# Patient Record
Sex: MALE | Race: WHITE | NOT HISPANIC OR LATINO | Employment: OTHER | URBAN - METROPOLITAN AREA
[De-identification: names, ages, dates, MRNs, and addresses within clinical notes are randomized per-mention and may not be internally consistent; named-entity substitution may affect disease eponyms.]

---

## 2019-05-23 ENCOUNTER — APPOINTMENT (OUTPATIENT)
Dept: CT IMAGING | Facility: CLINIC | Age: 69
End: 2019-05-23
Attending: EMERGENCY MEDICINE
Payer: COMMERCIAL

## 2019-05-23 ENCOUNTER — HOSPITAL ENCOUNTER (EMERGENCY)
Facility: CLINIC | Age: 69
Discharge: HOME | End: 2019-05-23
Attending: EMERGENCY MEDICINE | Admitting: EMERGENCY MEDICINE
Payer: COMMERCIAL

## 2019-05-23 VITALS
HEART RATE: 77 BPM | HEIGHT: 74 IN | BODY MASS INDEX: 36.57 KG/M2 | WEIGHT: 285 LBS | TEMPERATURE: 98.1 F | RESPIRATION RATE: 18 BRPM | DIASTOLIC BLOOD PRESSURE: 88 MMHG | SYSTOLIC BLOOD PRESSURE: 138 MMHG | OXYGEN SATURATION: 98 %

## 2019-05-23 DIAGNOSIS — R55 VASOVAGAL SYNCOPE: ICD-10-CM

## 2019-05-23 LAB
ANION GAP SERPL CALCULATED.3IONS-SCNC: 7 MMOL/L (ref 3–14)
BASOPHILS # BLD AUTO: 0 10E9/L (ref 0–0.2)
BASOPHILS NFR BLD AUTO: 0.4 %
BUN SERPL-MCNC: 11 MG/DL (ref 7–30)
CALCIUM SERPL-MCNC: 8.5 MG/DL (ref 8.5–10.1)
CHLORIDE SERPL-SCNC: 105 MMOL/L (ref 94–109)
CO2 SERPL-SCNC: 28 MMOL/L (ref 20–32)
CREAT SERPL-MCNC: 0.97 MG/DL (ref 0.66–1.25)
DIFFERENTIAL METHOD BLD: ABNORMAL
EOSINOPHIL # BLD AUTO: 0.1 10E9/L (ref 0–0.7)
EOSINOPHIL NFR BLD AUTO: 1.1 %
ERYTHROCYTE [DISTWIDTH] IN BLOOD BY AUTOMATED COUNT: 12.9 % (ref 10–15)
GFR SERPL CREATININE-BSD FRML MDRD: 79 ML/MIN/{1.73_M2}
GLUCOSE SERPL-MCNC: 104 MG/DL (ref 70–99)
HCT VFR BLD AUTO: 44.1 % (ref 40–53)
HGB BLD-MCNC: 15.7 G/DL (ref 13.3–17.7)
IMM GRANULOCYTES # BLD: 0 10E9/L (ref 0–0.4)
IMM GRANULOCYTES NFR BLD: 0.2 %
INR PPP: 1.9 (ref 0.86–1.14)
INTERPRETATION ECG - MUSE: NORMAL
LYMPHOCYTES # BLD AUTO: 1.3 10E9/L (ref 0.8–5.3)
LYMPHOCYTES NFR BLD AUTO: 23.4 %
MCH RBC QN AUTO: 31.8 PG (ref 26.5–33)
MCHC RBC AUTO-ENTMCNC: 35.6 G/DL (ref 31.5–36.5)
MCV RBC AUTO: 90 FL (ref 78–100)
MONOCYTES # BLD AUTO: 0.5 10E9/L (ref 0–1.3)
MONOCYTES NFR BLD AUTO: 9.9 %
NEUTROPHILS # BLD AUTO: 3.5 10E9/L (ref 1.6–8.3)
NEUTROPHILS NFR BLD AUTO: 65 %
NRBC # BLD AUTO: 0 10*3/UL
NRBC BLD AUTO-RTO: 0 /100
PLATELET # BLD AUTO: 139 10E9/L (ref 150–450)
POTASSIUM SERPL-SCNC: 4.1 MMOL/L (ref 3.4–5.3)
RBC # BLD AUTO: 4.93 10E12/L (ref 4.4–5.9)
SODIUM SERPL-SCNC: 140 MMOL/L (ref 133–144)
WBC # BLD AUTO: 5.3 10E9/L (ref 4–11)

## 2019-05-23 PROCEDURE — 93005 ELECTROCARDIOGRAM TRACING: CPT

## 2019-05-23 PROCEDURE — 80048 BASIC METABOLIC PNL TOTAL CA: CPT | Performed by: EMERGENCY MEDICINE

## 2019-05-23 PROCEDURE — 70450 CT HEAD/BRAIN W/O DYE: CPT

## 2019-05-23 PROCEDURE — 85610 PROTHROMBIN TIME: CPT | Performed by: EMERGENCY MEDICINE

## 2019-05-23 PROCEDURE — 99285 EMERGENCY DEPT VISIT HI MDM: CPT | Mod: 25

## 2019-05-23 PROCEDURE — 85025 COMPLETE CBC W/AUTO DIFF WBC: CPT | Performed by: EMERGENCY MEDICINE

## 2019-05-23 RX ORDER — WARFARIN SODIUM 1 MG/1
1 TABLET ORAL DAILY
COMMUNITY
End: 2020-09-13

## 2019-05-23 SDOH — HEALTH STABILITY: MENTAL HEALTH: HOW OFTEN DO YOU HAVE A DRINK CONTAINING ALCOHOL?: NEVER

## 2019-05-23 ASSESSMENT — ENCOUNTER SYMPTOMS: NAUSEA: 1

## 2019-05-23 ASSESSMENT — MIFFLIN-ST. JEOR: SCORE: 2122.5

## 2019-05-23 NOTE — ED AVS SNAPSHOT
Emergency Department  [address] [place] [address]  Phone:  [phone]  Fax:  [phone]                                    Cameron Barnard   MRN: [id number]    Department:   Emergency Department   Date of Visit:  5/23/2019           After Visit Summary Signature Page    I have received my discharge instructions, and my questions have been answered. I have discussed any challenges I see with this plan with the nurse or doctor.    ..........................................................................................................................................  Patient/Patient Representative Signature      ..........................................................................................................................................  Patient Representative Print Name and Relationship to Patient    ..................................................               ................................................  Date                                   Time    ..........................................................................................................................................  Reviewed by Signature/Title    ...................................................              ..............................................  Date                                               Time          22EPIC Rev 08/18

## 2019-05-23 NOTE — ED PROVIDER NOTES
"  History     Chief Complaint:  Loss of consciousness     HPI   Cameron Barnard is a 69 year old male who is on Coumadin and presents with a loss of consciousness. The patient was at [employer] and had just finished putting landscape rock onto his cart when he felt nauseas and experienced a loss of consciousness. he did not hit his head. He did go to the bathroom before leaving with the ambulance. He did have 3 cups of caffeinated coffee prior to visiting [employer] which he does not usually have as he drinks decaffeinated. He also hadn't taken his antihistamine for allergies today. Blood pressure was noticed to be 160 systolic per medics. Due to concern, the patient has been taken to the ED for evaluation and treatment via ambulance. Upon arrival, the patient's wife states he looked pale and sweaty this morning. EMS noticed color improvement here in the ED. The patient denies any chest pain. 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     Allergies:  Wheat     Medications:    Coumadin   Zyrtec     Past Medical History:    Primary Osteoarthritis  Deep vein thrombosis  Pulmonary embolism  Gout  Ascending aorta dilation    Past Surgical History:    The patient does not have any pertinent past surgical history.    Family History:    No past pertinent family history.    Social History:  Marital Status:  Single   Smoker:   No   Smokeless:   No   Alcohol:   Yes, occasional  Drugs:   No   Arrives with:   Wife    Review of Systems   Cardiovascular: Negative for chest pain.   Gastrointestinal: Positive for nausea.   Neurological: Positive for syncope.   All other systems reviewed and are negative.    Physical Exam     Patient Vitals for the past 24 hrs:   BP Temp Pulse Resp SpO2 Height Weight   05/23/19 1200 138/88 -- -- -- 98 % -- --   05/23/19 1100 157/88 -- -- -- 97 % -- --   05/23/19 1038 (!) 167/102 98.1  F (36.7  C) 77 18 98 % 1.88 m (6' 2\") 129.3 kg (285 lb)     Physical " Exam  GENERAL: well developed, pleasant  HEAD: atraumatic  EYES: pupils reactive, extraocular muscles intact, conjunctivae normal  ENT:  mucus membranes moist  NECK:  trachea midline, normal range of motion  RESPIRATORY: no tachypnea, breath sounds clear to auscultation   CVS: normal S1/S2, no murmurs, intact distal pulses  ABDOMEN: soft, nontender, nondistention  MUSCULOSKELETAL: no deformities  SKIN: warm and dry, no acute rashes or ulceration  NEURO: GCS 15, cranial nerves intact, alert and oriented x3  PSYCH:  Mood/affect normal    Emergency Department Course   ECG:  Indication: loss of consciousness  Time: 1103  Vent. Rate 70 bpm. NY interval 218. QRS duration 104. QT/QTc 404/436. P-R-T axis 42 -49 5. Sinus rhythm with 1st degree AV block. Incomplete right bundle branch block. Left anterior fascicular block. Minimal voltage criteria for LVH, may be normal variant. Cannot rule out anterior infarct, age undetermined. Abnormal ECG. Read time: 1119    Imaging:  Radiographic findings were communicated with the patient and family who voiced understanding of the findings.    CT Head without contrast:   No evidence of acute intracranial hemorrhage, mass, or  herniation. as per radiology.    Laboratory:  INR: 1.90  BMP: Glucose 104, o/w WNL (Creatinine: 0.97)  CBC: WBC: 5.3, HGB: 15.7, PLT: 139    Emergency Department Course:  1035 I performed an exam of the patient as documented above.   1158 I rechecked the patient and discussed the results of their workup thus far.     Findings and plan explained. Patient discharged home with instructions regarding supportive care, medications, and reasons to return. The importance of close follow-up was reviewed. I personally reviewed the laboratory results with them and answered all related questions prior to discharge.    Impression & Plan    Medical Decision Making:  The patient presents with syncope.  He notes that he was feeling nauseated and he frequently does feel this way  and had 3 cups of coffee today.  He was lifting six 50 pound bags of rocks and started feeling symptoms of lightheadedness.  The wooziness as he describes it intensified as did the lightheadedness and he felt it was due to the fact that he had not had his Zyrtec today and then had syncope.  Is not clear if he had head trauma.    Orthostatics were unrevealing. The patient's CT, labs, and EKG are unrevealing.  Certainly sounds vasovagal in nature as he has had prior episode of syncope in the past due to a heat exposure.  Possibly related to his nausea and coffee intake compounded with exertion of heavy lifting.  Discussed following up with his primary care doctor and avoiding heavy lifting today.  Should return if worsening symptoms.    Diagnosis:    ICD-10-CM    1. Vasovagal syncope R55      Disposition:  discharged to home    Scribe Disclosure:  Neville DIAMOND, am serving as a scribe on 5/23/2019 at 10:35 AM to personally document services performed by Dirk Benavides MD based on my observations and the provider's statements to me.     Neville Moss  5/23/2019    EMERGENCY DEPARTMENT       Dirk Benavides MD  05/28/19 0879

## 2019-05-23 NOTE — ED NOTES
Bed: ED27  Expected date:   Expected time:   Means of arrival:   Comments:  [facility] [place] - 428 - 69M syncope

## 2020-09-13 ENCOUNTER — APPOINTMENT (OUTPATIENT)
Dept: CT IMAGING | Facility: CLINIC | Age: 70
DRG: 390 | End: 2020-09-13
Attending: NURSE PRACTITIONER
Payer: COMMERCIAL

## 2020-09-13 ENCOUNTER — HOSPITAL ENCOUNTER (INPATIENT)
Facility: CLINIC | Age: 70
LOS: 7 days | Discharge: HOME | DRG: 390 | End: 2020-09-20
Attending: NURSE PRACTITIONER | Admitting: INTERNAL MEDICINE
Payer: COMMERCIAL

## 2020-09-13 DIAGNOSIS — R79.1 SUPRATHERAPEUTIC INR: ICD-10-CM

## 2020-09-13 DIAGNOSIS — K57.10: ICD-10-CM

## 2020-09-13 DIAGNOSIS — R31.9 HEMATURIA: ICD-10-CM

## 2020-09-13 DIAGNOSIS — R17 ELEVATED BILIRUBIN: ICD-10-CM

## 2020-09-13 DIAGNOSIS — K56.609 SMALL BOWEL OBSTRUCTION (H): ICD-10-CM

## 2020-09-13 PROBLEM — I82.412: Status: ACTIVE | Noted: 2017-05-01

## 2020-09-13 PROBLEM — I77.810 ASCENDING AORTA DILATION (H): Status: ACTIVE | Noted: 2017-05-01

## 2020-09-13 PROBLEM — M17.0 PRIMARY OSTEOARTHRITIS OF BOTH KNEES: Status: ACTIVE | Noted: 2019-03-12

## 2020-09-13 PROBLEM — K57.92 DIVERTICULITIS: Status: ACTIVE | Noted: 2020-09-13

## 2020-09-13 PROBLEM — Z79.01 ANTICOAGULATION MONITORING, INR RANGE 2-3: Status: ACTIVE | Noted: 2017-05-25

## 2020-09-13 LAB
ALBUMIN SERPL-MCNC: 3.8 G/DL (ref 3.4–5)
ALBUMIN UR-MCNC: 30 MG/DL
ALP SERPL-CCNC: 70 U/L (ref 40–150)
ALT SERPL W P-5'-P-CCNC: 32 U/L (ref 0–70)
ANION GAP SERPL CALCULATED.3IONS-SCNC: 6 MMOL/L (ref 3–14)
APPEARANCE UR: CLEAR
AST SERPL W P-5'-P-CCNC: 25 U/L (ref 0–45)
BASOPHILS # BLD AUTO: 0 10E9/L (ref 0–0.2)
BASOPHILS NFR BLD AUTO: 0 %
BILIRUB SERPL-MCNC: 2.1 MG/DL (ref 0.2–1.3)
BILIRUB UR QL STRIP: NEGATIVE
BUN SERPL-MCNC: 18 MG/DL (ref 7–30)
CALCIUM SERPL-MCNC: 9 MG/DL (ref 8.5–10.1)
CHLORIDE SERPL-SCNC: 103 MMOL/L (ref 94–109)
CO2 SERPL-SCNC: 27 MMOL/L (ref 20–32)
COLOR UR AUTO: YELLOW
CREAT SERPL-MCNC: 0.92 MG/DL (ref 0.66–1.25)
DIFFERENTIAL METHOD BLD: ABNORMAL
EOSINOPHIL # BLD AUTO: 0 10E9/L (ref 0–0.7)
EOSINOPHIL NFR BLD AUTO: 0 %
ERYTHROCYTE [DISTWIDTH] IN BLOOD BY AUTOMATED COUNT: 13.1 % (ref 10–15)
GFR SERPL CREATININE-BSD FRML MDRD: 83 ML/MIN/{1.73_M2}
GLUCOSE SERPL-MCNC: 131 MG/DL (ref 70–99)
GLUCOSE UR STRIP-MCNC: NEGATIVE MG/DL
HCT VFR BLD AUTO: 48.5 % (ref 40–53)
HGB BLD-MCNC: 17.2 G/DL (ref 13.3–17.7)
HGB UR QL STRIP: ABNORMAL
IMM GRANULOCYTES # BLD: 0.1 10E9/L (ref 0–0.4)
IMM GRANULOCYTES NFR BLD: 0.3 %
INR PPP: 3.97 (ref 0.86–1.14)
KETONES UR STRIP-MCNC: 10 MG/DL
LACTATE BLD-SCNC: 2.2 MMOL/L (ref 0.7–2)
LEUKOCYTE ESTERASE UR QL STRIP: NEGATIVE
LIPASE SERPL-CCNC: 86 U/L (ref 73–393)
LYMPHOCYTES # BLD AUTO: 1.6 10E9/L (ref 0.8–5.3)
LYMPHOCYTES NFR BLD AUTO: 7.4 %
MCH RBC QN AUTO: 32.1 PG (ref 26.5–33)
MCHC RBC AUTO-ENTMCNC: 35.5 G/DL (ref 31.5–36.5)
MCV RBC AUTO: 91 FL (ref 78–100)
MONOCYTES # BLD AUTO: 1.7 10E9/L (ref 0–1.3)
MONOCYTES NFR BLD AUTO: 7.8 %
MUCOUS THREADS #/AREA URNS LPF: PRESENT /LPF
NEUTROPHILS # BLD AUTO: 17.8 10E9/L (ref 1.6–8.3)
NEUTROPHILS NFR BLD AUTO: 84.5 %
NITRATE UR QL: NEGATIVE
NRBC # BLD AUTO: 0 10*3/UL
NRBC BLD AUTO-RTO: 0 /100
PH UR STRIP: 8.5 PH (ref 5–7)
PLATELET # BLD AUTO: 236 10E9/L (ref 150–450)
POTASSIUM SERPL-SCNC: 3.7 MMOL/L (ref 3.4–5.3)
PROCALCITONIN SERPL-MCNC: 0.85 NG/ML
PROT SERPL-MCNC: 7.8 G/DL (ref 6.8–8.8)
RBC # BLD AUTO: 5.35 10E12/L (ref 4.4–5.9)
RBC #/AREA URNS AUTO: 103 /HPF (ref 0–2)
SODIUM SERPL-SCNC: 136 MMOL/L (ref 133–144)
SOURCE: ABNORMAL
SP GR UR STRIP: 1.03 (ref 1–1.03)
SQUAMOUS #/AREA URNS AUTO: <1 /HPF (ref 0–1)
UROBILINOGEN UR STRIP-MCNC: 2 MG/DL (ref 0–2)
WBC # BLD AUTO: 21.1 10E9/L (ref 4–11)
WBC #/AREA URNS AUTO: 1 /HPF (ref 0–5)

## 2020-09-13 PROCEDURE — 99223 1ST HOSP IP/OBS HIGH 75: CPT | Mod: AI | Performed by: INTERNAL MEDICINE

## 2020-09-13 PROCEDURE — 74177 CT ABD & PELVIS W/CONTRAST: CPT

## 2020-09-13 PROCEDURE — 96375 TX/PRO/DX INJ NEW DRUG ADDON: CPT

## 2020-09-13 PROCEDURE — 25000128 H RX IP 250 OP 636: Performed by: NURSE PRACTITIONER

## 2020-09-13 PROCEDURE — 36415 COLL VENOUS BLD VENIPUNCTURE: CPT | Performed by: INTERNAL MEDICINE

## 2020-09-13 PROCEDURE — 25800030 ZZH RX IP 258 OP 636: Performed by: NURSE PRACTITIONER

## 2020-09-13 PROCEDURE — C9113 INJ PANTOPRAZOLE SODIUM, VIA: HCPCS | Performed by: INTERNAL MEDICINE

## 2020-09-13 PROCEDURE — 25000125 ZZHC RX 250: Performed by: NURSE PRACTITIONER

## 2020-09-13 PROCEDURE — 96365 THER/PROPH/DIAG IV INF INIT: CPT | Mod: 59

## 2020-09-13 PROCEDURE — 85025 COMPLETE CBC W/AUTO DIFF WBC: CPT | Performed by: NURSE PRACTITIONER

## 2020-09-13 PROCEDURE — 84145 PROCALCITONIN (PCT): CPT | Performed by: NURSE PRACTITIONER

## 2020-09-13 PROCEDURE — 96361 HYDRATE IV INFUSION ADD-ON: CPT

## 2020-09-13 PROCEDURE — 99285 EMERGENCY DEPT VISIT HI MDM: CPT | Mod: 25

## 2020-09-13 PROCEDURE — 81001 URINALYSIS AUTO W/SCOPE: CPT | Performed by: NURSE PRACTITIONER

## 2020-09-13 PROCEDURE — 80053 COMPREHEN METABOLIC PANEL: CPT | Performed by: NURSE PRACTITIONER

## 2020-09-13 PROCEDURE — 12000000 ZZH R&B MED SURG/OB

## 2020-09-13 PROCEDURE — 87040 BLOOD CULTURE FOR BACTERIA: CPT | Performed by: NURSE PRACTITIONER

## 2020-09-13 PROCEDURE — 83605 ASSAY OF LACTIC ACID: CPT | Performed by: INTERNAL MEDICINE

## 2020-09-13 PROCEDURE — 83690 ASSAY OF LIPASE: CPT | Performed by: NURSE PRACTITIONER

## 2020-09-13 PROCEDURE — 25000128 H RX IP 250 OP 636: Performed by: INTERNAL MEDICINE

## 2020-09-13 PROCEDURE — 85610 PROTHROMBIN TIME: CPT | Performed by: NURSE PRACTITIONER

## 2020-09-13 PROCEDURE — U0003 INFECTIOUS AGENT DETECTION BY NUCLEIC ACID (DNA OR RNA); SEVERE ACUTE RESPIRATORY SYNDROME CORONAVIRUS 2 (SARS-COV-2) (CORONAVIRUS DISEASE [COVID-19]), AMPLIFIED PROBE TECHNIQUE, MAKING USE OF HIGH THROUGHPUT TECHNOLOGIES AS DESCRIBED BY CMS-2020-01-R: HCPCS | Performed by: NURSE PRACTITIONER

## 2020-09-13 PROCEDURE — 25800030 ZZH RX IP 258 OP 636: Performed by: INTERNAL MEDICINE

## 2020-09-13 PROCEDURE — C9803 HOPD COVID-19 SPEC COLLECT: HCPCS

## 2020-09-13 RX ORDER — POTASSIUM CHLORIDE 29.8 MG/ML
20 INJECTION INTRAVENOUS
Status: DISCONTINUED | OUTPATIENT
Start: 2020-09-13 | End: 2020-09-20 | Stop reason: HOSPADM

## 2020-09-13 RX ORDER — POTASSIUM CL/LIDO/0.9 % NACL 10MEQ/0.1L
10 INTRAVENOUS SOLUTION, PIGGYBACK (ML) INTRAVENOUS
Status: DISCONTINUED | OUTPATIENT
Start: 2020-09-13 | End: 2020-09-20 | Stop reason: HOSPADM

## 2020-09-13 RX ORDER — PIPERACILLIN SODIUM, TAZOBACTAM SODIUM 3; .375 G/15ML; G/15ML
3.38 INJECTION, POWDER, LYOPHILIZED, FOR SOLUTION INTRAVENOUS ONCE
Status: COMPLETED | OUTPATIENT
Start: 2020-09-13 | End: 2020-09-13

## 2020-09-13 RX ORDER — ACETAMINOPHEN 500 MG
1000 TABLET ORAL DAILY PRN
Status: ON HOLD | COMMUNITY
End: 2021-02-11

## 2020-09-13 RX ORDER — LIDOCAINE 40 MG/G
CREAM TOPICAL
Status: DISCONTINUED | OUTPATIENT
Start: 2020-09-13 | End: 2020-09-20 | Stop reason: HOSPADM

## 2020-09-13 RX ORDER — IOPAMIDOL 755 MG/ML
135 INJECTION, SOLUTION INTRAVASCULAR ONCE
Status: COMPLETED | OUTPATIENT
Start: 2020-09-13 | End: 2020-09-13

## 2020-09-13 RX ORDER — ONDANSETRON 2 MG/ML
4 INJECTION INTRAMUSCULAR; INTRAVENOUS ONCE
Status: COMPLETED | OUTPATIENT
Start: 2020-09-13 | End: 2020-09-13

## 2020-09-13 RX ORDER — POTASSIUM CHLORIDE 7.45 MG/ML
10 INJECTION INTRAVENOUS
Status: DISCONTINUED | OUTPATIENT
Start: 2020-09-13 | End: 2020-09-20 | Stop reason: HOSPADM

## 2020-09-13 RX ORDER — POTASSIUM CHLORIDE 1500 MG/1
20-40 TABLET, EXTENDED RELEASE ORAL
Status: DISCONTINUED | OUTPATIENT
Start: 2020-09-13 | End: 2020-09-20 | Stop reason: HOSPADM

## 2020-09-13 RX ORDER — BISACODYL 10 MG
10 SUPPOSITORY, RECTAL RECTAL DAILY PRN
Status: DISCONTINUED | OUTPATIENT
Start: 2020-09-13 | End: 2020-09-20 | Stop reason: HOSPADM

## 2020-09-13 RX ORDER — WARFARIN SODIUM 5 MG/1
10 TABLET ORAL SEE ADMIN INSTRUCTIONS
Status: ON HOLD | COMMUNITY
End: 2020-10-02

## 2020-09-13 RX ORDER — PROCHLORPERAZINE MALEATE 5 MG
5 TABLET ORAL EVERY 6 HOURS PRN
Status: DISCONTINUED | OUTPATIENT
Start: 2020-09-13 | End: 2020-09-20 | Stop reason: HOSPADM

## 2020-09-13 RX ORDER — AMOXICILLIN 250 MG
1 CAPSULE ORAL 2 TIMES DAILY PRN
Status: DISCONTINUED | OUTPATIENT
Start: 2020-09-13 | End: 2020-09-20 | Stop reason: HOSPADM

## 2020-09-13 RX ORDER — ONDANSETRON 4 MG/1
4 TABLET, ORALLY DISINTEGRATING ORAL EVERY 6 HOURS PRN
Status: DISCONTINUED | OUTPATIENT
Start: 2020-09-13 | End: 2020-09-20 | Stop reason: HOSPADM

## 2020-09-13 RX ORDER — POTASSIUM CHLORIDE 1.5 G/1.58G
20-40 POWDER, FOR SOLUTION ORAL
Status: DISCONTINUED | OUTPATIENT
Start: 2020-09-13 | End: 2020-09-20 | Stop reason: HOSPADM

## 2020-09-13 RX ORDER — SODIUM CHLORIDE 9 MG/ML
INJECTION, SOLUTION INTRAVENOUS CONTINUOUS
Status: DISCONTINUED | OUTPATIENT
Start: 2020-09-13 | End: 2020-09-19

## 2020-09-13 RX ORDER — WARFARIN SODIUM 5 MG/1
7.5 TABLET ORAL SEE ADMIN INSTRUCTIONS
Status: ON HOLD | COMMUNITY
Start: 2020-09-25 | End: 2020-10-02

## 2020-09-13 RX ORDER — PIPERACILLIN SODIUM, TAZOBACTAM SODIUM 3; .375 G/15ML; G/15ML
3.38 INJECTION, POWDER, LYOPHILIZED, FOR SOLUTION INTRAVENOUS EVERY 6 HOURS
Status: DISCONTINUED | OUTPATIENT
Start: 2020-09-13 | End: 2020-09-19

## 2020-09-13 RX ORDER — AMOXICILLIN 250 MG
2 CAPSULE ORAL 2 TIMES DAILY PRN
Status: DISCONTINUED | OUTPATIENT
Start: 2020-09-13 | End: 2020-09-20 | Stop reason: HOSPADM

## 2020-09-13 RX ORDER — NALOXONE HYDROCHLORIDE 0.4 MG/ML
.1-.4 INJECTION, SOLUTION INTRAMUSCULAR; INTRAVENOUS; SUBCUTANEOUS
Status: DISCONTINUED | OUTPATIENT
Start: 2020-09-13 | End: 2020-09-20 | Stop reason: HOSPADM

## 2020-09-13 RX ORDER — ONDANSETRON 2 MG/ML
4 INJECTION INTRAMUSCULAR; INTRAVENOUS EVERY 6 HOURS PRN
Status: DISCONTINUED | OUTPATIENT
Start: 2020-09-13 | End: 2020-09-20 | Stop reason: HOSPADM

## 2020-09-13 RX ORDER — PROCHLORPERAZINE 25 MG
12.5 SUPPOSITORY, RECTAL RECTAL EVERY 12 HOURS PRN
Status: DISCONTINUED | OUTPATIENT
Start: 2020-09-13 | End: 2020-09-20 | Stop reason: HOSPADM

## 2020-09-13 RX ORDER — HYDROMORPHONE HYDROCHLORIDE 1 MG/ML
.3-.5 INJECTION, SOLUTION INTRAMUSCULAR; INTRAVENOUS; SUBCUTANEOUS
Status: DISCONTINUED | OUTPATIENT
Start: 2020-09-13 | End: 2020-09-20 | Stop reason: HOSPADM

## 2020-09-13 RX ADMIN — SODIUM CHLORIDE 79 ML: 9 INJECTION, SOLUTION INTRAVENOUS at 15:16

## 2020-09-13 RX ADMIN — IOPAMIDOL 135 ML: 755 INJECTION, SOLUTION INTRAVENOUS at 15:16

## 2020-09-13 RX ADMIN — HYDROMORPHONE HYDROCHLORIDE 0.3 MG: 1 INJECTION, SOLUTION INTRAMUSCULAR; INTRAVENOUS; SUBCUTANEOUS at 20:51

## 2020-09-13 RX ADMIN — SODIUM CHLORIDE 1000 ML: 9 INJECTION, SOLUTION INTRAVENOUS at 14:22

## 2020-09-13 RX ADMIN — PIPERACILLIN SODIUM AND TAZOBACTAM SODIUM 3.38 G: 3; .375 INJECTION, POWDER, LYOPHILIZED, FOR SOLUTION INTRAVENOUS at 22:00

## 2020-09-13 RX ADMIN — SODIUM CHLORIDE: 9 INJECTION, SOLUTION INTRAVENOUS at 19:33

## 2020-09-13 RX ADMIN — PANTOPRAZOLE SODIUM 40 MG: 40 INJECTION, POWDER, FOR SOLUTION INTRAVENOUS at 19:41

## 2020-09-13 RX ADMIN — ONDANSETRON 4 MG: 2 INJECTION INTRAMUSCULAR; INTRAVENOUS at 14:24

## 2020-09-13 RX ADMIN — PIPERACILLIN SODIUM AND TAZOBACTAM SODIUM 3.38 G: 3; .375 INJECTION, POWDER, LYOPHILIZED, FOR SOLUTION INTRAVENOUS at 16:34

## 2020-09-13 ASSESSMENT — ENCOUNTER SYMPTOMS
CONSTIPATION: 1
VOMITING: 1
FEVER: 0
ABDOMINAL PAIN: 1
DIARRHEA: 0
NAUSEA: 1

## 2020-09-13 ASSESSMENT — ACTIVITIES OF DAILY LIVING (ADL): ADLS_ACUITY_SCORE: 15

## 2020-09-13 ASSESSMENT — MIFFLIN-ST. JEOR
SCORE: 2131.11
SCORE: 2094.82

## 2020-09-13 NOTE — H&P
[facility]    History and Physical  Hospitalist       Date of Admission:  9/13/2020  Date of Service (when I saw the patient): 09/13/20    Assessment & Plan   Cameron Barnard is a 70 year old male who presents with abdominal pain    Asymptomatic COVID-19 sent, low suspicion    Diverticulitis  Small bowel obstruction  Presented with LLQ pain. Started evening prior to presentation, episodes of n/v since. No diarrhea or fever. Largely resolved in ED after emesis.  Afebrile. Procal 0.85. WBC 21.1. CT with low grade SBO with transition in LLQ. Also possible duodenal diverticulitis, malignancy may have same appearance  - blood cultures pending  - zosyn  - NPO, IV fluids  - protonix IV daily  - hold NG for now  - prn hydromorphone for pain  - GI consult, valdo given abnormalities seen on CT    Elevated bilirubin  Cholelithiasis  Hepatic steatosis and suggestion of cirrhosis/ chronic liver disease  Appears as if he used to drink somewhat heavily but none more receent. Noted 2.1 on admission, CT with steatosis and possible cirrhosis.  Bili was normal 11/2019 1.1.   - GI consult as above    Hematuria  No stones seen on CT admission. Will need urology referral at discharge    H/o PE/ DVT  On chronic warfarin for this. INR 3.97 on presentation  - while NPO, convert to lovenox subcutaneous once INR<3.0    HLD  Resume statin when PO    Dilated ascending aorta  Follows with cardiology (Dr. Watson) through [facility]. 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 increased risk of all cause morbidity and mortality  - encourage weight loss and healthy lifestyle    DVT Prophylaxis: Warfarin  Code Status: DNR / DNI    Disposition: Expected discharge in 3-4 days pending improvement in SBO and diverticulitis as well as any plans by GI    Maverick Montemayor MD  [phone] (P)  Text Page     Primary Care Physician   [facility]    Chief Complaint   Abdominal  pain    History is obtained from the patient and medical records    History of Present Illness   Cameron Barnard is a 70 year old male who presents with abdominal pain.  Is a delightful male who states that approximately 1 month ago he had an episode of abdominal pain with nausea and vomiting.  He actually made himself n.p.o. for 4 days and then gradually advance his diet.  His pain resolved.  Then, the evening prior to presentation he developed the same pain and felt like crap.  He had nausea and vomiting.  States the pain was generally in his left lower quadrant port across spread across his lower abdomen as well as occasionally go up into his upper abdomen on the left.  He had persistent nausea and vomiting overnight and states he would wake up every 45 minutes with paroxysms of pain.  Did not radiate to the back.  He had a minor bowel movement this a.m. otherwise none.  States the pain persisted prompting his presentation to the emergency department.  On arrival in the emergency department he had an episode of nausea with emesis.  He states after this the pain largely resolved.  He otherwise denies chest pain or shortness of breath.  Denies fevers or chills.  He has chronic left lower extremity edema secondary to DVT.    Past Medical History    I have reviewed this patient's medical history and updated it with pertinent information if needed.   Past Medical History:   Diagnosis Date     Allergic state      DVT (deep vein thrombosis) in pregnancy (H)        Past Surgical History   I have reviewed this patient's surgical history and updated it with pertinent information if needed.  No past surgical history on file.    Prior to Admission Medications   Prior to Admission Medications   Prescriptions Last Dose Informant Patient Reported? Taking?   warfarin (COUMADIN) 1 MG tablet   Yes No   Sig: Take 1 mg by mouth daily      Facility-Administered Medications: None     Allergies   No Known Allergies    Social History   I  have reviewed this patient's social history and updated it with pertinent information if needed. Cameron Barnard  reports that he has never smoked. He does not have any smokeless tobacco history on file. He reports that he does not drink alcohol or use drugs.    Family History   I have reviewed this patient's family history and updated it with pertinent information if needed.   Sister with breast cancer, gall bladder disease    Review of Systems   The 10 point Review of Systems is negative other than noted in the HPI or here.     Physical Exam   Temp: 98.2  F (36.8  C) Temp src: Oral BP: 130/75 Pulse: 74   Resp: 26 SpO2: 93 % O2 Device: None (Room air)    Vital Signs with Ranges  280 lbs 0 oz    Constitutional: alert, oriented and in no acute distress  Eyes: EOMI, PERRL  HEENT: OP clear  Respiratory: CTA B without w/c  Cardiovascular: RRR without m/r/g. L>R LE edema  GI: soft, nontender, obese, no RUQ tenderness  Lymph/Hematologic: no cervical LAD  Genitourinary: deferred  Skin: no rashes or lesions grossly  Musculoskeletal: no deformities or arthritis  Neurologic: CN II-XII, GREY, sensation grossly intact  Psychiatric: mood and affect wnl    Data   Data reviewed today:  I personally reviewed the abdominal CT image(s) showing diverticulitis, SBO.  Recent Labs   Lab 09/13/20  1419   WBC 21.1*   HGB 17.2   MCV 91      INR 3.97*      POTASSIUM 3.7   CHLORIDE 103   CO2 27   BUN 18   CR 0.92   ANIONGAP 6   PETER 9.0   *   ALBUMIN 3.8   PROTTOTAL 7.8   BILITOTAL 2.1*   ALKPHOS 70   ALT 32   AST 25   LIPASE 86       Recent Results (from the past 24 hour(s))   CT Abdomen Pelvis w Contrast    Narrative    CT ABDOMEN PELVIS W CONTRAST 9/13/2020 3:28 PM    CLINICAL HISTORY: Abd pain, diverticulitis suspected    TECHNIQUE: CT scan of the abdomen and pelvis was performed following  injection of IV contrast. Multiplanar reformats were obtained. Dose  reduction techniques were used.  CONTRAST: 135 mL  Isovue-370    COMPARISON: None.    FINDINGS:   LOWER CHEST: Normal.    HEPATOBILIARY: Suggestion of micronodular contour to the surface of  the liver. Mild hepatic steatosis. Cholelithiasis without biliary  ductal dilatation.    PANCREAS: Mild fatty atrophy.    SPLEEN: Normal.    ADRENAL GLANDS: Normal.    KIDNEYS/BLADDER: Symmetric enhancement of both kidneys. No  hydronephrosis. Subcentimeter hypodensity in the right kidney that is  too small to characterize, though statistically likely benign. Urinary  bladder is unremarkable.    BOWEL: Multiple fluid-filled dilated loops of small bowel in the left  lower quadrant with gradual transition to normal caliber in the  anterior left lower quadrant series 4/image 164. No pneumoperitoneum  or portal venous gas. No pneumatosis intestinalis. There is short  segmental mural thickening and enhancement in a distal left lower  quadrant small bowel loop with fluid filled dilated small bowel loops  proximal and distal series 4/image 197, series 6/image 82.    There is a moderate fluid and gas-filled descending duodenal  diverticulum with adjacent irregular enhancement and associated  stranding. Distal colonic diverticulosis without acute diverticulitis.    PELVIC ORGANS: Unremarkable. Trace dependent pelvic free fluid, likely  reactive.    ADDITIONAL FINDINGS: Abdominal aorta normal in caliber with minimal  calcified atheromatous plaque. Major portal veins are patent.    MUSCULOSKELETAL: Multilevel lower lumbar spondylosis.      Impression    IMPRESSION:   1.  Low-grade small bowel obstruction with gradual transition point in  the anterior left lower quadrant.  2.  Short segment mural thickening of distal small bowel loops, remote  and proximal transition point, may be reactive.  3.  Prominent descending duodenal diverticulum with adjacent  thickening and enhancement. The thickening and enhancement may be due  to adjacent compressed pancreatic parenchyma, though  duodenal  diverticulitis or malignancy can have a similar appearance. Close  clinical follow-up and consideration for endoscopy for further  evaluation.  4.  Distal colonic diverticulosis without acute diverticulitis.  5.  Cholelithiasis.  6.  Hepatic steatosis and suggestion of chronic liver disease  (cirrhosis).    MAX DURAND MD

## 2020-09-13 NOTE — PROGRESS NOTES
RECEIVING UNIT ED HANDOFF REVIEW    ED Nurse Handoff Report was reviewed by: Carmella Agee RN on September 13, 2020 at 6:00 PM

## 2020-09-13 NOTE — ED PROVIDER NOTES
"  History     Chief Complaint:  Lower abdominal pain    HPI   Cameron Barnard is a 70 year old male with history of LLE DVT, PE, ascending aorta dilatation, who is anticoagulated on coumadin who presents with left lower quadrant abdominal pain. The patient states the pain began last night and has had 6 or 7 episodes of emesis since then. He has been dry heaving. The pain first began as upper epigastric pain which then radiated to the lower abdominal area. The patient denies diarrhea, fever, or urinary symptoms. He does endorse constipation. He notes similar symptoms last week but did not eat for 4 days and had resolution of symptoms.     Allergies:  No Known Drug Allergies    Medications:    Coumadin     Past Medical History:    Left femoral vein DVT  Osteoarthritis of both knees  Pulmonary embolism  Ascending aorta dilation  Gout  Dyslipidemia  Hiatal hernia    Past Surgical History:    The patient does not have any pertinent past surgical history.    Family History:    Mother - Blood disease  Sister - Breast cancer    Social History:  The patient was accompanied to the ED by wife.  Smoking Status: Never Smoker  Smokeless tobacco: Never Used  Alcohol Use: Never  Drug Use: Never  PCP:  No Ref-Primary, Physician   Marital Status:   [2]     Review of Systems   Constitutional: Negative for fever.   Gastrointestinal: Positive for abdominal pain, constipation, nausea and vomiting. Negative for diarrhea.   Genitourinary: Negative.    All other systems reviewed and are negative.    Physical Exam     Patient Vitals for the past 24 hrs:   BP Temp Temp src Pulse Resp SpO2 Height Weight   09/13/20 1500 131/80 -- -- 82 -- 90 % -- --   09/13/20 1445 128/81 -- -- 81 -- 94 % -- --   09/13/20 1430 128/82 -- -- 87 -- 95 % -- --   09/13/20 1352 (!) 152/81 98.2  F (36.8  C) Oral 98 26 97 % 1.88 m (6' 2\") 127 kg (280 lb)       Physical Exam  Nursing notes reviewed. Vitals reviewed.  General: Alert. Well kept.  Eyes:  Conjunctiva " non-injected, non-icteric.  Neck/Throat: Moist mucous membranes.  Normal voice.  Cardiac: Regular rhythm. Normal heart sounds with no murmur/rubs/click.   Pulmonary: Clear and equal breath sounds bilaterally. No crackles/rales. No wheezing  Abdomen: Soft. Non-distended. Left lower quadrant and suprapubic tenderness to palpation. No guarding or rebound.  Musculoskeletal: Normal gross range of motion of all 4 extremities.    Neurological: Alert and oriented x4.   Skin: Warm and dry without rashes or petechiae. Normal appearance of visualized exposed skin.  Psych: Affect normal. Good eye contact.    Emergency Department Course     Imaging:  Radiology findings were communicated with the patient who voiced understanding of the findings.    CT Abdomen Pelvis w contrast   1.  Low-grade small bowel obstruction with gradual transition point in  the anterior left lower quadrant.  2.  Short segment mural thickening of distal small bowel loops, remote  and proximal transition point, may be reactive.  3.  Prominent descending duodenal diverticulum with adjacent  thickening and enhancement. The thickening and enhancement may be due  to adjacent compressed pancreatic parenchyma, though duodenal  diverticulitis or malignancy can have a similar appearance. Close  clinical follow-up and consideration for endoscopy for further  evaluation.  4.  Distal colonic diverticulosis without acute diverticulitis.  5.  Cholelithiasis.  6.  Hepatic steatosis and suggestion of chronic liver disease  (cirrhosis).  Reading per radiology    Laboratory:  Laboratory findings were communicated with the patient who voiced understanding of the findings.    CBC: WBC 21.1 (H), o/w WNL (HGB 17.2, )  CMP: Glucose 131 (H), Bilirubin 2,1 (H), o/w WNL (Creatinine 0.92)  Lipase: 86  INR: 3.97 (H)  Procalcitonin: 0.85  Blood Culture x2: Pending    UA with micro: Ketones 10 (A), Blood Small (A), pH 8.5 (H), Albumin 30 (A),  (H), Mucous Present (A),  o/w negative    COVID-19 Virus (Coronavirus) by PCR: Pending.     Interventions:  1422 0.9% NaCl bolus 1000 mL IV  1424 zofran 4 mg IV  1634 zosyn 3.375 g IV    Emergency Department Course:  Past medical records, nursing notes, and vitals reviewed.    (1434)   I performed an exam of the patient as documented above. History obtained from patient.      The patient was sent for a CT Abdomen Pelvis w contrast while in the emergency department, results above.     IV was inserted and blood was drawn for laboratory testing, results above.   The patient provided a urine sample here in the emergency department. This was sent for laboratory testing, findings above.    A nasal swab was obtained for laboratory testing, findings above.      (1630)   I rechecked the patient and discussed results and plan of care.     (1655)   I spoke with Dr. Montemayor of the Hospitalist service regarding patient's presentation, findings, and plan of care.     Findings and plan explained to the Patient who consents to admission. Discussed the patient with Dr. Montemayor, who will admit the patient to a med/surg bed for further monitoring, evaluation, and treatment.     I personally reviewed the laboratory and imaging results with the Patient and answered all related questions prior to admission.     Impression & Plan     Medical Decision Making:  Cameron Barnard is a 70 year old male with a history of ascending aortic dilatation, left femoral DVT, and pulmonary embolism, currently anticoagulated on warfarin who presents for evaluation of abdominal pain with nausea and vomiting. He is afebrile but WBC returns today at 21.1 with a neutrophil at 17.8. He does have a mild hyperbilirubinemia with bilirubin at 2.1, the rest of his hepatic panel and lipase are normal. His urine has 103 RBCs and no signs of infection and his INR is supra therapeutic at 3.97. CT of abdomen and pelvis shows a low grade small bowel obstruction with the transition point in the  left lower quadrant which is consistent with the patients pain. There is also a prominent descending duodenal diverticulum with thickening and enhancement concerning for duodenal diverticulitis or malignancy. The patient's procalcitonin is 0.85 but with the leukocytosis and the CT findings I will treat him for duodenal diverticulitis. Blood cultures were obtained. No indication for NG tube today he feels improved with Zofran. I spoke with  hospitalist who accepts him for admission.     Diagnosis:    ICD-10-CM    1. Elevated bilirubin  R17 Procalcitonin     Procalcitonin     Blood culture     Blood culture     Asymptomatic COVID-19 Virus (Coronavirus) by PCR     CANCELED: Procalcitonin   2. Supratherapeutic INR  R79.1    3. Small bowel obstruction (H)  K56.609    4. Diverticulum of duodenum with complication  K57.10    5. Hematuria  R31.9      Disposition:  Admitted to med/surg bed.    Scribe Disclosure:  I, Milena Capps, am serving as a scribe at 2:16 PM on 9/13/2020 to document services personally performed by Tricia Glasgow DNP based on my observations and the provider's statements to me.   9/13/2020    EMERGENCY DEPARTMENT       Tricia Glasgow CNP  09/13/20 [id number]

## 2020-09-13 NOTE — PHARMACY-ADMISSION MEDICATION HISTORY
"Pharmacy Medication History  Admission medication history interview status for the 9/13/2020  admission is complete. See EPIC admission navigator for prior to admission medications     Medication history sources: Patient  Medication history source reliability: Good  Adherence assessment: Good    Significant changes made to the medication list:  Medications added:  - Acetaminophen  - \"OTC sleeping pill\" under \"Unknown to patient\"  - \"Eye drop for allergies\" under \"Unknown to patient\"      Additional medication history information:   None    Medication reconciliation completed by provider prior to medication history? No    Time spent in this activity: 15 minutes      Prior to Admission medications    Medication Sig Last Dose Taking? Auth Provider   acetaminophen (TYLENOL) 500 MG tablet Take 1,000 mg by mouth daily as needed  at PRN Yes Unknown, Entered By History   UNKNOWN TO PATIENT Take 0.5 tablets by mouth nightly as needed \"OTC Sleeping pill\" per patient  at PRN Yes Unknown, Entered By History   UNKNOWN TO PATIENT Place 3 drops into both eyes daily as needed \"Eye drop for allergies\" per patient  at PRN Yes Unknown, Entered By History   warfarin ANTICOAGULANT (COUMADIN) 5 MG tablet Take 10 mg by mouth daily Except on Wednesdays 9/12/2020 at Unknown time Yes Unknown, Entered By History   warfarin ANTICOAGULANT (COUMADIN) 5 MG tablet Take 7.5 mg by mouth On Wednesdays 9/9/2020 Yes Unknown, Entered By History       "

## 2020-09-13 NOTE — ED TRIAGE NOTES
Pt has LLQ abdominal pain;  Has been having episodes of emesis since last night.  States that he is maybe a little constipated; no bladder issues.

## 2020-09-13 NOTE — ED NOTES
[facility]  ED Nurse Handoff Report    ED Chief complaint: No chief complaint on file.      ED Diagnosis:   Final diagnoses:   Elevated bilirubin   Supratherapeutic INR   Small bowel obstruction (H)       Code Status: Full Code    Allergies: No Known Allergies    Patient Story: Pt has had abd pain since last night.  Focused Assessment:  Alert and oriented.  VSS.  Up independently.  Has had roughly 6-7 episodes of emesis since last night.  Pt states that he has been slightly constipated.    Treatments and/or interventions provided: abx, 1L bolus  Patient's response to treatments and/or interventions:     To be done/followed up on inpatient unit:  See epic    Does this patient have any cognitive concerns?:     Activity level - Baseline/Home:  Independent  Activity Level - Current:   Independent    Patient's Preferred language: English   Needed?: No    Isolation: None  Infection: Not Applicable  Bariatric?: No    Vital Signs:   Vitals:    09/13/20 1430 09/13/20 1445 09/13/20 1500 09/13/20 1600   BP: 128/82 128/81 131/80 130/75   Pulse: 87 81 82 74   Resp:       Temp:       TempSrc:       SpO2: 95% 94% 90% 93%   Weight:       Height:           Cardiac Rhythm:     Was the PSS-3 completed:   Yes  What interventions are required if any?               Family Comments: wife @ bedside  OBS brochure/video discussed/provided to patient/family: Yes              Name of person given brochure if not patient:               Relationship to patient:     For the majority of the shift this patient's behavior was Green.   Behavioral interventions performed were .    ED NURSE PHONE NUMBER: [id number]

## 2020-09-14 LAB
ALBUMIN SERPL-MCNC: 2.8 G/DL (ref 3.4–5)
ALP SERPL-CCNC: 53 U/L (ref 40–150)
ALT SERPL W P-5'-P-CCNC: 22 U/L (ref 0–70)
ANION GAP SERPL CALCULATED.3IONS-SCNC: 3 MMOL/L (ref 3–14)
AST SERPL W P-5'-P-CCNC: 17 U/L (ref 0–45)
BILIRUB SERPL-MCNC: 2.3 MG/DL (ref 0.2–1.3)
BUN SERPL-MCNC: 19 MG/DL (ref 7–30)
CALCIUM SERPL-MCNC: 7.4 MG/DL (ref 8.5–10.1)
CHLORIDE SERPL-SCNC: 108 MMOL/L (ref 94–109)
CO2 SERPL-SCNC: 27 MMOL/L (ref 20–32)
CREAT SERPL-MCNC: 1.05 MG/DL (ref 0.66–1.25)
ERYTHROCYTE [DISTWIDTH] IN BLOOD BY AUTOMATED COUNT: 13.3 % (ref 10–15)
GFR SERPL CREATININE-BSD FRML MDRD: 71 ML/MIN/{1.73_M2}
GLUCOSE SERPL-MCNC: 116 MG/DL (ref 70–99)
HCT VFR BLD AUTO: 41.6 % (ref 40–53)
HGB BLD-MCNC: 13.9 G/DL (ref 13.3–17.7)
INR PPP: 3.55 (ref 0.86–1.14)
LACTATE BLD-SCNC: 1.1 MMOL/L (ref 0.7–2)
MCH RBC QN AUTO: 31.2 PG (ref 26.5–33)
MCHC RBC AUTO-ENTMCNC: 33.4 G/DL (ref 31.5–36.5)
MCV RBC AUTO: 93 FL (ref 78–100)
PLATELET # BLD AUTO: 153 10E9/L (ref 150–450)
POTASSIUM SERPL-SCNC: 3.7 MMOL/L (ref 3.4–5.3)
PROT SERPL-MCNC: 6.1 G/DL (ref 6.8–8.8)
RBC # BLD AUTO: 4.46 10E12/L (ref 4.4–5.9)
SARS-COV-2 RNA SPEC QL NAA+PROBE: NOT DETECTED
SODIUM SERPL-SCNC: 138 MMOL/L (ref 133–144)
SPECIMEN SOURCE: NORMAL
WBC # BLD AUTO: 11.8 10E9/L (ref 4–11)

## 2020-09-14 PROCEDURE — 83605 ASSAY OF LACTIC ACID: CPT | Performed by: INTERNAL MEDICINE

## 2020-09-14 PROCEDURE — 25800030 ZZH RX IP 258 OP 636: Performed by: INTERNAL MEDICINE

## 2020-09-14 PROCEDURE — 85610 PROTHROMBIN TIME: CPT | Performed by: INTERNAL MEDICINE

## 2020-09-14 PROCEDURE — C9113 INJ PANTOPRAZOLE SODIUM, VIA: HCPCS | Performed by: INTERNAL MEDICINE

## 2020-09-14 PROCEDURE — 25000128 H RX IP 250 OP 636: Performed by: INTERNAL MEDICINE

## 2020-09-14 PROCEDURE — 12000000 ZZH R&B MED SURG/OB

## 2020-09-14 PROCEDURE — 99232 SBSQ HOSP IP/OBS MODERATE 35: CPT | Performed by: INTERNAL MEDICINE

## 2020-09-14 PROCEDURE — 85027 COMPLETE CBC AUTOMATED: CPT | Performed by: INTERNAL MEDICINE

## 2020-09-14 PROCEDURE — 36415 COLL VENOUS BLD VENIPUNCTURE: CPT | Performed by: INTERNAL MEDICINE

## 2020-09-14 PROCEDURE — 80053 COMPREHEN METABOLIC PANEL: CPT | Performed by: INTERNAL MEDICINE

## 2020-09-14 RX ADMIN — PIPERACILLIN SODIUM AND TAZOBACTAM SODIUM 3.38 G: 3; .375 INJECTION, POWDER, LYOPHILIZED, FOR SOLUTION INTRAVENOUS at 09:39

## 2020-09-14 RX ADMIN — PIPERACILLIN SODIUM AND TAZOBACTAM SODIUM 3.38 G: 3; .375 INJECTION, POWDER, LYOPHILIZED, FOR SOLUTION INTRAVENOUS at 16:02

## 2020-09-14 RX ADMIN — HYDROMORPHONE HYDROCHLORIDE 0.5 MG: 1 INJECTION, SOLUTION INTRAMUSCULAR; INTRAVENOUS; SUBCUTANEOUS at 03:46

## 2020-09-14 RX ADMIN — PIPERACILLIN SODIUM AND TAZOBACTAM SODIUM 3.38 G: 3; .375 INJECTION, POWDER, LYOPHILIZED, FOR SOLUTION INTRAVENOUS at 22:20

## 2020-09-14 RX ADMIN — HYDROMORPHONE HYDROCHLORIDE 0.5 MG: 1 INJECTION, SOLUTION INTRAMUSCULAR; INTRAVENOUS; SUBCUTANEOUS at 09:39

## 2020-09-14 RX ADMIN — PANTOPRAZOLE SODIUM 40 MG: 40 INJECTION, POWDER, FOR SOLUTION INTRAVENOUS at 09:39

## 2020-09-14 RX ADMIN — SODIUM CHLORIDE: 9 INJECTION, SOLUTION INTRAVENOUS at 06:02

## 2020-09-14 RX ADMIN — PIPERACILLIN SODIUM AND TAZOBACTAM SODIUM 3.38 G: 3; .375 INJECTION, POWDER, LYOPHILIZED, FOR SOLUTION INTRAVENOUS at 03:47

## 2020-09-14 RX ADMIN — HYDROMORPHONE HYDROCHLORIDE 0.5 MG: 1 INJECTION, SOLUTION INTRAMUSCULAR; INTRAVENOUS; SUBCUTANEOUS at 06:10

## 2020-09-14 RX ADMIN — ONDANSETRON 4 MG: 2 INJECTION INTRAMUSCULAR; INTRAVENOUS at 22:27

## 2020-09-14 RX ADMIN — HYDROMORPHONE HYDROCHLORIDE 0.5 MG: 1 INJECTION, SOLUTION INTRAMUSCULAR; INTRAVENOUS; SUBCUTANEOUS at 22:27

## 2020-09-14 RX ADMIN — HYDROMORPHONE HYDROCHLORIDE 0.5 MG: 1 INJECTION, SOLUTION INTRAMUSCULAR; INTRAVENOUS; SUBCUTANEOUS at 18:29

## 2020-09-14 ASSESSMENT — ACTIVITIES OF DAILY LIVING (ADL)
ADLS_ACUITY_SCORE: 15

## 2020-09-14 NOTE — PLAN OF CARE
A&Ox4.  VSS.  Up SBA to bathroom.  IVF and IV antibiotics.  Increase in abdominal pain after a few ice chips, NPO.  IV dilaudid x1.  Lactic 2.2, repeat at midnight.  GI consult pending.

## 2020-09-14 NOTE — PROGRESS NOTES
Sepsis Evaluation Progress Note    I was called to see Cameron Barnard due to abnormal vital signs triggering the Sepsis SIRS screening alert. He is known to have an infection.     Physical Exam   Vital Signs:  Temp: 99  F (37.2  C) Temp src: Oral BP: (!) 152/75 Pulse: 82   Resp: 20 SpO2: 97 % O2 Device: None (Room air)    @[facility]([id number],1,,1)@  General: in no acute distress  Mental Status: baseline mental status.     Remainder of physical exam is significant for RRR, lungs clear, abdomen benign    Data   Lactate for Sepsis Protocol   Date Value Ref Range Status   09/13/2020 2.2 (H) 0.7 - 2.0 mmol/L Final     Comment:     Significant value called to and read back by  MADELAINE RUIZ ON 88 AT 1908 AV         Assessment & Plan   NO EVIDENCE OF SEPSIS at this time.  Vital sign, physical exam, and lab findings are likely due to diverticulitis.    Disposition: The patient will remain on the current unit. We will continue to monitor this patient closely..  Maverick Montemayor MD    Sepsis Criteria   Sepsis: 2+ SIRS criteria due to infection  Severe Sepsis: Sepsis AND 1+ new sign of acute organ dysfunction (Note: lactate >2 or acute encephalopathy each qualify as organ dysfunction)  Septic Shock: Sepsis AND hypotension despite volume resuscitation with 30 ml/kg crystalloid or lactate >=4  Note: HYPOTENSION is defined as 2 BP readings measured 3 hrs apart that have a SBP <90, MAP <65, or decrease >40 mmHg, occurring 6 hrs before or after t-zero

## 2020-09-14 NOTE — PROVIDER NOTIFICATION
MD Notification    Notified Person: MD    Notified Person Name: Montemayor    Notification Date/Time:9/13/20 1912    Notification Interaction:Paged MD    Purpose of Notification:Lactic BPA fired when arrived from ED.  Critical Lactic 2.2    Orders Received:MD returned call, will repeat lactic at midnight.  Continue to monitor.    Comments:

## 2020-09-14 NOTE — CONSULTS
GASTROENTEROLOGY CONSULTATION     Cameron Barnard   [address] [place] [address]   70 year old male   Admission Date/Time: 9/13/2020   Encounter Date: 9/14/2020  Primary Care Provider: Xiomy Brady     Referring / Attending Physician: Maverick Montemayor   We were asked to see the patient in consultation by Dr. Montemayor for evaluation of SBO.     HPI: Cameron Barnard is a 70 year old male with a past medical history significant for DVT, PE, ascending aortic dilation currently anticoagulated with Coumadin who presented to the emergency department for evaluation of left lower quadrant pain.    The patient states that his pain began 2 nights ago and was accompanied by several episodes of emesis and dry heaving.  The pain began in his epigastrium and radiated to his left lower quadrant.  He had a similar episode about 1 month ago which he treated with dietary restriction and his symptoms resolved on their own.  In the emergency department the patient was found to have normal vital signs.  Laboratory evaluation revealed a white count of 21.1, hemoglobin 17.2, INR 3.97.  A CT of the abdomen pelvis suggested a low-grade small bowel obstruction with gradual transition point in the anterior left lower quadrant.  There is a short segment of mural thickening of distal small bowel loops.  There is also a prominent descending duodenal diverticulum with adjacent thickening and enhancement, colonic diverticulosis, cholelithiasis, hepatic steatosis and suggestive of chronic liver disease.  He was admitted to the floor and given antiemetics with improvement in his nausea and vomiting so an NG tube was not placed.  This morning the patient states that he is feeling slightly better. He denies any further nausea or vomiting. He continues to have lower abdominal pain. He has not passed any flatus or bowel movements since admission. He states he is unable to afford a lengthy hospitalization and would like to be discharged  "as soon as possible.     Past Medical History  Past Medical History:   Diagnosis Date     Allergic state      DVT (deep vein thrombosis) in pregnancy (H)        Past Surgical History  No abdominal surgeries    Family History  Negative for IBD or GI malignancy    Social History  Social History     Socioeconomic History     Marital status:      Spouse name: Not on file     Number of children: Not on file     Years of education: Not on file     Highest education level: Not on file   Occupational History     Not on file   Social Needs     Financial resource strain: Not on file     Food insecurity     Worry: Not on file     Inability: Not on file     Transportation needs     Medical: Not on file     Non-medical: Not on file   Tobacco Use     Smoking status: Never Smoker   Substance and Sexual Activity     Alcohol use: Never     Frequency: Never     Drug use: Never     Sexual activity: Not on file   Lifestyle     Physical activity     Days per week: Not on file     Minutes per session: Not on file     Stress: Not on file   Relationships     Social connections     Talks on phone: Not on file     Gets together: Not on file     Attends [employer] service: Not on file     Active member of club or organization: Not on file     Attends meetings of clubs or organizations: Not on file     Relationship status: Not on file     Intimate partner violence     Fear of current or ex partner: Not on file     Emotionally abused: Not on file     Physically abused: Not on file     Forced sexual activity: Not on file   Other Topics Concern     Not on file   Social History Narrative     Not on file       Medications  Prior to Admission medications    Medication Sig Start Date End Date Taking? Authorizing Provider   acetaminophen (TYLENOL) 500 MG tablet Take 1,000 mg by mouth daily as needed   Yes Unknown, Entered By History   UNKNOWN TO PATIENT Take 0.5 tablets by mouth nightly as needed \"OTC Sleeping pill\" per patient   Yes Unknown, " "Entered By History   UNKNOWN TO PATIENT Place 3 drops into both eyes daily as needed \"Eye drop for allergies\" per patient   Yes Unknown, Entered By History   warfarin ANTICOAGULANT (COUMADIN) 5 MG tablet Take 10 mg by mouth six times a week Daily except on Wednesdays   Yes Unknown, Entered By History   warfarin ANTICOAGULANT (COUMADIN) 5 MG tablet Take 7.5 mg by mouth On Wednesdays   Yes Unknown, Entered By History       Allergies:  Patient has no known allergies.    ROS: A ten point review of systems was obtained and negative other than the symptoms noted above in the HPI.     Physical Exam:   /71 (BP Location: Left arm)   Pulse 69   Temp 98.5  F (36.9  C) (Oral)   Resp 16   Ht 1.88 m (6' 2\")   Wt 130.6 kg (288 lb)   SpO2 95%   BMI 36.98 kg/m     Constitutional: age appropriate, alert, no acute distress  Cardiovascular: regular rate and rhythm, no murmurs,rubs or gallops  Respiratory: clear to auscultation bilaterally  Psychiatric: normal pleasant affect  Head: atraumatic, normocephalic, spider angiomata on face  Neck: supple, no thyromegaly  ENT: mucous membranes are moist, no oral lesions are noted  Abdomen: soft, non-tender, non-distended, hypoactive bowel sound. No masses or hepatosplenomegaly is appreciated. No rebound tenderness or guarding  Neuro: Neurologically intact grossly  Skin: warm, dry, no rashes are noted    ADDITIONAL COMMENTS:   I reviewed the patient's new clinical lab test results.   Recent Labs   Lab Test 09/14/20  0651 09/13/20  1419 05/23/19  1055   WBC 11.8* 21.1* 5.3   HGB 13.9 17.2 15.7   MCV 93 91 90    236 139*   INR 3.55* 3.97* 1.90*      Recent Labs   Lab Test 09/14/20  0651 09/13/20  1419 05/23/19  1055    136 140   POTASSIUM 3.7 3.7 4.1   CHLORIDE 108 103 105   CO2 27 27 28   BUN 19 18 11   CR 1.05 0.92 0.97   ANIONGAP 3 6 7   PETER 7.4* 9.0 8.5      Recent Labs   Lab Test 09/14/20  0651 09/13/20  1419   ALBUMIN 2.8* 3.8   BILITOTAL 2.3* 2.1*   ALT 22 32   AST " 17 25   ALKPHOS 53 70   PROTEIN  --  30*   LIPASE  --  86      9/13/20 CT abdomen pelvis  IMPRESSION:   1.  Low-grade small bowel obstruction with gradual transition point in  the anterior left lower quadrant.  2.  Short segment mural thickening of distal small bowel loops, remote  and proximal transition point, may be reactive.  3.  Prominent descending duodenal diverticulum with adjacent  thickening and enhancement. The thickening and enhancement may be due  to adjacent compressed pancreatic parenchyma, though duodenal  diverticulitis or malignancy can have a similar appearance. Close  clinical follow-up and consideration for endoscopy for further  evaluation.  4.  Distal colonic diverticulosis without acute diverticulitis.  5.  Cholelithiasis.  6.  Hepatic steatosis and suggestion of chronic liver disease  (cirrhosis).    Assessment: 70-year-old male presenting with abdominal pain.  He had similar pain about 1 month ago that resolved spontaneously with dietary restriction. His suggests a SBO in the LLQ and short segment mural thickening in the distal small bowel. There is also a duodenal diverticulum with thickening and enhancement which could represent diverticulitis. Given his white count on admission diverticulitis is within the differential. The source for his SBO is unclear. Given the small bowel thickening inflammatory bowel disease is within the differential as is malignancy    Plan:   -Clear liquid diet. Advance as tolerated  -If vomiting returns would place NG tube for decompression. Would consider surgical consultation at that time as well.   -Eventually would benefit from EGD/Colonoscopy however would hold off for now given current obstruction  -Encourage ambulation  -EtOH cessation  -Outpatient evaluation in our Hepatology Clinic  -[facility] following    I discussed the patient's findings and plan with Dr. Spenser Ty who will also independently see and examine the patient.     Syed Braun PA-C  [facility]  Cell:  [phone] Monday through Friday 2632-6659  Office: [phone]

## 2020-09-14 NOTE — PROGRESS NOTES
[facility]    Medicine Progress Note - Hospitalist Service        Date of Admission:  9/13/2020  1:56 PM    Assessment & Plan:   Cameron Barnard is a 70 year old male who presents with abdominal pain     Duodenal Diverticulitis  Partial small bowel obstruction  Presented with LLQ pain. Started evening prior to presentation, episodes of n/v since. No diarrhea or fever. WBC 21.1. CT with low grade SBO with transition in LLQ. Also possible duodenal diverticulitis  -Afebrile, abdominal pain much better overnight.  -Continue IV zosyn  -Evaluated by GI, clear liquids diet started  -protonix IV daily  -If symptoms recur, will need NG decompression and general surgery consult  -prn hydromorphone for pain  -Eventually will need EGD, per GI     Elevated bilirubin  Cholelithiasis  Hepatic steatosis and suggestion of cirrhosis/ chronic liver disease  Appears as if he used to drink somewhat heavily but none more receent. Noted 2.1 on admission, CT with steatosis and possible cirrhosis.  Bili was normal 11/2019 1.1.   -Recheck LFTs in the morning    Hematuria  -No stones seen on CT admission.   -Will need urology referral at discharge     H/o PE/ DVT  -On chronic warfarin for this. INR 3.97 on presentation  -DVT/PE was 3 years ago  -Hold Coumadin just in case surgical intervention needed, if clinically improving will restart Coumadin tomorrow     HLD  -Resume statin when PO     Dilated ascending aorta  -Follows with cardiology (Dr. Watson) through [facility]. Follow up next 1/2021    Diet: NPO for Medical/Clinical Reasons Except for: Meds, Ice Chips     DVT Prophylaxis: Warfarin   Altman Catheter: not present  Code Status: No CPR, DO NOT INTUBATE  Disposition Plan    Expected discharge: 1-2 days to prior living situation pending improvement of pain and resolution of bowel obstruction.  Entered: Phillip Garcia MD 09/14/2020, 11:03 AM        The patient's care was discussed with the Bedside Nurse and Patient.    Phillip  "MD Jose  Hospitalist Service  [facility]    ______________________________________________________________________    Interval History   Abdominal pain much better this morning.  Denies nausea or vomiting.  Afebrile.    Data reviewed today: I reviewed all medications, new labs and imaging results over the last 24 hours. I personally reviewed no images or EKG's today.    Physical Exam   Vital signs:  Temp: 98.5  F (36.9  C) Temp src: Oral BP: 113/71 Pulse: 69   Resp: 16 SpO2: 95 % O2 Device: None (Room air)   Height: 188 cm (6' 2\") Weight: 130.6 kg (288 lb)  Estimated body mass index is 36.98 kg/m  as calculated from the following:    Height as of this encounter: 1.88 m (6' 2\").    Weight as of this encounter: 130.6 kg (288 lb).      Wt Readings from Last 2 Encounters:   09/13/20 130.6 kg (288 lb)   05/23/19 129.3 kg (285 lb)       Gen: AAOX3, NAD, comfortable  HEENT: Supple neck, moist oral mucosa, no pallor  Resp: CTA B/L, normal WOB, no crackles, no wheezes  CVS: RRR, no murmur  Abd/GI: Soft, mild nonspecific tenderness around the central abdomen, bowel sounds are hypoactive, no guarding or rebound or rigidity  Skin: Warm, dry no rashes  MSK: No joint deformities, no pedal edema  Neuro- CN- intact. No focal deficits.       Data   Recent Labs   Lab 09/14/20  0651 09/13/20  1419   WBC 11.8* 21.1*   HGB 13.9 17.2   MCV 93 91    236   INR 3.55* 3.97*    136   POTASSIUM 3.7 3.7   CHLORIDE 108 103   CO2 27 27   BUN 19 18   CR 1.05 0.92   ANIONGAP 3 6   PETER 7.4* 9.0   * 131*   ALBUMIN 2.8* 3.8   PROTTOTAL 6.1* 7.8   BILITOTAL 2.3* 2.1*   ALKPHOS 53 70   ALT 22 32   AST 17 25   LIPASE  --  86       Recent Results (from the past 24 hour(s))   CT Abdomen Pelvis w Contrast    Narrative    CT ABDOMEN PELVIS W CONTRAST 9/13/2020 3:28 PM    CLINICAL HISTORY: Abd pain, diverticulitis suspected    TECHNIQUE: CT scan of the abdomen and pelvis was performed following  injection of IV " contrast. Multiplanar reformats were obtained. Dose  reduction techniques were used.  CONTRAST: 135 mL Isovue-370    COMPARISON: None.    FINDINGS:   LOWER CHEST: Normal.    HEPATOBILIARY: Suggestion of micronodular contour to the surface of  the liver. Mild hepatic steatosis. Cholelithiasis without biliary  ductal dilatation.    PANCREAS: Mild fatty atrophy.    SPLEEN: Normal.    ADRENAL GLANDS: Normal.    KIDNEYS/BLADDER: Symmetric enhancement of both kidneys. No  hydronephrosis. Subcentimeter hypodensity in the right kidney that is  too small to characterize, though statistically likely benign. Urinary  bladder is unremarkable.    BOWEL: Multiple fluid-filled dilated loops of small bowel in the left  lower quadrant with gradual transition to normal caliber in the  anterior left lower quadrant series 4/image 164. No pneumoperitoneum  or portal venous gas. No pneumatosis intestinalis. There is short  segmental mural thickening and enhancement in a distal left lower  quadrant small bowel loop with fluid filled dilated small bowel loops  proximal and distal series 4/image 197, series 6/image 82.    There is a moderate fluid and gas-filled descending duodenal  diverticulum with adjacent irregular enhancement and associated  stranding. Distal colonic diverticulosis without acute diverticulitis.    PELVIC ORGANS: Unremarkable. Trace dependent pelvic free fluid, likely  reactive.    ADDITIONAL FINDINGS: Abdominal aorta normal in caliber with minimal  calcified atheromatous plaque. Major portal veins are patent.    MUSCULOSKELETAL: Multilevel lower lumbar spondylosis.      Impression    IMPRESSION:   1.  Low-grade small bowel obstruction with gradual transition point in  the anterior left lower quadrant.  2.  Short segment mural thickening of distal small bowel loops, remote  and proximal transition point, may be reactive.  3.  Prominent descending duodenal diverticulum with adjacent  thickening and enhancement. The  thickening and enhancement may be due  to adjacent compressed pancreatic parenchyma, though duodenal  diverticulitis or malignancy can have a similar appearance. Close  clinical follow-up and consideration for endoscopy for further  evaluation.  4.  Distal colonic diverticulosis without acute diverticulitis.  5.  Cholelithiasis.  6.  Hepatic steatosis and suggestion of chronic liver disease  (cirrhosis).    MAX DURAND MD     Medications     - MEDICATION INSTRUCTIONS -       sodium chloride 125 mL/hr at 09/14/20 0602       pantoprazole (PROTONIX) IV  40 mg Intravenous Daily with breakfast     piperacillin-tazobactam  3.375 g Intravenous Q6H     sodium chloride (PF)  3 mL Intracatheter Q8H

## 2020-09-14 NOTE — PLAN OF CARE
AVSS; abdominal pain controlled with IV dilaudid; IV NS at 125 ml/hr; up with SBA; voiding; pt denies flatus; no c/o nausea; NPO except ice chips/meds; GI consult ordered.

## 2020-09-15 LAB
ALBUMIN SERPL-MCNC: 2.6 G/DL (ref 3.4–5)
ALP SERPL-CCNC: 50 U/L (ref 40–150)
ALT SERPL W P-5'-P-CCNC: 18 U/L (ref 0–70)
AST SERPL W P-5'-P-CCNC: 16 U/L (ref 0–45)
BILIRUB DIRECT SERPL-MCNC: 0.3 MG/DL (ref 0–0.2)
BILIRUB SERPL-MCNC: 1.5 MG/DL (ref 0.2–1.3)
INR PPP: 2.49 (ref 0.86–1.14)
PROT SERPL-MCNC: 6 G/DL (ref 6.8–8.8)

## 2020-09-15 PROCEDURE — C9113 INJ PANTOPRAZOLE SODIUM, VIA: HCPCS | Performed by: INTERNAL MEDICINE

## 2020-09-15 PROCEDURE — 99232 SBSQ HOSP IP/OBS MODERATE 35: CPT | Performed by: INTERNAL MEDICINE

## 2020-09-15 PROCEDURE — 25000132 ZZH RX MED GY IP 250 OP 250 PS 637: Performed by: PHYSICIAN ASSISTANT

## 2020-09-15 PROCEDURE — 25800030 ZZH RX IP 258 OP 636: Performed by: INTERNAL MEDICINE

## 2020-09-15 PROCEDURE — 80076 HEPATIC FUNCTION PANEL: CPT | Performed by: INTERNAL MEDICINE

## 2020-09-15 PROCEDURE — 85610 PROTHROMBIN TIME: CPT | Performed by: INTERNAL MEDICINE

## 2020-09-15 PROCEDURE — 36415 COLL VENOUS BLD VENIPUNCTURE: CPT | Performed by: INTERNAL MEDICINE

## 2020-09-15 PROCEDURE — 25000128 H RX IP 250 OP 636: Performed by: INTERNAL MEDICINE

## 2020-09-15 PROCEDURE — 12000000 ZZH R&B MED SURG/OB

## 2020-09-15 RX ORDER — POLYETHYLENE GLYCOL 3350 17 G/17G
238 POWDER, FOR SOLUTION ORAL ONCE
Status: COMPLETED | OUTPATIENT
Start: 2020-09-15 | End: 2020-09-15

## 2020-09-15 RX ORDER — MAGNESIUM CARB/ALUMINUM HYDROX 105-160MG
296 TABLET,CHEWABLE ORAL ONCE
Status: COMPLETED | OUTPATIENT
Start: 2020-09-16 | End: 2020-09-16

## 2020-09-15 RX ORDER — WARFARIN SODIUM 5 MG/1
5 TABLET ORAL
Status: DISCONTINUED | OUTPATIENT
Start: 2020-09-15 | End: 2020-09-15 | Stop reason: DRUGHIGH

## 2020-09-15 RX ADMIN — PANTOPRAZOLE SODIUM 40 MG: 40 INJECTION, POWDER, FOR SOLUTION INTRAVENOUS at 10:49

## 2020-09-15 RX ADMIN — PIPERACILLIN SODIUM AND TAZOBACTAM SODIUM 3.38 G: 3; .375 INJECTION, POWDER, LYOPHILIZED, FOR SOLUTION INTRAVENOUS at 10:50

## 2020-09-15 RX ADMIN — POLYETHYLENE GLYCOL 3350 238 G: 17 POWDER, FOR SOLUTION ORAL at 14:01

## 2020-09-15 RX ADMIN — HYDROMORPHONE HYDROCHLORIDE 0.5 MG: 1 INJECTION, SOLUTION INTRAMUSCULAR; INTRAVENOUS; SUBCUTANEOUS at 23:00

## 2020-09-15 RX ADMIN — PIPERACILLIN SODIUM AND TAZOBACTAM SODIUM 3.38 G: 3; .375 INJECTION, POWDER, LYOPHILIZED, FOR SOLUTION INTRAVENOUS at 03:55

## 2020-09-15 RX ADMIN — PIPERACILLIN SODIUM AND TAZOBACTAM SODIUM 3.38 G: 3; .375 INJECTION, POWDER, LYOPHILIZED, FOR SOLUTION INTRAVENOUS at 16:48

## 2020-09-15 RX ADMIN — SODIUM CHLORIDE: 9 INJECTION, SOLUTION INTRAVENOUS at 20:26

## 2020-09-15 RX ADMIN — SODIUM CHLORIDE: 9 INJECTION, SOLUTION INTRAVENOUS at 07:17

## 2020-09-15 RX ADMIN — PIPERACILLIN SODIUM AND TAZOBACTAM SODIUM 3.38 G: 3; .375 INJECTION, POWDER, LYOPHILIZED, FOR SOLUTION INTRAVENOUS at 21:43

## 2020-09-15 RX ADMIN — PROCHLORPERAZINE EDISYLATE 5 MG: 5 INJECTION INTRAMUSCULAR; INTRAVENOUS at 23:07

## 2020-09-15 RX ADMIN — ONDANSETRON 4 MG: 2 INJECTION INTRAMUSCULAR; INTRAVENOUS at 20:23

## 2020-09-15 ASSESSMENT — ACTIVITIES OF DAILY LIVING (ADL)
RETIRED_COMMUNICATION: 0-->UNDERSTANDS/COMMUNICATES WITHOUT DIFFICULTY
ADLS_ACUITY_SCORE: 15
BATHING: 0-->INDEPENDENT
ADLS_ACUITY_SCORE: 15
SWALLOWING: 0-->SWALLOWS FOODS/LIQUIDS WITHOUT DIFFICULTY
ADLS_ACUITY_SCORE: 15
COGNITION: 0 - NO COGNITION ISSUES REPORTED
DRESS: 0-->INDEPENDENT
ADLS_ACUITY_SCORE: 15
ADLS_ACUITY_SCORE: 10
ADLS_ACUITY_SCORE: 15
TOILETING: 0-->INDEPENDENT
RETIRED_EATING: 0-->INDEPENDENT

## 2020-09-15 NOTE — PROGRESS NOTES
"GASTROENTEROLOGY PROGRESS NOTE     SUBJECTIVE: Feeling better today. Denies nausea or vomiting. Tolerating liquids. Feels like he may need to have a bowel movement soon.     OBJECTIVE:   /66 (BP Location: Left arm)   Pulse 66   Temp 96.4  F (35.8  C) (Oral)   Resp 16   Ht 1.88 m (6' 2\")   Wt 130.6 kg (288 lb)   SpO2 96%   BMI 36.98 kg/m     Temp (24hrs), Av.7  F (36.5  C), Min:96.4  F (35.8  C), Max:98.7  F (37.1  C)     Patient Vitals for the past 72 hrs:   Weight   20 1837 130.6 kg (288 lb)   20 1352 127 kg (280 lb)        Intake/Output Summary (Last 24 hours) at 9/15/2020 1047  Last data filed at 2020 2223  Gross per 24 hour   Intake 2108 ml   Output --   Net 2108 ml      PHYSICAL EXAM   Constitutional: Age appropriate, up in bed, no acute distress  Abdomen: Soft, non-tender, non-distended, normally active bowel sounds. No masses or hepatosplenomegaly appreciated. No guarding or rebound tenderness.    Additional Comments:   ROS, FH, SH: See initial GI consult for details.     I have reviewed the patient's new clinical lab results:   Recent Labs   Lab Test 09/15/20  0742 09/14/20  0651 09/13/20  1419 05/23/19  1055   WBC  --  11.8* 21.1* 5.3   HGB  --  13.9 17.2 15.7   MCV  --  93 91 90   PLT  --  153 236 139*   INR 2.49* 3.55* 3.97* 1.90*      Recent Labs   Lab Test 20  0651 20  1419 19  1055    136 140   POTASSIUM 3.7 3.7 4.1   CHLORIDE 108 103 105   CO2 27 27 28   BUN 19 18 11   CR 1.05 0.92 0.97   ANIONGAP 3 6 7   PETER 7.4* 9.0 8.5      Recent Labs   Lab Test 09/15/20  0742 20  0651 20  1419   ALBUMIN 2.6* 2.8* 3.8   BILITOTAL 1.5* 2.3* 2.1*   ALT 18 22 32   AST 16 17 25   ALKPHOS 50 53 70   PROTEIN  --   --  30*   LIPASE  --   --  86      20 CT abdomen pelvis  IMPRESSION:   1.  Low-grade small bowel obstruction with gradual transition point in  the anterior left lower quadrant.  2.  Short segment mural thickening of distal small bowel " loops, remote  and proximal transition point, may be reactive.  3.  Prominent descending duodenal diverticulum with adjacent  thickening and enhancement. The thickening and enhancement may be due  to adjacent compressed pancreatic parenchyma, though duodenal  diverticulitis or malignancy can have a similar appearance. Close  clinical follow-up and consideration for endoscopy for further  evaluation.  4.  Distal colonic diverticulosis without acute diverticulitis.  5.  Cholelithiasis.  6.  Hepatic steatosis and suggestion of chronic liver disease  (cirrhosis).     Assessment: 70-year-old male presenting with abdominal pain.  He had similar pain about 1 month ago that resolved spontaneously with dietary restriction. His CT suggests a SBO in the LLQ and short segment mural thickening in the distal small bowel. There is also a duodenal diverticulum with thickening and enhancement which could represent diverticulitis. Given his white count on admission diverticulitis is within the differential. The source for his SBO is unclear. Given the small bowel thickening inflammatory bowel disease is within the differential as is malignancy    Plan:   -Clear liquid diet. NPO at midnight  -Pt appears to be opening up and would like further evaluation sooner rather than later. Therefore will try starting a colonoscopy prep this afternoon if he starts having bowel movements  -EGD/Colonoscopy tentatively planned for tomorrow 9/16  -EtOH cessation  -Outpatient evaluation in our Hepatology Clinic  -[facility] following    [facility]  Cell:  [phone] Monday through Friday [id number]  Office: [phone]

## 2020-09-15 NOTE — PLAN OF CARE
A&Ox4. VSS. No C/O pain. Denies nausea. R PIV infusing NS @ 125 mL/hr w/ intermittent abx. IND. NPO, ice + meds. BS normoactive, denies flatus, no BM. +2 edema LLE. Discharge pending bowel improvement.

## 2020-09-15 NOTE — PROGRESS NOTES
[facility]    Medicine Progress Note - Hospitalist Service        Date of Admission:  9/13/2020  1:56 PM    Assessment & Plan:   Cameron Barnard is a 70 year old male who presents with abdominal pain     Duodenal Diverticulitis  Partial small bowel obstruction  Presented with LLQ pain. Started evening prior to presentation, episodes of n/v since. No diarrhea or fever. WBC 21.1. CT with low grade SBO with transition in LLQ. Also possible duodenal diverticulitis  -Pain overall much better  -History of partial return of bowel function, passing flatus, no bowel movements yet  -protonix IV daily  -GI following, plan for EGD and colonoscopy tomorrow  -Clear liquid diet for now  -N.p.o. after midnight  -Symptomatic treatment of pain    Elevated bilirubin  Cholelithiasis  Hepatic steatosis and suggestion of cirrhosis/ chronic liver disease  Appears as if he used to drink somewhat heavily but none more receent. Noted 2.1 on admission, CT with steatosis and possible cirrhosis.    -Bilirubin improving, LFTs stable.    Hematuria  -No stones seen on CT admission.   -Will need urology referral at discharge     H/o PE/ DVT  -On chronic warfarin for this. INR 3.97 on presentation  -DVT/PE was 3 years ago  -INR 2.49 today, continue to hold coumadin today as EGD/colonoscopy planned for tomorrow AM     HLD  -Resume statin when taking PO     Dilated ascending aorta  -Follows with cardiology (Dr. Watson) through [facility]. Follow up next 1/2021    Diet: Clear Liquid Diet     DVT Prophylaxis: Warfarin   Altman Catheter: not present  Code Status: No CPR, DO NOT INTUBATE  Disposition Plan    Expected discharge: 1-2 days to prior living situation pending improvement of pain and resolution of bowel obstruction.  Entered: Phillip Garcia MD 09/15/2020, 11:53 AM        The patient's care was discussed with the Bedside Nurse and Patient.    Phillip Garcia MD  Hospitalist Service  [facility]  "Hospital    ______________________________________________________________________    Interval History   Abdominal pain much better. Passing flatus. No n/v. Afebrile. EGD/colonoscopy planned for AM    Data reviewed today: I reviewed all medications, new labs and imaging results over the last 24 hours. I personally reviewed no images or EKG's today.    Physical Exam   Vital signs:  Temp: 96.4  F (35.8  C) Temp src: Oral BP: 126/66 Pulse: 66   Resp: 16 SpO2: 96 % O2 Device: None (Room air)   Height: 188 cm (6' 2\") Weight: 130.6 kg (288 lb)  Estimated body mass index is 36.98 kg/m  as calculated from the following:    Height as of this encounter: 1.88 m (6' 2\").    Weight as of this encounter: 130.6 kg (288 lb).      Wt Readings from Last 2 Encounters:   09/13/20 130.6 kg (288 lb)   05/23/19 129.3 kg (285 lb)       Gen: AAOX3, NAD  Resp: CTA B/L, normal WOB  CVS: RRR, no murmur  Abd/GI: Soft, mild nonspecific tenderness around the central abdomen, bowel sounds are normoactive, no guarding or rebound or rigidity  Skin: Warm, dry no rashes  MSK: No joint deformities, no pedal edema  Neuro- CN- intact. No focal deficits.       Data   Recent Labs   Lab 09/15/20  0742 09/14/20  0651 09/13/20  1419   WBC  --  11.8* 21.1*   HGB  --  13.9 17.2   MCV  --  93 91   PLT  --  153 236   INR 2.49* 3.55* 3.97*   NA  --  138 136   POTASSIUM  --  3.7 3.7   CHLORIDE  --  108 103   CO2  --  27 27   BUN  --  19 18   CR  --  1.05 0.92   ANIONGAP  --  3 6   PETER  --  7.4* 9.0   GLC  --  116* 131*   ALBUMIN 2.6* 2.8* 3.8   PROTTOTAL 6.0* 6.1* 7.8   BILITOTAL 1.5* 2.3* 2.1*   ALKPHOS 50 53 70   ALT 18 22 32   AST 16 17 25   LIPASE  --   --  86       No results found for this or any previous visit (from the past 24 hour(s)).  Medications     - MEDICATION INSTRUCTIONS -       sodium chloride 125 mL/hr at 09/15/20 0717     Warfarin Therapy Reminder         pantoprazole (PROTONIX) IV  40 mg Intravenous Daily with breakfast     " piperacillin-tazobactam  3.375 g Intravenous Q6H     sodium chloride (PF)  3 mL Intracatheter Q8H     warfarin ANTICOAGULANT  5 mg Oral ONCE at 18:00

## 2020-09-15 NOTE — PHARMACY-ANTICOAGULATION SERVICE
Clinical Pharmacy - Warfarin Dosing Consult     Pharmacy has been consulted to manage this patient s warfarin therapy.  Indication: DVT/ PE Treatment  Therapy Goal: INR 2-3  Warfarin Prior to Admission: Yes  Warfarin PTA Regimen: 10mg 6 days/wk and 7.5mg Wednesdays  Recent documented change in oral intake/nutrition: Yes, and INR was supratherapeutic on admission  Dose Comments: Pt is currently NPO except for meds and ice chips    INR   Date Value Ref Range Status   09/15/2020 2.49 (H) 0.86 - 1.14 Final   09/14/2020 3.55 (H) 0.86 - 1.14 Final       Recommend warfarin 5 mg today.  Pharmacy will monitor Cameron Barnard daily and order warfarin doses to achieve specified goal.      Please contact pharmacy as soon as possible if the warfarin needs to be held for a procedure or if the warfarin goals change.

## 2020-09-15 NOTE — PLAN OF CARE
Oriented x4.  VSS.  IV fluids infusing and intermittent antibiotics.  Ambulating independently in halls and to bathroom.  + bowel sounds but no BM yet.  CO waves of lower abdominal pain, IV dilaudid given with relief.  Nausea x1 relieved with zofran.  +2 edema in left leg.  Nursing will continue to monitor.

## 2020-09-16 ENCOUNTER — APPOINTMENT (OUTPATIENT)
Dept: GENERAL RADIOLOGY | Facility: CLINIC | Age: 70
DRG: 390 | End: 2020-09-16
Attending: PHYSICIAN ASSISTANT
Payer: COMMERCIAL

## 2020-09-16 LAB
ANION GAP SERPL CALCULATED.3IONS-SCNC: 5 MMOL/L (ref 3–14)
BUN SERPL-MCNC: 10 MG/DL (ref 7–30)
CALCIUM SERPL-MCNC: 8.2 MG/DL (ref 8.5–10.1)
CHLORIDE SERPL-SCNC: 107 MMOL/L (ref 94–109)
CO2 SERPL-SCNC: 27 MMOL/L (ref 20–32)
CREAT SERPL-MCNC: 0.83 MG/DL (ref 0.66–1.25)
ERYTHROCYTE [DISTWIDTH] IN BLOOD BY AUTOMATED COUNT: 13.3 % (ref 10–15)
GFR SERPL CREATININE-BSD FRML MDRD: 88 ML/MIN/{1.73_M2}
GLUCOSE SERPL-MCNC: 132 MG/DL (ref 70–99)
HCT VFR BLD AUTO: 45 % (ref 40–53)
HGB BLD-MCNC: 14.9 G/DL (ref 13.3–17.7)
INR PPP: 2.02 (ref 0.86–1.14)
MCH RBC QN AUTO: 30.1 PG (ref 26.5–33)
MCHC RBC AUTO-ENTMCNC: 33.1 G/DL (ref 31.5–36.5)
MCV RBC AUTO: 91 FL (ref 78–100)
PLATELET # BLD AUTO: 199 10E9/L (ref 150–450)
POTASSIUM SERPL-SCNC: 3.1 MMOL/L (ref 3.4–5.3)
RBC # BLD AUTO: 4.95 10E12/L (ref 4.4–5.9)
SODIUM SERPL-SCNC: 139 MMOL/L (ref 133–144)
WBC # BLD AUTO: 8.3 10E9/L (ref 4–11)

## 2020-09-16 PROCEDURE — 25000132 ZZH RX MED GY IP 250 OP 250 PS 637: Performed by: PHYSICIAN ASSISTANT

## 2020-09-16 PROCEDURE — 25800030 ZZH RX IP 258 OP 636: Performed by: INTERNAL MEDICINE

## 2020-09-16 PROCEDURE — 99222 1ST HOSP IP/OBS MODERATE 55: CPT | Performed by: SURGERY

## 2020-09-16 PROCEDURE — 85027 COMPLETE CBC AUTOMATED: CPT | Performed by: INTERNAL MEDICINE

## 2020-09-16 PROCEDURE — 12000000 ZZH R&B MED SURG/OB

## 2020-09-16 PROCEDURE — C9113 INJ PANTOPRAZOLE SODIUM, VIA: HCPCS | Performed by: INTERNAL MEDICINE

## 2020-09-16 PROCEDURE — 74019 RADEX ABDOMEN 2 VIEWS: CPT

## 2020-09-16 PROCEDURE — 25000128 H RX IP 250 OP 636: Performed by: INTERNAL MEDICINE

## 2020-09-16 PROCEDURE — 80048 BASIC METABOLIC PNL TOTAL CA: CPT | Performed by: INTERNAL MEDICINE

## 2020-09-16 PROCEDURE — 25000132 ZZH RX MED GY IP 250 OP 250 PS 637: Performed by: HOSPITALIST

## 2020-09-16 PROCEDURE — 99233 SBSQ HOSP IP/OBS HIGH 50: CPT | Performed by: INTERNAL MEDICINE

## 2020-09-16 PROCEDURE — 36415 COLL VENOUS BLD VENIPUNCTURE: CPT | Performed by: INTERNAL MEDICINE

## 2020-09-16 PROCEDURE — 85610 PROTHROMBIN TIME: CPT | Performed by: INTERNAL MEDICINE

## 2020-09-16 RX ORDER — ACETAMINOPHEN 325 MG/1
975 TABLET ORAL EVERY 4 HOURS PRN
Status: DISCONTINUED | OUTPATIENT
Start: 2020-09-16 | End: 2020-09-20 | Stop reason: HOSPADM

## 2020-09-16 RX ADMIN — PIPERACILLIN SODIUM AND TAZOBACTAM SODIUM 3.38 G: 3; .375 INJECTION, POWDER, LYOPHILIZED, FOR SOLUTION INTRAVENOUS at 10:41

## 2020-09-16 RX ADMIN — PANTOPRAZOLE SODIUM 40 MG: 40 INJECTION, POWDER, FOR SOLUTION INTRAVENOUS at 08:17

## 2020-09-16 RX ADMIN — PROCHLORPERAZINE EDISYLATE 5 MG: 5 INJECTION INTRAMUSCULAR; INTRAVENOUS at 06:43

## 2020-09-16 RX ADMIN — HYDROMORPHONE HYDROCHLORIDE 0.5 MG: 1 INJECTION, SOLUTION INTRAMUSCULAR; INTRAVENOUS; SUBCUTANEOUS at 06:25

## 2020-09-16 RX ADMIN — ONDANSETRON 4 MG: 2 INJECTION INTRAMUSCULAR; INTRAVENOUS at 05:48

## 2020-09-16 RX ADMIN — PIPERACILLIN SODIUM AND TAZOBACTAM SODIUM 3.38 G: 3; .375 INJECTION, POWDER, LYOPHILIZED, FOR SOLUTION INTRAVENOUS at 21:41

## 2020-09-16 RX ADMIN — PIPERACILLIN SODIUM AND TAZOBACTAM SODIUM 3.38 G: 3; .375 INJECTION, POWDER, LYOPHILIZED, FOR SOLUTION INTRAVENOUS at 04:04

## 2020-09-16 RX ADMIN — SODIUM CHLORIDE: 9 INJECTION, SOLUTION INTRAVENOUS at 13:58

## 2020-09-16 RX ADMIN — PIPERACILLIN SODIUM AND TAZOBACTAM SODIUM 3.38 G: 3; .375 INJECTION, POWDER, LYOPHILIZED, FOR SOLUTION INTRAVENOUS at 15:53

## 2020-09-16 RX ADMIN — ACETAMINOPHEN 975 MG: 325 TABLET, FILM COATED ORAL at 23:04

## 2020-09-16 RX ADMIN — Medication 1 ML: at 18:39

## 2020-09-16 RX ADMIN — MAGNESIUM CITRATE 296 ML: 1.75 LIQUID ORAL at 05:48

## 2020-09-16 ASSESSMENT — ACTIVITIES OF DAILY LIVING (ADL)
ADLS_ACUITY_SCORE: 10
ADLS_ACUITY_SCORE: 12
ADLS_ACUITY_SCORE: 10
ADLS_ACUITY_SCORE: 12

## 2020-09-16 NOTE — PLAN OF CARE
A&Ox4.  VSS, RA; except HTN.  Patient finished bowel prep prior to midnight; no BM, very small amount of flatus per patient report.  BS active.  Zofran and compazine given for nausea related to bowel prep; emesis x 1 after drinking magnesium citrate this AM.  Patient complained of intermittent low abdominal cramping; controlled with PRN Dilaudid x 2, heat packs and repositioning. Independent.  PIV infusing NS @ 50 mL/hr; intermittent antibiotics.  NPO since midnight, patient verbalized understanding.  Discharge pending progress.

## 2020-09-16 NOTE — PLAN OF CARE
Pt had a BM this morning, with lots of flatus, lower abdominal pain resolved. Pt will start colonoscopy prep at 1400  Pt drank 3.5L of  Miralax prep without any BM's, now pt states he is full and if he continues to drink he will end up having emesis. Pt decided to hold off until he gets response from what he has ingested so far. Will continue to monitor.

## 2020-09-16 NOTE — PROGRESS NOTES
[facility]    Medicine Progress Note - Hospitalist Service        Date of Admission:  9/13/2020  1:56 PM    Assessment & Plan:   Cameron Barnard is a 70 year old male who presents with abdominal pain     Duodenal Diverticulitis  Small bowel obstruction  Presented with LLQ pain. Started evening prior to presentation, episodes of n/v since. No diarrhea or fever. WBC 21.1. CT with low grade SBO with transition in LLQ. Also possible duodenal diverticulitis  -Initially improved with bowel rest and IV fluids, GI consulted, plan was to undergo EGD and colonoscopy today.  -Patient received a colonoscopy prep last night, no bowel movements  -Intermittent abdominal pain with worsening distention  -Clinically appears to have worsening of bowel obstruction.  -Insert NG tube  -Stat abdominal x-ray  -N.p.o.  -Discussed with GI, will likely cancel plans for EGD and colonoscopy  -General surgery consult  -IV fluids, symptomatic management of pain and nausea/vomiting  -Continue IV Zosyn for now for suspected duodenal diverticulitis    Elevated bilirubin  Cholelithiasis  Hepatic steatosis and suggestion of cirrhosis/ chronic liver disease  Appears as if he used to drink somewhat heavily but none more receent. Noted 2.1 on admission, CT with steatosis and possible cirrhosis.    -Bilirubin improving, LFTs stable.    Hematuria  -No stones seen on CT admission.   -Will need urology referral at discharge     H/o PE/ DVT  -On chronic warfarin for this. INR 3.97 on presentation  -DVT/PE was 3 years ago  -INR 2.02 today, hold warfarin as there might be a need for surgical intervention  -re-consult pharmacy when ready to resume coumadin again     HLD  -Resume statin when taking PO     Dilated ascending aorta  -Follows with cardiology (Dr. Watson) through [facility]. Follow up next 1/2021    Diet: NPO for Medical/Clinical Reasons Except for: Meds     DVT Prophylaxis: Warfarin   Altman Catheter: not present  Code Status: Full  "code  Disposition Plan    Expected discharge: 2 to 3 days, pending resolution of small bowel obstruction  Entered: Phillip aGrcia MD 09/16/2020, 8:21 AM        The patient's care was discussed with the Bedside Nurse and Patient.    Phillip Garcia MD  Hospitalist Service  [facility]    ______________________________________________________________________    Interval History   Intermittent abdominal pain.  Patient did not have any bowel movement with the colonoscopy prep in fact his abdominal distention has gotten worse.  Had one episode of emesis.  Afebrile.    Data reviewed today: I reviewed all medications, new labs and imaging results over the last 24 hours. I personally reviewed no images or EKG's today.    Physical Exam   Vital signs:  Temp: 98.3  F (36.8  C) Temp src: Oral BP: 130/77 Pulse: 78   Resp: 18 SpO2: 96 % O2 Device: None (Room air)   Height: 188 cm (6' 2\") Weight: 130.6 kg (288 lb)  Estimated body mass index is 36.98 kg/m  as calculated from the following:    Height as of this encounter: 1.88 m (6' 2\").    Weight as of this encounter: 130.6 kg (288 lb).      Wt Readings from Last 2 Encounters:   09/13/20 130.6 kg (288 lb)   05/23/19 129.3 kg (285 lb)       Gen: AAOX3, NAD  Resp: CTA B/L, normal WOB  CVS: RRR, no murmur  Abd/GI: Slightly more distended today, nonspecific generalized tenderness, bowel sounds hypoactive, no guarding or rebound or rigidity  Skin: Warm, dry no rashes  MSK: Bilateral lower extremity edema, L>R(chronic)  Neuro- CN- intact. No focal deficits.       Data   Recent Labs   Lab 09/16/20  0706 09/15/20  0742 09/14/20  0651 09/13/20  1419   WBC 8.3  --  11.8* 21.1*   HGB 14.9  --  13.9 17.2   MCV 91  --  93 91     --  153 236   INR 2.02* 2.49* 3.55* 3.97*     --  138 136   POTASSIUM 3.1*  --  3.7 3.7   CHLORIDE 107  --  108 103   CO2 27  --  27 27   BUN 10  --  19 18   CR 0.83  --  1.05 0.92   ANIONGAP 5  --  3 6   PETER 8.2*  --  7.4* 9.0   GLC " 132*  --  116* 131*   ALBUMIN  --  2.6* 2.8* 3.8   PROTTOTAL  --  6.0* 6.1* 7.8   BILITOTAL  --  1.5* 2.3* 2.1*   ALKPHOS  --  50 53 70   ALT  --  18 22 32   AST  --  16 17 25   LIPASE  --   --   --  86       No results found for this or any previous visit (from the past 24 hour(s)).  Medications     - MEDICATION INSTRUCTIONS -       sodium chloride 50 mL/hr at 09/15/20 2026     Warfarin Therapy Reminder         pantoprazole (PROTONIX) IV  40 mg Intravenous Daily with breakfast     piperacillin-tazobactam  3.375 g Intravenous Q6H     sodium chloride (PF)  3 mL Intracatheter Q8H

## 2020-09-16 NOTE — PROGRESS NOTES
"GASTROENTEROLOGY PROGRESS NOTE     SUBJECTIVE: Drank entire prep last night. No bowel movements. One episode of emesis this morning. NG placed with immediate return of 2.8 L. Passing flatus but feels poor.      OBJECTIVE:   /86 (BP Location: Left arm)   Pulse 78   Temp 98.3  F (36.8  C) (Oral)   Resp 18   Ht 1.88 m (6' 2\")   Wt 130.6 kg (288 lb)   SpO2 96%   BMI 36.98 kg/m     Temp (24hrs), Av.2  F (36.8  C), Min:97.5  F (36.4  C), Max:98.7  F (37.1  C)     Patient Vitals for the past 72 hrs:   Weight   20 1837 130.6 kg (288 lb)   20 1352 127 kg (280 lb)        Intake/Output Summary (Last 24 hours) at 2020 1017  Last data filed at 2020 0922  Gross per 24 hour   Intake 5462.5 ml   Output 2800 ml   Net 2662.5 ml      PHYSICAL EXAM   Constitutional: Obese, in bed, no acute distress  Cardiovascular: Regular rate and rhythm. No murmurs rubs or gallops  Respiratory: Clear to auscultation bilaterally  Abdomen: Soft, non-tender, mildly distended, hypoactive bowel sounds. No masses or hepatosplenomegaly appreciated. No guarding or rebound tenderness.    Additional Comments:   ROS, FH, SH: See initial GI consult for details.     I have reviewed the patient's new clinical lab results:   Recent Labs   Lab Test 20  0706 09/15/20  0742 20  0651 20  1419   WBC 8.3  --  11.8* 21.1*   HGB 14.9  --  13.9 17.2   MCV 91  --  93 91     --  153 236   INR 2.02* 2.49* 3.55* 3.97*      Recent Labs   Lab Test 20  0706 20  0651 20  1419    138 136   POTASSIUM 3.1* 3.7 3.7   CHLORIDE 107 108 103   CO2 27 27 27   BUN 10 19 18   CR 0.83 1.05 0.92   ANIONGAP 5 3 6   PETER 8.2* 7.4* 9.0      Recent Labs   Lab Test 09/15/20  0742 20  0651 20  1419   ALBUMIN 2.6* 2.8* 3.8   BILITOTAL 1.5* 2.3* 2.1*   ALT 18 22 32   AST 16 17 25   ALKPHOS 50 53 70   PROTEIN  --   --  30*   LIPASE  --   --  86      20 CT abdomen pelvis  IMPRESSION:   1.  Low-grade " small bowel obstruction with gradual transition point in  the anterior left lower quadrant.  2.  Short segment mural thickening of distal small bowel loops, remote  and proximal transition point, may be reactive.  3.  Prominent descending duodenal diverticulum with adjacent  thickening and enhancement. The thickening and enhancement may be due  to adjacent compressed pancreatic parenchyma, though duodenal  diverticulitis or malignancy can have a similar appearance. Close  clinical follow-up and consideration for endoscopy for further  evaluation.  4.  Distal colonic diverticulosis without acute diverticulitis.  5.  Cholelithiasis.  6.  Hepatic steatosis and suggestion of chronic liver disease  (cirrhosis).     Assessment: 70-year-old male presenting with abdominal pain.  He had similar pain about 1 month ago that resolved spontaneously with dietary restriction. His CT suggests a SBO in the LLQ and short segment mural thickening in the distal small bowel. There is also a duodenal diverticulum with thickening and enhancement which could represent diverticulitis. Given his white count on admission diverticulitis is within the differential. The source for his SBO is unclear. Given the small bowel thickening inflammatory bowel disease is within the differential as is malignancy. Attempted to prep yesterday however he had no stool output. NG placed this morning for decompression.     Plan:   -Continue NG decompression  -Surgical consultation   -EGD/Colonoscopy cancelled   -Continue IV fluids  -EtOH cessation  -Outpatient evaluation in our Hepatology Clinic  -MNLUIS FELIPE following    [facility]  Cell:  [phone] Monday through Friday [id number]  Office: [phone]

## 2020-09-16 NOTE — CONSULTS
"General Surgery Consultation    Cameron Barnard MRN#: [id number]   Age: 70 year old YOB: 1950     The surgical service was consulted by Dr. Garcia to evaluate and/or treat this patient for SBO.    Date of Admission:          9/13/2020       Chief Complaint:   abd pain         History of Present Illness:   This patient is a 70 year old male who presented to the [facility] ER with LLQ abdominal pain and associated n/v x 1 day.  He denies fever, chills, change in BM or urination.  He reports a similar episode about 1 month ago which resolved with bowel rest at home  He denies having any abdominal surgeries.  Of note, patient is on Warfarin for hx of DVT/PE, INR 2.02.  Today a NGT was placed with 3L out.  He is feeling much better.  The history is obtained from the patient and chart. The patient is NPO.    COVID result: negative 9/13         Past Medical History:     Past Medical History:   Diagnosis Date     Allergic state      DVT (deep vein thrombosis) / PE    ascending aorta dilatation         Past Surgical History:   No past surgical history on file.         Medications:     Prior to Admission medications    Medication Sig Start Date End Date Taking? Authorizing Provider   acetaminophen (TYLENOL) 500 MG tablet Take 1,000 mg by mouth daily as needed   Yes Unknown, Entered By History   UNKNOWN TO PATIENT Take 0.5 tablets by mouth nightly as needed \"OTC Sleeping pill\" per patient   Yes Unknown, Entered By History   UNKNOWN TO PATIENT Place 3 drops into both eyes daily as needed \"Eye drop for allergies\" per patient   Yes Unknown, Entered By History   warfarin ANTICOAGULANT (COUMADIN) 5 MG tablet Take 10 mg by mouth six times a week Daily except on Wednesdays   Yes Unknown, Entered By History   warfarin ANTICOAGULANT (COUMADIN) 5 MG tablet Take 7.5 mg by mouth On Wednesdays   Yes Unknown, Entered By History          Current Medications:           pantoprazole (PROTONIX) IV  40 mg " "Intravenous Daily with breakfast     piperacillin-tazobactam  3.375 g Intravenous Q6H     sodium chloride (PF)  3 mL Intracatheter Q8H     bisacodyl, HYDROmorphone, lidocaine 4%, lidocaine (buffered or not buffered), magnesium hydroxide, melatonin, naloxone, ondansetron **OR** ondansetron, - MEDICATION INSTRUCTIONS -, potassium chloride, potassium chloride with lidocaine, potassium chloride, potassium chloride, potassium chloride, prochlorperazine **OR** prochlorperazine **OR** prochlorperazine, senna-docusate **OR** senna-docusate, sodium chloride (PF), Warfarin Therapy Reminder         Allergies:   No Known Allergies          Social History:     Social History     Tobacco Use     Smoking status: Never Smoker   Substance Use Topics     Alcohol use: Never     Frequency: Never          Family History:   No family history on file.          Review of Systems:   The 12 point Review of Systems is negative other than noted in the HPI.         Physical Exam:   Blood pressure 127/82, pulse 72, temperature 98.3  F (36.8  C), temperature source Oral, resp. rate 16, height 1.88 m (6' 2\"), weight 130.6 kg (288 lb), SpO2 95 %.  I/O last 3 completed shifts:  In: 5462.5 [P.O.:4200; I.V.:1262.5]  Out: -   General - This is a well developed, well nourished male in no apparent distress.  HEENT - Normocephalic. Atraumatic. Moist mucous membranes. Pupils equal.  No scleral icterus.  Neck - Supple without masses or lymphadenopathy.  Lungs - Clear to ascultation bilaterally without crackles or wheezing.    Heart - Regular rate & rhythm without murmur.  Abdomen - Soft, nt, non-distended, +bowel sounds, no masses or organomegaly.  Extremities - Moves all extremities. B/L LE edema and stasis changes.  Neurologic - Nonfocal.  Skin - Warm and dry.          Data:   Labs:  Recent Labs   Lab Test 09/16/20  0706 09/14/20  0651 09/13/20  1419   WBC 8.3 11.8* 21.1*   HGB 14.9 13.9 17.2   HCT 45.0 41.6 48.5    153 236     Recent Labs   Lab " Test 09/16/20  0706 09/14/20  0651 09/13/20  1419   POTASSIUM 3.1* 3.7 3.7   CHLORIDE 107 108 103   CO2 27 27 27   BUN 10 19 18   CR 0.83 1.05 0.92     Recent Labs   Lab Test 09/15/20  0742 09/14/20  0651 09/13/20  1419   BILITOTAL 1.5* 2.3* 2.1*   DBIL 0.3*  --   --    ALT 18 22 32   AST 16 17 25   ALKPHOS 50 53 70   LIPASE  --   --  86     Recent Labs   Lab Test 09/16/20  0706 09/15/20  0742 09/14/20  0651   INR 2.02* 2.49* 3.55*     CT scan of the abdomen: 1.  Low-grade small bowel obstruction with gradual transition point in the anterior left lower quadrant.  2.  Short segment mural thickening of distal small bowel loops, remote and proximal transition point, may be reactive.  3.  Prominent descending duodenal diverticulum with adjacent  thickening and enhancement. The thickening and enhancement may be due  to adjacent compressed pancreatic parenchyma, though duodenal  diverticulitis or malignancy can have a similar appearance. Close  clinical follow-up and consideration for endoscopy for further  evaluation.  4.  Distal colonic diverticulosis without acute diverticulitis.  5.  Cholelithiasis.  6.  Hepatic steatosis and suggestion of chronic liver disease  (cirrhosis).         Assessment:   SBO  Duodenal diverticulum  Cholelithiasis         Plan:   - NPO, NGT, IVF, IV pain control, Zosyn  - Bowel rest  - Possible gastrografin challenge  - Protonix  - Monitor INR  - GI following  - Chloraseptic spray  - Medical management per hospitalist, appreciate your help    Thank you very much for this consult.     I have discussed the history, physical, and plan with Dr. Ceballos, who has independently interviewed and examined the patient and agrees with the plan as stated.     Kylah Gonzalez PA-C  [facility]  [phone]

## 2020-09-17 LAB
INR PPP: 1.75 (ref 0.86–1.14)
POTASSIUM SERPL-SCNC: 3.2 MMOL/L (ref 3.4–5.3)

## 2020-09-17 PROCEDURE — 25800030 ZZH RX IP 258 OP 636: Performed by: INTERNAL MEDICINE

## 2020-09-17 PROCEDURE — 85610 PROTHROMBIN TIME: CPT | Performed by: INTERNAL MEDICINE

## 2020-09-17 PROCEDURE — 36415 COLL VENOUS BLD VENIPUNCTURE: CPT | Performed by: HOSPITALIST

## 2020-09-17 PROCEDURE — 36415 COLL VENOUS BLD VENIPUNCTURE: CPT | Performed by: INTERNAL MEDICINE

## 2020-09-17 PROCEDURE — C9113 INJ PANTOPRAZOLE SODIUM, VIA: HCPCS | Performed by: INTERNAL MEDICINE

## 2020-09-17 PROCEDURE — 12000000 ZZH R&B MED SURG/OB

## 2020-09-17 PROCEDURE — 99232 SBSQ HOSP IP/OBS MODERATE 35: CPT | Performed by: PHYSICIAN ASSISTANT

## 2020-09-17 PROCEDURE — 25000128 H RX IP 250 OP 636: Performed by: INTERNAL MEDICINE

## 2020-09-17 PROCEDURE — 99232 SBSQ HOSP IP/OBS MODERATE 35: CPT | Performed by: HOSPITALIST

## 2020-09-17 PROCEDURE — 84132 ASSAY OF SERUM POTASSIUM: CPT | Performed by: HOSPITALIST

## 2020-09-17 RX ADMIN — SODIUM CHLORIDE: 9 INJECTION, SOLUTION INTRAVENOUS at 20:28

## 2020-09-17 RX ADMIN — DIATRIZOATE MEGLUMINE AND DIATRIZOATE SODIUM 90 ML: 660; 100 SOLUTION ORAL; RECTAL at 22:06

## 2020-09-17 RX ADMIN — PIPERACILLIN SODIUM AND TAZOBACTAM SODIUM 3.38 G: 3; .375 INJECTION, POWDER, LYOPHILIZED, FOR SOLUTION INTRAVENOUS at 16:11

## 2020-09-17 RX ADMIN — PIPERACILLIN SODIUM AND TAZOBACTAM SODIUM 3.38 G: 3; .375 INJECTION, POWDER, LYOPHILIZED, FOR SOLUTION INTRAVENOUS at 22:06

## 2020-09-17 RX ADMIN — PIPERACILLIN SODIUM AND TAZOBACTAM SODIUM 3.38 G: 3; .375 INJECTION, POWDER, LYOPHILIZED, FOR SOLUTION INTRAVENOUS at 03:42

## 2020-09-17 RX ADMIN — ONDANSETRON 4 MG: 2 INJECTION INTRAMUSCULAR; INTRAVENOUS at 22:56

## 2020-09-17 RX ADMIN — PIPERACILLIN SODIUM AND TAZOBACTAM SODIUM 3.38 G: 3; .375 INJECTION, POWDER, LYOPHILIZED, FOR SOLUTION INTRAVENOUS at 09:53

## 2020-09-17 RX ADMIN — PANTOPRAZOLE SODIUM 40 MG: 40 INJECTION, POWDER, FOR SOLUTION INTRAVENOUS at 08:54

## 2020-09-17 ASSESSMENT — ACTIVITIES OF DAILY LIVING (ADL)
ADLS_ACUITY_SCORE: 12

## 2020-09-17 NOTE — PROGRESS NOTES
"[facility]    General Surgery  Daily Post-Op Note       Assessment and Plan:   Cameron Barnard is a 70 year old male with low grade small bowel obstruction, prominent descending duodenal diverticulum concerning for duodenal diverticulitis or less likely malignancy. Abdominal film this morning revealing stable mildly dilated loop of small bowel in the mid abdomen.     Patient has no prior abdominal surgeries. He has been recently changing his diet for weight loss.     PLAN:  - Abdomen minimally distended without peritoneal signs. 5L of NG output yesterday, but passed flatus and very small BM this morning.  - Continue conservative management. NPO, NG to LIS, protonix.  - GGC overnight with abdominal film in the morning. Orders in place.  - Appreciate GI consult and eventual EGD        Interval History:   Cameron Barnard is seen this morning on surgical rounds. He states he feels much better than he felt yesterday. Very minimal LLQ pain and his abdomen feels softer. He denies nausea since NG placed. He had 5L out yesterday. He did pass a bit of flatus this morning and small firm stool that looked like \"pellets\".          Physical Exam:   Temp: 97.3  F (36.3  C) Temp src: Oral BP: 132/78 Pulse: 71   Resp: 16 SpO2: 95 % O2 Device: None (Room air)      I/O last 3 completed shifts:  In: 4878 [I.V.:4878]  Out: 5450 [Emesis/NG output:5450]    N,450 ml/24 hours bilious    GENERAL: VS reviewed, alert, oriented, no acute distress  LUNGS: Normal respiratory effort, no wheezing  ABDOMEN:  obese, minimally distended but soft, minimally tender in LLQ, no rebound or guarding   EXTREMITIES: Moving all extremities, no gross deformities, well perfused, no lower extremity edema or tenderness  NEUROLOGICAL: Grossly non-focal, mood & affect appropriate    Data   Recent Labs   Lab 20  0752 20  0706 09/15/20  0742 20  0651 20  1419   WBC  --  8.3  --  11.8* 21.1*   HGB  --  14.9  --  13.9 17.2   MCV  --  " 91  --  93 91   PLT  --  199  --  153 236   INR 1.75* 2.02* 2.49* 3.55* 3.97*   NA  --  139  --  138 136   POTASSIUM  --  3.1*  --  3.7 3.7   CHLORIDE  --  107  --  108 103   CO2  --  27  --  27 27   BUN  --  10  --  19 18   CR  --  0.83  --  1.05 0.92   ANIONGAP  --  5  --  3 6   PETER  --  8.2*  --  7.4* 9.0   GLC  --  132*  --  116* 131*   ALBUMIN  --   --  2.6* 2.8* 3.8   PROTTOTAL  --   --  6.0* 6.1* 7.8   BILITOTAL  --   --  1.5* 2.3* 2.1*   ALKPHOS  --   --  50 53 70   ALT  --   --  18 22 32   AST  --   --  16 17 25     IMAGING:    XR ABDOMEN 9/17  IMPRESSION: Stable mildly dilated loop of small bowel in the mid  abdomen, compatible with the known low-grade small bowel obstruction  seen on the prior CT, without significant interval change. No free air  under the diaphragm. Splenic artery atherosclerotic calcifications.  Degenerative changes spine.    Ania Larson PA-C

## 2020-09-17 NOTE — PLAN OF CARE
No bowel movement despite bowel prep last night. Abdomen distended. Patient c/o fullness and abdominal discomfort. BS not audible. EGD/colonoscopy canceled. Abdominal XR ordered, see results. NG tube inserted with immediate return of over 2800mL. Abdominal distention greatly improved. BP remained stable despite large fluid loss. All VSS on room air. Denies pain other than mild throat discomfort from NG tube. PRN chloraseptic spray given x1. Intermittent nausea but much improved after NG tube placement. Surgery consulted. Plan to continue NPO status with NG tube decompression. Possible gastrografin challenge tomorrow.

## 2020-09-17 NOTE — PLAN OF CARE
A&Ox4. VSS ex temp tmax 100.0 overnight. Tylenol given for a headache. Abdominal distention improving. BS hypoactive. No BM but is passing flatus. NGT on LIS with green/brown output. PIV infusing NS at 75ml/hour. Ambulated to bathroom. Abx Q6H. Plan for possible gastrografin challenge today.

## 2020-09-17 NOTE — PROVIDER NOTIFICATION
MD Notification    Notified Person: MD    Notified Person Name: Dr. Gutierrez    Notification Date/Time: 09/16/2020 22:42    Notification Interaction: paged, awaiting call back    Purpose of Notification:    Orders Received: order placed for tylenol    Comments:  Patient reporting headache, requesting Tylenol.

## 2020-09-17 NOTE — PROGRESS NOTES
[facility]    Medicine Progress Note - Hospitalist Service        Date of Admission:  9/13/2020  1:56 PM    Assessment & Plan:   Cameron Barnard is a 70 year old male who presents with abdominal pain     Duodenal Diverticulitis  Small bowel obstruction  Presented with LLQ pain. Started evening prior to presentation, episodes of n/v since. No diarrhea or fever. WBC 21.1. CT with low grade SBO with transition in LLQ. Also possible duodenal diverticulitis  -Clinically appears to have worsening of bowel obstruction.  -Continue NG tube  -N.p.o.  -Appreciate GI and Surgery consults  -IV fluids, symptomatic management of pain and nausea/vomiting  -Continue IV Zosyn for now for suspected duodenal diverticulitis    Elevated bilirubin  Cholelithiasis  Hepatic steatosis and suggestion of cirrhosis/ chronic liver disease  Appears as if he used to drink somewhat heavily but none more receent. Noted 2.1 on admission, CT with steatosis and possible cirrhosis.    -Bilirubin improving, LFTs stable.    Hematuria  -No stones seen on CT admission.   -Will need urology referral at discharge     H/o PE/ DVT  -On chronic warfarin for this. INR 3.97 on presentation  -DVT/PE was 3 years ago  -INR 2.02 today, hold warfarin as there might be a need for surgical intervention  -re-consult pharmacy when ready to resume coumadin again     HLD  -Resume statin when taking PO     Dilated ascending aorta  -Follows with cardiology (Dr. Watson) through [facility]. Follow up next 1/2021    Diet: NPO for Medical/Clinical Reasons Except for: Meds     DVT Prophylaxis: Warfarin   Altman Catheter: not present  Code Status: Full code  Disposition Plan    Expected discharge: 2 to 3 days, pending resolution of small bowel obstruction  Entered: Alba Riojas MD 09/17/2020, 4:27 PM        The patient's care was discussed with the Bedside Nurse and Patient.    Alba Riojas MD  Hospitalist Service  [facility]  "Hospital    ______________________________________________________________________    Interval History   Feeling better today since placement of NG tube. +BM (small). Passing gas.    Data reviewed today: I reviewed all medications, new labs and imaging results over the last 24 hours. I personally reviewed no images or EKG's today.    Physical Exam   Vital signs:  Temp: 98.5  F (36.9  C) Temp src: Oral BP: 138/72 Pulse: 81   Resp: 16 SpO2: 93 % O2 Device: None (Room air)   Height: 188 cm (6' 2\") Weight: 130.6 kg (288 lb)  Estimated body mass index is 36.98 kg/m  as calculated from the following:    Height as of this encounter: 1.88 m (6' 2\").    Weight as of this encounter: 130.6 kg (288 lb).      Wt Readings from Last 2 Encounters:   09/13/20 130.6 kg (288 lb)   05/23/19 129.3 kg (285 lb)       Gen: AAOX3, NAD  Resp: CTA B/L, normal WOB  CVS: RRR, no murmur  Abd/GI: Slightly more distended today, nonspecific generalized tenderness, bowel sounds hypoactive, no guarding or rebound or rigidity  Skin: Warm, dry no rashes  MSK: Bilateral lower extremity edema, L>R(chronic)  Neuro- CN- intact. No focal deficits.       Data   Recent Labs   Lab 09/17/20  0752 09/16/20  0706 09/15/20  0742 09/14/20  0651 09/13/20  1419   WBC  --  8.3  --  11.8* 21.1*   HGB  --  14.9  --  13.9 17.2   MCV  --  91  --  93 91   PLT  --  199  --  153 236   INR 1.75* 2.02* 2.49* 3.55* 3.97*   NA  --  139  --  138 136   POTASSIUM  --  3.1*  --  3.7 3.7   CHLORIDE  --  107  --  108 103   CO2  --  27  --  27 27   BUN  --  10  --  19 18   CR  --  0.83  --  1.05 0.92   ANIONGAP  --  5  --  3 6   PETER  --  8.2*  --  7.4* 9.0   GLC  --  132*  --  116* 131*   ALBUMIN  --   --  2.6* 2.8* 3.8   PROTTOTAL  --   --  6.0* 6.1* 7.8   BILITOTAL  --   --  1.5* 2.3* 2.1*   ALKPHOS  --   --  50 53 70   ALT  --   --  18 22 32   AST  --   --  16 17 25   LIPASE  --   --   --   --  86       No results found for this or any previous visit (from the past 24 " hour(s)).  Medications     - MEDICATION INSTRUCTIONS -       sodium chloride 75 mL/hr at 09/16/20 1358       diatrizoate meglumine-sodium  90 mL Oral Once     pantoprazole (PROTONIX) IV  40 mg Intravenous Daily with breakfast     piperacillin-tazobactam  3.375 g Intravenous Q6H     sodium chloride (PF)  3 mL Intracatheter Q8H

## 2020-09-18 ENCOUNTER — APPOINTMENT (OUTPATIENT)
Dept: GENERAL RADIOLOGY | Facility: CLINIC | Age: 70
DRG: 390 | End: 2020-09-18
Attending: PHYSICIAN ASSISTANT
Payer: COMMERCIAL

## 2020-09-18 LAB
ANION GAP SERPL CALCULATED.3IONS-SCNC: 7 MMOL/L (ref 3–14)
BUN SERPL-MCNC: 16 MG/DL (ref 7–30)
CALCIUM SERPL-MCNC: 8 MG/DL (ref 8.5–10.1)
CHLORIDE SERPL-SCNC: 111 MMOL/L (ref 94–109)
CO2 SERPL-SCNC: 24 MMOL/L (ref 20–32)
CREAT SERPL-MCNC: 1.03 MG/DL (ref 0.66–1.25)
GFR SERPL CREATININE-BSD FRML MDRD: 73 ML/MIN/{1.73_M2}
GLUCOSE SERPL-MCNC: 84 MG/DL (ref 70–99)
INR PPP: 1.5 (ref 0.86–1.14)
MAGNESIUM SERPL-MCNC: 2.4 MG/DL (ref 1.6–2.3)
POTASSIUM SERPL-SCNC: 3.5 MMOL/L (ref 3.4–5.3)
SODIUM SERPL-SCNC: 142 MMOL/L (ref 133–144)

## 2020-09-18 PROCEDURE — 99232 SBSQ HOSP IP/OBS MODERATE 35: CPT | Performed by: PHYSICIAN ASSISTANT

## 2020-09-18 PROCEDURE — C9113 INJ PANTOPRAZOLE SODIUM, VIA: HCPCS | Performed by: INTERNAL MEDICINE

## 2020-09-18 PROCEDURE — 12000000 ZZH R&B MED SURG/OB

## 2020-09-18 PROCEDURE — 83735 ASSAY OF MAGNESIUM: CPT | Performed by: INTERNAL MEDICINE

## 2020-09-18 PROCEDURE — 25000132 ZZH RX MED GY IP 250 OP 250 PS 637: Performed by: PHYSICIAN ASSISTANT

## 2020-09-18 PROCEDURE — 80048 BASIC METABOLIC PNL TOTAL CA: CPT | Performed by: INTERNAL MEDICINE

## 2020-09-18 PROCEDURE — 74019 RADEX ABDOMEN 2 VIEWS: CPT

## 2020-09-18 PROCEDURE — 85610 PROTHROMBIN TIME: CPT | Performed by: INTERNAL MEDICINE

## 2020-09-18 PROCEDURE — 25000128 H RX IP 250 OP 636: Performed by: INTERNAL MEDICINE

## 2020-09-18 PROCEDURE — 99232 SBSQ HOSP IP/OBS MODERATE 35: CPT | Performed by: HOSPITALIST

## 2020-09-18 PROCEDURE — 25800030 ZZH RX IP 258 OP 636: Performed by: INTERNAL MEDICINE

## 2020-09-18 PROCEDURE — 36415 COLL VENOUS BLD VENIPUNCTURE: CPT | Performed by: INTERNAL MEDICINE

## 2020-09-18 PROCEDURE — 40000141 ZZH STATISTIC PERIPHERAL IV START W/O US GUIDANCE

## 2020-09-18 RX ADMIN — PIPERACILLIN SODIUM AND TAZOBACTAM SODIUM 3.38 G: 3; .375 INJECTION, POWDER, LYOPHILIZED, FOR SOLUTION INTRAVENOUS at 16:20

## 2020-09-18 RX ADMIN — POTASSIUM CHLORIDE 10 MEQ: 7.46 INJECTION, SOLUTION INTRAVENOUS at 00:10

## 2020-09-18 RX ADMIN — SODIUM CHLORIDE: 9 INJECTION, SOLUTION INTRAVENOUS at 16:20

## 2020-09-18 RX ADMIN — PIPERACILLIN SODIUM AND TAZOBACTAM SODIUM 3.38 G: 3; .375 INJECTION, POWDER, LYOPHILIZED, FOR SOLUTION INTRAVENOUS at 03:34

## 2020-09-18 RX ADMIN — Medication 10 MEQ: at 02:39

## 2020-09-18 RX ADMIN — Medication 10 MEQ: at 04:14

## 2020-09-18 RX ADMIN — Medication 10 MEQ: at 05:17

## 2020-09-18 RX ADMIN — PIPERACILLIN SODIUM AND TAZOBACTAM SODIUM 3.38 G: 3; .375 INJECTION, POWDER, LYOPHILIZED, FOR SOLUTION INTRAVENOUS at 21:57

## 2020-09-18 RX ADMIN — PANTOPRAZOLE SODIUM 40 MG: 40 INJECTION, POWDER, FOR SOLUTION INTRAVENOUS at 09:31

## 2020-09-18 RX ADMIN — POLYETHYLENE GLYCOL 3350, SODIUM SULFATE ANHYDROUS, SODIUM BICARBONATE, SODIUM CHLORIDE, POTASSIUM CHLORIDE 2000 ML: 236; 22.74; 6.74; 5.86; 2.97 POWDER, FOR SOLUTION ORAL at 17:05

## 2020-09-18 RX ADMIN — PIPERACILLIN SODIUM AND TAZOBACTAM SODIUM 3.38 G: 3; .375 INJECTION, POWDER, LYOPHILIZED, FOR SOLUTION INTRAVENOUS at 09:31

## 2020-09-18 ASSESSMENT — ACTIVITIES OF DAILY LIVING (ADL)
ADLS_ACUITY_SCORE: 12

## 2020-09-18 NOTE — PLAN OF CARE
NG tube to LIS. 650mL green/yellow output this shift. Formed bowel movements x2. BS hypoactive. Passing gas. No nausea. Patient actually feels hungry. Occasional cramping but otherwise denies pain. Plan for gastrografin challenge at 2200 tonight and XR tomorrow morning. Plan pending results. GI and Surgery following.

## 2020-09-18 NOTE — PROGRESS NOTES
[facility]    Medicine Progress Note - Hospitalist Service        Date of Admission:  9/13/2020  1:56 PM    Assessment & Plan:   Cameron Barnard is a 70 year old male who presents with abdominal pain     Duodenal Diverticulitis  Small bowel obstruction  Presented with LLQ pain. Started evening prior to presentation, episodes of n/v since. No diarrhea or fever. WBC 21.1. CT with low grade SBO with transition in LLQ. Also possible duodenal diverticulitis. Now with multiple BMs and tolerating po.  -Continue NG tube for bowel prep. Okay to leave clamped.  -Clear liquids today  -Appreciate GI and Surgery consults  -IV fluids, symptomatic management of pain and nausea/vomiting  -Continue IV Zosyn for now    Elevated bilirubin  Cholelithiasis  Hepatic steatosis and suggestion of cirrhosis/ chronic liver disease  Appears as if he used to drink somewhat heavily but none more receent. Noted 2.1 on admission, CT with steatosis and possible cirrhosis.    -Bilirubin improving, LFTs stable.    Hematuria  -No stones seen on CT admission.   -Will need urology referral at discharge     H/o PE/ DVT  -On chronic warfarin for this. INR 3.97 on presentation  -DVT/PE was 3 years ago  -Restart coumadin after EGD/colonoscopy tomorrow     HLD  -Resume statin on discharge     Dilated ascending aorta  -Follows with cardiology (Dr. Watson) through [facility]. Follow up next 1/2021    Diet: Clear Liquid Diet  NPO for Medical/Clinical Reasons Except for: No Exceptions     DVT Prophylaxis: Warfarin   Altman Catheter: not present  Code Status: Full code  Disposition Plan    Expected discharge: 1-2 days  Entered: Alba Riojas MD 09/18/2020, 3:03 PM        The patient's care was discussed with the Bedside Nurse and Patient.    Alba Riojas MD  Hospitalist Service  [facility]    ______________________________________________________________________    Interval History   Feeling better today but tired due to up all  "night with loose stools. Tolerating clears and clamping of NG tube. Leaving NG tube in for bowel prep. EGD and colonoscopy planned for tomorrow.    Data reviewed today: I reviewed all medications, new labs and imaging results over the last 24 hours. I personally reviewed no images or EKG's today.    Physical Exam   Vital signs:  Temp: 96.3  F (35.7  C) Temp src: Oral BP: 135/88 Pulse: 84   Resp: 17 SpO2: 95 % O2 Device: None (Room air)   Height: 188 cm (6' 2\") Weight: 130.6 kg (288 lb)  Estimated body mass index is 36.98 kg/m  as calculated from the following:    Height as of this encounter: 1.88 m (6' 2\").    Weight as of this encounter: 130.6 kg (288 lb).      Wt Readings from Last 2 Encounters:   09/13/20 130.6 kg (288 lb)   05/23/19 129.3 kg (285 lb)       Gen: AAOX3, NAD  Resp: CTA B/L, normal WOB  CVS: RRR, no murmur  Abd/GI: Slightly more distended today, nonspecific generalized tenderness, bowel sounds hypoactive, no guarding or rebound or rigidity  Skin: Warm, dry no rashes  MSK: Bilateral lower extremity edema, L>R(chronic)  Neuro- CN- intact. No focal deficits.       Data   Recent Labs   Lab 09/18/20  0729 09/17/20  2127 09/17/20  0752 09/16/20  0706 09/15/20  0742 09/14/20  0651 09/13/20  1419   WBC  --   --   --  8.3  --  11.8* 21.1*   HGB  --   --   --  14.9  --  13.9 17.2   MCV  --   --   --  91  --  93 91   PLT  --   --   --  199  --  153 236   INR 1.50*  --  1.75* 2.02* 2.49* 3.55* 3.97*     --   --  139  --  138 136   POTASSIUM 3.5 3.2*  --  3.1*  --  3.7 3.7   CHLORIDE 111*  --   --  107  --  108 103   CO2 24  --   --  27  --  27 27   BUN 16  --   --  10  --  19 18   CR 1.03  --   --  0.83  --  1.05 0.92   ANIONGAP 7  --   --  5  --  3 6   PETER 8.0*  --   --  8.2*  --  7.4* 9.0   GLC 84  --   --  132*  --  116* 131*   ALBUMIN  --   --   --   --  2.6* 2.8* 3.8   PROTTOTAL  --   --   --   --  6.0* 6.1* 7.8   BILITOTAL  --   --   --   --  1.5* 2.3* 2.1*   ALKPHOS  --   --   --   --  50 53 70 "   ALT  --   --   --   --  18 22 32   AST  --   --   --   --  16 17 25   LIPASE  --   --   --   --   --   --  86       Recent Results (from the past 24 hour(s))   XR Abdomen 2 Views    Narrative    ABDOMEN TWO VIEWS  9/18/2020 7:11 AM     HISTORY: Gastrografin administration, small bowel obstruction follow  up.    COMPARISON: None.      Impression    IMPRESSION: Nasogastric tube appears appropriately positioned. There  is contrast throughout nondilated colon. Prominent small bowel loop in  the left abdomen measures up to 2.8 cm. No gross free air. Degree of  small bowel distention improved from prior.    AAMIR SCOTT MD     Medications     - MEDICATION INSTRUCTIONS -       sodium chloride 75 mL/hr at 09/17/20 2028       pantoprazole (PROTONIX) IV  40 mg Intravenous Daily with breakfast     piperacillin-tazobactam  3.375 g Intravenous Q6H     polyethylene glycol  2,000 mL Per NG tube Once     sodium chloride (PF)  3 mL Intracatheter Q8H

## 2020-09-18 NOTE — PROGRESS NOTES
"GASTROENTEROLOGY PROGRESS NOTE     SUBJECTIVE: Began having diarrhea overnight. NG clamped without return of nausea or pain. Tolerating clears     OBJECTIVE:   /88 (BP Location: Left arm)   Pulse 84   Temp 96.3  F (35.7  C) (Oral)   Resp 17   Ht 1.88 m (6' 2\")   Wt 130.6 kg (288 lb)   SpO2 95%   BMI 36.98 kg/m     Temp (24hrs), Av  F (36.7  C), Min:96.3  F (35.7  C), Max:99.1  F (37.3  C)     No data found.     Intake/Output Summary (Last 24 hours) at 2020 1137  Last data filed at 2020 0600  Gross per 24 hour   Intake 881 ml   Output 850 ml   Net 31 ml      PHYSICAL EXAM   Constitutional: Age appropriate, in bed, no acute distress  Cardiovascular: Regular rate and rhythm. No murmurs rubs or gallops  Respiratory: Clear to auscultation bilaterally  Abdomen: Soft, non-tender, non-distended, hypoactive bowel sounds. No masses or hepatosplenomegaly appreciated. No guarding or rebound tenderness.    Additional Comments:   ROS, FH, SH: See initial GI consult for details.     I have reviewed the patient's new clinical lab results:   Recent Labs   Lab Test 20  0720  07520  0720  0651 20  1419   WBC  --   --  8.3  --  11.8* 21.1*   HGB  --   --  14.9  --  13.9 17.2   MCV  --   --  91  --  93 91   PLT  --   --  199  --  153 236   INR 1.50* 1.75* 2.02*   < > 3.55* 3.97*    < > = values in this interval not displayed.      Recent Labs   Lab Test 20  0729 20  2127 20  0706 20  0651     --  139 138   POTASSIUM 3.5 3.2* 3.1* 3.7   CHLORIDE 111*  --  107 108   CO2 24  --  27 27   BUN 16  --  10 19   CR 1.03  --  0.83 1.05   ANIONGAP 7  --  5 3   PETER 8.0*  --  8.2* 7.4*      Recent Labs   Lab Test 09/15/20  0742 20  0651 20  1419   ALBUMIN 2.6* 2.8* 3.8   BILITOTAL 1.5* 2.3* 2.1*   ALT 18 22 32   AST 16 17 25   ALKPHOS 50 53 70   PROTEIN  --   --  30*   LIPASE  --   --  86      20 CT abdomen pelvis  IMPRESSION:   1. "  Low-grade small bowel obstruction with gradual transition point in  the anterior left lower quadrant.  2.  Short segment mural thickening of distal small bowel loops, remote  and proximal transition point, may be reactive.  3.  Prominent descending duodenal diverticulum with adjacent  thickening and enhancement. The thickening and enhancement may be due  to adjacent compressed pancreatic parenchyma, though duodenal  diverticulitis or malignancy can have a similar appearance. Close  clinical follow-up and consideration for endoscopy for further  evaluation.  4.  Distal colonic diverticulosis without acute diverticulitis.  5.  Cholelithiasis.  6.  Hepatic steatosis and suggestion of chronic liver disease  (cirrhosis).    9/18/20 AXR  IMPRESSION: Nasogastric tube appears appropriately positioned. There  is contrast throughout nondilated colon. Prominent small bowel loop in  the left abdomen measures up to 2.8 cm. No gross free air. Degree of  small bowel distention improved from prior.     Assessment: 70-year-old male presenting with abdominal pain.  He had similar pain about 1 month ago that resolved spontaneously with dietary restriction. His CT suggests a SBO in the LLQ and short segment mural thickening in the distal small bowel. There is also a duodenal diverticulum with thickening and enhancement which could represent diverticulitis. Given his white count on admission diverticulitis is within the differential. The source for his SBO is unclear. Given the small bowel thickening inflammatory bowel disease is within the differential as is malignancy. Attempted bowel prep with no stool output. NG placed with over 5 liters removed. Surgery consulted. Pt tolerated contrast last night and x-ray this morning shows the contrast in a non dilated colon. Tolerating NG clamping overnight without return of symptoms    Plan:   -Continue clear liquids, NPO at midnight  -Surgical  consultation appreciated  -EGD/Colonoscopy planned for tomorrow 9/19. Will give a bit more prep this evening  -Continue IV fluids and supportive care  -EtOH cessation  -Outpatient evaluation in our Hepatology Clinic  -SILVINO following    [facility]  Cell:  [phone] Monday through Friday 4997-4224  Office: [phone]

## 2020-09-18 NOTE — PROGRESS NOTES
"[facility]    General Surgery  Daily Post-Op Note       Assessment and Plan:   Cameron Barnard is a 70 year old male with low grade small bowel obstruction, prominent descending duodenal diverticulum concerning for duodenal diverticulitis or less likely malignancy. GGC overnight with abdominal film revealing improved small bowel distension but still with prominent small bowel loop in the left abdomen.    Patient has no prior abdominal surgeries. He has been recently changing his diet for weight loss.     PLAN:  - Film reviewed by Dr. Ceballos. Will clamp NG and allow clear liquid diet with NG in place.  - Patient is still having bowel movements this morning. If bowel function slows down later this morning over a couple hour period, will keep things moving with half a bottle of mag citrate via NG. [facility] nursing order placed for this.  - Agree with plan for EGD by GI team, would be nice to know plans if will pursue this during this hospital stay or outpatient         Interval History:   Cameron Barnard is seen this morning on surgical rounds. Cameron denies nausea and abdominal pain. He continues to have loose bowel movements this morning with most recent one 5 minutes ago. Patient is frustrated with long hospital stay and is hoping to have EGD while in the hospital while he's \"empty.\"         Physical Exam:   Temp: 96.3  F (35.7  C) Temp src: Oral BP: 135/88 Pulse: 84   Resp: 17 SpO2: 95 % O2 Device: None (Room air)      I/O last 3 completed shifts:  In: 881 [I.V.:881]  Out: 850 [Emesis/NG output:850]    N/24 hours bilious    GENERAL: VS reviewed, alert, oriented, no acute distress  LUNGS: Normal respiratory effort, no wheezing  ABDOMEN:  obese, minimally distended but soft, nontender throughout, no rebound or guarding   EXTREMITIES: Moving all extremities, no gross deformities, well perfused, no lower extremity edema or tenderness  NEUROLOGICAL: Grossly non-focal, mood & affect appropriate    Data   Recent " Labs   Lab 09/18/20  0729 09/17/20  2127 09/17/20  0752 09/16/20  0706 09/15/20  0742 09/14/20  0651 09/13/20  1419   WBC  --   --   --  8.3  --  11.8* 21.1*   HGB  --   --   --  14.9  --  13.9 17.2   MCV  --   --   --  91  --  93 91   PLT  --   --   --  199  --  153 236   INR 1.50*  --  1.75* 2.02* 2.49* 3.55* 3.97*     --   --  139  --  138 136   POTASSIUM 3.5 3.2*  --  3.1*  --  3.7 3.7   CHLORIDE 111*  --   --  107  --  108 103   CO2 24  --   --  27  --  27 27   BUN 16  --   --  10  --  19 18   CR 1.03  --   --  0.83  --  1.05 0.92   ANIONGAP 7  --   --  5  --  3 6   PETER 8.0*  --   --  8.2*  --  7.4* 9.0   GLC 84  --   --  132*  --  116* 131*   ALBUMIN  --   --   --   --  2.6* 2.8* 3.8   PROTTOTAL  --   --   --   --  6.0* 6.1* 7.8   BILITOTAL  --   --   --   --  1.5* 2.3* 2.1*   ALKPHOS  --   --   --   --  50 53 70   ALT  --   --   --   --  18 22 32   AST  --   --   --   --  16 17 25     IMAGING:    XR ABDOMEN 9/18  IMPRESSION: Nasogastric tube appears appropriately positioned. There  is contrast throughout nondilated colon. Prominent small bowel loop in  the left abdomen measures up to 2.8 cm. No gross free air. Degree of  small bowel distention improved from prior.    Ania Larson PA-C

## 2020-09-18 NOTE — PLAN OF CARE
A&Ox4.  VSS, ex slight HTN, RA.  Denies pain.  NPO with ice chips.  NG tube to LIS, 200 mL out this shift.  Gastrographin given through tube last PM as ordered; NG clamped x 2 hrs, patient requested preventive Zofran, abdominal x-ray this AM.  Per patient report, BM x 5 overnight.  Potassium replaced; recheck pending.  PIV infusing NS @ 75 mL/hr; intermittent antibiotics.  Discharge pending progress.

## 2020-09-19 ENCOUNTER — ANESTHESIA (OUTPATIENT)
Dept: SURGERY | Facility: CLINIC | Age: 70
DRG: 390 | End: 2020-09-19
Payer: COMMERCIAL

## 2020-09-19 ENCOUNTER — ANESTHESIA EVENT (OUTPATIENT)
Dept: SURGERY | Facility: CLINIC | Age: 70
DRG: 390 | End: 2020-09-19
Payer: COMMERCIAL

## 2020-09-19 LAB
ALBUMIN SERPL-MCNC: 2.4 G/DL (ref 3.4–5)
ALP SERPL-CCNC: 55 U/L (ref 40–150)
ALT SERPL W P-5'-P-CCNC: 38 U/L (ref 0–70)
ANION GAP SERPL CALCULATED.3IONS-SCNC: 3 MMOL/L (ref 3–14)
AST SERPL W P-5'-P-CCNC: 37 U/L (ref 0–45)
BACTERIA SPEC CULT: NO GROWTH
BACTERIA SPEC CULT: NO GROWTH
BILIRUB SERPL-MCNC: 0.9 MG/DL (ref 0.2–1.3)
BUN SERPL-MCNC: 12 MG/DL (ref 7–30)
CALCIUM SERPL-MCNC: 7.7 MG/DL (ref 8.5–10.1)
CHLORIDE SERPL-SCNC: 111 MMOL/L (ref 94–109)
CO2 SERPL-SCNC: 30 MMOL/L (ref 20–32)
COLONOSCOPY: NORMAL
CREAT SERPL-MCNC: 0.87 MG/DL (ref 0.66–1.25)
GFR SERPL CREATININE-BSD FRML MDRD: 87 ML/MIN/{1.73_M2}
GLUCOSE SERPL-MCNC: 84 MG/DL (ref 70–99)
INR PPP: 1.4 (ref 0.86–1.14)
POTASSIUM SERPL-SCNC: 3.4 MMOL/L (ref 3.4–5.3)
PROT SERPL-MCNC: 5.7 G/DL (ref 6.8–8.8)
SODIUM SERPL-SCNC: 144 MMOL/L (ref 133–144)
SPECIMEN SOURCE: NORMAL
SPECIMEN SOURCE: NORMAL
UPPER GI ENDOSCOPY: NORMAL

## 2020-09-19 PROCEDURE — 36000052 ZZH SURGERY LEVEL 2 EA 15 ADDTL MIN: Performed by: INTERNAL MEDICINE

## 2020-09-19 PROCEDURE — 12000000 ZZH R&B MED SURG/OB

## 2020-09-19 PROCEDURE — 25000128 H RX IP 250 OP 636: Performed by: INTERNAL MEDICINE

## 2020-09-19 PROCEDURE — 99232 SBSQ HOSP IP/OBS MODERATE 35: CPT | Performed by: HOSPITALIST

## 2020-09-19 PROCEDURE — 0DBP8ZX EXCISION OF RECTUM, VIA NATURAL OR ARTIFICIAL OPENING ENDOSCOPIC, DIAGNOSTIC: ICD-10-PCS | Performed by: INTERNAL MEDICINE

## 2020-09-19 PROCEDURE — 99232 SBSQ HOSP IP/OBS MODERATE 35: CPT | Performed by: PHYSICIAN ASSISTANT

## 2020-09-19 PROCEDURE — 25000566 ZZH SEVOFLURANE, EA 15 MIN: Performed by: INTERNAL MEDICINE

## 2020-09-19 PROCEDURE — 25000125 ZZHC RX 250: Performed by: NURSE ANESTHETIST, CERTIFIED REGISTERED

## 2020-09-19 PROCEDURE — C9113 INJ PANTOPRAZOLE SODIUM, VIA: HCPCS | Performed by: INTERNAL MEDICINE

## 2020-09-19 PROCEDURE — 37000009 ZZH ANESTHESIA TECHNICAL FEE, EACH ADDTL 15 MIN: Performed by: INTERNAL MEDICINE

## 2020-09-19 PROCEDURE — 36000050 ZZH SURGERY LEVEL 2 1ST 30 MIN: Performed by: INTERNAL MEDICINE

## 2020-09-19 PROCEDURE — 85610 PROTHROMBIN TIME: CPT | Performed by: HOSPITALIST

## 2020-09-19 PROCEDURE — 25000128 H RX IP 250 OP 636: Performed by: NURSE ANESTHETIST, CERTIFIED REGISTERED

## 2020-09-19 PROCEDURE — 71000012 ZZH RECOVERY PHASE 1 LEVEL 1 FIRST HR: Performed by: INTERNAL MEDICINE

## 2020-09-19 PROCEDURE — 25000132 ZZH RX MED GY IP 250 OP 250 PS 637: Performed by: INTERNAL MEDICINE

## 2020-09-19 PROCEDURE — 25800030 ZZH RX IP 258 OP 636: Performed by: ANESTHESIOLOGY

## 2020-09-19 PROCEDURE — 0DBE8ZX EXCISION OF LARGE INTESTINE, VIA NATURAL OR ARTIFICIAL OPENING ENDOSCOPIC, DIAGNOSTIC: ICD-10-PCS | Performed by: INTERNAL MEDICINE

## 2020-09-19 PROCEDURE — 88305 TISSUE EXAM BY PATHOLOGIST: CPT | Performed by: INTERNAL MEDICINE

## 2020-09-19 PROCEDURE — 40000169 ZZH STATISTIC PRE-PROCEDURE ASSESSMENT I: Performed by: INTERNAL MEDICINE

## 2020-09-19 PROCEDURE — 88305 TISSUE EXAM BY PATHOLOGIST: CPT | Mod: 26 | Performed by: INTERNAL MEDICINE

## 2020-09-19 PROCEDURE — 0DJ08ZZ INSPECTION OF UPPER INTESTINAL TRACT, VIA NATURAL OR ARTIFICIAL OPENING ENDOSCOPIC: ICD-10-PCS | Performed by: INTERNAL MEDICINE

## 2020-09-19 PROCEDURE — 80053 COMPREHEN METABOLIC PANEL: CPT | Performed by: HOSPITALIST

## 2020-09-19 PROCEDURE — 37000008 ZZH ANESTHESIA TECHNICAL FEE, 1ST 30 MIN: Performed by: INTERNAL MEDICINE

## 2020-09-19 PROCEDURE — 36415 COLL VENOUS BLD VENIPUNCTURE: CPT | Performed by: HOSPITALIST

## 2020-09-19 RX ORDER — ONDANSETRON 2 MG/ML
4 INJECTION INTRAMUSCULAR; INTRAVENOUS EVERY 30 MIN PRN
Status: DISCONTINUED | OUTPATIENT
Start: 2020-09-19 | End: 2020-09-19 | Stop reason: HOSPADM

## 2020-09-19 RX ORDER — FLUMAZENIL 0.1 MG/ML
0.2 INJECTION, SOLUTION INTRAVENOUS
Status: ACTIVE | OUTPATIENT
Start: 2020-09-19 | End: 2020-09-19

## 2020-09-19 RX ORDER — NALOXONE HYDROCHLORIDE 0.4 MG/ML
.1-.4 INJECTION, SOLUTION INTRAMUSCULAR; INTRAVENOUS; SUBCUTANEOUS
Status: DISCONTINUED | OUTPATIENT
Start: 2020-09-19 | End: 2020-09-20 | Stop reason: HOSPADM

## 2020-09-19 RX ORDER — LIDOCAINE HYDROCHLORIDE 20 MG/ML
INJECTION, SOLUTION INFILTRATION; PERINEURAL PRN
Status: DISCONTINUED | OUTPATIENT
Start: 2020-09-19 | End: 2020-09-19

## 2020-09-19 RX ORDER — PANTOPRAZOLE SODIUM 40 MG/1
40 TABLET, DELAYED RELEASE ORAL
Status: DISCONTINUED | OUTPATIENT
Start: 2020-09-20 | End: 2020-09-20 | Stop reason: HOSPADM

## 2020-09-19 RX ORDER — ONDANSETRON 2 MG/ML
INJECTION INTRAMUSCULAR; INTRAVENOUS PRN
Status: DISCONTINUED | OUTPATIENT
Start: 2020-09-19 | End: 2020-09-19

## 2020-09-19 RX ORDER — ONDANSETRON 4 MG/1
4 TABLET, ORALLY DISINTEGRATING ORAL EVERY 30 MIN PRN
Status: DISCONTINUED | OUTPATIENT
Start: 2020-09-19 | End: 2020-09-19 | Stop reason: HOSPADM

## 2020-09-19 RX ORDER — WARFARIN SODIUM 5 MG/1
10 TABLET ORAL
Status: COMPLETED | OUTPATIENT
Start: 2020-09-19 | End: 2020-09-19

## 2020-09-19 RX ORDER — SODIUM CHLORIDE, SODIUM LACTATE, POTASSIUM CHLORIDE, CALCIUM CHLORIDE 600; 310; 30; 20 MG/100ML; MG/100ML; MG/100ML; MG/100ML
INJECTION, SOLUTION INTRAVENOUS CONTINUOUS
Status: DISCONTINUED | OUTPATIENT
Start: 2020-09-19 | End: 2020-09-19 | Stop reason: HOSPADM

## 2020-09-19 RX ORDER — FENTANYL CITRATE 50 UG/ML
25-50 INJECTION, SOLUTION INTRAMUSCULAR; INTRAVENOUS
Status: DISCONTINUED | OUTPATIENT
Start: 2020-09-19 | End: 2020-09-19 | Stop reason: HOSPADM

## 2020-09-19 RX ORDER — NEOSTIGMINE METHYLSULFATE 1 MG/ML
VIAL (ML) INJECTION PRN
Status: DISCONTINUED | OUTPATIENT
Start: 2020-09-19 | End: 2020-09-19

## 2020-09-19 RX ORDER — HYDROMORPHONE HYDROCHLORIDE 1 MG/ML
.3-.5 INJECTION, SOLUTION INTRAMUSCULAR; INTRAVENOUS; SUBCUTANEOUS EVERY 5 MIN PRN
Status: DISCONTINUED | OUTPATIENT
Start: 2020-09-19 | End: 2020-09-19 | Stop reason: HOSPADM

## 2020-09-19 RX ORDER — GLYCOPYRROLATE 0.2 MG/ML
INJECTION, SOLUTION INTRAMUSCULAR; INTRAVENOUS PRN
Status: DISCONTINUED | OUTPATIENT
Start: 2020-09-19 | End: 2020-09-19

## 2020-09-19 RX ORDER — DEXAMETHASONE SODIUM PHOSPHATE 4 MG/ML
INJECTION, SOLUTION INTRA-ARTICULAR; INTRALESIONAL; INTRAMUSCULAR; INTRAVENOUS; SOFT TISSUE PRN
Status: DISCONTINUED | OUTPATIENT
Start: 2020-09-19 | End: 2020-09-19

## 2020-09-19 RX ORDER — PROPOFOL 10 MG/ML
INJECTION, EMULSION INTRAVENOUS PRN
Status: DISCONTINUED | OUTPATIENT
Start: 2020-09-19 | End: 2020-09-19

## 2020-09-19 RX ORDER — FENTANYL CITRATE 50 UG/ML
INJECTION, SOLUTION INTRAMUSCULAR; INTRAVENOUS PRN
Status: DISCONTINUED | OUTPATIENT
Start: 2020-09-19 | End: 2020-09-19

## 2020-09-19 RX ADMIN — GLYCOPYRROLATE 0.8 MCG: 0.2 INJECTION, SOLUTION INTRAMUSCULAR; INTRAVENOUS at 10:47

## 2020-09-19 RX ADMIN — PIPERACILLIN SODIUM AND TAZOBACTAM SODIUM 3.38 G: 3; .375 INJECTION, POWDER, LYOPHILIZED, FOR SOLUTION INTRAVENOUS at 04:05

## 2020-09-19 RX ADMIN — PANTOPRAZOLE SODIUM 40 MG: 40 INJECTION, POWDER, FOR SOLUTION INTRAVENOUS at 08:01

## 2020-09-19 RX ADMIN — WARFARIN SODIUM 10 MG: 5 TABLET ORAL at 18:05

## 2020-09-19 RX ADMIN — NEOSTIGMINE METHYLSULFATE 5 MG: 1 INJECTION, SOLUTION INTRAVENOUS at 10:47

## 2020-09-19 RX ADMIN — ONDANSETRON 4 MG: 2 INJECTION INTRAMUSCULAR; INTRAVENOUS at 10:36

## 2020-09-19 RX ADMIN — ROCURONIUM BROMIDE 30 MG: 10 INJECTION INTRAVENOUS at 10:19

## 2020-09-19 RX ADMIN — PIPERACILLIN SODIUM AND TAZOBACTAM SODIUM 3.38 G: 3; .375 INJECTION, POWDER, LYOPHILIZED, FOR SOLUTION INTRAVENOUS at 11:24

## 2020-09-19 RX ADMIN — LIDOCAINE HYDROCHLORIDE 100 MG: 20 INJECTION, SOLUTION INFILTRATION; PERINEURAL at 10:16

## 2020-09-19 RX ADMIN — SUCCINYLCHOLINE CHLORIDE 100 MG: 20 INJECTION, SOLUTION INTRAMUSCULAR; INTRAVENOUS; PARENTERAL at 10:16

## 2020-09-19 RX ADMIN — PROPOFOL 200 MG: 10 INJECTION, EMULSION INTRAVENOUS at 10:16

## 2020-09-19 RX ADMIN — FENTANYL CITRATE 50 MCG: 50 INJECTION, SOLUTION INTRAMUSCULAR; INTRAVENOUS at 10:21

## 2020-09-19 RX ADMIN — DEXAMETHASONE SODIUM PHOSPHATE 4 MG: 4 INJECTION, SOLUTION INTRA-ARTICULAR; INTRALESIONAL; INTRAMUSCULAR; INTRAVENOUS; SOFT TISSUE at 10:18

## 2020-09-19 RX ADMIN — SODIUM CHLORIDE, POTASSIUM CHLORIDE, SODIUM LACTATE AND CALCIUM CHLORIDE: 600; 310; 30; 20 INJECTION, SOLUTION INTRAVENOUS at 10:05

## 2020-09-19 SDOH — HEALTH STABILITY: MENTAL HEALTH: CURRENT SMOKER: 0

## 2020-09-19 ASSESSMENT — ACTIVITIES OF DAILY LIVING (ADL)
ADLS_ACUITY_SCORE: 12

## 2020-09-19 ASSESSMENT — COPD QUESTIONNAIRES: COPD: 0

## 2020-09-19 ASSESSMENT — ENCOUNTER SYMPTOMS: DYSRHYTHMIAS: 0

## 2020-09-19 ASSESSMENT — LIFESTYLE VARIABLES: TOBACCO_USE: 0

## 2020-09-19 NOTE — PLAN OF CARE
Problem: Pain (Intestinal Obstruction)  Goal: Acceptable Pain Control  9/18/2020 1901 by Sarah Prasad, RN  Outcome: No Change  SS, A/O, ambulating independently.  Denies pain.  New PIV placed this afternoon after infiltration on L.  Clear liquid diet, currently undergoing bowel prep with GoLytely down NG tube, tolerating, no nausea and frequent, loose stools continue.  NG clamped all of today.  Plan for colonoscopy/endoscopy tomorrow.

## 2020-09-19 NOTE — PLAN OF CARE
Problem: Nausea and Vomiting (Intestinal Obstruction)  Goal: Nausea and Vomiting Relief  9/19/2020 1754 by Sarah Prasad, RN  Outcome: Improving   Some HTN, all other VS WDL.  A/O, ambulating independently.  EGD and colonoscopy today, polyps removed, NG tube removed.  Advanced to full liquid diet, tolerating well no nausea or abdominal pain.  Diarrhea resolved.  Possible discharge tomorrow pending tolerating PO.

## 2020-09-19 NOTE — PROGRESS NOTES
[facility]    Medicine Progress Note - Hospitalist Service        Date of Admission:  9/13/2020  1:56 PM    Assessment & Plan:   Cameron Barnard is a 70 year old male who presents with abdominal pain     Duodenal Diverticulitis  Small bowel obstruction  Presented with LLQ pain. Started evening prior to presentation, episodes of n/v since. No diarrhea or fever. WBC 21.1. CT with low grade SBO with transition in LLQ. Also possible duodenal diverticulitis. Now with multiple BMs and tolerating po.  -EGD/colonoscopy complete - findings unremarkable  -Advancing diet  -Appreciate GI and Surgery consults  -Discontinue zosyn - no evidence of infection    Elevated bilirubin  Cholelithiasis  Hepatic steatosis and suggestion of cirrhosis/ chronic liver disease  Appears as if he used to drink somewhat heavily but none more receent. Noted 2.1 on admission, CT with steatosis and possible cirrhosis.    -Bilirubin improving, LFTs stable.    Hematuria  -No stones seen on CT admission.   -Will need urology referral at discharge     H/o PE/ DVT  -On chronic warfarin for this. INR 3.97 on presentation  -DVT/PE was 3 years ago  -Restart coumadin. No need for bridging.     HLD  -Resume statin on discharge     Dilated ascending aorta  -Follows with cardiology (Dr. Watson) through [facility]. Follow up next 1/2021    Diet: Clear Liquid Diet  Full Liquid Diet     DVT Prophylaxis: Warfarin   Altman Catheter: not present  Code Status: Full code  Disposition Plan    Expected discharge: tomorrow morning  Entered: Alba Riojas MD 09/19/2020, 2:52 PM        The patient's care was discussed with the Bedside Nurse and Patient.    Alba Riojas MD  Hospitalist Service  [facility]    ______________________________________________________________________    Interval History   Feeling well. Exhausted. Would like to leave early tomorrow.    Data reviewed today: I reviewed all medications, new labs and imaging results  "over the last 24 hours. I personally reviewed no images or EKG's today.    Physical Exam   Vital signs:  Temp: 97.8  F (36.6  C) Temp src: Oral BP: (!) 144/83 Pulse: 65   Resp: 15 SpO2: 97 % O2 Device: None (Room air) Oxygen Delivery: 8 LPM Height: 188 cm (6' 2\") Weight: 130.6 kg (288 lb)  Estimated body mass index is 36.98 kg/m  as calculated from the following:    Height as of this encounter: 1.88 m (6' 2\").    Weight as of this encounter: 130.6 kg (288 lb).      Wt Readings from Last 2 Encounters:   09/13/20 130.6 kg (288 lb)   05/23/19 129.3 kg (285 lb)       Gen: AAOX3, NAD  Resp: CTA B/L, normal WOB  CVS: RRR, no murmur  Abd/GI: Slightly more distended today, nonspecific generalized tenderness, bowel sounds hypoactive, no guarding or rebound or rigidity  Skin: Warm, dry no rashes  MSK: Bilateral lower extremity edema, L>R(chronic)  Neuro- CN- intact. No focal deficits.       Data   Recent Labs   Lab 09/19/20  0635 09/18/20  0729 09/17/20  2127 09/17/20  0752 09/16/20  0706 09/15/20  0742 09/14/20  0651 09/13/20  1419   WBC  --   --   --   --  8.3  --  11.8* 21.1*   HGB  --   --   --   --  14.9  --  13.9 17.2   MCV  --   --   --   --  91  --  93 91   PLT  --   --   --   --  199  --  153 236   INR 1.40* 1.50*  --  1.75* 2.02* 2.49* 3.55* 3.97*    142  --   --  139  --  138 136   POTASSIUM 3.4 3.5 3.2*  --  3.1*  --  3.7 3.7   CHLORIDE 111* 111*  --   --  107  --  108 103   CO2 30 24  --   --  27  --  27 27   BUN 12 16  --   --  10  --  19 18   CR 0.87 1.03  --   --  0.83  --  1.05 0.92   ANIONGAP 3 7  --   --  5  --  3 6   PETER 7.7* 8.0*  --   --  8.2*  --  7.4* 9.0   GLC 84 84  --   --  132*  --  116* 131*   ALBUMIN 2.4*  --   --   --   --  2.6* 2.8* 3.8   PROTTOTAL 5.7*  --   --   --   --  6.0* 6.1* 7.8   BILITOTAL 0.9  --   --   --   --  1.5* 2.3* 2.1*   ALKPHOS 55  --   --   --   --  50 53 70   ALT 38  --   --   --   --  18 22 32   AST 37  --   --   --   --  16 17 25   LIPASE  --   --   --   --   --   " --   --  86       No results found for this or any previous visit (from the past 24 hour(s)).  Medications     - MEDICATION INSTRUCTIONS -       - MEDICATION INSTRUCTIONS -       Warfarin Therapy Reminder         [START ON 9/20/2020] pantoprazole  40 mg Oral QAM AC     sodium chloride (PF)  3 mL Intracatheter Q8H     warfarin ANTICOAGULANT  10 mg Oral ONCE at 18:00

## 2020-09-19 NOTE — PROGRESS NOTES
GI Brief Note    EGD and colonoscopy completed, see full reports and photos under procedures tab.    Findings:    EGD: normal. No duodenitis, no mass, no diverticulum    Colonoscopy:  - normal terminal ileum  - diverticulosis  - multiple small polyps removed today    A/P:  Colonoscopy should be repeated in a few months for screening in case any small polyps missed today due to prep  No reason for SBO seen on today's exams, no evidence of malignancy, IBD, etc  No luminal  GI reason seen for SBO    -start clear liquids, advance as tolerated if ok with surgery    Lakshmi Santana MD  [facility]  Cell/Pager: [phone]  After 5pm please call [phone]

## 2020-09-19 NOTE — ANESTHESIA CARE TRANSFER NOTE
Patient: Cameron Barnard    Procedure(s):  ESOPHAGOGASTRODUODENOSCOPY (EGD)  COLONOSCOPY    Diagnosis: Small bowel obstruction (H) [K56.609]  Diagnosis Additional Information: No value filed.    Anesthesia Type:   General     Note:  Airway :Face Mask  Patient transferred to:PACU  Comments: Pt awake and able to verbalize needs. ALL monitors on and audible. Vital signs stable. Spontaneous respiration without difficulty. o2 sats 99% on 10Lo2 per face mask. Pt denies pain. Report given to PACU RN.Handoff Report: Identifed the Patient, Identified the Reponsible Provider, Reviewed the pertinent medical history, Discussed the surgical course, Reviewed Intra-OP anesthesia mangement and issues during anesthesia, Set expectations for post-procedure period and Allowed opportunity for questions and acknowledgement of understanding      Vitals: (Last set prior to Anesthesia Care Transfer)    CRNA VITALS  9/19/2020 1023 - 9/19/2020 1058      9/19/2020             NIBP:  166/84    Pulse:  65    SpO2:  98 %    Resp Rate (observed):  10                Electronically Signed By: RON Philippe CRNA  September 19, 2020  10:58 AM

## 2020-09-19 NOTE — PROGRESS NOTES
PRE-PROCEDURE NOTE    CHIEF COMPLAINT / REASON FOR PROCEDURE:  SBO, concern for duodenitis on CT    History and Physical Reviewed:  yes    PRE-SEDATION ASSESSMENT:    Lung Exam:  normal  Heart Exam:  normal  Mallampati Airway Classification:  3   Previous reaction to anesthesia/sedation:   no  Sedation plan based on assessment:  Per anesthesia  Comment(s):  Increased risk of perforation explained  ASA Classification:  3-4    IMPRESSION:  Rule out duodenitis, mass, small bowel inflammation    PLAN:  egd/colon    Lakshmi Santana MD, MD

## 2020-09-19 NOTE — PROGRESS NOTES
[facility]    General Surgery  Daily Post-Op Note       Assessment and Plan:   Cameron Barnard is a 70 year old male with low grade small bowel obstruction, prominent descending duodenal diverticulum concerning for duodenal diverticulitis or less likely malignancy. GGC yesterday with abdominal film revealing improved small bowel distension but still with prominent small bowel loop in the left abdomen.    Patient has no prior abdominal surgeries. He has been recently changing his diet for weight loss.     PLAN:  - EGD by GI team today, will await these results  - From a General Surgery standpoint NG tube can be removed and likely due to this down in endoscopy.  - No changes from surgical standpoint. As small bowel obstruction resolving, diet advancement following GI procedure per their recommendations.    **ADDENDUM**  GI procedure results as below. Keep NG out and advance diet to full liquids later today if tolerates clear liquids again after procedures.    EGD   - Normal upper endoscopy. No sign of mass or                             inflammation or diverticulum.   COLONOSCOPY  - Multiple small polyps removed from the colon                             - Diverticulosis                             - Normal terminal ileum                             - No evidence of colitis        Interval History:   Cameron Barnard is seen this morning on surgical rounds. He states he feels the best he has felt since being the hospital. He tolerated clears yesterday with NG clamped. He denies abdominal pain. He is passing flatus and continues to have bowel movements following prep for EGD today.         Physical Exam:   Temp: 98.8  F (37.1  C) Temp src: Oral BP: 137/78 Pulse: 71   Resp: 16 SpO2: 97 % O2 Device: None (Room air)      No intake/output data recorded.    GENERAL: VS reviewed, alert, oriented, no acute distress  LUNGS: Normal respiratory effort, no wheezing  ABDOMEN:  obese, nondistended, nontender throughout, no  rebound or guarding   EXTREMITIES: Moving all extremities, no gross deformities, well perfused, no lower extremity edema or tenderness  NEUROLOGICAL: Grossly non-focal, mood & affect appropriate    Data   Recent Labs   Lab 09/19/20  0635 09/18/20  0729 09/17/20  2127 09/17/20  0752 09/16/20  0706 09/15/20  0742 09/14/20  0651 09/13/20  1419   WBC  --   --   --   --  8.3  --  11.8* 21.1*   HGB  --   --   --   --  14.9  --  13.9 17.2   MCV  --   --   --   --  91  --  93 91   PLT  --   --   --   --  199  --  153 236   INR 1.40* 1.50*  --  1.75* 2.02* 2.49* 3.55* 3.97*    142  --   --  139  --  138 136   POTASSIUM 3.4 3.5 3.2*  --  3.1*  --  3.7 3.7   CHLORIDE 111* 111*  --   --  107  --  108 103   CO2 30 24  --   --  27  --  27 27   BUN 12 16  --   --  10  --  19 18   CR 0.87 1.03  --   --  0.83  --  1.05 0.92   ANIONGAP 3 7  --   --  5  --  3 6   PETER 7.7* 8.0*  --   --  8.2*  --  7.4* 9.0   GLC 84 84  --   --  132*  --  116* 131*   ALBUMIN 2.4*  --   --   --   --  2.6* 2.8* 3.8   PROTTOTAL 5.7*  --   --   --   --  6.0* 6.1* 7.8   BILITOTAL 0.9  --   --   --   --  1.5* 2.3* 2.1*   ALKPHOS 55  --   --   --   --  50 53 70   ALT 38  --   --   --   --  18 22 32   AST 37  --   --   --   --  16 17 25       Ania Larson PA-C

## 2020-09-19 NOTE — ANESTHESIA PREPROCEDURE EVALUATION
Anesthesia Pre-Procedure Evaluation    Patient: Cameron Barnard   MRN: [id number] : 1950          Preoperative Diagnosis: Small bowel obstruction (H) [K56.609]    Procedure(s):  ESOPHAGOGASTRODUODENOSCOPY (EGD)  COLONOSCOPY    Past Medical History:   Diagnosis Date     Allergic state      DVT (deep vein thrombosis) in pregnancy      History reviewed. No pertinent surgical history.    Anesthesia Evaluation     .             ROS/MED HX    ENT/Pulmonary: Comment: H/O PE     (-) tobacco use, asthma, COPD and sleep apnea   Neurologic:  - neg neurologic ROS     Cardiovascular:     (+) ----. Taking blood thinners : . . . :. .      (-) CAD and arrhythmias   METS/Exercise Tolerance:     Hematologic:         Musculoskeletal:         GI/Hepatic: Comment: SBO  NG in situ. 3 L removed when placed          (-) GERD   Renal/Genitourinary:  - ROS Renal section negative       Endo:     (+) Obesity, .   (-) Type I DM and Type II DM   Psychiatric:  - neg psychiatric ROS       Infectious Disease:         Malignancy:         Other:                          Physical Exam      Airway   Mallampati: III  TM distance: >3 FB  Neck ROM: full    Dental   (+) missing    Cardiovascular   Rhythm and rate: regular      Pulmonary    breath sounds clear to auscultation            Lab Results   Component Value Date    WBC 8.3 2020    HGB 14.9 2020    HCT 45.0 2020     2020     2020    POTASSIUM 3.4 2020    CHLORIDE 111 (H) 2020    CO2 30 2020    BUN 12 2020    CR 0.87 2020    GLC 84 2020    PETER 7.7 (L) 2020    MAG 2.4 (H) 2020    ALBUMIN 2.4 (L) 2020    PROTTOTAL 5.7 (L) 2020    ALT 38 2020    AST 37 2020    ALKPHOS 55 2020    BILITOTAL 0.9 2020    LIPASE 86 2020    INR 1.40 (H) 2020       Preop Vitals  BP Readings from Last 3 Encounters:   20 (!) 175/95   19 138/88    Pulse Readings from Last 3  "Encounters:   09/19/20 71   05/23/19 77      Resp Readings from Last 3 Encounters:   09/19/20 18   05/23/19 18    SpO2 Readings from Last 3 Encounters:   09/19/20 96%   05/23/19 98%      Temp Readings from Last 1 Encounters:   09/19/20 36.4  C (97.6  F) (Temporal)    Ht Readings from Last 1 Encounters:   09/13/20 1.88 m (6' 2\")      Wt Readings from Last 1 Encounters:   09/13/20 130.6 kg (288 lb)    Estimated body mass index is 36.98 kg/m  as calculated from the following:    Height as of this encounter: 1.88 m (6' 2\").    Weight as of this encounter: 130.6 kg (288 lb).       Anesthesia Plan      History & Physical Review  History and physical reviewed and following examination; no interval change.    ASA Status:  3 .    NPO Status:  > 8 hours (NG in place)    Plan for General with Intravenous and Propofol (RSI) induction. Maintenance will be Balanced.    PONV prophylaxis:  Ondansetron (or other 5HT-3) and Dexamethasone or Solumedrol  RSI/ETT  Given that NG still in place we will do GA.    The patient is not a current smoker      Postoperative Care  Postoperative pain management:  Multi-modal analgesia.      Consents  Anesthetic plan, risks, benefits and alternatives discussed with:  Patient.  Use of blood products discussed: No .   .                 Parish Oakley MD  "

## 2020-09-19 NOTE — PLAN OF CARE
Pt A/O. VSS on RA. Denied pain. Denied nausea. NG remained clamped. Bowel prep completed. Loose stools continue, cleared overnight. Clear liquids, NPO since midnight.  PIV w/ NS at 75 ml/hr. Up independently in room. Plan for EGD/colonoscopy at 0945.

## 2020-09-19 NOTE — ANESTHESIA POSTPROCEDURE EVALUATION
Patient: Cameron Barnard    Procedure(s):  ESOPHAGOGASTRODUODENOSCOPY (EGD)  COLONOSCOPY    Diagnosis:Small bowel obstruction (H) [K56.609]  Diagnosis Additional Information: No value filed.    Anesthesia Type:  General    Note:  Anesthesia Post Evaluation    Patient location during evaluation: PACU  Patient participation: Able to fully participate in evaluation  Level of consciousness: awake and alert  Pain management: adequate  Airway patency: patent  Cardiovascular status: acceptable and stable  Respiratory status: acceptable, spontaneous ventilation, unassisted and nonlabored ventilation  Hydration status: acceptable  PONV: none             Last vitals:  Vitals:    09/19/20 1115 09/19/20 1145 09/19/20 1211   BP: (!) 156/91 (!) 173/92 (!) 178/83   Pulse: 59 61 61   Resp: 16 12 16   Temp: 37.2  C (99  F)  36.2  C (97.1  F)   SpO2: 96% 96% 96%         Electronically Signed By: Parish Oakley MD  September 19, 2020  12:27 PM

## 2020-09-20 VITALS
HEIGHT: 74 IN | OXYGEN SATURATION: 96 % | BODY MASS INDEX: 36.96 KG/M2 | HEART RATE: 64 BPM | TEMPERATURE: 96 F | DIASTOLIC BLOOD PRESSURE: 80 MMHG | SYSTOLIC BLOOD PRESSURE: 161 MMHG | RESPIRATION RATE: 16 BRPM | WEIGHT: 288 LBS

## 2020-09-20 LAB
ALBUMIN SERPL-MCNC: 2.7 G/DL (ref 3.4–5)
ALP SERPL-CCNC: 64 U/L (ref 40–150)
ALT SERPL W P-5'-P-CCNC: 57 U/L (ref 0–70)
ANION GAP SERPL CALCULATED.3IONS-SCNC: 5 MMOL/L (ref 3–14)
AST SERPL W P-5'-P-CCNC: 49 U/L (ref 0–45)
BILIRUB SERPL-MCNC: 0.8 MG/DL (ref 0.2–1.3)
BUN SERPL-MCNC: 7 MG/DL (ref 7–30)
CALCIUM SERPL-MCNC: 8.2 MG/DL (ref 8.5–10.1)
CHLORIDE SERPL-SCNC: 104 MMOL/L (ref 94–109)
CO2 SERPL-SCNC: 28 MMOL/L (ref 20–32)
CREAT SERPL-MCNC: 0.86 MG/DL (ref 0.66–1.25)
ERYTHROCYTE [DISTWIDTH] IN BLOOD BY AUTOMATED COUNT: 12.6 % (ref 10–15)
GFR SERPL CREATININE-BSD FRML MDRD: 87 ML/MIN/{1.73_M2}
GLUCOSE SERPL-MCNC: 93 MG/DL (ref 70–99)
HCT VFR BLD AUTO: 41.1 % (ref 40–53)
HGB BLD-MCNC: 14.1 G/DL (ref 13.3–17.7)
INR PPP: 1.27 (ref 0.86–1.14)
MCH RBC QN AUTO: 31.3 PG (ref 26.5–33)
MCHC RBC AUTO-ENTMCNC: 34.3 G/DL (ref 31.5–36.5)
MCV RBC AUTO: 91 FL (ref 78–100)
PLATELET # BLD AUTO: 202 10E9/L (ref 150–450)
POTASSIUM SERPL-SCNC: 3.4 MMOL/L (ref 3.4–5.3)
PROT SERPL-MCNC: 6.8 G/DL (ref 6.8–8.8)
RBC # BLD AUTO: 4.51 10E12/L (ref 4.4–5.9)
SODIUM SERPL-SCNC: 137 MMOL/L (ref 133–144)
WBC # BLD AUTO: 8 10E9/L (ref 4–11)

## 2020-09-20 PROCEDURE — 25000132 ZZH RX MED GY IP 250 OP 250 PS 637: Performed by: HOSPITALIST

## 2020-09-20 PROCEDURE — 80053 COMPREHEN METABOLIC PANEL: CPT | Performed by: HOSPITALIST

## 2020-09-20 PROCEDURE — 85027 COMPLETE CBC AUTOMATED: CPT | Performed by: HOSPITALIST

## 2020-09-20 PROCEDURE — 99239 HOSP IP/OBS DSCHRG MGMT >30: CPT | Performed by: HOSPITALIST

## 2020-09-20 PROCEDURE — 36415 COLL VENOUS BLD VENIPUNCTURE: CPT | Performed by: HOSPITALIST

## 2020-09-20 PROCEDURE — 85610 PROTHROMBIN TIME: CPT | Performed by: HOSPITALIST

## 2020-09-20 RX ORDER — WARFARIN SODIUM 5 MG/1
15 TABLET ORAL
Status: DISCONTINUED | OUTPATIENT
Start: 2020-09-20 | End: 2020-09-20 | Stop reason: HOSPADM

## 2020-09-20 RX ADMIN — PANTOPRAZOLE SODIUM 40 MG: 40 TABLET, DELAYED RELEASE ORAL at 06:55

## 2020-09-20 ASSESSMENT — ACTIVITIES OF DAILY LIVING (ADL)
ADLS_ACUITY_SCORE: 12

## 2020-09-20 NOTE — PLAN OF CARE
Pt A/O. VSS on RA. Denied pain. Denied nausea. Tolerating full liquid diet. PIV SL. Up independently. Plan for discharge home today.

## 2020-09-20 NOTE — PROGRESS NOTES
[id number]: pt stable this AM. No new complaints. VSS ex elevated BP. Denies pain. Up ind. Advanced to low fiber diet. Spoke with MD in AM, okay for patient to discharge prior to being seen. GI signed off. IV removed. Patient walked off unit with wife. AVS given and all questions answered.

## 2020-09-20 NOTE — DISCHARGE SUMMARY
[facility]  Hospitalist Discharge Summary      Date of Admission:  9/13/2020  Date of Discharge:  9/20/2020 10:56 AM  Discharging Provider: Alba Riojas MD      Discharge Diagnoses   Duodenal diverticulitis  Small bowel obstruction, resolved    Follow-ups Needed After Discharge   Follow-up Appointments     Follow-up and recommended labs and tests       Follow up with primary care provider, [facility], within 7   days for hospital follow- up.  No follow up labs or test are needed.                 Discharge Disposition   Discharged to home  Condition at discharge: Stable    Hospital Course   Followed by GI and surgery services.    Consultations This Hospital Stay   GASTROENTEROLOGY IP CONSULT  PHARMACY TO DOSE WARFARIN  SURGERY GENERAL IP CONSULT  PHARMACY TO DOSE WARFARIN    Code Status   Full Code    Time Spent on this Encounter   I, Alba Riojas MD, personally saw the patient today and spent greater than 30 minutes discharging this patient.       Alba Riojas MD  [facility]  ______________________________________________________________________    Physical Exam   Vital Signs: Temp: 96  F (35.6  C) Temp src: Oral BP: (!) 146/83 Pulse: 66   Resp: 16 SpO2: 96 % O2 Device: None (Room air) Oxygen Delivery: 8 LPM  Weight: 288 lbs 0 oz    Primary Care Physician   [facility]    Discharge Orders      Reason for your hospital stay    Small bowel obstruction     Follow-up and recommended labs and tests     Follow up with primary care provider, [facility], within 7 days for hospital follow- up.  No follow up labs or test are needed.     Activity    Your activity upon discharge: activity as tolerated     Full Code     Diet    Follow this diet upon discharge: Regular       Significant Results and Procedures   Most Recent 3 BMP's:  Recent Labs   Lab Test 09/30/20  0814 09/29/20  0739 09/28/20  0710    138 138   POTASSIUM 3.6 3.5 3.7    CHLORIDE 104 106 107   CO2 27 28 26   BUN 7 10 10   CR 0.73 0.82 0.90   ANIONGAP 6 4 5   PETER 7.8* 7.9* 8.0*   GLC 75 90 101*   ,   Results for orders placed or performed during the hospital encounter of 09/13/20   CT Abdomen Pelvis w Contrast    Narrative    CT ABDOMEN PELVIS W CONTRAST 9/13/2020 3:28 PM    CLINICAL HISTORY: Abd pain, diverticulitis suspected    TECHNIQUE: CT scan of the abdomen and pelvis was performed following  injection of IV contrast. Multiplanar reformats were obtained. Dose  reduction techniques were used.  CONTRAST: 135 mL Isovue-370    COMPARISON: None.    FINDINGS:   LOWER CHEST: Normal.    HEPATOBILIARY: Suggestion of micronodular contour to the surface of  the liver. Mild hepatic steatosis. Cholelithiasis without biliary  ductal dilatation.    PANCREAS: Mild fatty atrophy.    SPLEEN: Normal.    ADRENAL GLANDS: Normal.    KIDNEYS/BLADDER: Symmetric enhancement of both kidneys. No  hydronephrosis. Subcentimeter hypodensity in the right kidney that is  too small to characterize, though statistically likely benign. Urinary  bladder is unremarkable.    BOWEL: Multiple fluid-filled dilated loops of small bowel in the left  lower quadrant with gradual transition to normal caliber in the  anterior left lower quadrant series 4/image 164. No pneumoperitoneum  or portal venous gas. No pneumatosis intestinalis. There is short  segmental mural thickening and enhancement in a distal left lower  quadrant small bowel loop with fluid filled dilated small bowel loops  proximal and distal series 4/image 197, series 6/image 82.    There is a moderate fluid and gas-filled descending duodenal  diverticulum with adjacent irregular enhancement and associated  stranding. Distal colonic diverticulosis without acute diverticulitis.    PELVIC ORGANS: Unremarkable. Trace dependent pelvic free fluid, likely  reactive.    ADDITIONAL FINDINGS: Abdominal aorta normal in caliber with minimal  calcified atheromatous  plaque. Major portal veins are patent.    MUSCULOSKELETAL: Multilevel lower lumbar spondylosis.      Impression    IMPRESSION:   1.  Low-grade small bowel obstruction with gradual transition point in  the anterior left lower quadrant.  2.  Short segment mural thickening of distal small bowel loops, remote  and proximal transition point, may be reactive.  3.  Prominent descending duodenal diverticulum with adjacent  thickening and enhancement. The thickening and enhancement may be due  to adjacent compressed pancreatic parenchyma, though duodenal  diverticulitis or malignancy can have a similar appearance. Close  clinical follow-up and consideration for endoscopy for further  evaluation.  4.  Distal colonic diverticulosis without acute diverticulitis.  5.  Cholelithiasis.  6.  Hepatic steatosis and suggestion of chronic liver disease  (cirrhosis).    MAX DURAND MD   XR Abdomen 2 Views    Narrative    XR ABDOMEN 2 VW   9/16/2020 8:51 AM     HISTORY: SBO, reassess progress    COMPARISON: 9/13/2020      Impression    IMPRESSION: Stable mildly dilated loop of small bowel in the mid  abdomen, compatible with the known low-grade small bowel obstruction  seen on the prior CT, without significant interval change. No free air  under the diaphragm. Splenic artery atherosclerotic calcifications.  Degenerative changes spine.    GIOVANNY GILBERT MD   XR Abdomen 2 Views    Narrative    ABDOMEN TWO VIEWS  9/18/2020 7:11 AM     HISTORY: Gastrografin administration, small bowel obstruction follow  up.    COMPARISON: None.      Impression    IMPRESSION: Nasogastric tube appears appropriately positioned. There  is contrast throughout nondilated colon. Prominent small bowel loop in  the left abdomen measures up to 2.8 cm. No gross free air. Degree of  small bowel distention improved from prior.    AAMIR SCOTT MD       Discharge Medications   Current Discharge Medication List      CONTINUE these medications which have NOT CHANGED  "   Details   acetaminophen (TYLENOL) 500 MG tablet Take 1,000 mg by mouth daily as needed      !! UNKNOWN TO PATIENT Take 0.5 tablets by mouth nightly as needed \"OTC Sleeping pill\" per patient      !! UNKNOWN TO PATIENT Place 3 drops into both eyes daily as needed \"Eye drop for allergies\" per patient      !! warfarin ANTICOAGULANT (COUMADIN) 5 MG tablet Take 10 mg by mouth six times a week Daily except on Wednesdays      !! warfarin ANTICOAGULANT (COUMADIN) 5 MG tablet Take 7.5 mg by mouth On Wednesdays       !! - Potential duplicate medications found. Please discuss with provider.        Allergies   No Known Allergies  "

## 2020-09-20 NOTE — PROGRESS NOTES
GI chart check    EGD/Colonoscopy unremarkable for cause of SBO.  Patient tolerating diet and planning for discharge today.    Given the number of  Colon polyps removed and the prep quality, he should have another colonoscopy in a few months to make sure no polyps were missed, we can contact him as an outpatient to arrange that.    GI will sign off, please call with any questions and we would be happy to see the  Patient again.    Lakshmi Santana MD  [facility]  Cell/Pager: [phone]  After 5pm please call [phone]

## 2020-09-22 LAB — COPATH REPORT: NORMAL

## 2020-09-26 ENCOUNTER — APPOINTMENT (OUTPATIENT)
Dept: CT IMAGING | Facility: CLINIC | Age: 70
DRG: 330 | End: 2020-09-26
Attending: NURSE PRACTITIONER
Payer: COMMERCIAL

## 2020-09-26 ENCOUNTER — HOSPITAL ENCOUNTER (INPATIENT)
Facility: CLINIC | Age: 70
LOS: 6 days | Discharge: HOME | DRG: 330 | End: 2020-10-02
Attending: NURSE PRACTITIONER | Admitting: HOSPITALIST
Payer: COMMERCIAL

## 2020-09-26 DIAGNOSIS — R10.30 LOWER ABDOMINAL PAIN: ICD-10-CM

## 2020-09-26 DIAGNOSIS — Z79.01 ANTICOAGULATION MONITORING, INR RANGE 2-3: Primary | ICD-10-CM

## 2020-09-26 DIAGNOSIS — K56.609 SBO (SMALL BOWEL OBSTRUCTION) (H): ICD-10-CM

## 2020-09-26 LAB
ALBUMIN SERPL-MCNC: 3 G/DL (ref 3.4–5)
ALBUMIN UR-MCNC: 10 MG/DL
ALP SERPL-CCNC: 70 U/L (ref 40–150)
ALT SERPL W P-5'-P-CCNC: 41 U/L (ref 0–70)
ANION GAP SERPL CALCULATED.3IONS-SCNC: 5 MMOL/L (ref 3–14)
APPEARANCE UR: CLEAR
AST SERPL W P-5'-P-CCNC: 28 U/L (ref 0–45)
BASOPHILS # BLD AUTO: 0 10E9/L (ref 0–0.2)
BASOPHILS NFR BLD AUTO: 0.1 %
BILIRUB SERPL-MCNC: 1.3 MG/DL (ref 0.2–1.3)
BILIRUB UR QL STRIP: NEGATIVE
BUN SERPL-MCNC: 9 MG/DL (ref 7–30)
CALCIUM SERPL-MCNC: 8.5 MG/DL (ref 8.5–10.1)
CHLORIDE SERPL-SCNC: 104 MMOL/L (ref 94–109)
CO2 SERPL-SCNC: 26 MMOL/L (ref 20–32)
COLOR UR AUTO: YELLOW
CREAT SERPL-MCNC: 0.8 MG/DL (ref 0.66–1.25)
DIFFERENTIAL METHOD BLD: ABNORMAL
EOSINOPHIL # BLD AUTO: 0.2 10E9/L (ref 0–0.7)
EOSINOPHIL NFR BLD AUTO: 1.2 %
ERYTHROCYTE [DISTWIDTH] IN BLOOD BY AUTOMATED COUNT: 12.8 % (ref 10–15)
GFR SERPL CREATININE-BSD FRML MDRD: 90 ML/MIN/{1.73_M2}
GLUCOSE BLDC GLUCOMTR-MCNC: 114 MG/DL (ref 70–99)
GLUCOSE SERPL-MCNC: 112 MG/DL (ref 70–99)
GLUCOSE UR STRIP-MCNC: NEGATIVE MG/DL
HCT VFR BLD AUTO: 43.7 % (ref 40–53)
HGB BLD-MCNC: 15.1 G/DL (ref 13.3–17.7)
HGB UR QL STRIP: NEGATIVE
IMM GRANULOCYTES # BLD: 0 10E9/L (ref 0–0.4)
IMM GRANULOCYTES NFR BLD: 0.3 %
INR PPP: 2.36 (ref 0.86–1.14)
INR PPP: 2.45 (ref 0.86–1.14)
INR PPP: 2.69 (ref 0.86–1.14)
KETONES UR STRIP-MCNC: 5 MG/DL
LABORATORY COMMENT REPORT: NORMAL
LACTATE BLD-SCNC: 0.6 MMOL/L (ref 0.7–2)
LEUKOCYTE ESTERASE UR QL STRIP: NEGATIVE
LIPASE SERPL-CCNC: 70 U/L (ref 73–393)
LYMPHOCYTES # BLD AUTO: 1.5 10E9/L (ref 0.8–5.3)
LYMPHOCYTES NFR BLD AUTO: 11.7 %
MCH RBC QN AUTO: 31 PG (ref 26.5–33)
MCHC RBC AUTO-ENTMCNC: 34.6 G/DL (ref 31.5–36.5)
MCV RBC AUTO: 90 FL (ref 78–100)
MONOCYTES # BLD AUTO: 1.4 10E9/L (ref 0–1.3)
MONOCYTES NFR BLD AUTO: 10.6 %
MUCOUS THREADS #/AREA URNS LPF: PRESENT /LPF
NEUTROPHILS # BLD AUTO: 9.7 10E9/L (ref 1.6–8.3)
NEUTROPHILS NFR BLD AUTO: 76.1 %
NITRATE UR QL: NEGATIVE
NRBC # BLD AUTO: 0 10*3/UL
NRBC BLD AUTO-RTO: 0 /100
PH UR STRIP: 5.5 PH (ref 5–7)
PLATELET # BLD AUTO: 303 10E9/L (ref 150–450)
POTASSIUM SERPL-SCNC: 3.9 MMOL/L (ref 3.4–5.3)
PROT SERPL-MCNC: 7.2 G/DL (ref 6.8–8.8)
RBC # BLD AUTO: 4.87 10E12/L (ref 4.4–5.9)
RBC #/AREA URNS AUTO: 2 /HPF (ref 0–2)
SARS-COV-2 RNA SPEC QL NAA+PROBE: NEGATIVE
SARS-COV-2 RNA SPEC QL NAA+PROBE: NORMAL
SODIUM SERPL-SCNC: 135 MMOL/L (ref 133–144)
SOURCE: ABNORMAL
SP GR UR STRIP: 1.01 (ref 1–1.03)
SPECIMEN SOURCE: NORMAL
SPECIMEN SOURCE: NORMAL
SQUAMOUS #/AREA URNS AUTO: <1 /HPF (ref 0–1)
UROBILINOGEN UR STRIP-MCNC: NORMAL MG/DL (ref 0–2)
WBC # BLD AUTO: 12.8 10E9/L (ref 4–11)
WBC #/AREA URNS AUTO: <1 /HPF (ref 0–5)

## 2020-09-26 PROCEDURE — 74177 CT ABD & PELVIS W/CONTRAST: CPT

## 2020-09-26 PROCEDURE — 96376 TX/PRO/DX INJ SAME DRUG ADON: CPT

## 2020-09-26 PROCEDURE — 85025 COMPLETE CBC W/AUTO DIFF WBC: CPT | Performed by: NURSE PRACTITIONER

## 2020-09-26 PROCEDURE — 96374 THER/PROPH/DIAG INJ IV PUSH: CPT | Mod: 59

## 2020-09-26 PROCEDURE — 25000128 H RX IP 250 OP 636: Performed by: NURSE PRACTITIONER

## 2020-09-26 PROCEDURE — C9803 HOPD COVID-19 SPEC COLLECT: HCPCS

## 2020-09-26 PROCEDURE — 25800030 ZZH RX IP 258 OP 636: Performed by: HOSPITALIST

## 2020-09-26 PROCEDURE — 99222 1ST HOSP IP/OBS MODERATE 55: CPT | Mod: 57 | Performed by: SURGERY

## 2020-09-26 PROCEDURE — 85610 PROTHROMBIN TIME: CPT | Performed by: NURSE PRACTITIONER

## 2020-09-26 PROCEDURE — 25000125 ZZHC RX 250: Performed by: NURSE PRACTITIONER

## 2020-09-26 PROCEDURE — 99223 1ST HOSP IP/OBS HIGH 75: CPT | Mod: AI | Performed by: HOSPITALIST

## 2020-09-26 PROCEDURE — 25800030 ZZH RX IP 258 OP 636

## 2020-09-26 PROCEDURE — 83605 ASSAY OF LACTIC ACID: CPT | Performed by: NURSE PRACTITIONER

## 2020-09-26 PROCEDURE — 12000000 ZZH R&B MED SURG/OB

## 2020-09-26 PROCEDURE — 96361 HYDRATE IV INFUSION ADD-ON: CPT

## 2020-09-26 PROCEDURE — 25000128 H RX IP 250 OP 636: Performed by: HOSPITALIST

## 2020-09-26 PROCEDURE — 36415 COLL VENOUS BLD VENIPUNCTURE: CPT | Performed by: HOSPITALIST

## 2020-09-26 PROCEDURE — 83690 ASSAY OF LIPASE: CPT | Performed by: NURSE PRACTITIONER

## 2020-09-26 PROCEDURE — 96375 TX/PRO/DX INJ NEW DRUG ADDON: CPT

## 2020-09-26 PROCEDURE — 00000146 ZZHCL STATISTIC GLUCOSE BY METER IP

## 2020-09-26 PROCEDURE — 85610 PROTHROMBIN TIME: CPT | Performed by: HOSPITALIST

## 2020-09-26 PROCEDURE — 99285 EMERGENCY DEPT VISIT HI MDM: CPT | Mod: 25

## 2020-09-26 PROCEDURE — 25000128 H RX IP 250 OP 636

## 2020-09-26 PROCEDURE — 80053 COMPREHEN METABOLIC PANEL: CPT | Performed by: NURSE PRACTITIONER

## 2020-09-26 PROCEDURE — 93005 ELECTROCARDIOGRAM TRACING: CPT

## 2020-09-26 PROCEDURE — 25800030 ZZH RX IP 258 OP 636: Performed by: NURSE PRACTITIONER

## 2020-09-26 PROCEDURE — 81001 URINALYSIS AUTO W/SCOPE: CPT | Performed by: NURSE PRACTITIONER

## 2020-09-26 PROCEDURE — U0003 INFECTIOUS AGENT DETECTION BY NUCLEIC ACID (DNA OR RNA); SEVERE ACUTE RESPIRATORY SYNDROME CORONAVIRUS 2 (SARS-COV-2) (CORONAVIRUS DISEASE [COVID-19]), AMPLIFIED PROBE TECHNIQUE, MAKING USE OF HIGH THROUGHPUT TECHNOLOGIES AS DESCRIBED BY CMS-2020-01-R: HCPCS | Performed by: NURSE PRACTITIONER

## 2020-09-26 RX ORDER — SODIUM CHLORIDE 9 MG/ML
INJECTION, SOLUTION INTRAVENOUS CONTINUOUS
Status: DISCONTINUED | OUTPATIENT
Start: 2020-09-26 | End: 2020-10-01

## 2020-09-26 RX ORDER — PIPERACILLIN SODIUM, TAZOBACTAM SODIUM 4; .5 G/20ML; G/20ML
4.5 INJECTION, POWDER, LYOPHILIZED, FOR SOLUTION INTRAVENOUS EVERY 6 HOURS
Status: DISCONTINUED | OUTPATIENT
Start: 2020-09-26 | End: 2020-10-01

## 2020-09-26 RX ORDER — NALOXONE HYDROCHLORIDE 0.4 MG/ML
.1-.4 INJECTION, SOLUTION INTRAMUSCULAR; INTRAVENOUS; SUBCUTANEOUS
Status: DISCONTINUED | OUTPATIENT
Start: 2020-09-26 | End: 2020-10-02 | Stop reason: HOSPADM

## 2020-09-26 RX ORDER — PROCHLORPERAZINE 25 MG
12.5 SUPPOSITORY, RECTAL RECTAL EVERY 12 HOURS PRN
Status: DISCONTINUED | OUTPATIENT
Start: 2020-09-26 | End: 2020-10-02 | Stop reason: HOSPADM

## 2020-09-26 RX ORDER — IOPAMIDOL 755 MG/ML
135 INJECTION, SOLUTION INTRAVASCULAR ONCE
Status: COMPLETED | OUTPATIENT
Start: 2020-09-26 | End: 2020-09-26

## 2020-09-26 RX ORDER — PROCHLORPERAZINE MALEATE 5 MG
5 TABLET ORAL EVERY 6 HOURS PRN
Status: DISCONTINUED | OUTPATIENT
Start: 2020-09-26 | End: 2020-10-02 | Stop reason: HOSPADM

## 2020-09-26 RX ORDER — LIDOCAINE 40 MG/G
CREAM TOPICAL
Status: DISCONTINUED | OUTPATIENT
Start: 2020-09-26 | End: 2020-10-02 | Stop reason: HOSPADM

## 2020-09-26 RX ORDER — ONDANSETRON 2 MG/ML
4 INJECTION INTRAMUSCULAR; INTRAVENOUS EVERY 6 HOURS PRN
Status: DISCONTINUED | OUTPATIENT
Start: 2020-09-26 | End: 2020-10-02 | Stop reason: HOSPADM

## 2020-09-26 RX ORDER — SODIUM CHLORIDE, SODIUM LACTATE, POTASSIUM CHLORIDE, CALCIUM CHLORIDE 600; 310; 30; 20 MG/100ML; MG/100ML; MG/100ML; MG/100ML
INJECTION, SOLUTION INTRAVENOUS CONTINUOUS
Status: CANCELLED | OUTPATIENT
Start: 2020-09-26

## 2020-09-26 RX ORDER — HYDROMORPHONE HYDROCHLORIDE 1 MG/ML
1 INJECTION, SOLUTION INTRAMUSCULAR; INTRAVENOUS; SUBCUTANEOUS ONCE
Status: COMPLETED | OUTPATIENT
Start: 2020-09-26 | End: 2020-09-26

## 2020-09-26 RX ORDER — HYDROMORPHONE HYDROCHLORIDE 1 MG/ML
.5-1 INJECTION, SOLUTION INTRAMUSCULAR; INTRAVENOUS; SUBCUTANEOUS
Status: DISCONTINUED | OUTPATIENT
Start: 2020-09-26 | End: 2020-10-02 | Stop reason: HOSPADM

## 2020-09-26 RX ORDER — ONDANSETRON 2 MG/ML
4 INJECTION INTRAMUSCULAR; INTRAVENOUS ONCE
Status: COMPLETED | OUTPATIENT
Start: 2020-09-26 | End: 2020-09-26

## 2020-09-26 RX ORDER — PIPERACILLIN SODIUM, TAZOBACTAM SODIUM 4; .5 G/20ML; G/20ML
4.5 INJECTION, POWDER, LYOPHILIZED, FOR SOLUTION INTRAVENOUS ONCE
Status: COMPLETED | OUTPATIENT
Start: 2020-09-26 | End: 2020-09-26

## 2020-09-26 RX ORDER — ONDANSETRON 4 MG/1
4 TABLET, ORALLY DISINTEGRATING ORAL EVERY 6 HOURS PRN
Status: DISCONTINUED | OUTPATIENT
Start: 2020-09-26 | End: 2020-10-02 | Stop reason: HOSPADM

## 2020-09-26 RX ADMIN — SODIUM CHLORIDE: 9 INJECTION, SOLUTION INTRAVENOUS at 18:20

## 2020-09-26 RX ADMIN — SODIUM CHLORIDE 1000 ML: 9 INJECTION, SOLUTION INTRAVENOUS at 12:28

## 2020-09-26 RX ADMIN — IOPAMIDOL 135 ML: 755 INJECTION, SOLUTION INTRAVENOUS at 12:58

## 2020-09-26 RX ADMIN — HYDROMORPHONE HYDROCHLORIDE 1 MG: 1 INJECTION, SOLUTION INTRAMUSCULAR; INTRAVENOUS; SUBCUTANEOUS at 12:25

## 2020-09-26 RX ADMIN — HYDROMORPHONE HYDROCHLORIDE 1 MG: 1 INJECTION, SOLUTION INTRAMUSCULAR; INTRAVENOUS; SUBCUTANEOUS at 15:05

## 2020-09-26 RX ADMIN — ONDANSETRON 4 MG: 2 INJECTION INTRAMUSCULAR; INTRAVENOUS at 12:27

## 2020-09-26 RX ADMIN — PIPERACILLIN AND TAZOBACTAM 4.5 G: 4; .5 INJECTION, POWDER, FOR SOLUTION INTRAVENOUS at 20:56

## 2020-09-26 RX ADMIN — PIPERACILLIN SODIUM AND TAZOBACTAM SODIUM 4.5 G: 4; .5 INJECTION, POWDER, LYOPHILIZED, FOR SOLUTION INTRAVENOUS at 15:10

## 2020-09-26 RX ADMIN — HYDROMORPHONE HYDROCHLORIDE 0.5 MG: 1 INJECTION, SOLUTION INTRAMUSCULAR; INTRAVENOUS; SUBCUTANEOUS at 23:52

## 2020-09-26 RX ADMIN — SODIUM CHLORIDE 79 ML: 9 INJECTION, SOLUTION INTRAVENOUS at 12:58

## 2020-09-26 RX ADMIN — PHYTONADIONE 2 MG: 2 INJECTION, EMULSION INTRAMUSCULAR; INTRAVENOUS; SUBCUTANEOUS at 18:51

## 2020-09-26 ASSESSMENT — MIFFLIN-ST. JEOR: SCORE: 2076.68

## 2020-09-26 ASSESSMENT — ACTIVITIES OF DAILY LIVING (ADL): ADLS_ACUITY_SCORE: 12

## 2020-09-26 ASSESSMENT — ENCOUNTER SYMPTOMS
VOMITING: 0
NAUSEA: 1
FEVER: 0
CONSTIPATION: 1
ABDOMINAL PAIN: 1
DIFFICULTY URINATING: 0
DYSURIA: 0
SHORTNESS OF BREATH: 0

## 2020-09-26 NOTE — PHARMACY-ADMISSION MEDICATION HISTORY
"Pharmacy Medication History  Admission medication history interview status for the 9/26/2020  admission is complete. See EPIC admission navigator for prior to admission medications     Medication history sources: Patient and was discharged from the hospital on 9/20  Medication history source reliability: Good  Adherence assessment: unable to assess    Significant changes made to the medication list:  Modified warfarin dosing. Added doxylamine, [employer] naturals      Additional medication history information:   none    Medication reconciliation completed by provider prior to medication history? No    Time spent in this activity: 10 minutes      Prior to Admission medications    Medication Sig Last Dose Taking? Auth Provider   acetaminophen (TYLENOL) 500 MG tablet Take 1,000 mg by mouth daily as needed prn Yes Unknown, Entered By History   doxylamine (UNISOM) 25 MG TABS tablet Take 12.5 mg by mouth nightly as needed prn Yes Unknown, Entered By History   Fish Oil-Cholecalciferol (OMEGA-3 FISH OIL/VITAMIN D3 PO) Take 2 capsules by mouth daily [employer] product with Omega3/Vit D 2 weeks ago Yes Unknown, Entered By History   UNKNOWN TO PATIENT Place 3 drops into both eyes daily as needed \"Blink tears Eye drop for allergies\" per patient  Yes Unknown, Entered By History   warfarin ANTICOAGULANT (COUMADIN) 5 MG tablet Take 10 mg by mouth See Admin Instructions Had dose on 9/24/20 9/24/2020 at Unknown time Yes Unknown, Entered By History   warfarin ANTICOAGULANT (COUMADIN) 5 MG tablet Take 7.5 mg by mouth See Admin Instructions Scheduled for 9/25/20 and 9/26/20. then INR recheck 9/25/2020 at pm Yes Unknown, Entered By History       "

## 2020-09-26 NOTE — PROGRESS NOTES
RECEIVING UNIT ED HANDOFF REVIEW    ED Nurse Handoff Report was reviewed by: Remington Rodriges RN on September 26, 2020 at 4:58 PM

## 2020-09-26 NOTE — CONSULTS
[facility] Surgery Consultation    Cameron Barnard MRN# [id number]   Age: 70 year old YOB: 1950     Date of Admission:  9/26/2020    Reason for consult: Abdominal pain ; de sang SBO        Requesting physician: Farida Palacio CNP        Level of consult: Consult, follow and place orders           Assessment and Plan:   Assessment:   Mr Barnard is a 69 yo male with a history of DVT/PE on warfarin presenting with abdominal pain likely from a de sang SBO. His CT scan is concerning for inflammation in the ileum likely causing this SBO although the exact etiology is unclear. Lower concern for appendicitis.       Plan:   -tentatively plan for diagnostic laparoscopy tomorrow, possible ex-lap, possible appendectomy tomorrow to evaluate the cause of his pain and CT findings   -agree with IV Abx   -NPO   -consider NGT if patient develops nausea or vomiting   -trend CBC and BMP and INR tomorrow morning   -mIVF   -reverse warfarin with vitamin K              Chief Complaint:   Abdominal pain     History is obtained from the patient         History of Present Illness:   This patient is a 70 year old male with a history of DVT/PE on warfarin who presents to the ER today with lower abdominal pain. He was recently admitted to this hospital last week week similar symptoms. The concern at that time was a de sang SBO. This resolved with conservative management. There was a duodenal diverticulum noted on CT and due to the de sang SBO there was a concern for possible malignancy causing this obstruction. GI performed EGD and colonoscopy which revealed several polyps but no obvious cause for his SBO. After discharge about a week ago he was doing fairly well until about Wednesday. The pain started coming back again in the same location he had it previously. Mainly in the lower abdomen. He says he has eaten very conservative foods. He denies any nausea or vomiting. He is still passing gas but has not had a bowel movement since  "Wednesday. The pain would not improve and he eventually decided to return to the ER today because his symptoms were unrelenting and he just feels very sore. He feels better now with the pain medications.              Past Medical History:     Past Medical History:   Diagnosis Date     Allergic state      DVT (deep venous thrombosis) (H)              Past Surgical History:     Past Surgical History:   Procedure Laterality Date     COLONOSCOPY N/A 9/19/2020    Procedure: COLONOSCOPY;  Surgeon: Lakshmi Santana MD;  Location:  OR     ESOPHAGOSCOPY, GASTROSCOPY, DUODENOSCOPY (EGD), COMBINED N/A 9/19/2020    Procedure: ESOPHAGOGASTRODUODENOSCOPY (EGD);  Surgeon: Lakshmi Santana MD;  Location:  OR             Social History:     Social History     Tobacco Use     Smoking status: Never Smoker     Smokeless tobacco: Never Used   Substance Use Topics     Alcohol use: Yes     Frequency: Never     Comment: 1 glass of red wine daily             Family History:   History reviewed. No pertinent family history.  Family history reviewed          Immunizations:   Immunization status is unknown          Allergies:   No Known Allergies          Medications:     Current Facility-Administered Medications   Medication     piperacillin-tazobactam (ZOSYN) 4.5 g vial to attach to  mL bag     Current Outpatient Medications   Medication Sig     acetaminophen (TYLENOL) 500 MG tablet Take 1,000 mg by mouth daily as needed     doxylamine (UNISOM) 25 MG TABS tablet Take 12.5 mg by mouth nightly as needed     Fish Oil-Cholecalciferol (OMEGA-3 FISH OIL/VITAMIN D3 PO) Take 2 capsules by mouth daily [employer] product with Omega3/Vit D     UNKNOWN TO PATIENT Place 3 drops into both eyes daily as needed \"Blink tears Eye drop for allergies\" per patient     warfarin ANTICOAGULANT (COUMADIN) 5 MG tablet Take 10 mg by mouth See Admin Instructions Had dose on 9/24/20     warfarin ANTICOAGULANT (COUMADIN) 5 MG tablet Take 7.5 " "mg by mouth See Admin Instructions Scheduled for 9/25/20 and 9/26/20. then INR recheck             Review of Systems:   The Review of Systems is negative other than noted in the HPI    /87   Pulse 85   Temp 96.1  F (35.6  C)   Resp 18   Ht 1.88 m (6' 2\")   Wt 125.2 kg (276 lb)   SpO2 98%   BMI 35.44 kg/m    General: appears well and not in distress, conversational   CV: regular rate and rhythm   Resp: normal respiratory effort   Abd: soft, obese abdomen, non-tender to deep palpation; no surgical scars   Extremities: warm and well perfused             Data:   All laboratory and imaging data in the past 24 hours reviewed     Delfino Loredo MD  General Surgery Resident, PGY-4   P: [phone]        "

## 2020-09-26 NOTE — ED TRIAGE NOTES
Generalized abdominal pain, recent hospitalization for bowel obstructions. He states he is passing gas occasionally.

## 2020-09-26 NOTE — H&P
[facility]    History and Physical  Hospitalist       Date of Admission:  9/26/2020    Assessment & Plan   Cameron Barnard is a 70 year old male with a history of recent small bowel obstruction, remote PE/DVT, hyperlipidemia, and dilated ascending aorta who presented to the ER with abdominal pain and nausea. After evaluation in the ER, he was found to have a recurrent small bowel obstruction. General surgery was consulted and the hospitalist service was contacted to bring him into the hospital for further evaluation and management.    Small bowel obstruction  Possible bowel microperforation  Possible early acute appendicitis  -Recently hospitalized from 9/13 through 9/20 with similar symptoms.  -Symptoms resolved with conservative management during that hospitalization.  -EGD and colonoscopy were obtained during that admission and did not reveal any obvious source for his bowel obstruction.  -He felt better after discharge but developed recurrent symptoms on Wednesday 9/23.  -CT scan abdomen/pelvis on 9/26 is concerning for small bowel obstruction with transition point in the distal ileum.  There is significant inflammatory change in the region as well as possible small microperforation and possible early acute appendicitis.  -Admit to inpatient.  -N.p.o.  -IV fluids.  -As needed pain and nausea medications.  -General surgery consulted, planning likely exploratory laparoscopy in a.m.  -Started on IV Zosyn in the ER, will continue the same for now.  -Hold off on NG tube for now, if he develops nausea/vomiting would consider placing NG tube.  He is very hopeful that he can avoid having an NG tube placed.  -Recheck labs in a.m.    History of PE/DVT  -Diagnosed about 3 years ago, has been on warfarin since then.  -Hold warfarin for now.  -INR 2.45 in the ER.  Consult pharmacy to reverse warfarin in anticipation of abdominal surgery tomorrow morning.    Hyperlipidemia  -Hold PTA omega-3 product for  now.    Recent hematuria  -Follow-up with outpatient urology as previously recommended.  -Currently denies hematuria and there was no blood seen on his UA in the ER today.    Dilated a sending aorta  -Follows with cardiology through [facility] as an outpatient, continue outpatient follow-up as previously recommended.    Asymptomatic COVID-19 testing  -Pre-op asymptomatic COVID-19 testing was obtained in the ER and is pending at this time.    DVT Prophylaxis: Pneumatic Compression Devices  Code Status: Full Code  Expected discharge: admit to inpatient, I expect that he will be in the hospital at least two midnights.    Juan Pinon MD    Primary Care Physician   [facility]    Chief Complaint   Abdominal pain and nausea    History is obtained from the patient    History of Present Illness   Cameron Barnard is a 70 year old male with a history of recent small bowel obstruction, remote PE/DVT, hyperlipidemia, and dilated ascending aorta who presented to the ER with abdominal pain and nausea.  He was recently admitted to [facility] from September 13 through the 20th with a small bowel obstruction.  Etiology of the bowel obstruction was unclear.  He responded well to conservative management.  He underwent an EGD and colonoscopy on September 19, 2020 which revealed some polyps in the colon, but was otherwise fairly unremarkable and did not reveal an etiology for his bowel obstruction.  He felt well after he was discharged home on the 20th.  He started to develop recurrent abdominal pain and nausea on the 23rd.  The symptoms are not improving and he decided to come into the ER today for evaluation.    In the ER, he was evaluated by Farida Palacio CNP.  Vitals were okay.  Labs are fairly unremarkable although white blood cell count was slightly elevated at 12.8.  Lipase and lactic acid were not elevated.  CT the abdomen and pelvis was concerning for a distal small bowel obstruction with significant  "inflammation in the region, possible small microperforation, and possible early acute appendicitis.  He is given pain medications and IV fluids.  He was started on an IV antibiotic.  General surgery was consulted and the hospitalist service was contacted to admit the patient for further evaluation management.    He currently feels better after getting some IV pain and nausea medication.  Denies fevers, chest pain, shortness of breath.  Has been nauseous but has not thrown up today.  Has still been passing gas on occasion but has not had a bowel movement since Wednesday.  Denies urinary symptoms.  No recent sick contacts or known exposures COVID-19.    Past Medical History    I have reviewed this patient's medical history and updated it with pertinent information if needed.   Past Medical History:   Diagnosis Date     Allergic state      DVT (deep venous thrombosis) (H)      Past Surgical History   I have reviewed this patient's surgical history and updated it with pertinent information if needed.  Past Surgical History:   Procedure Laterality Date     COLONOSCOPY N/A 9/19/2020    Procedure: COLONOSCOPY;  Surgeon: Lakshmi Santana MD;  Location:  OR     ESOPHAGOSCOPY, GASTROSCOPY, DUODENOSCOPY (EGD), COMBINED N/A 9/19/2020    Procedure: ESOPHAGOGASTRODUODENOSCOPY (EGD);  Surgeon: Lakshmi Santana MD;  Location:  OR     Prior to Admission Medications   Prior to Admission Medications   Prescriptions Last Dose Informant Patient Reported? Taking?   Fish Oil-Cholecalciferol (OMEGA-3 FISH OIL/VITAMIN D3 PO) 2 weeks ago  Yes Yes   Sig: Take 2 capsules by mouth daily Boiling [employer] product with Omega3/Vit D   UNKNOWN TO PATIENT  Self Yes Yes   Sig: Place 3 drops into both eyes daily as needed \"Blink tears Eye drop for allergies\" per patient   acetaminophen (TYLENOL) 500 MG tablet prn Self Yes Yes   Sig: Take 1,000 mg by mouth daily as needed   doxylamine (UNISOM) 25 MG TABS tablet prn  Yes Yes   Sig: Take " 12.5 mg by mouth nightly as needed   warfarin ANTICOAGULANT (COUMADIN) 5 MG tablet 9/24/2020 at Unknown time Self Yes Yes   Sig: Take 10 mg by mouth See Admin Instructions Had dose on 9/24/20   warfarin ANTICOAGULANT (COUMADIN) 5 MG tablet 9/25/2020 at pm Self Yes Yes   Sig: Take 7.5 mg by mouth See Admin Instructions Scheduled for 9/25/20 and 9/26/20. then INR recheck      Facility-Administered Medications: None     Allergies   No Known Allergies    Social History   I have reviewed this patient's social history and updated it with pertinent information if needed. Cameron Barnard  reports that he has never smoked. He has never used smokeless tobacco. He reports current alcohol use. He reports that he does not use drugs.    Family History   I have reviewed this patient's family history and updated it with pertinent information if needed.   History reviewed. No pertinent family history.    Review of Systems   The 10 point Review of Systems is negative other than noted in the HPI or here.    Physical Exam   Temp: 96.1  F (35.6  C)   BP: 114/77 Pulse: 85   Resp: 18 SpO2: 92 %      Vital Signs with Ranges  Temp:  [96.1  F (35.6  C)] 96.1  F (35.6  C)  Pulse:  [85] 85  Resp:  [18] 18  BP: (114-135)/(77-87) 114/77  SpO2:  [92 %-98 %] 92 %  276 lbs 0 oz    Constitutional: awake, alert, cooperative, no apparent distress  Eyes: pupils equal, round and reactive to light, conjunctiva normal  ENT: oral pharynx with moist mucous membranes  Respiratory: clear to auscultation bilaterally, no crackles or wheezing  Cardiovascular: regular rate and rhythm, normal S1 and S2, no murmur noted  GI: normal bowel sounds, soft, obese, mild generalized tenderness, mild distention  Skin: warm, dry  Musculoskeletal: no lower extremity pitting edema present  Neurologic: awake, alert, oriented to name, place and time. motor is 5 out of 5 bilaterally. sensory is intact.    Data   Data reviewed today:  I personally reviewed no images or EKG's  today.    Recent Labs   Lab 09/26/20  1204 09/20/20  0726   WBC 12.8* 8.0   HGB 15.1 14.1   MCV 90 91    202   INR 2.45* 1.27*    137   POTASSIUM 3.9 3.4   CHLORIDE 104 104   CO2 26 28   BUN 9 7   CR 0.80 0.86   ANIONGAP 5 5   PETER 8.5 8.2*   * 93   ALBUMIN 3.0* 2.7*   PROTTOTAL 7.2 6.8   BILITOTAL 1.3 0.8   ALKPHOS 70 64   ALT 41 57   AST 28 49*   LIPASE 70*  --      Recent Results (from the past 24 hour(s))   CT Abdomen Pelvis w Contrast    Narrative    CT ABDOMEN AND PELVIS WITH CONTRAST 9/26/2020 1:07 PM    CLINICAL HISTORY: Abdominal pain, 9/13 CT with small bowel  obstruction.    TECHNIQUE: CT scan of the abdomen and pelvis was performed following  injection of IV contrast. Multiplanar reformats were obtained. Dose  reduction techniques were used.    CONTRAST: 135 mL [id number]    COMPARISON: CT abdomen and pelvis dated 9/13/2020, abdominal x-rays  dated 9/18/2020.    FINDINGS:   LOWER CHEST: Normal.    HEPATOBILIARY: Multiple dependently layering gallstones are again seen  in the gallbladder. Gallbladder is slightly more distended than on the  prior study. The liver enhances normally.    PANCREAS: Probable duodenal diverticulum is again seen in the  head/uncinate process of the pancreas, similar to the prior study. The  pancreas otherwise enhances normally and is of normal morphology.    SPLEEN: Normal.    ADRENAL GLANDS: Normal.    KIDNEYS/BLADDER: Normal.    BOWEL: There are multiple diverticula in the colon. The colon is  decompressed. Appendix extends posteriorly and medially from the cecum  and is prominent at the tip measuring up to 1.0 cm in diameter. This  is slightly more prominent than on the prior study. There are also  some mild strandy densities adjacent to the appendix which could  represent inflammatory changes from early acute appendicitis. There is  abnormal fluid-filled and air-filled distention of the small bowel.  There is significant inflammatory change around a loop of  the distal  ileum in the deep pelvis adjacent to the urinary bladder and adjacent  to the sigmoid colon. This appears be centered around the small bowel  and not the colon, and is a new finding since the prior study. There  is fecalization in the distal small bowel in this region. Just after  this area of inflammation, there appears to be decompression of the  small bowel and proximal to this area, there is abnormal distention of  the small bowel. A few bubbles of gas are seen adjacent to the small  bowel wall in this region which could represent microperforation and  does not appear to be associated with a diverticulum. Trace free fluid  is seen adjacent to this region. The more proximal small bowel is  distended by fluid and air, but is otherwise unremarkable. Small bowel  measures up to maximum of approximately 4.8 cm in diameter. The  stomach contains a moderate amount of air and fluid, but is otherwise  unremarkable. The proximalmost small bowel/jejunum and duodenum are  grossly of normal caliber.    PELVIC ORGANS: Prostate gland is unremarkable.    ADDITIONAL FINDINGS: No other free air is identified. There is  nonaneurysmal atherosclerosis. No adenopathy is seen.    MUSCULOSKELETAL: No acute fractures or aggressive osseous lesions are  identified. There are degenerative changes in the spine.      Impression    IMPRESSION:   1.  The findings are concerning for small bowel obstruction with  transition point in the distal ileum adjacent to the urinary bladder.  There is significant inflammatory change in this region. These  inflammatory changes are new and the transition point in the small  bowel in this location is new since the prior CT dated 9/13/2020.  There may be a microperforation of the small bowel in this region  given the few bubbles of extraluminal air adjacent to small bowel in  this region.  2.  Appendix is mildly enlarged and are some periappendiceal  inflammatory changes which could represent  early acute appendicitis,  although this does not appear to be the cause of the inflammatory  change around the distal ileum where there is a transition point.  3.  Colonic diverticulosis.  4.  Cholelithiasis.    I discussed the findings of probable small bowel obstruction, possible  early acute appendicitis, as well as significant inflammatory changes  around the distal small bowel (distal ileum) with possible  microperforation of the distal ileum with Dr. Palacio on 9/26/2020 at  approximately 1:40 PM, when she became available.

## 2020-09-26 NOTE — ED PROVIDER NOTES
History     Chief Complaint:  Abdominal Pain    HPI   Cameron Barnard is a 70 year old male taking coumadin with a history of small bowel obstruction, DVT, and diverticulitis who presents to the emergency department for evaluation of abdominal pain. The patient was recently seen and admitted to the hospital on 09/13/2020 for lower abdominal pain. He had imaging and laboratory work drawn that revealed a small bowel obstruction. He underwent an endoscopy and colonoscopy on 09/19/2020 prior to being discharged on 09/20/2020. At that time, he reports that he felt well and was passing gas.However, he had an episode of diarrhea 2 days later and has not been able to pass stool since then. He shortly after began having abdominal pain from his penis to his lower abdomen, which is the same location as his pain on 09/13. He states that the pain is pulsating and rates an 8/10. The pain makes it difficult to walk and sit upright. The patient also notes having some nausea. He otherwise denies any chest pain, shortness of breath, vomiting, fever, dysuria or difficulty urinating.    CT Abdomen Pelvis w Contrast   [facility] 09/13/2020  1.  Low-grade small bowel obstruction with gradual transition point in  the anterior left lower quadrant.  2.  Short segment mural thickening of distal small bowel loops, remote  and proximal transition point, may be reactive.  3.  Prominent descending duodenal diverticulum with adjacent  thickening and enhancement. The thickening and enhancement may be due  to adjacent compressed pancreatic parenchyma, though duodenal  diverticulitis or malignancy can have a similar appearance. Close  clinical follow-up and consideration for endoscopy for further  evaluation.  4.  Distal colonic diverticulosis without acute diverticulitis.  5.  Cholelithiasis.  6.  Hepatic steatosis and suggestion of chronic liver disease  (cirrhosis).  Reading per radiology      Allergies:  Wheat    Medications:   "  Coumadin    Past Medical History:    DVT  Diverticulitis  Osteoarthritis  Pulmonary embolism  Allergic state  Gout  Hernia  Small bowel obstruction    Past Surgical History:    Colonoscopy  Esophagoscopy, gastroscopy, duodenoscopy, combined     Family History:    Mother: blood poisoning  Sister: breast cancer    Social History:  The patient was accompanied to the ED by his wife.  Smoking Status: Never   Smokeless Tobacco: Never  Alcohol Use: Yes  Drug Use: Never  Marital Status:        Review of Systems   Constitutional: Negative for fever.   Respiratory: Negative for shortness of breath.    Cardiovascular: Negative for chest pain.   Gastrointestinal: Positive for abdominal pain, constipation and nausea. Negative for vomiting.   Genitourinary: Negative for difficulty urinating and dysuria.   All other systems reviewed and are negative.        Physical Exam   Vitals:  Patient Vitals for the past 24 hrs:   BP Temp Pulse Resp SpO2 Height Weight   09/26/20 1133 135/87 96.1  F (35.6  C) 85 18 98 % 1.88 m (6' 2\") 125.2 kg (276 lb)       Physical Exam  Physical Exam   Constitutional: Appears uncomfortable. Mild diaphoresis.Non-toxic appearing.    Head: Head moves freely with normal range of motion.   ENT: Oropharynx is clear and moist.   Eyes: Conjunctivae pink. EOMs intact. No scleral icterus.   Neck: Normal range of motion.    Cardiovascular: Regular rate and rhythm. Normal heart sounds. No concerning murmur.  Pulmonary/Chest: No respiratory distress. No decreased breath sounds. No wheezes. No rhonchi. No rales.   Abdominal: Distended, firm. Diffuse lower abdominal jeremy and guarding. No CVA tenderness.   Musculoskeletal: Distal capillary refill and sensation intact.  Neurological: Oriented to person, place, and time. No focal deficits.   Skin: Skin is warm and normal in color. No rash noted.        Emergency Department Course     ECG:  Completed at 1611.  Read at 1622 by Dr. Barrios.   Rate 66 bpm. NC interval " 202. QRS duration 104. QT/QTc 434/454. P-R-T axes 37 -39 -11.  Normal sinus rhythm  LAHB      Imaging:  Radiographic findings were communicated with the patient who voiced understanding of the findings.    CT Abdomen Pelvis W Contrast  1.  The findings are concerning for small bowel obstruction with  transition point in the distal ileum adjacent to the urinary bladder.  There is significant inflammatory change in this region. These  inflammatory changes are new and the transition point in the small  bowel in this location is new since the prior CT dated 9/13/2020.  There may be a microperforation of the small bowel in this region  given the few bubbles of extraluminal air adjacent to small bowel in  this region.  2.  Appendix is mildly enlarged and are some periappendiceal  inflammatory changes which could represent early acute appendicitis,  although this does not appear to be the cause of the inflammatory  change around the distal ileum where there is a transition point.  3.  Colonic diverticulosis.  4.  Cholelithiasis.     I discussed the findings of probable small bowel obstruction, possible  early acute appendicitis, as well as significant inflammatory changes  around the distal small bowel (distal ileum) with possible  microperforation of the distal ileum with Dr. Palacio on 9/26/2020 at  approximately 1:40 PM, when she became available.  Reading per radiology.    Laboratory:  CBC: WBC 12.8 (H) o/w WNL. (HGB 15.1, )   CMP: Glucose 112 (H), Albumin 3.0 (L) o/w AWNL (Creatinine 0.80)    INR: 2.45 (H)  Lipase: 70 (L)     Lactic Acid (Collected at 1345): 0.6 (L)     UA with micro: Ketones 5, Protein Albumin 10, Mucous present o/w negative     Asymptomatic COVID-19 Virus by PCR: Pending    Interventions:  1225 Dilaudid 1 mg IV  1227 Zofran 4 mg IV  1228 NS, 1 L, IV bolus   Zosyn 3.375 g IV    Emergency Department Course:  Past medical records, nursing notes, and vitals reviewed.    1204 I performed an exam of  the patient as documented above.     IV was inserted and blood was drawn for laboratory testing, results above.  The patient was sent for a abdomen/pelvis CT while in the emergency department, results above.     1336 I spoke with Dr. Franks, radiology, regarding the patient.     1342 I rechecked the patient and discussed the results of his workup thus far.     The patient provided a urine sample here in the emergency department. This was sent for laboratory testing, findings above.     1404 I spoke with Dr. Parekh, general surgery, regarding the patient.     1424 I spoke with Dr. Parekh, general surgery, regarding the patient. He anticipates that the patient will go to surgery tomorrow.    1436 I rechecked and updated the patient.     1443 I spoke with Dr. Pinon, hospitalist, who agreed to admit the patient.     EKG obtained in the ED, see results above.      1718 I rechecked and updated the patient.    Findings and plan explained to the Patient who consents to admission. Discussed the patient with Dr. Pinon, who will admit the patient to a med/surg bed for further monitoring, evaluation, and treatment.    I personally reviewed the laboratory results with the Patient and answered all related questions prior to admission.      Impression & Plan      Covid-19  Cameron Barnard was evaluated during a global COVID-19 pandemic, which necessitated consideration that the patient might be at risk for infection with the SARS-CoV-2 virus that causes COVID-19.   Applicable protocols for evaluation were followed during the patient's care.   COVID-19 was considered as part of the patient's evaluation. The plan for testing is:  a test was obtained during this visit.     Medical Decision Making:  Cameron Barnard is a 70 year old male with recent admission for SBO, no cause found. Upper endoscopy and colonoscopy during admission on 9/19 with no cause found, 5 polyps removed. Here today with increasing pain. No fevers. WBC 12.8. Pain  controlled with Dilaudid. CT abnormal with inflammatory changes (new since CT on 9/13), SBO more distal than prior, possible microperforation, and an enlarged appendix periappendiceal stranding. I considered bowel ischemia and for that reason lactate performed and is negative. Zosyn given in ED. Discussed with Dr Parekh, general surgery. Patient is anticoagulated on Warfarin and INR is 2.45 today. Dr Greer will admit, I discussed with him plan for surgery tomorrow and he will reverse Coumadin accordingly. Patient and his wife are amenable to plan.     COVID test ordered for asymptomatic with plans for surgery tomorrow.     Diagnosis:    ICD-10-CM    1. Lower abdominal pain  R10.30    2. SBO (small bowel obstruction) (H)  K56.609        Disposition:  Admitted to the hospital under the care of Shaka Rose, am serving as a scribe on 9/26/2020 at 12:00 PM to personally document services performed by Farida Palacio* based on my observations and the provider's statements to me.    Shaka Smith  9/26/2020    EMERGENCY DEPARTMENT       Farida Palacio, RON LOVE  09/26/20 1807

## 2020-09-26 NOTE — ED NOTES
"[facility]  ED Nurse Handoff Report    ED Chief complaint: Abdominal Pain      ED Diagnosis:   Final diagnoses:   Lower abdominal pain   SBO (small bowel obstruction) (H)       Code Status: Full Code    Allergies: No Known Allergies    Patient Story: Recently discharged from [facility]. Continues to have pain in abdomen.  Focused Assessment:  Abdominal pain that was 8/10. Patient drank some tea this am but has not had solid food in about 4 days. Patient is on Eliquis. Tentative plan for surgery tomorrow 9/27/20.     Treatments and/or interventions provided: IV fluid. Dilaudid. Zofran.  Patient's response to treatments and/or interventions: Patient states that he does feel better after the pain meds.    To be done/followed up on inpatient unit:  ?    Does this patient have any cognitive concerns?: No concerns.    Activity level - Baseline/Home:  Independent  Activity Level - Current:   Independent    Patient's Preferred language: English   Needed?: No    Isolation: None  Infection: Not Applicable  Bariatric?: Yes    Vital Signs:   Vitals:    09/26/20 1133   BP: 135/87   Pulse: 85   Resp: 18   Temp: 96.1  F (35.6  C)   SpO2: 98%   Weight: 125.2 kg (276 lb)   Height: 1.88 m (6' 2\")       Cardiac Rhythm:     Was the PSS-3 completed:   Yes  What interventions are required if any?               Family Comments: Wife is bedside and helpful. Patient is alert and very pleasant.   OBS brochure/video discussed/provided to patient/family: N/A              Name of person given brochure if not patient: N/A              Relationship to patient: N/A    For the majority of the shift this patient's behavior was Green.   Behavioral interventions performed were None.    ED NURSE PHONE NUMBER: [phone]         "

## 2020-09-26 NOTE — ED NOTES
Pt had a quick dizzy spell while lying in bed. Pt was diaphoretic, awake, alert. Pt states he feels much better. EKG done and BS check.Pt provider notified  And will continue to monitor.

## 2020-09-27 ENCOUNTER — APPOINTMENT (OUTPATIENT)
Dept: GENERAL RADIOLOGY | Facility: CLINIC | Age: 70
DRG: 330 | End: 2020-09-27
Attending: SURGERY
Payer: COMMERCIAL

## 2020-09-27 ENCOUNTER — SURGERY (OUTPATIENT)
Age: 70
End: 2020-09-27
Payer: COMMERCIAL

## 2020-09-27 ENCOUNTER — ANESTHESIA EVENT (OUTPATIENT)
Dept: SURGERY | Facility: CLINIC | Age: 70
DRG: 330 | End: 2020-09-27
Payer: COMMERCIAL

## 2020-09-27 ENCOUNTER — ANESTHESIA (OUTPATIENT)
Dept: SURGERY | Facility: CLINIC | Age: 70
DRG: 330 | End: 2020-09-27
Payer: COMMERCIAL

## 2020-09-27 LAB
ANION GAP SERPL CALCULATED.3IONS-SCNC: 4 MMOL/L (ref 3–14)
BUN SERPL-MCNC: 11 MG/DL (ref 7–30)
CALCIUM SERPL-MCNC: 8 MG/DL (ref 8.5–10.1)
CHLORIDE SERPL-SCNC: 107 MMOL/L (ref 94–109)
CO2 SERPL-SCNC: 26 MMOL/L (ref 20–32)
CREAT SERPL-MCNC: 0.95 MG/DL (ref 0.66–1.25)
ERYTHROCYTE [DISTWIDTH] IN BLOOD BY AUTOMATED COUNT: 13.1 % (ref 10–15)
GFR SERPL CREATININE-BSD FRML MDRD: 80 ML/MIN/{1.73_M2}
GLUCOSE SERPL-MCNC: 103 MG/DL (ref 70–99)
HCT VFR BLD AUTO: 39.2 % (ref 40–53)
HGB BLD-MCNC: 13.3 G/DL (ref 13.3–17.7)
INR PPP: 1.59 (ref 0.86–1.14)
INR PPP: 1.73 (ref 0.86–1.14)
INTERPRETATION ECG - MUSE: NORMAL
MCH RBC QN AUTO: 31.4 PG (ref 26.5–33)
MCHC RBC AUTO-ENTMCNC: 33.9 G/DL (ref 31.5–36.5)
MCV RBC AUTO: 93 FL (ref 78–100)
PLATELET # BLD AUTO: 247 10E9/L (ref 150–450)
POTASSIUM SERPL-SCNC: 3.6 MMOL/L (ref 3.4–5.3)
RBC # BLD AUTO: 4.24 10E12/L (ref 4.4–5.9)
SODIUM SERPL-SCNC: 137 MMOL/L (ref 133–144)
WBC # BLD AUTO: 8.7 10E9/L (ref 4–11)

## 2020-09-27 PROCEDURE — 25000128 H RX IP 250 OP 636: Performed by: HOSPITALIST

## 2020-09-27 PROCEDURE — 85610 PROTHROMBIN TIME: CPT | Performed by: HOSPITALIST

## 2020-09-27 PROCEDURE — 25800030 ZZH RX IP 258 OP 636

## 2020-09-27 PROCEDURE — 25000125 ZZHC RX 250: Performed by: NURSE ANESTHETIST, CERTIFIED REGISTERED

## 2020-09-27 PROCEDURE — 40000986 XR ABDOMEN PORT 1 VW

## 2020-09-27 PROCEDURE — 88307 TISSUE EXAM BY PATHOLOGIST: CPT | Performed by: SURGERY

## 2020-09-27 PROCEDURE — 36000056 ZZH SURGERY LEVEL 3 1ST 30 MIN: Performed by: SURGERY

## 2020-09-27 PROCEDURE — 25800030 ZZH RX IP 258 OP 636: Performed by: NURSE ANESTHETIST, CERTIFIED REGISTERED

## 2020-09-27 PROCEDURE — 85610 PROTHROMBIN TIME: CPT | Performed by: SURGERY

## 2020-09-27 PROCEDURE — 93010 ELECTROCARDIOGRAM REPORT: CPT | Performed by: INTERNAL MEDICINE

## 2020-09-27 PROCEDURE — 25000125 ZZHC RX 250: Performed by: SURGERY

## 2020-09-27 PROCEDURE — 71000012 ZZH RECOVERY PHASE 1 LEVEL 1 FIRST HR: Performed by: SURGERY

## 2020-09-27 PROCEDURE — 99232 SBSQ HOSP IP/OBS MODERATE 35: CPT | Performed by: HOSPITALIST

## 2020-09-27 PROCEDURE — 88307 TISSUE EXAM BY PATHOLOGIST: CPT | Mod: 26 | Performed by: SURGERY

## 2020-09-27 PROCEDURE — 93005 ELECTROCARDIOGRAM TRACING: CPT

## 2020-09-27 PROCEDURE — 88304 TISSUE EXAM BY PATHOLOGIST: CPT | Mod: 26 | Performed by: SURGERY

## 2020-09-27 PROCEDURE — 25000128 H RX IP 250 OP 636: Performed by: NURSE ANESTHETIST, CERTIFIED REGISTERED

## 2020-09-27 PROCEDURE — 0DTJ4ZZ RESECTION OF APPENDIX, PERCUTANEOUS ENDOSCOPIC APPROACH: ICD-10-PCS | Performed by: SURGERY

## 2020-09-27 PROCEDURE — 0DBB0ZZ EXCISION OF ILEUM, OPEN APPROACH: ICD-10-PCS | Performed by: SURGERY

## 2020-09-27 PROCEDURE — 25000128 H RX IP 250 OP 636

## 2020-09-27 PROCEDURE — 88304 TISSUE EXAM BY PATHOLOGIST: CPT | Performed by: SURGERY

## 2020-09-27 PROCEDURE — 37000008 ZZH ANESTHESIA TECHNICAL FEE, 1ST 30 MIN: Performed by: SURGERY

## 2020-09-27 PROCEDURE — 25800030 ZZH RX IP 258 OP 636: Performed by: STUDENT IN AN ORGANIZED HEALTH CARE EDUCATION/TRAINING PROGRAM

## 2020-09-27 PROCEDURE — 36000058 ZZH SURGERY LEVEL 3 EA 15 ADDTL MIN: Performed by: SURGERY

## 2020-09-27 PROCEDURE — 25800025 ZZH RX 258: Performed by: SURGERY

## 2020-09-27 PROCEDURE — 85027 COMPLETE CBC AUTOMATED: CPT | Performed by: HOSPITALIST

## 2020-09-27 PROCEDURE — 37000009 ZZH ANESTHESIA TECHNICAL FEE, EACH ADDTL 15 MIN: Performed by: SURGERY

## 2020-09-27 PROCEDURE — 40000170 ZZH STATISTIC PRE-PROCEDURE ASSESSMENT II: Performed by: SURGERY

## 2020-09-27 PROCEDURE — 12000000 ZZH R&B MED SURG/OB

## 2020-09-27 PROCEDURE — 36415 COLL VENOUS BLD VENIPUNCTURE: CPT | Performed by: HOSPITALIST

## 2020-09-27 PROCEDURE — 25000128 H RX IP 250 OP 636: Performed by: ANESTHESIOLOGY

## 2020-09-27 PROCEDURE — 36415 COLL VENOUS BLD VENIPUNCTURE: CPT | Performed by: SURGERY

## 2020-09-27 PROCEDURE — 80048 BASIC METABOLIC PNL TOTAL CA: CPT | Performed by: HOSPITALIST

## 2020-09-27 PROCEDURE — 71000013 ZZH RECOVERY PHASE 1 LEVEL 1 EA ADDTL HR: Performed by: SURGERY

## 2020-09-27 PROCEDURE — 25800030 ZZH RX IP 258 OP 636: Performed by: HOSPITALIST

## 2020-09-27 PROCEDURE — 44180 LAP ENTEROLYSIS: CPT | Performed by: SURGERY

## 2020-09-27 PROCEDURE — 27210794 ZZH OR GENERAL SUPPLY STERILE: Performed by: SURGERY

## 2020-09-27 PROCEDURE — 25000128 H RX IP 250 OP 636: Performed by: STUDENT IN AN ORGANIZED HEALTH CARE EDUCATION/TRAINING PROGRAM

## 2020-09-27 PROCEDURE — 25000566 ZZH SEVOFLURANE, EA 15 MIN: Performed by: SURGERY

## 2020-09-27 RX ORDER — ONDANSETRON 2 MG/ML
INJECTION INTRAMUSCULAR; INTRAVENOUS PRN
Status: DISCONTINUED | OUTPATIENT
Start: 2020-09-27 | End: 2020-09-27

## 2020-09-27 RX ORDER — FENTANYL CITRATE 50 UG/ML
INJECTION, SOLUTION INTRAMUSCULAR; INTRAVENOUS PRN
Status: DISCONTINUED | OUTPATIENT
Start: 2020-09-27 | End: 2020-09-27

## 2020-09-27 RX ORDER — LIDOCAINE HYDROCHLORIDE 20 MG/ML
INJECTION, SOLUTION INFILTRATION; PERINEURAL PRN
Status: DISCONTINUED | OUTPATIENT
Start: 2020-09-27 | End: 2020-09-27

## 2020-09-27 RX ORDER — FENTANYL CITRATE 50 UG/ML
25-50 INJECTION, SOLUTION INTRAMUSCULAR; INTRAVENOUS
Status: DISCONTINUED | OUTPATIENT
Start: 2020-09-27 | End: 2020-09-27

## 2020-09-27 RX ORDER — DEXAMETHASONE SODIUM PHOSPHATE 4 MG/ML
INJECTION, SOLUTION INTRA-ARTICULAR; INTRALESIONAL; INTRAMUSCULAR; INTRAVENOUS; SOFT TISSUE PRN
Status: DISCONTINUED | OUTPATIENT
Start: 2020-09-27 | End: 2020-09-27

## 2020-09-27 RX ORDER — GLYCOPYRROLATE 0.2 MG/ML
INJECTION, SOLUTION INTRAMUSCULAR; INTRAVENOUS PRN
Status: DISCONTINUED | OUTPATIENT
Start: 2020-09-27 | End: 2020-09-27

## 2020-09-27 RX ORDER — SODIUM CHLORIDE, SODIUM LACTATE, POTASSIUM CHLORIDE, CALCIUM CHLORIDE 600; 310; 30; 20 MG/100ML; MG/100ML; MG/100ML; MG/100ML
INJECTION, SOLUTION INTRAVENOUS CONTINUOUS PRN
Status: DISCONTINUED | OUTPATIENT
Start: 2020-09-27 | End: 2020-09-27

## 2020-09-27 RX ORDER — NALOXONE HYDROCHLORIDE 0.4 MG/ML
.1-.4 INJECTION, SOLUTION INTRAMUSCULAR; INTRAVENOUS; SUBCUTANEOUS
Status: ACTIVE | OUTPATIENT
Start: 2020-09-27 | End: 2020-09-28

## 2020-09-27 RX ORDER — NEOSTIGMINE METHYLSULFATE 1 MG/ML
VIAL (ML) INJECTION PRN
Status: DISCONTINUED | OUTPATIENT
Start: 2020-09-27 | End: 2020-09-27

## 2020-09-27 RX ORDER — LORAZEPAM 2 MG/ML
0.5 INJECTION INTRAMUSCULAR EVERY 6 HOURS PRN
Status: DISCONTINUED | OUTPATIENT
Start: 2020-09-27 | End: 2020-10-02 | Stop reason: HOSPADM

## 2020-09-27 RX ORDER — PROPOFOL 10 MG/ML
INJECTION, EMULSION INTRAVENOUS PRN
Status: DISCONTINUED | OUTPATIENT
Start: 2020-09-27 | End: 2020-09-27

## 2020-09-27 RX ORDER — BUPIVACAINE HYDROCHLORIDE AND EPINEPHRINE 2.5; 5 MG/ML; UG/ML
INJECTION, SOLUTION INFILTRATION; PERINEURAL PRN
Status: DISCONTINUED | OUTPATIENT
Start: 2020-09-27 | End: 2020-09-27 | Stop reason: HOSPADM

## 2020-09-27 RX ORDER — ONDANSETRON 2 MG/ML
4 INJECTION INTRAMUSCULAR; INTRAVENOUS EVERY 30 MIN PRN
Status: DISCONTINUED | OUTPATIENT
Start: 2020-09-27 | End: 2020-09-27

## 2020-09-27 RX ORDER — HYDROMORPHONE HYDROCHLORIDE 1 MG/ML
.3-.5 INJECTION, SOLUTION INTRAMUSCULAR; INTRAVENOUS; SUBCUTANEOUS EVERY 5 MIN PRN
Status: DISCONTINUED | OUTPATIENT
Start: 2020-09-27 | End: 2020-09-27

## 2020-09-27 RX ORDER — MAGNESIUM HYDROXIDE 1200 MG/15ML
LIQUID ORAL PRN
Status: DISCONTINUED | OUTPATIENT
Start: 2020-09-27 | End: 2020-09-27 | Stop reason: HOSPADM

## 2020-09-27 RX ORDER — SODIUM CHLORIDE, SODIUM LACTATE, POTASSIUM CHLORIDE, CALCIUM CHLORIDE 600; 310; 30; 20 MG/100ML; MG/100ML; MG/100ML; MG/100ML
INJECTION, SOLUTION INTRAVENOUS CONTINUOUS
Status: DISCONTINUED | OUTPATIENT
Start: 2020-09-27 | End: 2020-10-01

## 2020-09-27 RX ORDER — ONDANSETRON 4 MG/1
4 TABLET, ORALLY DISINTEGRATING ORAL EVERY 30 MIN PRN
Status: DISCONTINUED | OUTPATIENT
Start: 2020-09-27 | End: 2020-09-27

## 2020-09-27 RX ADMIN — LORAZEPAM 0.5 MG: 2 INJECTION INTRAMUSCULAR; INTRAVENOUS at 18:16

## 2020-09-27 RX ADMIN — PHENYLEPHRINE HYDROCHLORIDE 100 MCG: 10 INJECTION INTRAVENOUS at 11:35

## 2020-09-27 RX ADMIN — HYDROMORPHONE HYDROCHLORIDE 0.5 MG: 1 INJECTION, SOLUTION INTRAMUSCULAR; INTRAVENOUS; SUBCUTANEOUS at 13:44

## 2020-09-27 RX ADMIN — NEOSTIGMINE METHYLSULFATE 5 MG: 1 INJECTION, SOLUTION INTRAVENOUS at 12:57

## 2020-09-27 RX ADMIN — PHENYLEPHRINE HYDROCHLORIDE 100 MCG: 10 INJECTION INTRAVENOUS at 11:44

## 2020-09-27 RX ADMIN — SODIUM CHLORIDE 1000 ML: 900 IRRIGANT IRRIGATION at 11:41

## 2020-09-27 RX ADMIN — HYDROMORPHONE HYDROCHLORIDE 0.5 MG: 1 INJECTION, SOLUTION INTRAMUSCULAR; INTRAVENOUS; SUBCUTANEOUS at 13:28

## 2020-09-27 RX ADMIN — PHENYLEPHRINE HYDROCHLORIDE 100 MCG: 10 INJECTION INTRAVENOUS at 12:13

## 2020-09-27 RX ADMIN — DEXMEDETOMIDINE HYDROCHLORIDE 8 MCG: 100 INJECTION, SOLUTION INTRAVENOUS at 11:47

## 2020-09-27 RX ADMIN — PIPERACILLIN AND TAZOBACTAM 4.5 G: 4; .5 INJECTION, POWDER, FOR SOLUTION INTRAVENOUS at 02:47

## 2020-09-27 RX ADMIN — ROCURONIUM BROMIDE 10 MG: 10 INJECTION INTRAVENOUS at 11:38

## 2020-09-27 RX ADMIN — HYDROMORPHONE HYDROCHLORIDE 0.5 MG: 1 INJECTION, SOLUTION INTRAMUSCULAR; INTRAVENOUS; SUBCUTANEOUS at 17:13

## 2020-09-27 RX ADMIN — PHENYLEPHRINE HYDROCHLORIDE 100 MCG: 10 INJECTION INTRAVENOUS at 11:30

## 2020-09-27 RX ADMIN — ONDANSETRON 4 MG: 2 INJECTION INTRAMUSCULAR; INTRAVENOUS at 12:39

## 2020-09-27 RX ADMIN — SUCCINYLCHOLINE CHLORIDE 100 MG: 20 INJECTION, SOLUTION INTRAMUSCULAR; INTRAVENOUS; PARENTERAL at 11:20

## 2020-09-27 RX ADMIN — ROCURONIUM BROMIDE 40 MG: 10 INJECTION INTRAVENOUS at 11:30

## 2020-09-27 RX ADMIN — SODIUM CHLORIDE, POTASSIUM CHLORIDE, SODIUM LACTATE AND CALCIUM CHLORIDE: 600; 310; 30; 20 INJECTION, SOLUTION INTRAVENOUS at 12:37

## 2020-09-27 RX ADMIN — PIPERACILLIN AND TAZOBACTAM 4.5 G: 4; .5 INJECTION, POWDER, FOR SOLUTION INTRAVENOUS at 20:29

## 2020-09-27 RX ADMIN — FENTANYL CITRATE 100 MCG: 50 INJECTION, SOLUTION INTRAMUSCULAR; INTRAVENOUS at 11:19

## 2020-09-27 RX ADMIN — HYDROMORPHONE HYDROCHLORIDE 0.5 MG: 1 INJECTION, SOLUTION INTRAMUSCULAR; INTRAVENOUS; SUBCUTANEOUS at 12:35

## 2020-09-27 RX ADMIN — PHENYLEPHRINE HYDROCHLORIDE 100 MCG: 10 INJECTION INTRAVENOUS at 12:45

## 2020-09-27 RX ADMIN — PROPOFOL 200 MG: 10 INJECTION, EMULSION INTRAVENOUS at 11:20

## 2020-09-27 RX ADMIN — PHYTONADIONE 2 MG: 2 INJECTION, EMULSION INTRAMUSCULAR; INTRAVENOUS; SUBCUTANEOUS at 08:40

## 2020-09-27 RX ADMIN — ROCURONIUM BROMIDE 10 MG: 10 INJECTION INTRAVENOUS at 11:41

## 2020-09-27 RX ADMIN — SODIUM CHLORIDE, POTASSIUM CHLORIDE, SODIUM LACTATE AND CALCIUM CHLORIDE: 600; 310; 30; 20 INJECTION, SOLUTION INTRAVENOUS at 17:15

## 2020-09-27 RX ADMIN — MIDAZOLAM 2 MG: 1 INJECTION INTRAMUSCULAR; INTRAVENOUS at 11:14

## 2020-09-27 RX ADMIN — SODIUM CHLORIDE: 9 INJECTION, SOLUTION INTRAVENOUS at 06:43

## 2020-09-27 RX ADMIN — SODIUM CHLORIDE, POTASSIUM CHLORIDE, SODIUM LACTATE AND CALCIUM CHLORIDE: 600; 310; 30; 20 INJECTION, SOLUTION INTRAVENOUS at 11:14

## 2020-09-27 RX ADMIN — DEXAMETHASONE SODIUM PHOSPHATE 4 MG: 4 INJECTION, SOLUTION INTRA-ARTICULAR; INTRALESIONAL; INTRAMUSCULAR; INTRAVENOUS; SOFT TISSUE at 11:44

## 2020-09-27 RX ADMIN — HYDROMORPHONE HYDROCHLORIDE 0.5 MG: 1 INJECTION, SOLUTION INTRAMUSCULAR; INTRAVENOUS; SUBCUTANEOUS at 14:20

## 2020-09-27 RX ADMIN — PHYTONADIONE 2 MG: 2 INJECTION, EMULSION INTRAMUSCULAR; INTRAVENOUS; SUBCUTANEOUS at 00:15

## 2020-09-27 RX ADMIN — HYDROMORPHONE HYDROCHLORIDE 0.5 MG: 1 INJECTION, SOLUTION INTRAMUSCULAR; INTRAVENOUS; SUBCUTANEOUS at 23:38

## 2020-09-27 RX ADMIN — PHENYLEPHRINE HYDROCHLORIDE 100 MCG: 10 INJECTION INTRAVENOUS at 12:43

## 2020-09-27 RX ADMIN — HYDROMORPHONE HYDROCHLORIDE 0.5 MG: 1 INJECTION, SOLUTION INTRAMUSCULAR; INTRAVENOUS; SUBCUTANEOUS at 20:29

## 2020-09-27 RX ADMIN — GLYCOPYRROLATE 0.7 MG: 0.2 INJECTION, SOLUTION INTRAMUSCULAR; INTRAVENOUS at 12:57

## 2020-09-27 RX ADMIN — BUPIVACAINE HYDROCHLORIDE AND EPINEPHRINE BITARTRATE 30 ML: 2.5; .005 INJECTION, SOLUTION EPIDURAL; INFILTRATION; INTRACAUDAL; PERINEURAL at 13:03

## 2020-09-27 RX ADMIN — LIDOCAINE HYDROCHLORIDE 100 MG: 20 INJECTION, SOLUTION INFILTRATION; PERINEURAL at 11:19

## 2020-09-27 RX ADMIN — DEXMEDETOMIDINE HYDROCHLORIDE 12 MCG: 100 INJECTION, SOLUTION INTRAVENOUS at 12:19

## 2020-09-27 RX ADMIN — PHENYLEPHRINE HYDROCHLORIDE 100 MCG: 10 INJECTION INTRAVENOUS at 12:11

## 2020-09-27 RX ADMIN — PHENYLEPHRINE HYDROCHLORIDE 100 MCG: 10 INJECTION INTRAVENOUS at 11:33

## 2020-09-27 RX ADMIN — PIPERACILLIN AND TAZOBACTAM 4.5 G: 4; .5 INJECTION, POWDER, FOR SOLUTION INTRAVENOUS at 10:28

## 2020-09-27 RX ADMIN — ROCURONIUM BROMIDE 10 MG: 10 INJECTION INTRAVENOUS at 12:16

## 2020-09-27 ASSESSMENT — ACTIVITIES OF DAILY LIVING (ADL)
AMBULATION: 0-->INDEPENDENT
RETIRED_COMMUNICATION: 0-->UNDERSTANDS/COMMUNICATES WITHOUT DIFFICULTY
ADLS_ACUITY_SCORE: 12
SWALLOWING: 0-->SWALLOWS FOODS/LIQUIDS WITHOUT DIFFICULTY
NUMBER_OF_TIMES_PATIENT_HAS_FALLEN_WITHIN_LAST_SIX_MONTHS: 1
WHICH_OF_THE_ABOVE_FUNCTIONAL_RISKS_HAD_A_RECENT_ONSET_OR_CHANGE?: FALL HISTORY
ADLS_ACUITY_SCORE: 12
TOILETING: 0-->INDEPENDENT
ADLS_ACUITY_SCORE: 12
ADLS_ACUITY_SCORE: 12
RETIRED_EATING: 0-->INDEPENDENT
BATHING: 0-->INDEPENDENT
ADLS_ACUITY_SCORE: 12
COGNITION: 0 - NO COGNITION ISSUES REPORTED
TRANSFERRING: 0-->INDEPENDENT
FALL_HISTORY_WITHIN_LAST_SIX_MONTHS: YES
DRESS: 0-->INDEPENDENT

## 2020-09-27 ASSESSMENT — MIFFLIN-ST. JEOR: SCORE: 2088.75

## 2020-09-27 ASSESSMENT — LIFESTYLE VARIABLES: TOBACCO_USE: 0

## 2020-09-27 NOTE — BRIEF OP NOTE
[facility]    Brief Operative Note    Pre-operative diagnosis: Peritonitis (H) [K65.9]  Post-operative diagnosis Same as pre-operative diagnosis    Procedure: Procedure(s):  LAPAROSCOPIC  SMALL BOWEL RESCECTION  Laparoscopic appendectomy  Surgeon: Surgeon(s) and Role:     * Christ Parekh MD - Primary  Anesthesia: General   Estimated blood loss: 20ml  Drains: None  Specimens:   ID Type Source Tests Collected by Time Destination   A : APPENDIX Organ Appendix SURGICAL PATHOLOGY EXAM Christ Parekh MD 9/27/2020 12:09 PM    B : TERMINAL ILEUM, SUTURE ON PROXIMAL END Tissue Small Intestine, Ileum SURGICAL PATHOLOGY EXAM Christ Parekh MD 9/27/2020 12:32 PM      Findings:   focally inflamed mesentery and small bowel with local abscess cavities and creeping mesenteric fat in mid ileum. - resected, appendectomy also done   Complications: None.  Implants: * No implants in log *     Recs:   -NG placed in OR - keep to LIS   -mIVF   -continue Abx   -continue to hold warfarin

## 2020-09-27 NOTE — ANESTHESIA POSTPROCEDURE EVALUATION
Patient: Cameron Barnard    Procedure(s):  LAPAROSCOPIC  SMALL BOWEL RESCECTION  Laparoscopic appendectomy    Diagnosis:Peritonitis (H) [K65.9]  Diagnosis Additional Information: No value filed.    Anesthesia Type:  General    Note:  Anesthesia Post Evaluation    Patient location during evaluation: PACU  Patient participation: Able to fully participate in evaluation  Level of consciousness: awake  Pain management: adequate  Airway patency: patent  Cardiovascular status: acceptable  Respiratory status: acceptable  Hydration status: acceptable  PONV: none     Anesthetic complications: None          Last vitals:  Vitals:    09/27/20 1420 09/27/20 1430 09/27/20 1440   BP: (!) 141/83 (!) 140/77 138/83   Pulse: 62 64 58   Resp: 19 20 20   Temp:      SpO2: 97% 95% 94%         Electronically Signed By: Orly Viveros  September 27, 2020  2:53 PM

## 2020-09-27 NOTE — ANESTHESIA PREPROCEDURE EVALUATION
Anesthesia Pre-Procedure Evaluation    Patient: Cameron Barnard   MRN: [id number] : 1950          Preoperative Diagnosis: Peritonitis (H) [K65.9]    Procedure(s):  LAPAROSCOPY, DIAGNOSTIC, BY GENERAL SURGERY    Past Medical History:   Diagnosis Date     Allergic state      DVT (deep venous thrombosis) (H)      Past Surgical History:   Procedure Laterality Date     COLONOSCOPY N/A 2020    Procedure: COLONOSCOPY;  Surgeon: Lakshmi Santana MD;  Location:  OR     ESOPHAGOSCOPY, GASTROSCOPY, DUODENOSCOPY (EGD), COMBINED N/A 2020    Procedure: ESOPHAGOGASTRODUODENOSCOPY (EGD);  Surgeon: Lakshmi Santana MD;  Location:  OR       Anesthesia Evaluation     . Pt has had prior anesthetic.     No history of anesthetic complications          ROS/MED HX    ENT/Pulmonary:      (-) tobacco use, asthma and sleep apnea   Neurologic:       Cardiovascular:        (-) hypertension   METS/Exercise Tolerance:     Hematologic:     (+) History of blood clots -      Musculoskeletal:         GI/Hepatic:        (-) GERD   Renal/Genitourinary:         Endo:      (-) Type II DM and thyroid disease   Psychiatric:         Infectious Disease:         Malignancy:         Other:                          Physical Exam  Normal systems: dental    Airway   Mallampati: III  TM distance: >3 FB  Neck ROM: full    Dental     Cardiovascular   Rhythm and rate: regular and normal      Pulmonary    breath sounds clear to auscultation            Lab Results   Component Value Date    WBC 8.7 2020    HGB 13.3 2020    HCT 39.2 (L) 2020     2020     2020    POTASSIUM 3.6 2020    CHLORIDE 107 2020    CO2 26 2020    BUN 11 2020    CR 0.95 2020     (H) 2020    PETER 8.0 (L) 2020    MAG 2.4 (H) 2020    ALBUMIN 3.0 (L) 2020    PROTTOTAL 7.2 2020    ALT 41 2020    AST 28 2020    ALKPHOS 70 2020    BILITOTAL 1.3  "09/26/2020    LIPASE 70 (L) 09/26/2020    INR 1.73 (H) 09/27/2020       Preop Vitals  BP Readings from Last 3 Encounters:   09/27/20 121/76   09/20/20 (!) 161/80   05/23/19 138/88    Pulse Readings from Last 3 Encounters:   09/27/20 70   09/20/20 64   05/23/19 77      Resp Readings from Last 3 Encounters:   09/27/20 16   09/20/20 16   05/23/19 18    SpO2 Readings from Last 3 Encounters:   09/27/20 98%   09/20/20 96%   05/23/19 98%      Temp Readings from Last 1 Encounters:   09/27/20 36.8  C (98.3  F) (Oral)    Ht Readings from Last 1 Encounters:   09/26/20 1.88 m (6' 2\")      Wt Readings from Last 1 Encounters:   09/27/20 126.4 kg (278 lb 10.6 oz)    Estimated body mass index is 35.78 kg/m  as calculated from the following:    Height as of this encounter: 1.88 m (6' 2\").    Weight as of this encounter: 126.4 kg (278 lb 10.6 oz).       Anesthesia Plan      History & Physical Review  History and physical reviewed and following examination; no interval change.    ASA Status:  3 .        Plan for General with Intravenous induction. Maintenance will be Balanced.    PONV prophylaxis:  Ondansetron (or other 5HT-3) and Dexamethasone or Solumedrol  Additional equipment: Videolaryngoscope        Postoperative Care  Postoperative pain management:  IV analgesics and Oral pain medications.      Consents  Anesthetic plan, risks, benefits and alternatives discussed with:  Patient..                 Orly Viveros  "

## 2020-09-27 NOTE — ANESTHESIA CARE TRANSFER NOTE
Patient: Cameron Barnard    Procedure(s):  LAPAROSCOPIC  SMALL BOWEL RESCECTION  Laparoscopic appendectomy    Diagnosis: Peritonitis (H) [K65.9]  Diagnosis Additional Information: No value filed.    Anesthesia Type:   General     Note:  Airway :Face Mask  Patient transferred to:PACU  Comments: Neuromuscular blockade reversed after TOF 4/4, spontaneous respirations, adequate tidal volumes, followed commands to voice, oropharynx suctioned with soft flexible catheter, extubated atraumatically, airway patent after extubation.  Oxygen via facemask at 8 liters per minute to PACU. Oxygen tubing connected to wall O2 in PACU, SpO2, NiBP, and EKG monitors and alarms on and functioning, report on patient's clinical status given to PACU RN, RN questions answered.   Handoff Report: Identifed the Patient, Identified the Reponsible Provider, Reviewed the pertinent medical history, Discussed the surgical course, Reviewed Intra-OP anesthesia mangement and issues during anesthesia, Set expectations for post-procedure period and Allowed opportunity for questions and acknowledgement of understanding      Vitals: (Last set prior to Anesthesia Care Transfer)    CRNA VITALS  9/27/2020 1242 - 9/27/2020 1316      9/27/2020             NIBP:  140/80    Ht Rate:  57    SpO2:  97 %    Resp Rate (observed):  18                Electronically Signed By: RON Hunter CRNA  September 27, 2020  1:16 PM

## 2020-09-27 NOTE — PROGRESS NOTES
[facility]    Medicine Progress Note - Hospitalist Service       Date of Admission:  9/26/2020  Assessment & Plan       Cameron Barnard is a 70 year old male with a history of recent small bowel obstruction, remote PE/DVT, hyperlipidemia, and dilated ascending aorta who presented to the ER with abdominal pain and nausea. After evaluation in the ER, he was found to have a recurrent small bowel obstruction. General surgery was consulted and the hospitalist service was contacted to bring him into the hospital for further evaluation and management.    Small bowel obstruction  Possible bowel microperforation  Possible early acute appendicitis  -Recently hospitalized from 9/13 through 9/20 with similar symptoms. Symptoms resolved with conservative management during that hospitalization.  -EGD and colonoscopy were obtained during that admission and did not reveal any obvious source for his bowel obstruction.  -He felt better after discharge but developed recurrent symptoms starting on Wednesday 9/23.  -CT scan abdomen/pelvis on 9/26 is concerning for small bowel obstruction with transition point in the distal ileum.  There is significant inflammatory change in the region as well as possible small microperforation and possible early acute appendicitis.  -N.p.o.  -IV fluids.  -As needed pain and nausea medications.  -Continue IV Zosyn due to possible microperforation.  -Hold off on NG tube for now, if he develops nausea/vomiting would consider placing NG tube.  He is very hopeful that he can avoid having an NG tube placed.  -Leukocytosis resolved. Recheck labs in a.m.  -Symptoms improved but not resolved this morning.  -General surgery consulted, planning exploratory laparoscopy later today.    History of PE/DVT  -Diagnosed about 3 years ago, has been on warfarin since then.  -Hold warfarin for now.  -INR 2.45 in the ER.  Pharmacy consulted to reverse warfarin. Received 2 mg of IV vitamin K on two occasions  overnight. INR 1.73 this morning. Pharmacy ordered another 2 mg of IV vitamin K this morning. Goal INR is < 1.5 prior to going to the OR.    Hyperlipidemia  -Hold PTA omega-3 product for now.    Recent hematuria  -Follow-up with outpatient urology as previously recommended.  -Currently denies hematuria and there was no blood seen on his UA in the ER.    Dilated a sending aorta  -Follows with cardiology through [facility] as an outpatient, continue outpatient follow-up as previously recommended.    Asymptomatic COVID-19 testing  -Pre-op asymptomatic COVID-19 testing was NEGATIVE on 9/26/20.     Diet: NPO for Medical/Clinical Reasons Except for: Meds    DVT Prophylaxis: Pneumatic Compression Devices  Altman Catheter: not present  Code Status: Full Code           Disposition Plan   Expected discharge: going to OR today for exploratory laparoscopy. Discharge plan depends on surgical findings and post-op recovery.  Entered: Juan Pinon MD 09/27/2020, 8:44 AM       The patient's care was discussed with the Bedside Nurse and Patient.    Juan Pinon MD  Hospitalist Service  [facility]    ______________________________________________________________________    Interval History   Cameron Barnard feels better this morning. Abdominal pain improved, now about a 3-4/10. Passing flatus. No BM since admission. Denies fevers, chest pain, shortness of breath, nausea. Plan for OR at noon today per patient report.    Data reviewed today: I reviewed all medications, new labs and imaging results over the last 24 hours. I personally reviewed no images or EKG's today.    Physical Exam   Vital Signs: Temp: 98.3  F (36.8  C) Temp src: Oral BP: 121/76 Pulse: 70   Resp: 16 SpO2: 98 % O2 Device: None (Room air)    Weight: 278 lbs 10.58 oz  Constitutional: awake, alert, cooperative, no apparent distress  Respiratory: clear to auscultation bilaterally, no crackles or wheezing  Cardiovascular: regular rate and rhythm, normal  S1 and S2, no murmur noted  GI: normal bowel sounds, soft, mild distention, minimal tenderness  Skin: warm, dry  Musculoskeletal: trace lower extremity pitting edema present  Neurologic: awake, alert, oriented to name, place and time    Data   Recent Labs   Lab 09/27/20  0735 09/26/20  2324 09/26/20  1837 09/26/20  1204   WBC 8.7  --   --  12.8*   HGB 13.3  --   --  15.1   MCV 93  --   --  90     --   --  303   INR 1.73* 2.36* 2.69* 2.45*     --   --  135   POTASSIUM 3.6  --   --  3.9   CHLORIDE 107  --   --  104   CO2 26  --   --  26   BUN 11  --   --  9   CR 0.95  --   --  0.80   ANIONGAP 4  --   --  5   PETER 8.0*  --   --  8.5   *  --   --  112*   ALBUMIN  --   --   --  3.0*   PROTTOTAL  --   --   --  7.2   BILITOTAL  --   --   --  1.3   ALKPHOS  --   --   --  70   ALT  --   --   --  41   AST  --   --   --  28   LIPASE  --   --   --  70*     Medications     sodium chloride 100 mL/hr at 09/27/20 0643       phytonadione  2 mg Intravenous Once     piperacillin-tazobactam  4.5 g Intravenous Q6H     sodium chloride (PF)  3 mL Intracatheter Q8H

## 2020-09-27 NOTE — PROGRESS NOTES
"General Surgery Progress Note:     S:   Doing about the same today, his abdominal pain is better and he is passing gas this morning but no bowel movement. He denies nausea or vomiting. No fevers or chills.     O:   /76 (BP Location: Right arm)   Pulse 70   Temp 98.3  F (36.8  C) (Oral)   Resp 16   Ht 1.88 m (6' 2\")   Wt 126.4 kg (278 lb 10.6 oz)   SpO2 98%   BMI 35.78 kg/m    General: appears well and not in distress, conversational   CV: regular rate and rhythm   Resp: normal respiratory effort   Abd: soft, obese abdomen, non-tender to deep palpation; no surgical scars   Extremities: warm and well perfused     A/P:   Mr Barnard is a 69 yo male with a history of DVT/PE on warfarin presenting with abdominal pain likely from a de sang SBO. His CT scan is concerning for inflammation in the ileum likely causing this SBO although the exact etiology is unclear.     -diagnostic laparoscopy today   -INR down to 1.7 today, will have FFP in OR ready   -continue IV Abx   -NPO   -mIVF   -morning labs     Delfino Loredo MD  General Surgery Resident, PGY-4   P: [phone]     "

## 2020-09-27 NOTE — OP NOTE
General Surgery Operative Note    PREOPERATIVE DIAGNOSIS: Recurrent small bowel obstruction, possible intra-abdominal microperforation    POSTOPERATIVE DIAGNOSIS: Same    PROCEDURE: Exploratory laparoscopy, laparoscopic-assisted small bowel resection, laparoscopic appendectomy    ANESTHESIA:  General.    PREOPERATIVE MEDICATIONS: Zosyn    SURGEON:  Christ Parekh MD    ASSISTANT: Delfino Loredo MD.  Assistant was required owing to challenging exposure and need for retraction.    INDICATIONS: Patient presented approximately 3 weeks ago with small bowel obstruction.  No history of previous abdominal surgery.  Patient recovered with medical management but returned yesterday with recurrent symptoms of nausea and vomiting.  CT scan of the abdomen showed inflammation of a small bowel loop in the pelvis and concern for possible microperforation.  Patient now presents for exploratory laparoscopy.    PROCEDURE:  The patient was taken to the operating suite and uneventfully endotracheally intubated.  The abdomen was prepped and draped in a sterile fashion.  Surgeon initiated timeout was acknowledged.  We entered the abdomen in the left upper quadrant using a Visiport technique.  2 other 5 mm trochars were placed in the left abdomen.  We began exploring the abdomen and encountered some hyperemic bowel near the terminal ileum.  We followed this distally and encountered an area of very firm tense small bowel which appear to be causing a relative bowel obstruction proximally.  We continued following this and saw an area of probable abscess formation near the mesentery of this area.  We made the determination that this was likely the cause of his issues and that this would not clinically improve without resection.  Given his concerns for right lower quadrant pain and some concern for appendicitis on CT scan, I did like to remove his appendix.  Appendix appeared grossly normal.  We grasped the appendix and elevated toward the  abdominal wall.  A window was made in the mesoappendix and we divided the appendix from the cecum with a 45 mm Endo NIDIA white load.  I then used 2 firings of the gray load stapler to divide the mesentery.  We grasped the area of small bowel which appeared to be perforated and elected to make a small incision in the abdominal wall to deliver it.  I used a 15 blade and made a 6 cm midline incision above the umbilicus.  This was taken down through skin and subcutaneous tissue.  Fascia was divided along the midline.  I placed an Rajinder wound retractor and was able to encounter the bowel which was involved.  I began delivering the Paris to the abdominal wound and came across the mesentery with a LigaSure device.  We delivered the affected area which appeared to normalize at approximately 8 to 10 cm proximal to the terminal ileum.  We found an area surrounding the microperforation which measured approximately 20 cm.  We brought the small bowel together in a side-to-side functional end and configuration.  Enterotomies were made and we fired a 75 mm blue load stapler to create our anastomosis.  We then used another staple firing to close our common enterotomy.  Mesenteric defect was closed with several interrupted 3-0 Vicryl sutures.  The anastomosis was widely patent and was returned into the abdominal cavity.  Fascia was then closed along the midline with several interrupted 0 Vicryl sutures.  We reinsufflated the abdomen at this point and our anastomosis appeared to be in good position.  There is no evidence of ongoing bleeding and we elected to terminate the procedure at this point.  Trochars were removed and the abdomen was desufflated.  The skin edges were reapproximated with 4-0 Vicryl and Steri-Strips.  The patient was uneventfully extubated, awakened and taken to the PACU in stable condition.  At the conclusion of the case, all lap and needle counts were correct.      ESTIMATED BLOOD LOSS: 20 mL    INTRAOPERATIVE  FINDINGS: Probable perforation of terminal ileum with localized inflammation and small bowel obstruction.  Exact cause not identified.  Incidental appendectomy performed.    Christ Parekh MD, MD

## 2020-09-27 NOTE — PLAN OF CARE
A/o x4. AVSS on RA. Pain managed w/ IV dilaudid. C/o anxiety this evening, managed w/ IV ativan. Midline dressing, scant drainage. BS hypoactive, -flatus, -BM. Voiding small adequate amounts. NGT intact, small un-measurable output. CMS intact. Will CTM

## 2020-09-27 NOTE — PLAN OF CARE
A/Ox4. VSS. LS clear. BS hypo, no flatus, no bm. Voiding adequately. Managing pain with prn dilaudid x1. NPO. Up independently. INR at midnight 2.36, vitamin k given. Plan for surgery today.

## 2020-09-28 LAB
ANION GAP SERPL CALCULATED.3IONS-SCNC: 5 MMOL/L (ref 3–14)
BUN SERPL-MCNC: 10 MG/DL (ref 7–30)
CALCIUM SERPL-MCNC: 8 MG/DL (ref 8.5–10.1)
CHLORIDE SERPL-SCNC: 107 MMOL/L (ref 94–109)
CO2 SERPL-SCNC: 26 MMOL/L (ref 20–32)
CREAT SERPL-MCNC: 0.9 MG/DL (ref 0.66–1.25)
ERYTHROCYTE [DISTWIDTH] IN BLOOD BY AUTOMATED COUNT: 12.9 % (ref 10–15)
GFR SERPL CREATININE-BSD FRML MDRD: 85 ML/MIN/{1.73_M2}
GLUCOSE SERPL-MCNC: 101 MG/DL (ref 70–99)
HCT VFR BLD AUTO: 38.2 % (ref 40–53)
HGB BLD-MCNC: 12.8 G/DL (ref 13.3–17.7)
MCH RBC QN AUTO: 31 PG (ref 26.5–33)
MCHC RBC AUTO-ENTMCNC: 33.5 G/DL (ref 31.5–36.5)
MCV RBC AUTO: 93 FL (ref 78–100)
PLATELET # BLD AUTO: 284 10E9/L (ref 150–450)
POTASSIUM SERPL-SCNC: 3.7 MMOL/L (ref 3.4–5.3)
RBC # BLD AUTO: 4.13 10E12/L (ref 4.4–5.9)
SODIUM SERPL-SCNC: 138 MMOL/L (ref 133–144)
WBC # BLD AUTO: 10.1 10E9/L (ref 4–11)

## 2020-09-28 PROCEDURE — 99232 SBSQ HOSP IP/OBS MODERATE 35: CPT | Performed by: HOSPITALIST

## 2020-09-28 PROCEDURE — 36415 COLL VENOUS BLD VENIPUNCTURE: CPT | Performed by: STUDENT IN AN ORGANIZED HEALTH CARE EDUCATION/TRAINING PROGRAM

## 2020-09-28 PROCEDURE — 25000132 ZZH RX MED GY IP 250 OP 250 PS 637: Performed by: STUDENT IN AN ORGANIZED HEALTH CARE EDUCATION/TRAINING PROGRAM

## 2020-09-28 PROCEDURE — 25800030 ZZH RX IP 258 OP 636: Performed by: STUDENT IN AN ORGANIZED HEALTH CARE EDUCATION/TRAINING PROGRAM

## 2020-09-28 PROCEDURE — 80048 BASIC METABOLIC PNL TOTAL CA: CPT | Performed by: STUDENT IN AN ORGANIZED HEALTH CARE EDUCATION/TRAINING PROGRAM

## 2020-09-28 PROCEDURE — 25000128 H RX IP 250 OP 636: Performed by: STUDENT IN AN ORGANIZED HEALTH CARE EDUCATION/TRAINING PROGRAM

## 2020-09-28 PROCEDURE — 12000000 ZZH R&B MED SURG/OB

## 2020-09-28 PROCEDURE — 85027 COMPLETE CBC AUTOMATED: CPT | Performed by: STUDENT IN AN ORGANIZED HEALTH CARE EDUCATION/TRAINING PROGRAM

## 2020-09-28 RX ADMIN — Medication 1 MG: at 22:29

## 2020-09-28 RX ADMIN — HYDROMORPHONE HYDROCHLORIDE 0.5 MG: 1 INJECTION, SOLUTION INTRAMUSCULAR; INTRAVENOUS; SUBCUTANEOUS at 17:23

## 2020-09-28 RX ADMIN — HYDROMORPHONE HYDROCHLORIDE 0.5 MG: 1 INJECTION, SOLUTION INTRAMUSCULAR; INTRAVENOUS; SUBCUTANEOUS at 06:25

## 2020-09-28 RX ADMIN — SODIUM CHLORIDE, POTASSIUM CHLORIDE, SODIUM LACTATE AND CALCIUM CHLORIDE: 600; 310; 30; 20 INJECTION, SOLUTION INTRAVENOUS at 04:46

## 2020-09-28 RX ADMIN — PIPERACILLIN AND TAZOBACTAM 4.5 G: 4; .5 INJECTION, POWDER, FOR SOLUTION INTRAVENOUS at 10:07

## 2020-09-28 RX ADMIN — PIPERACILLIN AND TAZOBACTAM 4.5 G: 4; .5 INJECTION, POWDER, FOR SOLUTION INTRAVENOUS at 02:30

## 2020-09-28 RX ADMIN — HYDROMORPHONE HYDROCHLORIDE 0.5 MG: 1 INJECTION, SOLUTION INTRAMUSCULAR; INTRAVENOUS; SUBCUTANEOUS at 11:16

## 2020-09-28 RX ADMIN — PIPERACILLIN AND TAZOBACTAM 4.5 G: 4; .5 INJECTION, POWDER, FOR SOLUTION INTRAVENOUS at 15:16

## 2020-09-28 RX ADMIN — PIPERACILLIN AND TAZOBACTAM 4.5 G: 4; .5 INJECTION, POWDER, FOR SOLUTION INTRAVENOUS at 22:30

## 2020-09-28 RX ADMIN — SODIUM CHLORIDE, POTASSIUM CHLORIDE, SODIUM LACTATE AND CALCIUM CHLORIDE: 600; 310; 30; 20 INJECTION, SOLUTION INTRAVENOUS at 16:33

## 2020-09-28 RX ADMIN — HYDROMORPHONE HYDROCHLORIDE 0.5 MG: 1 INJECTION, SOLUTION INTRAMUSCULAR; INTRAVENOUS; SUBCUTANEOUS at 02:40

## 2020-09-28 ASSESSMENT — ACTIVITIES OF DAILY LIVING (ADL)
ADLS_ACUITY_SCORE: 12
ADLS_ACUITY_SCORE: 12
ADLS_ACUITY_SCORE: 14
ADLS_ACUITY_SCORE: 12

## 2020-09-28 ASSESSMENT — MIFFLIN-ST. JEOR: SCORE: 2097.75

## 2020-09-28 NOTE — PLAN OF CARE
Pt A&Ox4. VSS on RA. Pain managed w/ IV dilaudid and ice. Scant drainage to midline dressing. BS hypocative, -gas, -bm. NG tube to LIS w/ dark green output. NPO. CMS intact. Continue to monitor.

## 2020-09-28 NOTE — PLAN OF CARE
[id number]:  Pt a/ox4. BPs slightly elevated, otherwise VS stabled. C/o occasionala abdominal pain, IV dilaudid used prn. Pt ambulated around the unit in the afternoon with standby assist. Dyspneic with exertion. NG tube connected to low-intermittent suction, draining dark green/brorn output.

## 2020-09-28 NOTE — PROGRESS NOTES
[facility]    Medicine Progress Note - Hospitalist Service       Date of Admission:  9/26/2020  Assessment & Plan       Cameron Barnard is a 70 year old male with a history of recent small bowel obstruction, remote PE/DVT, hyperlipidemia, and dilated ascending aorta who presented to the ER with abdominal pain and nausea. After evaluation in the ER, he was found to have a recurrent small bowel obstruction. General surgery was consulted and the hospitalist service was contacted to bring him into the hospital for further evaluation and management.    Recurrent small bowel obstruction  Small bowel microperforation  -Recently hospitalized from 9/13 through 9/20 with similar symptoms. Symptoms resolved with conservative management during that hospitalization.  -EGD and colonoscopy were obtained during that admission and did not reveal any obvious source for his bowel obstruction.  -He felt better after discharge but developed recurrent symptoms starting on Wednesday 9/23.  -CT scan abdomen/pelvis on 9/26 is concerning for small bowel obstruction with transition point in the distal ileum.  There is significant inflammatory change in the region as well as possible small microperforation and possible early acute appendicitis.  -General surgery consulted.  -S/p exploratory laparoscopy, laparoscopic-assisted small bowel resection, and laparoscopic appendectomy on 9/27/20.  -Post-op management per general surgery.  -Currently NPO, IV fluids, NG tube in place.  -On IV Zosyn for microperforation, plan to stop antibiotics tomorrow.    History of PE/DVT  -Diagnosed about 3 years ago, has been on warfarin since then.  -Hold warfarin for now.  -INR 2.45 in the ER.  Pharmacy was consulted and INR was reversed with vitamin K.  - Plan to restart warfarin tomorrow per general surgery recommendations.    Hyperlipidemia  -Hold PTA omega-3 product for now.    Recent hematuria  -Follow-up with outpatient urology as  previously recommended.  -Currently denies hematuria and there was no blood seen on his UA in the ER.    Dilated a sending aorta  -Follows with cardiology through [facility] as an outpatient, continue outpatient follow-up as previously recommended.    Asymptomatic COVID-19 testing  -Pre-op asymptomatic COVID-19 testing was NEGATIVE on 9/26/20.     Diet: NPO for Medical/Clinical Reasons Except for: Meds    DVT Prophylaxis: Pneumatic Compression Devices, Restart Warfarin tomorrow per surgery recommendations  Altman Catheter: not present  Code Status: Full Code           Disposition Plan   Expected discharge: defer to general surgery  Entered: Juan Pinon MD 09/28/2020, 9:05 AM       The patient's care was discussed with the Bedside Nurse and Patient.    Juan Pinon MD  Hospitalist Service  [facility]    ______________________________________________________________________    Interval History   Cameron Barnard feels OK this morning. Pain fairly well controlled. NG tube in place, tolerating it OK. No nausea. No BM or flatus since surgery.    Data reviewed today: I reviewed all medications, new labs and imaging results over the last 24 hours. I personally reviewed no images or EKG's today.    Physical Exam   Vital Signs: Temp: 97.8  F (36.6  C) Temp src: Oral BP: (!) 141/76 Pulse: 72   Resp: 16 SpO2: 92 % O2 Device: None (Room air) Oxygen Delivery: 3 LPM  Weight: 280 lbs 10.33 oz  Constitutional: awake, alert, cooperative, no apparent distress  Respiratory: clear to auscultation bilaterally, no crackles or wheezing  Cardiovascular: regular rate and rhythm, normal S1 and S2, no murmur noted  GI: bowel sounds decreased, soft, non-distended, appropriate tenderness, NG tube in place  Skin: warm, dry  Musculoskeletal: no lower extremity pitting edema present  Neurologic: awake, alert, oriented to name, place and time    Data   Recent Labs   Lab 09/28/20  0710 09/27/20  1050 09/27/20  0735 09/26/20  [id number]   09/26/20  1204   WBC 10.1  --  8.7  --   --  12.8*   HGB 12.8*  --  13.3  --   --  15.1   MCV 93  --  93  --   --  90     --  247  --   --  303   INR  --  1.59* 1.73* 2.36*   < > 2.45*     --  137  --   --  135   POTASSIUM 3.7  --  3.6  --   --  3.9   CHLORIDE 107  --  107  --   --  104   CO2 26  --  26  --   --  26   BUN 10  --  11  --   --  9   CR 0.90  --  0.95  --   --  0.80   ANIONGAP 5  --  4  --   --  5   PETER 8.0*  --  8.0*  --   --  8.5   *  --  103*  --   --  112*   ALBUMIN  --   --   --   --   --  3.0*   PROTTOTAL  --   --   --   --   --  7.2   BILITOTAL  --   --   --   --   --  1.3   ALKPHOS  --   --   --   --   --  70   ALT  --   --   --   --   --  41   AST  --   --   --   --   --  28   LIPASE  --   --   --   --   --  70*    < > = values in this interval not displayed.     Medications     lactated ringers 100 mL/hr at 09/28/20 0446     sodium chloride 100 mL/hr at 09/27/20 0643       piperacillin-tazobactam  4.5 g Intravenous Q6H     sodium chloride (PF)  3 mL Intracatheter Q8H

## 2020-09-28 NOTE — PROGRESS NOTES
"General Surgery Progress Note:     S:   Feeling worn out this morning and only got to sleep around 4am. Otherwise doing ok, abdominal pain is mild and controlled. Not passing gas, no bowel movement, no nausea or vomiting     NG with bilious output     O:   BP (!) 147/85 (BP Location: Right arm)   Pulse 74   Temp 98.1  F (36.7  C) (Oral)   Resp 16   Ht 1.88 m (6' 2\")   Wt 127.3 kg (280 lb 10.3 oz)   SpO2 94%   BMI 36.03 kg/m    General: appears well and not in distress, conversational   CV: regular rate and rhythm   Resp: normal respiratory effort   Abd: soft, obese abdomen, minimally tender to palpation; dressings c/d/i   Extremities: warm and well perfused     A/P:   Mr Barnard is a 71 yo male with a history of DVT/PE on warfarin presenting with abdominal pain. He is now POD#1 from diagnostic laparoscopy where focally inflamed small bowel was found likely from a microperforation - etiology of perforation unclear. Small bowel resected with primary anastomosis and incidental appendectomy.     -continue Abx for another 24 hours then stop   -can resume warfarin tomorrow   -NG to LIS   -NPO   -mIVF   -ok for VTE prophylaxis   -ambulation, IS   -f/u pathology         Delfino Loredo MD  General Surgery Resident, PGY-4   P: [phone]     "

## 2020-09-28 NOTE — PLAN OF CARE
Ambulated x3 in halls abdomin distended No flatus no nausea. NG green returns 450 ml this shift. Dressing old drainage. Took dilaudid   x 1 for pain states pain  better then last night. Bp hypertensive.

## 2020-09-28 NOTE — PLAN OF CARE
Bp hypertensive at times metoprolol IV scheduled and brings BP to WNL. Co of dome wheezing but no SOB fine [facility] Hospitalist aware will order nasal spray. Denies nausea hypoactive BS no flatus.  NG to low intermittent suction. Green and clear returns after UGI done Heparin  infusing at 500u/hour.tele Afib denies need for Dilaudid iv  incision intact with staples ambulates a few steps with 2 assist to bsc chair for5 2 hours.

## 2020-09-29 LAB
ANION GAP SERPL CALCULATED.3IONS-SCNC: 4 MMOL/L (ref 3–14)
BUN SERPL-MCNC: 10 MG/DL (ref 7–30)
CALCIUM SERPL-MCNC: 7.9 MG/DL (ref 8.5–10.1)
CHLORIDE SERPL-SCNC: 106 MMOL/L (ref 94–109)
CO2 SERPL-SCNC: 28 MMOL/L (ref 20–32)
COPATH REPORT: NORMAL
CREAT SERPL-MCNC: 0.82 MG/DL (ref 0.66–1.25)
ERYTHROCYTE [DISTWIDTH] IN BLOOD BY AUTOMATED COUNT: 12.9 % (ref 10–15)
GFR SERPL CREATININE-BSD FRML MDRD: 89 ML/MIN/{1.73_M2}
GLUCOSE SERPL-MCNC: 90 MG/DL (ref 70–99)
HCT VFR BLD AUTO: 36 % (ref 40–53)
HGB BLD-MCNC: 12.1 G/DL (ref 13.3–17.7)
INR PPP: 1.36 (ref 0.86–1.14)
INTERPRETATION ECG - MUSE: NORMAL
MCH RBC QN AUTO: 31 PG (ref 26.5–33)
MCHC RBC AUTO-ENTMCNC: 33.6 G/DL (ref 31.5–36.5)
MCV RBC AUTO: 92 FL (ref 78–100)
PLATELET # BLD AUTO: 285 10E9/L (ref 150–450)
POTASSIUM SERPL-SCNC: 3.5 MMOL/L (ref 3.4–5.3)
RBC # BLD AUTO: 3.9 10E12/L (ref 4.4–5.9)
SODIUM SERPL-SCNC: 138 MMOL/L (ref 133–144)
WBC # BLD AUTO: 9.2 10E9/L (ref 4–11)

## 2020-09-29 PROCEDURE — 25000132 ZZH RX MED GY IP 250 OP 250 PS 637: Performed by: HOSPITALIST

## 2020-09-29 PROCEDURE — 25800030 ZZH RX IP 258 OP 636: Performed by: STUDENT IN AN ORGANIZED HEALTH CARE EDUCATION/TRAINING PROGRAM

## 2020-09-29 PROCEDURE — 80048 BASIC METABOLIC PNL TOTAL CA: CPT | Performed by: HOSPITALIST

## 2020-09-29 PROCEDURE — C9113 INJ PANTOPRAZOLE SODIUM, VIA: HCPCS | Performed by: HOSPITALIST

## 2020-09-29 PROCEDURE — 25000128 H RX IP 250 OP 636: Performed by: STUDENT IN AN ORGANIZED HEALTH CARE EDUCATION/TRAINING PROGRAM

## 2020-09-29 PROCEDURE — 25000132 ZZH RX MED GY IP 250 OP 250 PS 637

## 2020-09-29 PROCEDURE — 36415 COLL VENOUS BLD VENIPUNCTURE: CPT | Performed by: HOSPITALIST

## 2020-09-29 PROCEDURE — 25000128 H RX IP 250 OP 636: Performed by: HOSPITALIST

## 2020-09-29 PROCEDURE — 25000132 ZZH RX MED GY IP 250 OP 250 PS 637: Performed by: SURGERY

## 2020-09-29 PROCEDURE — 85027 COMPLETE CBC AUTOMATED: CPT | Performed by: HOSPITALIST

## 2020-09-29 PROCEDURE — 99232 SBSQ HOSP IP/OBS MODERATE 35: CPT | Performed by: HOSPITALIST

## 2020-09-29 PROCEDURE — 12000000 ZZH R&B MED SURG/OB

## 2020-09-29 PROCEDURE — 85610 PROTHROMBIN TIME: CPT | Performed by: HOSPITALIST

## 2020-09-29 RX ORDER — WARFARIN SODIUM 5 MG/1
10 TABLET ORAL
Status: COMPLETED | OUTPATIENT
Start: 2020-09-29 | End: 2020-09-29

## 2020-09-29 RX ORDER — CALCIUM CARBONATE 500 MG/1
1000 TABLET, CHEWABLE ORAL EVERY 4 HOURS PRN
Status: DISCONTINUED | OUTPATIENT
Start: 2020-09-29 | End: 2020-10-02 | Stop reason: HOSPADM

## 2020-09-29 RX ADMIN — SODIUM CHLORIDE, POTASSIUM CHLORIDE, SODIUM LACTATE AND CALCIUM CHLORIDE: 600; 310; 30; 20 INJECTION, SOLUTION INTRAVENOUS at 14:51

## 2020-09-29 RX ADMIN — CALCIUM CARBONATE (ANTACID) CHEW TAB 500 MG 1000 MG: 500 CHEW TAB at 10:23

## 2020-09-29 RX ADMIN — PIPERACILLIN AND TAZOBACTAM 4.5 G: 4; .5 INJECTION, POWDER, FOR SOLUTION INTRAVENOUS at 21:45

## 2020-09-29 RX ADMIN — PIPERACILLIN AND TAZOBACTAM 4.5 G: 4; .5 INJECTION, POWDER, FOR SOLUTION INTRAVENOUS at 03:41

## 2020-09-29 RX ADMIN — WARFARIN SODIUM 10 MG: 5 TABLET ORAL at 18:08

## 2020-09-29 RX ADMIN — SODIUM CHLORIDE, POTASSIUM CHLORIDE, SODIUM LACTATE AND CALCIUM CHLORIDE: 600; 310; 30; 20 INJECTION, SOLUTION INTRAVENOUS at 03:15

## 2020-09-29 RX ADMIN — HYDROMORPHONE HYDROCHLORIDE 0.5 MG: 1 INJECTION, SOLUTION INTRAMUSCULAR; INTRAVENOUS; SUBCUTANEOUS at 01:07

## 2020-09-29 RX ADMIN — PIPERACILLIN AND TAZOBACTAM 4.5 G: 4; .5 INJECTION, POWDER, FOR SOLUTION INTRAVENOUS at 16:52

## 2020-09-29 RX ADMIN — Medication 1 ML: at 04:32

## 2020-09-29 RX ADMIN — PIPERACILLIN AND TAZOBACTAM 4.5 G: 4; .5 INJECTION, POWDER, FOR SOLUTION INTRAVENOUS at 11:09

## 2020-09-29 RX ADMIN — PANTOPRAZOLE SODIUM 40 MG: 40 INJECTION, POWDER, FOR SOLUTION INTRAVENOUS at 12:11

## 2020-09-29 ASSESSMENT — ACTIVITIES OF DAILY LIVING (ADL)
ADLS_ACUITY_SCORE: 14

## 2020-09-29 NOTE — PLAN OF CARE
A/O x4. VSS on RA. PRN IV Dilaudid given x1 for pain. Abdominal incision dressing with shadowing- marked w/ no change. NG to LIS with 400 ml brown/green output. BS faint, flatus-, BM-. Denies N/V. LS clear. Voiding. Up SBA. NPO ex ice chips. IVF infusing at 100 ml/hr. Will continue to monitor.

## 2020-09-29 NOTE — PROGRESS NOTES
Surgery  Patient doing pretty well.  Increased NG tube outputs overnight.  Has been passing some flatus, no return of bowel function as of yet.  No nausea.  Plan will be to clamp his NG tube for a couple hours overnight and check residuals tomorrow.  If he does well with tube clamping and is continuing to pass flatus, will probably remove the NG tube tomorrow and start clears.  Chirag Parekh MD  General Surgery, Office [phone]

## 2020-09-29 NOTE — PHARMACY-ANTICOAGULATION SERVICE
Clinical Pharmacy - Warfarin Dosing Consult     Pharmacy has been consulted to manage this patient s warfarin therapy.  Indication: DVT/PE Prophylaxis  Therapy Goal: INR 2-3  Warfarin Prior to Admission: Yes  Warfarin PTA Regimen: 10 mg 6 days per week and 7.5 mg Wednesdays.  Dose Comments: NPO    INR   Date Value Ref Range Status   09/29/2020 1.36 (H) 0.86 - 1.14 Final   09/27/2020 1.59 (H) 0.86 - 1.14 Final       Recommend warfarin 10 mg today.  Pharmacy will monitor Cameron Barnard daily and order warfarin doses to achieve specified goal.      Please contact pharmacy as soon as possible if the warfarin needs to be held for a procedure or if the warfarin goals change.      Stefani Byrne, CarolynnD, BCPS

## 2020-09-29 NOTE — PROGRESS NOTES
[facility]    Medicine Progress Note - Hospitalist Service       Date of Admission:  9/26/2020  Assessment & Plan       Cameron Barnard is a 70 year old male with a history of recent small bowel obstruction, remote PE/DVT, hyperlipidemia, and dilated ascending aorta who presented to the ER with abdominal pain and nausea. After evaluation in the ER, he was found to have a recurrent small bowel obstruction. General surgery was consulted and the hospitalist service was contacted to bring him into the hospital for further evaluation and management.    Recurrent small bowel obstruction  Small bowel microperforation  -Recently hospitalized from 9/13 through 9/20 with similar symptoms. Symptoms resolved with conservative management during that hospitalization.  -EGD and colonoscopy were obtained during that admission and did not reveal any obvious source for his bowel obstruction.  -He felt better after discharge but developed recurrent symptoms starting on Wednesday 9/23.  -CT scan abdomen/pelvis on 9/26 is concerning for small bowel obstruction with transition point in the distal ileum.  There is significant inflammatory change in the region as well as possible small microperforation and possible early acute appendicitis.  -General surgery consulted.  -S/p exploratory laparoscopy, laparoscopic-assisted small bowel resection, and laparoscopic appendectomy on 9/27/20.  -Post-op management per general surgery.  -Currently NPO, IV fluids, NG tube in place. Plan for NG clamping trial overnight.  -On IV Zosyn for microperforation, duration per general surgery.    History of PE/DVT  -Diagnosed about 3 years ago, has been on warfarin since then.  -INR 2.45 in the ER.  Held warfarin upon admission.  Pharmacy was consulted and INR was reversed with vitamin K.  -INR 1.36 this morning. Consult pharmacy to restart warfarin today.    Heartburn  - Improved with TUMS.  - Will start protonix.    Hyperlipidemia  -Hold PTA  omega-3 product for now.    Recent hematuria  -Follow-up with outpatient urology as previously recommended.  -Currently denies hematuria and there was no blood seen on his UA in the ER.    Dilated a sending aorta  -Follows with cardiology through [facility] as an outpatient, continue outpatient follow-up as previously recommended.    Asymptomatic COVID-19 testing  -Pre-op asymptomatic COVID-19 testing was NEGATIVE on 9/26/20.     Diet: NPO for Medical/Clinical Reasons Except for: Meds    DVT Prophylaxis: Pneumatic Compression Devices  Altman Catheter: not present  Code Status: Full Code           Disposition Plan   Expected discharge: defer to general surgery  Entered: Juan Pinon MD 09/29/2020, 10:50 AM       The patient's care was discussed with the Bedside Nurse, Patient and Patient's Family.    Juan Pinon MD  Hospitalist Service  [facility]    ______________________________________________________________________    Interval History   Cameron Barnard feels a little better today. Some abdominal discomfort, well controlled with pain medications. Denies fevers, chest pain, shortness of breath, nausea. Passing flatus, no BM yet. Complains of acid reflux.    Data reviewed today: I reviewed all medications, new labs and imaging results over the last 24 hours. I personally reviewed no images or EKG's today.    Physical Exam   Vital Signs: Temp: 97.8  F (36.6  C) Temp src: Oral BP: (!) 157/90(Recheck) Pulse: 73   Resp: 16 SpO2: 93 % O2 Device: None (Room air)    Weight: 280 lbs 10.33 oz  Constitutional: awake, alert, cooperative, no apparent distress  Respiratory: clear to auscultation bilaterally, no crackles or wheezing  Cardiovascular: regular rate and rhythm, normal S1 and S2, no murmur noted  GI: bowel sounds decreased, soft, non-distended, appropriate tenderness, NG tube in place  Skin: warm, dry  Musculoskeletal: trace lower extremity pitting edema present  Neurologic: awake, alert, oriented  to name, place and time    Data   Recent Labs   Lab 09/29/20  0739 09/28/20  0710 09/27/20  1050 09/27/20  0735  09/26/20  1204   WBC 9.2 10.1  --  8.7  --  12.8*   HGB 12.1* 12.8*  --  13.3  --  15.1   MCV 92 93  --  93  --  90    284  --  247  --  303   INR 1.36*  --  1.59* 1.73*   < > 2.45*    138  --  137  --  135   POTASSIUM 3.5 3.7  --  3.6  --  3.9   CHLORIDE 106 107  --  107  --  104   CO2 28 26  --  26  --  26   BUN 10 10  --  11  --  9   CR 0.82 0.90  --  0.95  --  0.80   ANIONGAP 4 5  --  4  --  5   PETER 7.9* 8.0*  --  8.0*  --  8.5   GLC 90 101*  --  103*  --  112*   ALBUMIN  --   --   --   --   --  3.0*   PROTTOTAL  --   --   --   --   --  7.2   BILITOTAL  --   --   --   --   --  1.3   ALKPHOS  --   --   --   --   --  70   ALT  --   --   --   --   --  41   AST  --   --   --   --   --  28   LIPASE  --   --   --   --   --  70*    < > = values in this interval not displayed.     Medications     lactated ringers 100 mL/hr at 09/29/20 0315     sodium chloride 100 mL/hr at 09/27/20 0643     Warfarin Therapy Reminder         pantoprazole (PROTONIX) IV  40 mg Intravenous Daily with breakfast     piperacillin-tazobactam  4.5 g Intravenous Q6H     sodium chloride (PF)  3 mL Intracatheter Q8H

## 2020-09-29 NOTE — PLAN OF CARE
A/o x4. AVSS on RA. Denied pain. Back/shoulder pain managed w/ exercise and aqua-k pad. Abd inicion dressing marked drainage. BS active, +flatus, -BM. (more gas today). Up SBA. NPO ex ice. NGT intact, green fluid output. Plans for NGT clamp trial for a few hours overnight, then check residuals tomorrow AM. Will CTM

## 2020-09-30 LAB
ANION GAP SERPL CALCULATED.3IONS-SCNC: 6 MMOL/L (ref 3–14)
BUN SERPL-MCNC: 7 MG/DL (ref 7–30)
CALCIUM SERPL-MCNC: 7.8 MG/DL (ref 8.5–10.1)
CHLORIDE SERPL-SCNC: 104 MMOL/L (ref 94–109)
CO2 SERPL-SCNC: 27 MMOL/L (ref 20–32)
CREAT SERPL-MCNC: 0.73 MG/DL (ref 0.66–1.25)
GFR SERPL CREATININE-BSD FRML MDRD: >90 ML/MIN/{1.73_M2}
GLUCOSE SERPL-MCNC: 75 MG/DL (ref 70–99)
INR PPP: 1.22 (ref 0.86–1.14)
POTASSIUM SERPL-SCNC: 3.6 MMOL/L (ref 3.4–5.3)
SODIUM SERPL-SCNC: 137 MMOL/L (ref 133–144)

## 2020-09-30 PROCEDURE — 99232 SBSQ HOSP IP/OBS MODERATE 35: CPT | Performed by: HOSPITALIST

## 2020-09-30 PROCEDURE — 25800030 ZZH RX IP 258 OP 636: Performed by: STUDENT IN AN ORGANIZED HEALTH CARE EDUCATION/TRAINING PROGRAM

## 2020-09-30 PROCEDURE — 25000128 H RX IP 250 OP 636: Performed by: STUDENT IN AN ORGANIZED HEALTH CARE EDUCATION/TRAINING PROGRAM

## 2020-09-30 PROCEDURE — 25000132 ZZH RX MED GY IP 250 OP 250 PS 637: Performed by: HOSPITALIST

## 2020-09-30 PROCEDURE — C9113 INJ PANTOPRAZOLE SODIUM, VIA: HCPCS | Performed by: HOSPITALIST

## 2020-09-30 PROCEDURE — 25000128 H RX IP 250 OP 636: Performed by: HOSPITALIST

## 2020-09-30 PROCEDURE — 12000000 ZZH R&B MED SURG/OB

## 2020-09-30 PROCEDURE — 80048 BASIC METABOLIC PNL TOTAL CA: CPT | Performed by: HOSPITALIST

## 2020-09-30 PROCEDURE — 85610 PROTHROMBIN TIME: CPT | Performed by: HOSPITALIST

## 2020-09-30 PROCEDURE — 36415 COLL VENOUS BLD VENIPUNCTURE: CPT | Performed by: HOSPITALIST

## 2020-09-30 RX ORDER — WARFARIN SODIUM 5 MG/1
10 TABLET ORAL
Status: COMPLETED | OUTPATIENT
Start: 2020-09-30 | End: 2020-09-30

## 2020-09-30 RX ADMIN — PANTOPRAZOLE SODIUM 40 MG: 40 INJECTION, POWDER, FOR SOLUTION INTRAVENOUS at 10:02

## 2020-09-30 RX ADMIN — SODIUM CHLORIDE: 9 INJECTION, SOLUTION INTRAVENOUS at 10:05

## 2020-09-30 RX ADMIN — PIPERACILLIN AND TAZOBACTAM 4.5 G: 4; .5 INJECTION, POWDER, FOR SOLUTION INTRAVENOUS at 16:47

## 2020-09-30 RX ADMIN — SODIUM CHLORIDE, POTASSIUM CHLORIDE, SODIUM LACTATE AND CALCIUM CHLORIDE: 600; 310; 30; 20 INJECTION, SOLUTION INTRAVENOUS at 02:05

## 2020-09-30 RX ADMIN — PIPERACILLIN AND TAZOBACTAM 4.5 G: 4; .5 INJECTION, POWDER, FOR SOLUTION INTRAVENOUS at 03:51

## 2020-09-30 RX ADMIN — PIPERACILLIN AND TAZOBACTAM 4.5 G: 4; .5 INJECTION, POWDER, FOR SOLUTION INTRAVENOUS at 22:27

## 2020-09-30 RX ADMIN — WARFARIN SODIUM 10 MG: 5 TABLET ORAL at 18:21

## 2020-09-30 RX ADMIN — PIPERACILLIN AND TAZOBACTAM 4.5 G: 4; .5 INJECTION, POWDER, FOR SOLUTION INTRAVENOUS at 10:02

## 2020-09-30 ASSESSMENT — ACTIVITIES OF DAILY LIVING (ADL)
ADLS_ACUITY_SCORE: 14

## 2020-09-30 ASSESSMENT — MIFFLIN-ST. JEOR: SCORE: 2089.75

## 2020-09-30 NOTE — PROGRESS NOTES
"General Surgery Progress Note:     S:   Doing well, passing lots of gas, had a bowel movement, tolerating a CLD without nausea or vomiting, walking around the unit, minimal abdominal pain     O:   BP (!) 142/89 (BP Location: Right arm)   Pulse 75   Temp 98.1  F (36.7  C) (Oral)   Resp 16   Ht 1.88 m (6' 2\")   Wt 125.5 kg (276 lb 10.8 oz)   SpO2 95%   BMI 35.52 kg/m      General: appears well and not in distress   CV: regular rate   Resp: normal respiratory effort   Abd: soft, obese, mild distenstion, non-tender to palpation; incisions healing well w/o signs of infection    Extremities: warm and well perfused       Pathology returned - appendix negative for malignancy, ileum with evidence of microperforation and associated microabscess formation but no malignancy    A/P:   POD#4 from diagnostic laparoscopy and lap assisted small bowel resection for microperforation of small bowel causing obstructive symptoms. Etiology unclear. Path negative for malignancy. IBD is possible although both EGD and colonoscopy with terminal ileum intubation negative for any evidence of crohns or UC.      -advance to FLD today - if he tolerates this well advance to soft diet later this afternoon   -discontinue IVF   -ok to stop Abx from surgery perspective   -home possibly tomorrow or later today     Delfino Loredo MD  General Surgery Resident, PGY-4   P: [phone]     "

## 2020-09-30 NOTE — PROGRESS NOTES
Surgery  Pt doing well.  Decreasing NG outputs, no residual after clamping trial.  Plan to discontinue NGT and start clears.  Home 1-2 days  Chirag Parekh MD  General Surgery, Office [phone]

## 2020-09-30 NOTE — PLAN OF CARE
A/O x4. VSS on RA. Denies pain. Abdominal incision dressing with shadowing- marked w/ no change. NG to LIS with green output. Clamping trial over night clamped for 3 hours from 5146-2436 with 0 ml out after trial. BS+, passing a lot of flatus, BM-. Denies N/V. LS clear. Voiding. Up SBA. NPO ex ice chips. IVF infusing at 100 ml/hr. Will continue to monitor.

## 2020-09-30 NOTE — PROGRESS NOTES
[facility]    Medicine Progress Note - Hospitalist Service       Date of Admission:  9/26/2020  Assessment & Plan       Cameron Barnard is a 70 year old male with a history of recent small bowel obstruction, remote PE/DVT, hyperlipidemia, and dilated ascending aorta who presented to the ER with abdominal pain and nausea. After evaluation in the ER, he was found to have a recurrent small bowel obstruction. General surgery was consulted and the hospitalist service was contacted to bring him into the hospital for further evaluation and management.    Recurrent small bowel obstruction  Small bowel microperforation  -Recently hospitalized from 9/13 through 9/20 with similar symptoms. Symptoms resolved with conservative management during that hospitalization.  -EGD and colonoscopy were obtained during that admission and did not reveal any obvious source for his bowel obstruction.  -He felt better after discharge but developed recurrent symptoms starting on Wednesday 9/23.  -CT scan abdomen/pelvis on 9/26 is concerning for small bowel obstruction with transition point in the distal ileum.  There is significant inflammatory change in the region as well as possible small microperforation and possible early acute appendicitis.  -General surgery consulted.  -S/p exploratory laparoscopy, laparoscopic-assisted small bowel resection, and laparoscopic appendectomy on 9/27/20.  -Post-op management per general surgery.  -Currently NPO, IV fluids, NG tube in place. NG tube removed today and he is starting clear liquids.  -On IV Zosyn for microperforation, duration per general surgery.    History of PE/DVT  -Diagnosed about 3 years ago, has been on warfarin since then.  -INR 2.45 in the ER.  Held warfarin upon admission.  Pharmacy was consulted and INR was reversed with vitamin K.  -INR 1.36 this morning. Consult pharmacy to restart warfarin today. Not bridging as his VTE was 3 years ago and that was his only episode  of VTE.    Heartburn  - Improved with TUMS.  - Started daily protonix on 9/29/20.    Hyperlipidemia  -Hold PTA omega-3 product for now.    Recent hematuria  -Follow-up with outpatient urology as previously recommended.  -Currently denies hematuria and there was no blood seen on his UA in the ER.    Dilated a sending aorta  -Follows with cardiology through [facility] as an outpatient, continue outpatient follow-up as previously recommended.    Asymptomatic COVID-19 testing  -Pre-op asymptomatic COVID-19 testing was NEGATIVE on 9/26/20.     Diet: NPO for Medical/Clinical Reasons Except for: Meds    DVT Prophylaxis: Pneumatic Compression Devices  Altman Catheter: not present  Code Status: Full Code           Disposition Plan   Expected discharge: defer to general surgery  Entered: Juan Pinon MD 09/30/2020, 10:14 AM       The patient's care was discussed with the Bedside Nurse and Patient.    Juan Pinon MD  Hospitalist Service  [facility]    ______________________________________________________________________    Interval History   Cameron DINH Akil feels OK this morning. Continues to pass flatus, no BM yet. NG tube clamped for 3 hours overnight, states it went well. Denies nausea/vomiting. Abdominal pain fairly well controlled. Denies fevers, chest pain, shortness of breath.    Data reviewed today: I reviewed all medications, new labs and imaging results over the last 24 hours. I personally reviewed no images or EKG's today.    Physical Exam   Vital Signs: Temp: 98.2  F (36.8  C) Temp src: Oral BP: (!) 147/99 Pulse: 75   Resp: 16 SpO2: 100 % O2 Device: None (Room air)    Weight: 278 lbs 14.11 oz  Constitutional: awake, alert, cooperative, no apparent distress  Respiratory: clear to auscultation bilaterally, no crackles or wheezing  Cardiovascular: regular rate and rhythm, normal S1 and S2, no murmur noted  GI: bowel sounds decreased, soft, non-distended, appropriate tenderness, NG tube in  place  Skin: warm, dry  Musculoskeletal: no lower extremity pitting edema present  Neurologic: awake, alert, oriented to name, place and time    Data   Recent Labs   Lab 09/30/20  0814 09/29/20  0739 09/28/20  0710 09/27/20  1050 09/27/20  0735  09/26/20  1204   WBC  --  9.2 10.1  --  8.7  --  12.8*   HGB  --  12.1* 12.8*  --  13.3  --  15.1   MCV  --  92 93  --  93  --  90   PLT  --  285 284  --  247  --  303   INR 1.22* 1.36*  --  1.59* 1.73*   < > 2.45*    138 138  --  137  --  135   POTASSIUM 3.6 3.5 3.7  --  3.6  --  3.9   CHLORIDE 104 106 107  --  107  --  104   CO2 27 28 26  --  26  --  26   BUN 7 10 10  --  11  --  9   CR 0.73 0.82 0.90  --  0.95  --  0.80   ANIONGAP 6 4 5  --  4  --  5   PETER 7.8* 7.9* 8.0*  --  8.0*  --  8.5   GLC 75 90 101*  --  103*  --  112*   ALBUMIN  --   --   --   --   --   --  3.0*   PROTTOTAL  --   --   --   --   --   --  7.2   BILITOTAL  --   --   --   --   --   --  1.3   ALKPHOS  --   --   --   --   --   --  70   ALT  --   --   --   --   --   --  41   AST  --   --   --   --   --   --  28   LIPASE  --   --   --   --   --   --  70*    < > = values in this interval not displayed.     Medications     lactated ringers 100 mL/hr at 09/30/20 0205     sodium chloride Stopped (09/30/20 1328)     Warfarin Therapy Reminder         pantoprazole (PROTONIX) IV  40 mg Intravenous Daily with breakfast     piperacillin-tazobactam  4.5 g Intravenous Q6H     sodium chloride (PF)  3 mL Intracatheter Q8H

## 2020-10-01 LAB — INR PPP: 1.24 (ref 0.86–1.14)

## 2020-10-01 PROCEDURE — 36415 COLL VENOUS BLD VENIPUNCTURE: CPT | Performed by: HOSPITALIST

## 2020-10-01 PROCEDURE — 250N000011 HC RX IP 250 OP 636: Performed by: HOSPITALIST

## 2020-10-01 PROCEDURE — 85610 PROTHROMBIN TIME: CPT | Performed by: HOSPITALIST

## 2020-10-01 PROCEDURE — 120N000001 HC R&B MED SURG/OB

## 2020-10-01 PROCEDURE — 250N000013 HC RX MED GY IP 250 OP 250 PS 637

## 2020-10-01 PROCEDURE — 250N000011 HC RX IP 250 OP 636: Performed by: STUDENT IN AN ORGANIZED HEALTH CARE EDUCATION/TRAINING PROGRAM

## 2020-10-01 PROCEDURE — C9113 INJ PANTOPRAZOLE SODIUM, VIA: HCPCS | Performed by: HOSPITALIST

## 2020-10-01 PROCEDURE — 99232 SBSQ HOSP IP/OBS MODERATE 35: CPT | Performed by: INTERNAL MEDICINE

## 2020-10-01 RX ADMIN — PIPERACILLIN AND TAZOBACTAM 4.5 G: 4; .5 INJECTION, POWDER, FOR SOLUTION INTRAVENOUS at 04:21

## 2020-10-01 RX ADMIN — PANTOPRAZOLE SODIUM 40 MG: 40 INJECTION, POWDER, FOR SOLUTION INTRAVENOUS at 08:49

## 2020-10-01 RX ADMIN — WARFARIN SODIUM 12.5 MG: 7.5 TABLET ORAL at 18:23

## 2020-10-01 ASSESSMENT — ACTIVITIES OF DAILY LIVING (ADL)
ADLS_ACUITY_SCORE: 12

## 2020-10-01 ASSESSMENT — MIFFLIN-ST. JEOR: SCORE: 2079.75

## 2020-10-01 NOTE — PLAN OF CARE
VSS. A/O. Ind. Abdo lap sites CDI, some redness on incision site. Had a BM. NG removed. Tolerating clears. Trace of LE edema. Denies pain. Voiding fine.

## 2020-10-01 NOTE — PROGRESS NOTES
A&O. VSS. Pt states has some abd discomfort but declines medications. Up indp. Voiding. +BM x2 today. Plan to discharge home tomorrow.

## 2020-10-01 NOTE — PROGRESS NOTES
[facility]    Medicine Progress Note - Hospitalist Service        Date of Admission:  9/26/2020 11:36 AM    Assessment & Plan:   Cameron Barnard is a 70 year old male with a history of recent small bowel obstruction, remote PE/DVT, hyperlipidemia, and dilated ascending aorta who presented to the ER with abdominal pain and nausea. After evaluation in the ER, he was found to have a recurrent small bowel obstruction. General surgery was consulted and the hospitalist service was contacted to bring him into the hospital for further evaluation and management.     Recurrent small bowel obstruction status post exploratory laparoscopy, laparoscopic assisted small bowel resection, laparoscopic appendectomy on 9/27/2020  Small bowel microperforation  -Recently hospitalized from 9/13 through 9/20 with similar symptoms. Symptoms resolved with conservative management during that hospitalization.  -EGD and colonoscopy were obtained during that admission and did not reveal any obvious source for his bowel obstruction.  -He felt better after discharge but developed recurrent symptoms starting on Wednesday 9/23.  -CT scan abdomen/pelvis on 9/26 is concerning for small bowel obstruction with transition point in the distal ileum.  There is significant inflammatory change in the region as well as possible small microperforation and possible early acute appendicitis.  -General surgery consulted.  -S/p exploratory laparoscopy, laparoscopic-assisted small bowel resection, and laparoscopic appendectomy on 9/27/20.  -Post-op management per general surgery.  -Currently on full liquid diet  -Off antibiotics  -Mobilize     History of PE/DVT  -Diagnosed about 3 years ago, has been on warfarin since then.  -INR 2.45 in the ER.  Held warfarin upon admission.  Pharmacy was consulted and INR was reversed with vitamin K.  -Coumadin resumed postop  -INR 1.24 this morning  -Pharmacy to dose Coumadin  -Last VTE was 3 years ago, no need for  "bridging.     Heartburn  - Improved with TUMS.  - Started daily protonix on 9/29/20.    Hyperlipidemia  -Hold PTA omega-3 product for now.     Recent hematuria  -Follow-up with outpatient urology as previously recommended.  -Currently denies hematuria and there was no blood seen on his UA in the ER.     Dilated asending aorta  -Follows with cardiology through [facility] as an outpatient, continue outpatient follow-up as previously recommended.     Asymptomatic COVID-19 testing  -NEGATIVE on 9/26/20.       Diet: Full Liquid Diet     DVT Prophylaxis: Warfarin   Altman Catheter: not present  Code Status: Full Code     Disposition Plan    Expected discharge: Tomorrow, recommended to.  Prior living situation.  Entered: Phillip Garcia MD 10/01/2020, 10:39 AM        The patient's care was discussed with the Bedside Nurse and Patient.    Phillip Garcia MD  Hospitalist Service  [facility]    ______________________________________________________________________    Interval History   Denies significant abdominal pain.  Had a loose bowel movement earlier today.  No nausea or vomiting.  Tolerating full liquids.    Data reviewed today: I reviewed all medications, new labs and imaging results over the last 24 hours. I personally reviewed no images or EKG's today.    Physical Exam   Vital signs:  Temp: 97.8  F (36.6  C) Temp src: Oral BP: (!) 145/86 Pulse: 75   Resp: 16 SpO2: 96 % O2 Device: None (Room air) Oxygen Delivery: 3 LPM Height: 188 cm (6' 2\") Weight: 125.5 kg (276 lb 10.8 oz)  Estimated body mass index is 35.52 kg/m  as calculated from the following:    Height as of this encounter: 1.88 m (6' 2\").    Weight as of this encounter: 125.5 kg (276 lb 10.8 oz).      Wt Readings from Last 2 Encounters:   10/01/20 125.5 kg (276 lb 10.8 oz)   09/13/20 130.6 kg (288 lb)       Gen: AAOX3, NAD  Resp: CTA B/L, normal WOB  CVS: RRR, no murmur  Abd/GI: Soft, non-tender. BS- normoactive.   Skin: Warm, dry no " rashes  Neuro- CN- intact. No focal deficits.    Data   Recent Labs   Lab 10/01/20  0758 09/30/20  0814 09/29/20  0739 09/28/20  0710 09/27/20  0735 09/27/20  0735 09/26/20  1204 09/26/20  1204   WBC  --   --  9.2 10.1  --  8.7  --  12.8*   HGB  --   --  12.1* 12.8*  --  13.3  --  15.1   MCV  --   --  92 93  --  93  --  90   PLT  --   --  285 284  --  247  --  303   INR 1.24* 1.22* 1.36*  --    < > 1.73*   < > 2.45*   NA  --  137 138 138  --  137  --  135   POTASSIUM  --  3.6 3.5 3.7  --  3.6  --  3.9   CHLORIDE  --  104 106 107  --  107  --  104   CO2  --  27 28 26  --  26  --  26   BUN  --  7 10 10  --  11  --  9   CR  --  0.73 0.82 0.90  --  0.95  --  0.80   ANIONGAP  --  6 4 5  --  4  --  5   PETER  --  7.8* 7.9* 8.0*  --  8.0*  --  8.5   GLC  --  75 90 101*  --  103*  --  112*   ALBUMIN  --   --   --   --   --   --   --  3.0*   PROTTOTAL  --   --   --   --   --   --   --  7.2   BILITOTAL  --   --   --   --   --   --   --  1.3   ALKPHOS  --   --   --   --   --   --   --  70   ALT  --   --   --   --   --   --   --  41   AST  --   --   --   --   --   --   --  28   LIPASE  --   --   --   --   --   --   --  70*    < > = values in this interval not displayed.       No results found for this or any previous visit (from the past 24 hour(s)).  Medications     Warfarin Therapy Reminder         pantoprazole (PROTONIX) IV  40 mg Intravenous Daily with breakfast     sodium chloride (PF)  3 mL Intracatheter Q8H     warfarin ANTICOAGULANT  12.5 mg Oral ONCE at 18:00

## 2020-10-01 NOTE — PLAN OF CARE
A/O x4. VSS on RA. Denies pain. Abdominal incisions with steri strips, ROLY. BS+, flatus+, BM yesterday. Denies N/V. LS clear. Voiding. Tolerating clear liquids. Up independently. PIV saline locked. Will continue to monitor.

## 2020-10-02 VITALS
HEART RATE: 70 BPM | BODY MASS INDEX: 35.51 KG/M2 | RESPIRATION RATE: 18 BRPM | WEIGHT: 276.68 LBS | HEIGHT: 74 IN | TEMPERATURE: 97.6 F | DIASTOLIC BLOOD PRESSURE: 85 MMHG | SYSTOLIC BLOOD PRESSURE: 142 MMHG | OXYGEN SATURATION: 95 %

## 2020-10-02 LAB — INR PPP: 1.41 (ref 0.86–1.14)

## 2020-10-02 PROCEDURE — C9113 INJ PANTOPRAZOLE SODIUM, VIA: HCPCS | Performed by: HOSPITALIST

## 2020-10-02 PROCEDURE — 250N000011 HC RX IP 250 OP 636: Performed by: HOSPITALIST

## 2020-10-02 PROCEDURE — 99239 HOSP IP/OBS DSCHRG MGMT >30: CPT | Performed by: INTERNAL MEDICINE

## 2020-10-02 PROCEDURE — 36415 COLL VENOUS BLD VENIPUNCTURE: CPT | Performed by: HOSPITALIST

## 2020-10-02 PROCEDURE — 85610 PROTHROMBIN TIME: CPT | Performed by: HOSPITALIST

## 2020-10-02 RX ORDER — OXYCODONE HYDROCHLORIDE 5 MG/1
5 TABLET ORAL EVERY 6 HOURS PRN
Qty: 10 TABLET | Refills: 0 | Status: SHIPPED | OUTPATIENT
Start: 2020-10-02 | End: 2020-10-05

## 2020-10-02 RX ORDER — WARFARIN SODIUM 5 MG/1
12.5 TABLET ORAL DAILY
Qty: 3 TABLET | Refills: 0
Start: 2020-10-02 | End: 2020-10-03

## 2020-10-02 RX ORDER — PANTOPRAZOLE SODIUM 40 MG/1
40 TABLET, DELAYED RELEASE ORAL
Status: DISCONTINUED | OUTPATIENT
Start: 2020-10-03 | End: 2020-10-02 | Stop reason: HOSPADM

## 2020-10-02 RX ORDER — WARFARIN SODIUM 5 MG/1
10 TABLET ORAL SEE ADMIN INSTRUCTIONS
Start: 2020-10-03 | End: 2021-01-20

## 2020-10-02 RX ORDER — AMOXICILLIN 250 MG
1 CAPSULE ORAL DAILY
Qty: 30 TABLET | Refills: 0 | Status: SHIPPED | OUTPATIENT
Start: 2020-10-02 | End: 2021-01-20

## 2020-10-02 RX ORDER — WARFARIN SODIUM 5 MG/1
7.5 TABLET ORAL SEE ADMIN INSTRUCTIONS
Start: 2020-10-02 | End: 2021-01-20

## 2020-10-02 RX ADMIN — PANTOPRAZOLE SODIUM 40 MG: 40 INJECTION, POWDER, FOR SOLUTION INTRAVENOUS at 08:41

## 2020-10-02 ASSESSMENT — ACTIVITIES OF DAILY LIVING (ADL)
ADLS_ACUITY_SCORE: 12

## 2020-10-02 NOTE — DISCHARGE SUMMARY
[facility]    Discharge Summary  Hospitalist    Date of Admission:  9/26/2020  Date of Discharge:  10/2/2020  Discharging Provider: Phillip Garcia MD    Discharge Diagnoses      Recurrent small bowel obstruction status post exploratory laparoscopy, laparoscopic assisted small bowel resection, laparoscopic appendectomy on 9/27/2020  Small bowel microperforation  History of PE/DVT  Hyperlipidemia  Recent hematuria  Dilated asending aorta    Hospital Course:    Cameron Barnard is a 70 year old male with a history of recent small bowel obstruction, remote PE/DVT, hyperlipidemia, and dilated ascending aorta who presented to the ER with abdominal pain and nausea. After evaluation in the ER, he was found to have a recurrent small bowel obstruction. General surgery was consulted and the hospitalist service was contacted to bring him into the hospital for further evaluation and management.     Recurrent small bowel obstruction status post exploratory laparoscopy, laparoscopic assisted small bowel resection, laparoscopic appendectomy on 9/27/2020  Small bowel microperforation  -Recently hospitalized from 9/13 through 9/20 with similar symptoms. Symptoms resolved with conservative management during that hospitalization.  -EGD and colonoscopy were obtained during that admission and did not reveal any obvious source for his bowel obstruction.  -He felt better after discharge but developed recurrent symptoms starting on Wednesday 9/23.  -CT scan abdomen/pelvis on 9/26  concerning for small bowel obstruction with transition point in the distal ileum.  There is significant inflammatory change in the region as well as possible small microperforation and possible early acute appendicitis.  -General surgery consulted.  -S/p exploratory laparoscopy, laparoscopic-assisted small bowel resection, and laparoscopic appendectomy on 9/27/20.  -Postop recovery was uneventful, patient gradually progressed, diet was  advanced, tolerating diet.  Pain adequately controlled.  Patient ready for discharge.  Off antibiotics at this time.     History of PE/DVT  -Diagnosed about 3 years ago, has been on warfarin since then.  -INR 2.45 in the ER.  Held warfarin upon admission.  Pharmacy was consulted and INR was reversed with vitamin K.  -Coumadin resumed postop  -INR 1.41 on the morning of discharge  -Last VTE was 3 years ago, no need for bridging.  -Recommend Coumadin 12.5 mg tonight, recheck INR as outpatient tomorrow, Coumadin dose to be titrated based on that tomorrow.     Heartburn  -Resolved    Hyperlipidemia  -Resume PTA omega-3 product     Recent hematuria  -Follow-up with outpatient urology as previously recommended.  -Currently denies hematuria and there was no blood seen on his UA in the ER.     Dilated asending aorta  -Follows with cardiology through [facility] as an outpatient, continue outpatient follow-up as previously recommended.     Asymptomatic COVID-19 testing  -NEGATIVE on 9/26/20.    Phillip Garcia MD    Significant Results and Procedures   See below    Pending Results     Unresulted Labs Ordered in the Past 30 Days of this Admission     No orders found from 8/27/2020 to 9/27/2020.          Code Status   Full Code       Primary Care Physician   [facility]    Physical Exam   Temp: 97.9  F (36.6  C) Temp src: Oral BP: (!) 152/89 Pulse: 71   Resp: 18 SpO2: 95 % O2 Device: None (Room air)      Constitutional: AAOX3, NAD  Respiratory: CTA B/L, Normal WOB  Cardiovascular: RRR, No murmur  GI: Soft, Non- tender, BS- normoactive  Neuro: CN- grossly intact, Motor strength 5/5 on all 4 extremities.     Discharge Disposition   Discharged to home  Condition at discharge: Stable    Consultations This Hospital Stay   SURGERY GENERAL IP CONSULT  PHARMACY TO DOSE PHYTONADIONE  PHARMACY TO DOSE WARFARIN    Time Spent on this Encounter   I, Phillip Garcia MD, personally saw the patient today and spent greater than 30  minutes discharging this patient.    Discharge Orders      Reason for your hospital stay    You were in the hospital for a small bowel obstruction.     Discharge Instructions    [facility]    Discharge Instructions: Post-Operative Bowel Surgery    ACTIVITY      Expect to feel tired after your surgery. This will gradually resolve.      Take frequent, short walks and increase your activity gradually.      Avoid strenuous physical activity or heavy lifting greater than 15 lbs. for 4 weeks. You may climb stairs.      You may drive without restrictions when you are not using any prescription pain medication and feel comfortable in a car.      You may return to work/school when you are comfortable without any prescription pain medication.      You may wear an abdominal binder for comfort for 2-3 weeks from your surgery. You can wash the abdominal binder and dry it on low heat in the dryer.    WOUND CARE      You may shower, but do not soak your incision(s) in a tub or pool for 2 weeks. Pat your incisions dry after your shower and leave them open to air. Re-apply dressing (Band-Aids or gauze/tape) as needed for comfort or drainage.      You may have steri-strips (looks like white tape) or staples at your incisions. You may peel off the steri-strips 2 weeks after your surgery. If you have staples, they will be removed at your next office visit.      Do not apply any lotions, creams, or ointments to your incisions.      A ridge under your incisions is normal and will gradually resolve.    DIET      Stay on a low fiber diet until your follow up appointment. Avoid heavy, spicy, greasy meals and gas forming foods, such as cabbage, broccoli, and onions.      You may find your appetite to be diminished briefly after surgery. You may take nutritional supplement shakes if you are able.      Drink plenty of fluids to stay hydrated.    PAIN      Expect some tenderness and discomfort at the incision  site(s). Use the prescribed pain medication at your discretion. Expect gradual resolution of your pain over several days.      You may take ibuprofen with food (unless you have been told not to) or acetaminophen/Tylenol instead of or in addition to your prescribed pain medication. If you are taking Norco or Percocet, do not take any additional acetaminophen/Tylenol.      Do not drink alcohol or drive while you are taking pain medications.      You may apply ice to your incisions in 20 minute intervals as needed for the next 24-48 hours. After that time, consider switching to heat if you prefer.    EXPECTATIONS      Pain medications can cause constipation. Limit use when possible. Take over the counter stool softener/stimulant, such as Colace or Senna, 1-2 times a day with plenty of water. You may take a mild over the counter laxative, such as Miralax or a suppository, as needed. You may discontinue these medications once you are having regular bowel movements and/or are no longer taking your narcotic pain medication.      If you had laparoscopic surgery, you may have shoulder or upper back discomfort due to the gas used in surgery. This is temporary and should resolve in 48-72 hours after the surgery. Short, frequent walks may help with this.    RETURN APPOINTMENT      Follow up with your surgeon - Dr Parekh in 10-14 days. Please call our office at [phone] to schedule your appointment. We are located at [address] [place] [address].    CALL OUR OFFICE -[phone] IF YOU HAVE:      Chills or fever above 101  F.      Increased redness, warmth, or drainage at your incisions.      Significant bleeding.      Pain not relieved by your pain medication or rest.      Increasing pain after the first 48 hours.      Any other concerns or questions.     Follow-up and recommended labs and tests     Follow up with Dr. Parekh in 2 weeks.     Activity    Your activity upon discharge: activity as  tolerated     Follow-up and recommended labs and tests    Follow up with primary care provider, [facility], within 7 days for hospital follow- up.  The following labs/tests are recommended: INR on 10/3     Activity    Your activity upon discharge: activity as tolerated     Full Code     Diet    Follow this diet upon discharge: Orders Placed This Encounter      Low Fiber Diet     Diet    Follow this diet upon discharge: Orders Placed This Encounter      Low Fiber Diet      Diet       Discharge Medications   Current Discharge Medication List      START taking these medications    Details   oxyCODONE (ROXICODONE) 5 MG tablet Take 1 tablet (5 mg) by mouth every 6 hours as needed for severe pain  Qty: 10 tablet, Refills: 0    Associated Diagnoses: SBO (small bowel obstruction) (H)      senna-docusate (SENOKOT-S/PERICOLACE) 8.6-50 MG tablet Take 1 tablet by mouth daily  Qty: 30 tablet, Refills: 0    Associated Diagnoses: SBO (small bowel obstruction) (H)         CONTINUE these medications which have CHANGED    Details   !! warfarin ANTICOAGULANT (COUMADIN) 5 MG tablet Take 2 tablets (10 mg) by mouth See Admin Instructions Had dose on 9/24/20  Qty:      Associated Diagnoses: Anticoagulation monitoring, INR range 2-3      !! warfarin ANTICOAGULANT (COUMADIN) 5 MG tablet Take 2.5 tablets (12.5 mg) by mouth daily for 1 day  Qty: 3 tablet, Refills: 0    Associated Diagnoses: Anticoagulation monitoring, INR range 2-3      !! warfarin ANTICOAGULANT (COUMADIN) 5 MG tablet Take 1.5 tablets (7.5 mg) by mouth See Admin Instructions ON WEDNESDAYS  Qty:      Comments: ON wEDE  Associated Diagnoses: Anticoagulation monitoring, INR range 2-3       !! - Potential duplicate medications found. Please discuss with provider.      CONTINUE these medications which have NOT CHANGED    Details   acetaminophen (TYLENOL) 500 MG tablet Take 1,000 mg by mouth daily as needed      doxylamine (UNISOM) 25 MG TABS tablet Take 12.5 mg by  "mouth nightly as needed      Fish Oil-Cholecalciferol (OMEGA-3 FISH OIL/VITAMIN D3 PO) Take 2 capsules by mouth daily [employer] product with Omega3/Vit D      UNKNOWN TO PATIENT Place 3 drops into both eyes daily as needed \"Blink tears Eye drop for allergies\" per patient           Allergies   No Known Allergies  Data   Most Recent 3 CBC's:  Recent Labs   Lab Test 09/29/20  0739 09/28/20  0710 09/27/20  0735   WBC 9.2 10.1 8.7   HGB 12.1* 12.8* 13.3   MCV 92 93 93    284 247      Most Recent 3 BMP's:  Recent Labs   Lab Test 09/30/20  0814 09/29/20  0739 09/28/20  0710    138 138   POTASSIUM 3.6 3.5 3.7   CHLORIDE 104 106 107   CO2 27 28 26   BUN 7 10 10   CR 0.73 0.82 0.90   ANIONGAP 6 4 5   PETER 7.8* 7.9* 8.0*   GLC 75 90 101*     Most Recent 2 LFT's:  Recent Labs   Lab Test 09/26/20  1204 09/20/20  0726   AST 28 49*   ALT 41 57   ALKPHOS 70 64   BILITOTAL 1.3 0.8     Most Recent INR's and Anticoagulation Dosing History:  Anticoagulation Dose History     Recent Dosing and Labs Latest Ref Rng & Units 9/26/2020 9/27/2020 9/27/2020 9/29/2020 9/30/2020 10/1/2020 10/2/2020    ZZ IMS TEMPLATE - - - - - - 12.5 mg -    Warfarin 5 mg - - - - 10 mg 10 mg - -    INR 0.86 - 1.14 2.36(H) 1.73(H) 1.59(H) 1.36(H) 1.22(H) 1.24(H) 1.41(H)        Most Recent 3 Troponin's:No lab results found.  Most Recent Cholesterol Panel:No lab results found.  Most Recent 6 Bacteria Isolates From Any Culture (See EPIC Reports for Culture Details):  Recent Labs   Lab Test 09/13/20  1624 09/13/20  1420   CULT No growth No growth     Most Recent TSH, T4 and A1c Labs:No lab results found.    Results for orders placed or performed during the hospital encounter of 09/26/20   CT Abdomen Pelvis w Contrast    Narrative    CT ABDOMEN AND PELVIS WITH CONTRAST 9/26/2020 1:07 PM    CLINICAL HISTORY: Abdominal pain, 9/13 CT with small bowel  obstruction.    TECHNIQUE: CT scan of the abdomen and pelvis was performed following  injection of IV " contrast. Multiplanar reformats were obtained. Dose  reduction techniques were used.    CONTRAST: 135 mL [id number]    COMPARISON: CT abdomen and pelvis dated 9/13/2020, abdominal x-rays  dated 9/18/2020.    FINDINGS:   LOWER CHEST: Normal.    HEPATOBILIARY: Multiple dependently layering gallstones are again seen  in the gallbladder. Gallbladder is slightly more distended than on the  prior study. The liver enhances normally.    PANCREAS: Probable duodenal diverticulum is again seen in the  head/uncinate process of the pancreas, similar to the prior study. The  pancreas otherwise enhances normally and is of normal morphology.    SPLEEN: Normal.    ADRENAL GLANDS: Normal.    KIDNEYS/BLADDER: Normal.    BOWEL: There are multiple diverticula in the colon. The colon is  decompressed. Appendix extends posteriorly and medially from the cecum  and is prominent at the tip measuring up to 1.0 cm in diameter. This  is slightly more prominent than on the prior study. There are also  some mild strandy densities adjacent to the appendix which could  represent inflammatory changes from early acute appendicitis. There is  abnormal fluid-filled and air-filled distention of the small bowel.  There is significant inflammatory change around a loop of the distal  ileum in the deep pelvis adjacent to the urinary bladder and adjacent  to the sigmoid colon. This appears be centered around the small bowel  and not the colon, and is a new finding since the prior study. There  is fecalization in the distal small bowel in this region. Just after  this area of inflammation, there appears to be decompression of the  small bowel and proximal to this area, there is abnormal distention of  the small bowel. A few bubbles of gas are seen adjacent to the small  bowel wall in this region which could represent microperforation and  does not appear to be associated with a diverticulum. Trace free fluid  is seen adjacent to this region. The more proximal  small bowel is  distended by fluid and air, but is otherwise unremarkable. Small bowel  measures up to maximum of approximately 4.8 cm in diameter. The  stomach contains a moderate amount of air and fluid, but is otherwise  unremarkable. The proximalmost small bowel/jejunum and duodenum are  grossly of normal caliber.    PELVIC ORGANS: Prostate gland is unremarkable.    ADDITIONAL FINDINGS: No other free air is identified. There is  nonaneurysmal atherosclerosis. No adenopathy is seen.    MUSCULOSKELETAL: No acute fractures or aggressive osseous lesions are  identified. There are degenerative changes in the spine.      Impression    IMPRESSION:   1.  The findings are concerning for small bowel obstruction with  transition point in the distal ileum adjacent to the urinary bladder.  There is significant inflammatory change in this region. These  inflammatory changes are new and the transition point in the small  bowel in this location is new since the prior CT dated 9/13/2020.  There may be a microperforation of the small bowel in this region  given the few bubbles of extraluminal air adjacent to small bowel in  this region.  2.  Appendix is mildly enlarged and are some periappendiceal  inflammatory changes which could represent early acute appendicitis,  although this does not appear to be the cause of the inflammatory  change around the distal ileum where there is a transition point.  3.  Colonic diverticulosis.  4.  Cholelithiasis.    I discussed the findings of probable small bowel obstruction, possible  early acute appendicitis, as well as significant inflammatory changes  around the distal small bowel (distal ileum) with possible  microperforation of the distal ileum with Dr. Palacio on 9/26/2020 at  approximately 1:40 PM, when she became available.    CARMEN CABAN MD   XR Abdomen Port 1 View    Narrative    ABDOMEN ONE VIEW PORTABLE    9/27/2020 2:03 PM     HISTORY: Check for NGT placement    COMPARISON:  Abdominal CT on 9/26/2020      Impression    IMPRESSION: Single portable AP view of the abdomen was obtained.  Enteric tube tip and sideholes project over the stomach. Multiple  dilated small bowel loops, consistent with patient's history of small  bowel obstruction.    NUSRAT BOATENG MD

## 2020-10-02 NOTE — CONSULTS
Per Dr Garcia, patient needs to have an INR drawn tomorrow. Patient's PCP Clinic is closed on weekends. Writer scheduled patient to come to [facility] to have his INR drawn tomorrow, Saturday 10/3. Patient informed. Information on patient's AVS. Orders in Epic.

## 2020-10-02 NOTE — PLAN OF CARE
AVSS on RA. Denies pain. Incisions ROLY with steri strips. Diet low fiber tolerating well. Denies N/V. Up with assist of 1. BS active and passing gas.

## 2020-10-02 NOTE — PLAN OF CARE
Pt discharged per orders and denies pain at discharge. Tolerates diet activity passing flatus had  a bm voiding.  Pt given discharge instructions and verbalizes understanding of these and filled Rx's given to patient with instructions and verbalizes understanding of these.  Pt aware he needs to return to the hospital lab for INR tomorrow scheduled per Noemy BRAND care coordinator today. Escorted to door 2 in w/c by NA wife driving patient home

## 2020-10-02 NOTE — PROGRESS NOTES
"CLINICAL NUTRITION SERVICES  -  ASSESSMENT NOTE      Future/Additional Recommendations: Pt to continue low fiber meals TID.     Malnutrition:   % Weight Loss:  None noted  % Intake:  No decreased intake noted  Subcutaneous Fat Loss:  None observed  Muscle Loss:  None observed  Fluid Retention:  None noted    Malnutrition Diagnosis: Patient does not meet two of the above criteria necessary for diagnosing malnutrition        REASON FOR ASSESSMENT  Cameron Barnard is a 70 year old male seen by Registered Dietitian for LOS      NUTRITION HISTORY  Information obtained from pt and spouse  Pt was eating normally PTA      CURRENT NUTRITION ORDERS  Diet Order:     Low Fiber     Current Intake/Tolerance:  Eager about being advanced to solid foods  Disliked the liquid diet, but enjoyed dinner 10/1 that consisted of an omelet, white toast, banana, brownie, and tea  Tolerating diet, eating 100%      NUTRITION FOCUSED PHYSICAL ASSESSMENT FOR DIAGNOSING MALNUTRITION)  Yes (visual)         Observed:    No nutrition-related physical findings observed    Obtained from Chart/Interdisciplinary Team:  9/26: Admitted with abdominal pain. Hx of SBO - hospitalized 9/13/20-9/20/20 and treated conservatively. Found to have recurrent SBO this admit. NPO diet order w/ IV fluids.   9/27: Small bowel resection and appendectomy. NG tube placed LIS.   9/30: ROBF,+BM, NGT removed. CL diet.   10/1: FL diet then advanced to low fiber. +BM x2     ANTHROPOMETRICS  Height: 6' 2\"  Weight: 276 lbs 10.84 oz  Body mass index is 35.52 kg/m .  Weight Status:  Obesity Grade II BMI 35-39.9  IBW: 190 lbs   % IBW: 145%  Weight History:   10/01/20 0500 125.5 kg (276 lb 10.8 oz)   09/30/20 0626 126.5 kg (278 lb 14.1 oz)   09/28/20 0630 127.3 kg (280 lb 10.3 oz)   09/27/20 0600 126.4 kg (278 lb 10.6 oz)   09/26/20 1133 125.2 kg (276 lb)     LABS  Labs reviewed    MEDICATIONS  Medications reviewed      ASSESSED NUTRITION NEEDS PER APPROVED PRACTICE " GUIDELINES:    MALNUTRITION:  % Weight Loss:  None noted  % Intake:  No decreased intake noted  Subcutaneous Fat Loss:  None observed  Muscle Loss:  None observed  Fluid Retention:  None noted    Malnutrition Diagnosis: Patient does not meet two of the above criteria necessary for diagnosing malnutrition      NUTRITION DIAGNOSIS:  Altered GI function related to small bowel resection as evidenced by low fiber diet.      NUTRITION INTERVENTIONS  Recommendations / Nutrition Prescription  Continue on low fiber diet until otherwise instructed by MD.   .      Implementation  Nutrition education: Provided education on low fiber diet upon discharge.  .      Nutrition Goals  Pt to continue low fiber meals TID.    .      MONITORING AND EVALUATION:  Progress towards goals will be monitored and evaluated per protocol and Practice Guidelines    Jeanine Ramos  Dietetic Intern   Pager: [phone]

## 2020-10-02 NOTE — PROGRESS NOTES
"General Surgery Progress Note:     S:   No issues this morning, tolerating a soft diet, passing gas and having bowel movements     O:   /78 (BP Location: Right arm)   Pulse 84   Temp 98.7  F (37.1  C) (Oral)   Resp 18   Ht 1.88 m (6' 2\")   Wt 125.5 kg (276 lb 10.8 oz)   SpO2 98%   BMI 35.52 kg/m      General: appears well and not in distress   CV: regular rate   Resp: normal respiratory effort   Abd: soft, obese, mild distenstion, non-tender to palpation; incisions healing well w/o signs of infection    Extremities: warm and well perfused       Pathology returned - appendix negative for malignancy, ileum with evidence of microperforation and associated microabscess formation but no malignancy    A/P:   POD#5 from diagnostic laparoscopy and lap assisted small bowel resection for microperforation of small bowel causing obstructive symptoms.     -ok to discharge home today from surgery perspective   -patient did not want narcotics on discharge     Delfino Loredo MD  General Surgery Resident, PGY-4   P: [phone]     "

## 2020-10-03 ENCOUNTER — HOSPITAL ENCOUNTER (OUTPATIENT)
Dept: LAB | Facility: CLINIC | Age: 70
Discharge: HOME | End: 2020-10-03
Attending: INTERNAL MEDICINE | Admitting: INTERNAL MEDICINE
Payer: COMMERCIAL

## 2020-10-03 DIAGNOSIS — Z79.01 ANTICOAGULATION MONITORING, INR RANGE 2-3: ICD-10-CM

## 2020-10-03 LAB — INR PPP: 1.59 (ref 0.86–1.14)

## 2020-10-03 PROCEDURE — 36415 COLL VENOUS BLD VENIPUNCTURE: CPT | Performed by: INTERNAL MEDICINE

## 2020-10-03 PROCEDURE — 85610 PROTHROMBIN TIME: CPT | Performed by: INTERNAL MEDICINE

## 2020-10-22 ENCOUNTER — TELEPHONE (OUTPATIENT)
Dept: SURGERY | Facility: CLINIC | Age: 70
End: 2020-10-22

## 2020-10-22 NOTE — TELEPHONE ENCOUNTER
[facility]  Post op call note - No Answer    Cameron DINH Akil was called for an update regarding his recovery.  There was no answer and a message was left requesting a return call.    Delfino Loredo MD

## 2020-10-27 ENCOUNTER — OFFICE VISIT (OUTPATIENT)
Dept: SURGERY | Facility: CLINIC | Age: 70
End: 2020-10-27
Payer: COMMERCIAL

## 2020-10-27 DIAGNOSIS — Z09 SURGICAL FOLLOWUP VISIT: Primary | ICD-10-CM

## 2020-10-27 PROCEDURE — 99024 POSTOP FOLLOW-UP VISIT: CPT | Performed by: SURGERY

## 2020-10-27 NOTE — LETTER
[facility]    [address], [address]  [place], [place] [address]  Phone [phone]  Fax [phone] [address], [address]  [place] [address]  Phone [phone]  Fax [phone]    [email or web address]     2020    Re: Cameron Barnard  : 1950      Surgery Postop Note     Cameron Barnard presents today for surgical followup.  he is doing well following lap bowel resection.  Incisions look fine with no signs of wound infection.  He has some sciatic pain in the left leg which is recurrent.  He is okay to start PT and also should add more fiber to his diet.  I expect him to make a complete recovery.  Thank you for the opportunity to help in his care.     Chirag Parekh M.D.  [facility]  [phone]

## 2020-10-27 NOTE — PROGRESS NOTES
Surgery Postop Note    Cameron Barnard presents today for surgical followup.  he is doing well following lap bowel resection.  Incisions look fine with no signs of wound infection.  He has some sciatic pain in the left leg which is recurrent.  He is okay to start PT and also should add more fiber to his diet.  I expect him to make a complete recovery.  Thank you for the opportunity to help in his care.    Chirag Parekh M.D.  [facility]  [phone]    Please route or send letter to:  Primary Care Provider (PCP) and Referring Provider

## 2021-01-01 ENCOUNTER — HEALTH MAINTENANCE LETTER (OUTPATIENT)
Age: 71
End: 2021-01-01

## 2021-01-01 ENCOUNTER — HOSPITAL ENCOUNTER (OUTPATIENT)
Facility: CLINIC | Age: 71
End: 2021-01-01
Attending: ORTHOPAEDIC SURGERY | Admitting: ORTHOPAEDIC SURGERY
Payer: COMMERCIAL

## 2021-01-01 DIAGNOSIS — Z11.59 ENCOUNTER FOR SCREENING FOR OTHER VIRAL DISEASES: ICD-10-CM

## 2021-01-01 RX ORDER — MULTIVIT WITH MINERALS/LUTEIN
1000 TABLET ORAL DAILY
COMMUNITY
End: 2021-01-01 | Stop reason: HOSPADM

## 2021-01-01 RX ORDER — TRANEXAMIC ACID 650 MG/1
1950 TABLET ORAL ONCE
Status: CANCELLED | OUTPATIENT
Start: 2021-01-01 | End: 2021-01-01

## 2021-01-01 RX ORDER — CEFAZOLIN SODIUM IN 0.9 % NACL 3 G/100 ML
3 INTRAVENOUS SOLUTION, PIGGYBACK (ML) INTRAVENOUS
Status: CANCELLED | OUTPATIENT
Start: 2021-01-01

## 2021-01-01 RX ORDER — CEFAZOLIN SODIUM IN 0.9 % NACL 3 G/100 ML
3 INTRAVENOUS SOLUTION, PIGGYBACK (ML) INTRAVENOUS SEE ADMIN INSTRUCTIONS
Status: CANCELLED | OUTPATIENT
Start: 2021-01-01

## 2021-01-19 ENCOUNTER — TRANSFERRED RECORDS (OUTPATIENT)
Dept: HEALTH INFORMATION MANAGEMENT | Facility: CLINIC | Age: 71
End: 2021-01-19

## 2021-01-19 DIAGNOSIS — Z11.59 ENCOUNTER FOR SCREENING FOR OTHER VIRAL DISEASES: ICD-10-CM

## 2021-01-20 RX ORDER — WARFARIN SODIUM 5 MG/1
10 TABLET ORAL DAILY
COMMUNITY
End: 2021-03-02

## 2021-01-20 RX ORDER — PHENOL 1.4 %
1 AEROSOL, SPRAY (ML) MUCOUS MEMBRANE EVERY MORNING
COMMUNITY
End: 2022-01-01

## 2021-01-20 RX ORDER — ACETAMINOPHEN 160 MG
2000 TABLET,DISINTEGRATING ORAL EVERY MORNING
COMMUNITY
End: 2022-01-01

## 2021-01-20 RX ORDER — WARFARIN SODIUM 5 MG/1
7.5 TABLET ORAL EVERY EVENING
Status: ON HOLD | COMMUNITY
End: 2021-05-13

## 2021-02-03 ENCOUNTER — TELEPHONE (OUTPATIENT)
Dept: WOUND CARE | Facility: CLINIC | Age: 71
End: 2021-02-03

## 2021-02-03 ENCOUNTER — HOSPITAL ENCOUNTER (OUTPATIENT)
Dept: WOUND CARE | Facility: CLINIC | Age: 71
Discharge: HOME | End: 2021-02-03
Attending: SURGERY | Admitting: SURGERY
Payer: COMMERCIAL

## 2021-02-03 VITALS
WEIGHT: 290.4 LBS | DIASTOLIC BLOOD PRESSURE: 106 MMHG | HEIGHT: 74 IN | BODY MASS INDEX: 37.27 KG/M2 | RESPIRATION RATE: 18 BRPM | SYSTOLIC BLOOD PRESSURE: 168 MMHG | TEMPERATURE: 97.4 F

## 2021-02-03 DIAGNOSIS — M79.89 SWELLING OF LIMB: ICD-10-CM

## 2021-02-03 DIAGNOSIS — I82.412 DEEP VEIN THROMBOSIS (DVT) OF FEMORAL VEIN OF LEFT LOWER EXTREMITY, UNSPECIFIED CHRONICITY (H): ICD-10-CM

## 2021-02-03 DIAGNOSIS — I89.0 LYMPHEDEMA OF LEFT LOWER EXTREMITY: ICD-10-CM

## 2021-02-03 PROBLEM — Z90.49 HISTORY OF BOWEL RESECTION: Status: ACTIVE | Noted: 2020-10-19

## 2021-02-03 PROCEDURE — G0463 HOSPITAL OUTPT CLINIC VISIT: HCPCS

## 2021-02-03 PROCEDURE — 99203 OFFICE O/P NEW LOW 30 MIN: CPT | Performed by: SURGERY

## 2021-02-03 RX ORDER — DIOSMIN COMPLEX NO.1 630 MG
1 TABLET ORAL DAILY
Qty: 90 TABLET | Refills: 3 | Status: SHIPPED | OUTPATIENT
Start: 2021-02-03 | End: 2021-05-04

## 2021-02-03 RX ORDER — AMOXICILLIN 500 MG
2000 CAPSULE ORAL
COMMUNITY
Start: 2019-08-28 | End: 2021-02-08

## 2021-02-03 RX ORDER — MULTIVITAMIN
1 TABLET ORAL
Status: ON HOLD | COMMUNITY
Start: 2021-01-15 | End: 2021-02-10

## 2021-02-03 ASSESSMENT — MIFFLIN-ST. JEOR: SCORE: 2142

## 2021-02-03 NOTE — DISCHARGE INSTRUCTIONS
[facility]  [address] [address]    Call us at [phone] if you have any questions about your wounds, have redness or swelling around your wound, have a fever of 101 or greater or if you have any other problems or concerns. We answer the phone Monday through Friday 8 am to 4 pm, please leave a message as we check the voicemail frequently throughout the day.     Cameron Barnard      1950    Medications/supplements to aid in healin. Vitamin D3 5,000 iu per day  2. Vasculera to help your veins -take one tablet a day. Purchase from [facility]    Compression: Apply clean compression Edem[place]ear blue stripe from toe to calf than yellow stripe from calf to knee, to be worn .    Walk as much as you can. When you sit raise your ankle above your hips to decrease swelling.      LISA Apple M.D.. February 3, 2021    Wound Clinic follow up 4 weeks with Dr. Apple    Lymphedema Therapy call the scheduling line at [phone]. There are different locations to choose from.     [facility] office for a venous ultrasound of your veins. Call [phone] to schedule an appointment.     Follow up with Hematologist to discuss reasoning for why you are still on coumadin.      COVID vaccine information at [email or web address]    If you had a positive experience please indicate that on your patient satisfaction survey form that [facility] will be sending you.

## 2021-02-03 NOTE — TELEPHONE ENCOUNTER
[facility] Wound    Who is the name of the provider?:  Ambika       What is the location you see this provider at?: [place]    Reason for call:  Appt today, 2/3.  Question re wearing edema ware, is it for left leg or both legs?    Can we leave a detailed message on this number?  YES

## 2021-02-03 NOTE — PROGRESS NOTES
[facility] Wound Healing [place] Progress Note    Subject: Camerno Barnard consultation requested by Dr. 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  Deep venous thromboses approximately 4 years ago.  He does not have an underlying genetic abnormality is identified in the medical record such as factor V Leiden or factor II abnormality.  He does not utilize tobacco, nondiabetic.  Chronic degenerative joint disease.  Chronic edema of both lower extremities, there is anticipated bilateral total knee arthroplasty and staged procedures  by approximately 6 months per patient history.  He denies fevers chills or sweats  Plan initial surgical procedure for left knee is February 10.  He has not seen a certified lymphedema therapist previously.  He is not utilizing compression stockings on a regular basis.    PMH:   Past Medical History:   Diagnosis Date     Allergic state      DVT (deep venous thrombosis) (H)      Hypertension      Perforated bowel (H) 09/2020     Patient Active Problem List   Diagnosis     Anticoagulation monitoring, INR range 2-3     Ascending aorta dilation (H)     Deep vein thrombosis of left femoral vein (H)     Primary osteoarthritis of both knees     Pulmonary embolism (H)     Diverticulitis     Bowel obstruction (H)     Social Hx:   Social History     Socioeconomic History     Marital status:      Spouse name: Not on file     Number of children: Not on file     Years of education: Not on file     Highest education level: Not on file   Occupational History     Not on file   Social Needs     Financial resource strain: Not on file     Food insecurity     Worry: Not on file     Inability: Not on file     Transportation needs     Medical: Not on file     Non-medical: Not on file   Tobacco Use     Smoking status: Never  Smoker     Smokeless tobacco: Never Used   Substance and Sexual Activity     Alcohol use: Yes     Frequency: Never     Comment: 2 glasses of red wine per week     Drug use: Never     Sexual activity: Not on file   Lifestyle     Physical activity     Days per week: Not on file     Minutes per session: Not on file     Stress: Not on file   Relationships     Social connections     Talks on phone: Not on file     Gets together: Not on file     Attends Synagogue service: Not on file     Active member of club or organization: Not on file     Attends meetings of clubs or organizations: Not on file     Relationship status: Not on file     Intimate partner violence     Fear of current or ex partner: Not on file     Emotionally abused: Not on file     Physically abused: Not on file     Forced sexual activity: Not on file   Other Topics Concern     Parent/sibling w/ CABG, MI or angioplasty before 65F 55M? Not Asked   Social History Narrative     Not on file       Surgical Hx:   Past Surgical History:   Procedure Laterality Date     COLONOSCOPY N/A 9/19/2020    Procedure: COLONOSCOPY;  Surgeon: Lakshmi Santana MD;  Location:  OR     ESOPHAGOSCOPY, GASTROSCOPY, DUODENOSCOPY (EGD), COMBINED N/A 9/19/2020    Procedure: ESOPHAGOGASTRODUODENOSCOPY (EGD);  Surgeon: Lakshmi Santana MD;  Location:  OR     LAPAROSCOPIC APPENDECTOMY N/A 9/27/2020    Procedure: Laparoscopic appendectomy;  Surgeon: Christ Parekh MD;  Location:  OR     LAPAROSCOPY DIAGNOSTIC (GENERAL) N/A 9/27/2020    Procedure: LAPAROSCOPIC  SMALL BOWEL RESCECTION;  Surgeon: Christ Parekh MD;  Location:  OR       Allergies:    Allergies   Allergen Reactions     Wheat Bran Other (See Comments)     Weight gain       Medications:   Current Outpatient Medications   Medication     acetaminophen (TYLENOL) 500 MG tablet     Cholecalciferol (VITAMIN D3) 50 MCG (2000 UT) CAPS     Fish Oil-Cholecalciferol (OMEGA-3 FISH OIL/VITAMIN D3 PO)      melatonin 5 MG CAPS     Multiple Vitamins-Minerals (MULTIVITAMIN ADULTS 50+) TABS     Omega-3 Fatty Acids (FISH OIL) 1200 MG capsule     Specialty Vitamins Products (VITAMINS FOR HAIR) TABS     warfarin ANTICOAGULANT (COUMADIN) 5 MG tablet     warfarin ANTICOAGULANT (COUMADIN) 5 MG tablet     No current facility-administered medications for this encounter.        Labs:   Recent Labs   Lab Test 10/03/20  1013 09/29/20  0739 09/29/20  0739 09/26/20  1204 09/26/20  1204   ALBUMIN  --   --   --   --  3.0*   HGB  --   --  12.1*   < > 15.1   INR 1.59*   < > 1.36*   < > 2.45*   WBC  --   --  9.2   < > 12.8*    < > = values in this interval not displayed.     Creatinine   Date Value Ref Range Status   09/30/2020 0.73 0.66 - 1.25 mg/dL Final     GFR Estimate   Date Value Ref Range Status   09/30/2020 >90 >60 mL/min/[1.73_m2] Final     Comment:     Non  GFR Calc  Starting 12/18/2018, serum creatinine based estimated GFR (eGFR) will be   calculated using the Chronic Kidney Disease Epidemiology Collaboration   (CKD-EPI) equation.       GFR Estimate If Black   Date Value Ref Range Status   09/30/2020 >90 >60 mL/min/[1.73_m2] Final     Comment:      GFR Calc  Starting 12/18/2018, serum creatinine based estimated GFR (eGFR) will be   calculated using the Chronic Kidney Disease Epidemiology Collaboration   (CKD-EPI) equation.       Lab Results   Component Value Date    WBC 9.2 09/29/2020     Lab Results   Component Value Date    RBC 3.90 09/29/2020     Lab Results   Component Value Date    HGB 12.1 09/29/2020     Lab Results   Component Value Date    HCT 36.0 09/29/2020     No components found for: MCT  Lab Results   Component Value Date    MCV 92 09/29/2020     Lab Results   Component Value Date    MCH 31.0 09/29/2020     Lab Results   Component Value Date    MCHC 33.6 09/29/2020     Lab Results   Component Value Date    RDW 12.9 09/29/2020     Lab Results   Component Value Date      "09/29/2020          Nutrition requirements were discussed with patient today.  Objective:  BP (!) 168/106 (BP Location: Left arm)   Temp 97.4  F (36.3  C) (Temporal)   Resp 18   Ht 1.88 m (6' 2\")   Wt 131.7 kg (290 lb 6.4 oz)   BMI 37.29 kg/m          General:  Patient is alert and orientated, no acute distress.  Conversant.  Palpable pedal pulses.  2+ pitting edema left intertibial margin, 1+ pitting edema right anterior tibial margin.  Mild venous stasis changes left lower extremity without ulcerations.  Classification is C3.  No visible venous varicosities.        Impression: Chronic hereditary lymphedema tarda left greater than right, chronic degenerative joint disease  Barriers to healing include: lymphedema and chronic edema    Plan: Initiate EdemaWear from base of left toe to mid calf, overlap from mid calf to mid thigh, will be edema wear on the thigh, this will all micro deflation in the periknee region to maximize ventral medial bundle dermal lymphatic functionality and ideally minimize interstitial edema in preparation for left knee total arthroplasty.  Should continue to utilize edema wear postoperatively, he will be undergoing surgery in [place] [facility].  He will follow-up with us in approximately 3 to 4 weeks.  We will obtain certified lymphedema therapy evaluation.  Will require duplex ultrasound for venous insufficiency left and right lower extremity, history of left lower extremity deep venous thromboses at the femoral region.  Initiate micronized purified flavonoid fraction, vascular, 1 tablet daily lifelong for management of lymphedema.  Goal is to minimize interstitial edema preorthopedic surgery to minimize the potential of surgical site infection or other sequela postoperatively.  Follow-up in clinic in approximately 3 to 4 weeks.    Tyshawn Apple MD on 2/3/2021 at 8:32 AM        "

## 2021-02-03 NOTE — PROGRESS NOTES
Patient arrived for wound care visit. Certified Wound Care Nurse time spent evaluating patient record, completed a full evaluation and documented wound(s) & jainna-wound skin; provided recommendation based on treatment plan. Applied dressing, reviewed discharge instructions, patient education, and discussed plan of care with appropriate medical team staff members and patient and/or family members.

## 2021-02-03 NOTE — TELEPHONE ENCOUNTER
Instructions provided to use the Edema wear on the left leg. Pt is using 15-20mmHg compression on the right leg and will continue to use them.  All questions and concerns addressed.

## 2021-02-06 DIAGNOSIS — Z11.59 ENCOUNTER FOR SCREENING FOR OTHER VIRAL DISEASES: ICD-10-CM

## 2021-02-06 LAB
SARS-COV-2 RNA RESP QL NAA+PROBE: NORMAL
SPECIMEN SOURCE: NORMAL

## 2021-02-06 PROCEDURE — U0003 INFECTIOUS AGENT DETECTION BY NUCLEIC ACID (DNA OR RNA); SEVERE ACUTE RESPIRATORY SYNDROME CORONAVIRUS 2 (SARS-COV-2) (CORONAVIRUS DISEASE [COVID-19]), AMPLIFIED PROBE TECHNIQUE, MAKING USE OF HIGH THROUGHPUT TECHNOLOGIES AS DESCRIBED BY CMS-2020-01-R: HCPCS | Performed by: ORTHOPAEDIC SURGERY

## 2021-02-06 PROCEDURE — U0005 INFEC AGEN DETEC AMPLI PROBE: HCPCS | Performed by: ORTHOPAEDIC SURGERY

## 2021-02-07 LAB
LABORATORY COMMENT REPORT: NORMAL
SARS-COV-2 RNA RESP QL NAA+PROBE: NEGATIVE
SPECIMEN SOURCE: NORMAL

## 2021-02-08 ENCOUNTER — ANCILLARY PROCEDURE (OUTPATIENT)
Dept: ULTRASOUND IMAGING | Facility: CLINIC | Age: 71
End: 2021-02-08
Attending: SURGERY
Payer: COMMERCIAL

## 2021-02-08 DIAGNOSIS — I82.412 DEEP VEIN THROMBOSIS (DVT) OF FEMORAL VEIN OF LEFT LOWER EXTREMITY, UNSPECIFIED CHRONICITY (H): ICD-10-CM

## 2021-02-08 DIAGNOSIS — M79.89 SWELLING OF LIMB: ICD-10-CM

## 2021-02-08 DIAGNOSIS — I89.0 LYMPHEDEMA OF LEFT LOWER EXTREMITY: ICD-10-CM

## 2021-02-08 PROCEDURE — 93970 EXTREMITY STUDY: CPT | Performed by: SURGERY

## 2021-02-10 ENCOUNTER — ANESTHESIA EVENT (OUTPATIENT)
Dept: SURGERY | Facility: CLINIC | Age: 71
End: 2021-02-10
Payer: COMMERCIAL

## 2021-02-10 ENCOUNTER — ANESTHESIA (OUTPATIENT)
Dept: SURGERY | Facility: CLINIC | Age: 71
End: 2021-02-10
Payer: COMMERCIAL

## 2021-02-10 ENCOUNTER — APPOINTMENT (OUTPATIENT)
Dept: PHYSICAL THERAPY | Facility: CLINIC | Age: 71
End: 2021-02-10
Attending: ORTHOPAEDIC SURGERY
Payer: COMMERCIAL

## 2021-02-10 ENCOUNTER — HOSPITAL ENCOUNTER (OUTPATIENT)
Facility: CLINIC | Age: 71
Discharge: HOME | End: 2021-02-11
Attending: ORTHOPAEDIC SURGERY | Admitting: ORTHOPAEDIC SURGERY
Payer: COMMERCIAL

## 2021-02-10 ENCOUNTER — APPOINTMENT (OUTPATIENT)
Dept: GENERAL RADIOLOGY | Facility: CLINIC | Age: 71
End: 2021-02-10
Attending: ORTHOPAEDIC SURGERY
Payer: COMMERCIAL

## 2021-02-10 DIAGNOSIS — Z96.652 S/P TOTAL KNEE ARTHROPLASTY, LEFT: Primary | ICD-10-CM

## 2021-02-10 DIAGNOSIS — I82.412 DEEP VEIN THROMBOSIS (DVT) OF FEMORAL VEIN OF LEFT LOWER EXTREMITY, UNSPECIFIED CHRONICITY (H): ICD-10-CM

## 2021-02-10 PROBLEM — Z96.659 S/P TOTAL KNEE ARTHROPLASTY: Status: ACTIVE | Noted: 2021-02-10

## 2021-02-10 LAB
ABO + RH BLD: NORMAL
ABO + RH BLD: NORMAL
BLD GP AB SCN SERPL QL: NORMAL
BLOOD BANK CMNT PATIENT-IMP: NORMAL
INR PPP: 1.04 (ref 0.86–1.14)
SPECIMEN EXP DATE BLD: NORMAL

## 2021-02-10 PROCEDURE — 250N000013 HC RX MED GY IP 250 OP 250 PS 637: Performed by: PHYSICIAN ASSISTANT

## 2021-02-10 PROCEDURE — 710N000009 HC RECOVERY PHASE 1, LEVEL 1, PER MIN: Performed by: ORTHOPAEDIC SURGERY

## 2021-02-10 PROCEDURE — 99203 OFFICE O/P NEW LOW 30 MIN: CPT | Performed by: INTERNAL MEDICINE

## 2021-02-10 PROCEDURE — 97530 THERAPEUTIC ACTIVITIES: CPT | Mod: GP | Performed by: PHYSICAL THERAPIST

## 2021-02-10 PROCEDURE — 86900 BLOOD TYPING SEROLOGIC ABO: CPT | Performed by: ANESTHESIOLOGY

## 2021-02-10 PROCEDURE — 250N000009 HC RX 250: Performed by: ANESTHESIOLOGY

## 2021-02-10 PROCEDURE — 999N000141 HC STATISTIC PRE-PROCEDURE NURSING ASSESSMENT: Performed by: ORTHOPAEDIC SURGERY

## 2021-02-10 PROCEDURE — 370N000017 HC ANESTHESIA TECHNICAL FEE, PER MIN: Performed by: ORTHOPAEDIC SURGERY

## 2021-02-10 PROCEDURE — 97116 GAIT TRAINING THERAPY: CPT | Mod: GP | Performed by: PHYSICAL THERAPIST

## 2021-02-10 PROCEDURE — 86850 RBC ANTIBODY SCREEN: CPT | Performed by: ANESTHESIOLOGY

## 2021-02-10 PROCEDURE — 250N000011 HC RX IP 250 OP 636: Performed by: ORTHOPAEDIC SURGERY

## 2021-02-10 PROCEDURE — 250N000009 HC RX 250: Performed by: NURSE ANESTHETIST, CERTIFIED REGISTERED

## 2021-02-10 PROCEDURE — 36415 COLL VENOUS BLD VENIPUNCTURE: CPT | Performed by: ANESTHESIOLOGY

## 2021-02-10 PROCEDURE — C1776 JOINT DEVICE (IMPLANTABLE): HCPCS | Performed by: ORTHOPAEDIC SURGERY

## 2021-02-10 PROCEDURE — 86901 BLOOD TYPING SEROLOGIC RH(D): CPT | Performed by: ANESTHESIOLOGY

## 2021-02-10 PROCEDURE — 258N000001 HC RX 258: Performed by: ORTHOPAEDIC SURGERY

## 2021-02-10 PROCEDURE — 250N000013 HC RX MED GY IP 250 OP 250 PS 637: Performed by: ORTHOPAEDIC SURGERY

## 2021-02-10 PROCEDURE — 272N000002 HC OR SUPPLY OTHER OPNP: Performed by: ORTHOPAEDIC SURGERY

## 2021-02-10 PROCEDURE — 258N000003 HC RX IP 258 OP 636: Performed by: ANESTHESIOLOGY

## 2021-02-10 PROCEDURE — 97161 PT EVAL LOW COMPLEX 20 MIN: CPT | Mod: GP | Performed by: PHYSICAL THERAPIST

## 2021-02-10 PROCEDURE — 250N000011 HC RX IP 250 OP 636: Performed by: NURSE ANESTHETIST, CERTIFIED REGISTERED

## 2021-02-10 PROCEDURE — 258N000003 HC RX IP 258 OP 636: Performed by: NURSE ANESTHETIST, CERTIFIED REGISTERED

## 2021-02-10 PROCEDURE — 250N000009 HC RX 250: Performed by: ORTHOPAEDIC SURGERY

## 2021-02-10 PROCEDURE — 258N000003 HC RX IP 258 OP 636: Performed by: ORTHOPAEDIC SURGERY

## 2021-02-10 PROCEDURE — 360N000077 HC SURGERY LEVEL 4, PER MIN: Performed by: ORTHOPAEDIC SURGERY

## 2021-02-10 PROCEDURE — 85610 PROTHROMBIN TIME: CPT | Performed by: ANESTHESIOLOGY

## 2021-02-10 PROCEDURE — 272N000001 HC OR GENERAL SUPPLY STERILE: Performed by: ORTHOPAEDIC SURGERY

## 2021-02-10 PROCEDURE — 99207 PR CDG-CODE CATEGORY CHANGED: CPT | Performed by: INTERNAL MEDICINE

## 2021-02-10 PROCEDURE — 73560 X-RAY EXAM OF KNEE 1 OR 2: CPT | Mod: LT

## 2021-02-10 DEVICE — IMPLANTABLE DEVICE: Type: IMPLANTABLE DEVICE | Site: KNEE | Status: FUNCTIONAL

## 2021-02-10 DEVICE — PATELLA IMPLANT DOME, 37 X 10MM, VIT-E
Type: IMPLANTABLE DEVICE | Site: KNEE | Status: FUNCTIONAL
Brand: ARTHREX®

## 2021-02-10 DEVICE — IBALANCE TKA, MODULAR TIBIAL TRAY, SZ 7
Type: IMPLANTABLE DEVICE | Site: KNEE | Status: FUNCTIONAL
Brand: ARTHREX®

## 2021-02-10 RX ORDER — NALOXONE HYDROCHLORIDE 0.4 MG/ML
0.4 INJECTION, SOLUTION INTRAMUSCULAR; INTRAVENOUS; SUBCUTANEOUS
Status: ACTIVE | OUTPATIENT
Start: 2021-02-10 | End: 2021-02-11

## 2021-02-10 RX ORDER — CEFAZOLIN SODIUM IN 0.9 % NACL 3 G/100 ML
3 INTRAVENOUS SOLUTION, PIGGYBACK (ML) INTRAVENOUS
Status: DISCONTINUED | OUTPATIENT
Start: 2021-02-10 | End: 2021-02-10 | Stop reason: HOSPADM

## 2021-02-10 RX ORDER — DIMENHYDRINATE 50 MG/ML
25 INJECTION, SOLUTION INTRAMUSCULAR; INTRAVENOUS
Status: DISCONTINUED | OUTPATIENT
Start: 2021-02-10 | End: 2021-02-10 | Stop reason: HOSPADM

## 2021-02-10 RX ORDER — BUPIVACAINE HYDROCHLORIDE 5 MG/ML
INJECTION, SOLUTION PERINEURAL PRN
Status: DISCONTINUED | OUTPATIENT
Start: 2021-02-10 | End: 2021-02-10 | Stop reason: HOSPADM

## 2021-02-10 RX ORDER — ONDANSETRON 2 MG/ML
4 INJECTION INTRAMUSCULAR; INTRAVENOUS EVERY 6 HOURS PRN
Status: DISCONTINUED | OUTPATIENT
Start: 2021-02-10 | End: 2021-02-11 | Stop reason: HOSPADM

## 2021-02-10 RX ORDER — LABETALOL 20 MG/4 ML (5 MG/ML) INTRAVENOUS SYRINGE
10
Status: DISCONTINUED | OUTPATIENT
Start: 2021-02-10 | End: 2021-02-10 | Stop reason: HOSPADM

## 2021-02-10 RX ORDER — CELECOXIB 100 MG/1
100 CAPSULE ORAL 2 TIMES DAILY
Status: DISCONTINUED | OUTPATIENT
Start: 2021-02-10 | End: 2021-02-11 | Stop reason: HOSPADM

## 2021-02-10 RX ORDER — LIDOCAINE 40 MG/G
CREAM TOPICAL
Status: DISCONTINUED | OUTPATIENT
Start: 2021-02-10 | End: 2021-02-11 | Stop reason: HOSPADM

## 2021-02-10 RX ORDER — WARFARIN SODIUM 5 MG/1
5 TABLET ORAL
Status: DISCONTINUED | OUTPATIENT
Start: 2021-02-10 | End: 2021-02-10

## 2021-02-10 RX ORDER — KETOROLAC TROMETHAMINE 30 MG/ML
15 INJECTION, SOLUTION INTRAMUSCULAR; INTRAVENOUS
Status: DISCONTINUED | OUTPATIENT
Start: 2021-02-10 | End: 2021-02-10 | Stop reason: HOSPADM

## 2021-02-10 RX ORDER — BISACODYL 10 MG
10 SUPPOSITORY, RECTAL RECTAL DAILY PRN
Status: DISCONTINUED | OUTPATIENT
Start: 2021-02-10 | End: 2021-02-11 | Stop reason: HOSPADM

## 2021-02-10 RX ORDER — PROPOFOL 10 MG/ML
INJECTION, EMULSION INTRAVENOUS CONTINUOUS PRN
Status: DISCONTINUED | OUTPATIENT
Start: 2021-02-10 | End: 2021-02-10

## 2021-02-10 RX ORDER — LIDOCAINE 40 MG/G
CREAM TOPICAL
Status: DISCONTINUED | OUTPATIENT
Start: 2021-02-10 | End: 2021-02-10 | Stop reason: HOSPADM

## 2021-02-10 RX ORDER — SODIUM CHLORIDE, SODIUM LACTATE, POTASSIUM CHLORIDE, CALCIUM CHLORIDE 600; 310; 30; 20 MG/100ML; MG/100ML; MG/100ML; MG/100ML
INJECTION, SOLUTION INTRAVENOUS CONTINUOUS
Status: DISCONTINUED | OUTPATIENT
Start: 2021-02-10 | End: 2021-02-11 | Stop reason: HOSPADM

## 2021-02-10 RX ORDER — TAMSULOSIN HYDROCHLORIDE 0.4 MG/1
0.4 CAPSULE ORAL DAILY
Status: DISCONTINUED | OUTPATIENT
Start: 2021-02-10 | End: 2021-02-11 | Stop reason: HOSPADM

## 2021-02-10 RX ORDER — ACETAMINOPHEN 325 MG/1
975 TABLET ORAL EVERY 8 HOURS
Status: DISCONTINUED | OUTPATIENT
Start: 2021-02-10 | End: 2021-02-11 | Stop reason: HOSPADM

## 2021-02-10 RX ORDER — ONDANSETRON 2 MG/ML
4 INJECTION INTRAMUSCULAR; INTRAVENOUS EVERY 6 HOURS
Status: DISCONTINUED | OUTPATIENT
Start: 2021-02-10 | End: 2021-02-11

## 2021-02-10 RX ORDER — OXYCODONE HYDROCHLORIDE 5 MG/1
5-10 TABLET ORAL
Qty: 60 TABLET | Refills: 0 | Status: SHIPPED | OUTPATIENT
Start: 2021-02-10 | End: 2021-03-02

## 2021-02-10 RX ORDER — FAMOTIDINE 20 MG/1
20 TABLET, FILM COATED ORAL 2 TIMES DAILY
Status: DISCONTINUED | OUTPATIENT
Start: 2021-02-10 | End: 2021-02-11 | Stop reason: HOSPADM

## 2021-02-10 RX ORDER — CEFAZOLIN SODIUM 1 G/3ML
1 INJECTION, POWDER, FOR SOLUTION INTRAMUSCULAR; INTRAVENOUS SEE ADMIN INSTRUCTIONS
Status: DISCONTINUED | OUTPATIENT
Start: 2021-02-10 | End: 2021-02-10 | Stop reason: HOSPADM

## 2021-02-10 RX ORDER — TAMSULOSIN HYDROCHLORIDE 0.4 MG/1
0.4 CAPSULE ORAL ONCE
Status: DISCONTINUED | OUTPATIENT
Start: 2021-02-10 | End: 2021-02-10

## 2021-02-10 RX ORDER — SODIUM CHLORIDE, SODIUM LACTATE, POTASSIUM CHLORIDE, CALCIUM CHLORIDE 600; 310; 30; 20 MG/100ML; MG/100ML; MG/100ML; MG/100ML
INJECTION, SOLUTION INTRAVENOUS CONTINUOUS
Status: DISCONTINUED | OUTPATIENT
Start: 2021-02-10 | End: 2021-02-10 | Stop reason: HOSPADM

## 2021-02-10 RX ORDER — ONDANSETRON 4 MG/1
4 TABLET, ORALLY DISINTEGRATING ORAL EVERY 6 HOURS PRN
Status: DISCONTINUED | OUTPATIENT
Start: 2021-02-10 | End: 2021-02-11 | Stop reason: HOSPADM

## 2021-02-10 RX ORDER — OXYCODONE HYDROCHLORIDE 5 MG/1
5 TABLET ORAL
Status: DISCONTINUED | OUTPATIENT
Start: 2021-02-10 | End: 2021-02-11 | Stop reason: HOSPADM

## 2021-02-10 RX ORDER — METHYLPREDNISOLONE ACETATE 80 MG/ML
INJECTION, SUSPENSION INTRA-ARTICULAR; INTRALESIONAL; INTRAMUSCULAR; SOFT TISSUE PRN
Status: DISCONTINUED | OUTPATIENT
Start: 2021-02-10 | End: 2021-02-10 | Stop reason: HOSPADM

## 2021-02-10 RX ORDER — OXYCODONE HYDROCHLORIDE 5 MG/1
10 TABLET ORAL EVERY 4 HOURS PRN
Status: DISCONTINUED | OUTPATIENT
Start: 2021-02-10 | End: 2021-02-11 | Stop reason: HOSPADM

## 2021-02-10 RX ORDER — METOCLOPRAMIDE 5 MG/1
5 TABLET ORAL EVERY 6 HOURS PRN
Status: DISCONTINUED | OUTPATIENT
Start: 2021-02-10 | End: 2021-02-11 | Stop reason: HOSPADM

## 2021-02-10 RX ORDER — POLYETHYLENE GLYCOL 3350 17 G/17G
17 POWDER, FOR SOLUTION ORAL DAILY
Status: DISCONTINUED | OUTPATIENT
Start: 2021-02-11 | End: 2021-02-11 | Stop reason: HOSPADM

## 2021-02-10 RX ORDER — ACETAMINOPHEN 325 MG/1
650 TABLET ORAL EVERY 4 HOURS PRN
Qty: 100 TABLET | Refills: 0 | Status: SHIPPED | OUTPATIENT
Start: 2021-02-10 | End: 2021-05-04

## 2021-02-10 RX ORDER — HYDROMORPHONE HYDROCHLORIDE 1 MG/ML
0.2 INJECTION, SOLUTION INTRAMUSCULAR; INTRAVENOUS; SUBCUTANEOUS
Status: DISCONTINUED | OUTPATIENT
Start: 2021-02-10 | End: 2021-02-11 | Stop reason: HOSPADM

## 2021-02-10 RX ORDER — ACETAMINOPHEN 325 MG/1
975 TABLET ORAL ONCE
Status: COMPLETED | OUTPATIENT
Start: 2021-02-10 | End: 2021-02-10

## 2021-02-10 RX ORDER — FENTANYL CITRATE 50 UG/ML
INJECTION, SOLUTION INTRAMUSCULAR; INTRAVENOUS PRN
Status: DISCONTINUED | OUTPATIENT
Start: 2021-02-10 | End: 2021-02-10

## 2021-02-10 RX ORDER — FENTANYL CITRATE 50 UG/ML
25-50 INJECTION, SOLUTION INTRAMUSCULAR; INTRAVENOUS
Status: DISCONTINUED | OUTPATIENT
Start: 2021-02-10 | End: 2021-02-10 | Stop reason: HOSPADM

## 2021-02-10 RX ORDER — ONDANSETRON 4 MG/1
4 TABLET, ORALLY DISINTEGRATING ORAL EVERY 30 MIN PRN
Status: DISCONTINUED | OUTPATIENT
Start: 2021-02-10 | End: 2021-02-10 | Stop reason: HOSPADM

## 2021-02-10 RX ORDER — HYDRALAZINE HYDROCHLORIDE 20 MG/ML
5 INJECTION INTRAMUSCULAR; INTRAVENOUS EVERY 4 HOURS PRN
Status: DISCONTINUED | OUTPATIENT
Start: 2021-02-10 | End: 2021-02-11 | Stop reason: HOSPADM

## 2021-02-10 RX ORDER — METOCLOPRAMIDE HYDROCHLORIDE 5 MG/ML
5 INJECTION INTRAMUSCULAR; INTRAVENOUS EVERY 6 HOURS PRN
Status: DISCONTINUED | OUTPATIENT
Start: 2021-02-10 | End: 2021-02-11 | Stop reason: HOSPADM

## 2021-02-10 RX ORDER — TAMSULOSIN HYDROCHLORIDE 0.4 MG/1
0.4 CAPSULE ORAL DAILY
Status: DISCONTINUED | OUTPATIENT
Start: 2021-02-10 | End: 2021-02-10

## 2021-02-10 RX ORDER — TRANEXAMIC ACID 650 MG/1
1950 TABLET ORAL ONCE
Status: COMPLETED | OUTPATIENT
Start: 2021-02-10 | End: 2021-02-10

## 2021-02-10 RX ORDER — AMOXICILLIN 250 MG
1 CAPSULE ORAL 2 TIMES DAILY
Status: DISCONTINUED | OUTPATIENT
Start: 2021-02-10 | End: 2021-02-11 | Stop reason: HOSPADM

## 2021-02-10 RX ORDER — HYDRALAZINE HYDROCHLORIDE 20 MG/ML
2.5-5 INJECTION INTRAMUSCULAR; INTRAVENOUS EVERY 10 MIN PRN
Status: DISCONTINUED | OUTPATIENT
Start: 2021-02-10 | End: 2021-02-10 | Stop reason: HOSPADM

## 2021-02-10 RX ORDER — ONDANSETRON 2 MG/ML
4 INJECTION INTRAMUSCULAR; INTRAVENOUS EVERY 30 MIN PRN
Status: DISCONTINUED | OUTPATIENT
Start: 2021-02-10 | End: 2021-02-10 | Stop reason: HOSPADM

## 2021-02-10 RX ORDER — ACETAMINOPHEN 325 MG/1
650 TABLET ORAL EVERY 4 HOURS PRN
Status: DISCONTINUED | OUTPATIENT
Start: 2021-02-13 | End: 2021-02-11 | Stop reason: HOSPADM

## 2021-02-10 RX ORDER — AMOXICILLIN 250 MG
1-2 CAPSULE ORAL 2 TIMES DAILY
Qty: 30 TABLET | Refills: 0 | Status: SHIPPED | OUTPATIENT
Start: 2021-02-10 | End: 2021-03-02

## 2021-02-10 RX ORDER — GLYCOPYRROLATE 0.2 MG/ML
INJECTION, SOLUTION INTRAMUSCULAR; INTRAVENOUS PRN
Status: DISCONTINUED | OUTPATIENT
Start: 2021-02-10 | End: 2021-02-10

## 2021-02-10 RX ORDER — HYDROXYZINE HYDROCHLORIDE 10 MG/1
10 TABLET, FILM COATED ORAL EVERY 6 HOURS PRN
Status: DISCONTINUED | OUTPATIENT
Start: 2021-02-10 | End: 2021-02-11 | Stop reason: HOSPADM

## 2021-02-10 RX ORDER — DOCUSATE SODIUM 100 MG/1
100 CAPSULE, LIQUID FILLED ORAL 2 TIMES DAILY
Status: DISCONTINUED | OUTPATIENT
Start: 2021-02-10 | End: 2021-02-11 | Stop reason: HOSPADM

## 2021-02-10 RX ORDER — HYDROMORPHONE HYDROCHLORIDE 1 MG/ML
0.4 INJECTION, SOLUTION INTRAMUSCULAR; INTRAVENOUS; SUBCUTANEOUS
Status: DISCONTINUED | OUTPATIENT
Start: 2021-02-10 | End: 2021-02-11 | Stop reason: HOSPADM

## 2021-02-10 RX ORDER — PROCHLORPERAZINE MALEATE 5 MG
5 TABLET ORAL EVERY 6 HOURS PRN
Status: DISCONTINUED | OUTPATIENT
Start: 2021-02-10 | End: 2021-02-11 | Stop reason: HOSPADM

## 2021-02-10 RX ORDER — NALOXONE HYDROCHLORIDE 0.4 MG/ML
0.2 INJECTION, SOLUTION INTRAMUSCULAR; INTRAVENOUS; SUBCUTANEOUS
Status: ACTIVE | OUTPATIENT
Start: 2021-02-10 | End: 2021-02-11

## 2021-02-10 RX ORDER — CEFAZOLIN SODIUM 2 G/100ML
2 INJECTION, SOLUTION INTRAVENOUS EVERY 8 HOURS
Status: COMPLETED | OUTPATIENT
Start: 2021-02-10 | End: 2021-02-11

## 2021-02-10 RX ORDER — DEXAMETHASONE SODIUM PHOSPHATE 4 MG/ML
INJECTION, SOLUTION INTRA-ARTICULAR; INTRALESIONAL; INTRAMUSCULAR; INTRAVENOUS; SOFT TISSUE PRN
Status: DISCONTINUED | OUTPATIENT
Start: 2021-02-10 | End: 2021-02-10

## 2021-02-10 RX ORDER — VANCOMYCIN HYDROCHLORIDE 1 G/20ML
INJECTION, POWDER, LYOPHILIZED, FOR SOLUTION INTRAVENOUS PRN
Status: DISCONTINUED | OUTPATIENT
Start: 2021-02-10 | End: 2021-02-10 | Stop reason: HOSPADM

## 2021-02-10 RX ORDER — PROPOFOL 10 MG/ML
INJECTION, EMULSION INTRAVENOUS PRN
Status: DISCONTINUED | OUTPATIENT
Start: 2021-02-10 | End: 2021-02-10

## 2021-02-10 RX ORDER — MEPERIDINE HYDROCHLORIDE 25 MG/ML
12.5 INJECTION INTRAMUSCULAR; INTRAVENOUS; SUBCUTANEOUS EVERY 5 MIN PRN
Status: DISCONTINUED | OUTPATIENT
Start: 2021-02-10 | End: 2021-02-10 | Stop reason: HOSPADM

## 2021-02-10 RX ADMIN — TAMSULOSIN HYDROCHLORIDE 0.4 MG: 0.4 CAPSULE ORAL at 12:23

## 2021-02-10 RX ADMIN — HYDROMORPHONE HYDROCHLORIDE 0.2 MG: 1 INJECTION, SOLUTION INTRAMUSCULAR; INTRAVENOUS; SUBCUTANEOUS at 17:00

## 2021-02-10 RX ADMIN — OXYCODONE HYDROCHLORIDE 5 MG: 5 TABLET ORAL at 14:51

## 2021-02-10 RX ADMIN — ACETAMINOPHEN 975 MG: 325 TABLET, FILM COATED ORAL at 06:14

## 2021-02-10 RX ADMIN — FENTANYL CITRATE 50 MCG: 50 INJECTION, SOLUTION INTRAMUSCULAR; INTRAVENOUS at 07:30

## 2021-02-10 RX ADMIN — DOCUSATE SODIUM 100 MG: 100 CAPSULE, LIQUID FILLED ORAL at 21:01

## 2021-02-10 RX ADMIN — OXYCODONE HYDROCHLORIDE 5 MG: 5 TABLET ORAL at 19:22

## 2021-02-10 RX ADMIN — FAMOTIDINE 20 MG: 20 TABLET ORAL at 12:23

## 2021-02-10 RX ADMIN — PHENYLEPHRINE HYDROCHLORIDE 100 MCG: 10 INJECTION INTRAVENOUS at 08:55

## 2021-02-10 RX ADMIN — ONDANSETRON HYDROCHLORIDE 4 MG: 2 INJECTION, SOLUTION INTRAVENOUS at 07:30

## 2021-02-10 RX ADMIN — PHENYLEPHRINE HYDROCHLORIDE 75 MCG: 10 INJECTION INTRAVENOUS at 08:25

## 2021-02-10 RX ADMIN — FENTANYL CITRATE 50 MCG: 50 INJECTION, SOLUTION INTRAMUSCULAR; INTRAVENOUS at 07:24

## 2021-02-10 RX ADMIN — PROPOFOL 30 MG: 10 INJECTION, EMULSION INTRAVENOUS at 07:38

## 2021-02-10 RX ADMIN — GLYCOPYRROLATE 0.2 MG: 0.2 INJECTION, SOLUTION INTRAMUSCULAR; INTRAVENOUS at 07:30

## 2021-02-10 RX ADMIN — ACETAMINOPHEN 975 MG: 325 TABLET, FILM COATED ORAL at 21:03

## 2021-02-10 RX ADMIN — ACETAMINOPHEN 975 MG: 325 TABLET, FILM COATED ORAL at 14:08

## 2021-02-10 RX ADMIN — MIDAZOLAM 2 MG: 1 INJECTION INTRAMUSCULAR; INTRAVENOUS at 07:18

## 2021-02-10 RX ADMIN — PHENYLEPHRINE HYDROCHLORIDE 100 MCG: 10 INJECTION INTRAVENOUS at 08:31

## 2021-02-10 RX ADMIN — CEFAZOLIN SODIUM 2 G: 2 INJECTION, SOLUTION INTRAVENOUS at 15:49

## 2021-02-10 RX ADMIN — SODIUM CHLORIDE, POTASSIUM CHLORIDE, SODIUM LACTATE AND CALCIUM CHLORIDE: 600; 310; 30; 20 INJECTION, SOLUTION INTRAVENOUS at 12:11

## 2021-02-10 RX ADMIN — SODIUM CHLORIDE, POTASSIUM CHLORIDE, SODIUM LACTATE AND CALCIUM CHLORIDE: 600; 310; 30; 20 INJECTION, SOLUTION INTRAVENOUS at 08:18

## 2021-02-10 RX ADMIN — PHENYLEPHRINE HYDROCHLORIDE 50 MCG: 10 INJECTION INTRAVENOUS at 08:00

## 2021-02-10 RX ADMIN — LIDOCAINE HYDROCHLORIDE 20 MG: 10 INJECTION, SOLUTION EPIDURAL; INFILTRATION; INTRACAUDAL; PERINEURAL at 07:34

## 2021-02-10 RX ADMIN — OXYCODONE HYDROCHLORIDE 5 MG: 5 TABLET ORAL at 22:39

## 2021-02-10 RX ADMIN — PHENYLEPHRINE HYDROCHLORIDE 100 MCG: 10 INJECTION INTRAVENOUS at 07:53

## 2021-02-10 RX ADMIN — SODIUM CHLORIDE, POTASSIUM CHLORIDE, SODIUM LACTATE AND CALCIUM CHLORIDE: 600; 310; 30; 20 INJECTION, SOLUTION INTRAVENOUS at 07:14

## 2021-02-10 RX ADMIN — FAMOTIDINE 20 MG: 20 TABLET ORAL at 21:01

## 2021-02-10 RX ADMIN — PROPOFOL 50 MCG/KG/MIN: 10 INJECTION, EMULSION INTRAVENOUS at 07:34

## 2021-02-10 RX ADMIN — TRANEXAMIC ACID 1950 MG: 650 TABLET ORAL at 06:14

## 2021-02-10 RX ADMIN — CELECOXIB 100 MG: 100 CAPSULE ORAL at 21:00

## 2021-02-10 RX ADMIN — PROPOFOL 20 MG: 10 INJECTION, EMULSION INTRAVENOUS at 07:31

## 2021-02-10 RX ADMIN — DEXAMETHASONE SODIUM PHOSPHATE 4 MG: 4 INJECTION, SOLUTION INTRA-ARTICULAR; INTRALESIONAL; INTRAMUSCULAR; INTRAVENOUS; SOFT TISSUE at 07:50

## 2021-02-10 RX ADMIN — WARFARIN SODIUM 7.5 MG: 5 TABLET ORAL at 18:06

## 2021-02-10 RX ADMIN — DOCUSATE SODIUM 50 MG AND SENNOSIDES 8.6 MG 1 TABLET: 8.6; 5 TABLET, FILM COATED ORAL at 21:01

## 2021-02-10 ASSESSMENT — MIFFLIN-ST. JEOR: SCORE: 2117.5

## 2021-02-10 NOTE — BRIEF OP NOTE
[facility]    Brief Operative Note    Pre-operative diagnosis: DJD (degenerative joint disease) [M19.90]  Post-operative diagnosis Same as pre-operative diagnosis    Procedure: Procedure(s):  Left total knee arthroplasty  Surgeon: Surgeon(s) and Role:     * Gigi De Oliveira MD - Primary     * Shayna Bergman PA-C - Assisting  Anesthesia: Choice   Estimated blood loss: Less than 10 ml  Drains: None  Specimens: * No specimens in log *  Findings:   None.  Complications: None.  Implants:   Implant Name Type Inv. Item Serial No.  Lot No. LRB No. Used Action   simplex p speed set    [facility] [id number] Left 1 Implanted   PATELLA IMP DOME 37 X 10MM VIT-E Total Joint Component/Insert PATELLA IMP DOME 37 X 10MM VIT-E  [employer] [id number] Left 1 Implanted   IMP COMP TKA IBALANCE MOD TIBIAL TRAY SZ 7 [id number] Total Joint Component/Insert IMP COMP TKA IBALANCE MOD TIBIAL TRAY SZ 7 [id number]-[id number]  [employer] [id number] Left 1 Implanted   FEMORAL IMPLANT PS CEMENTED SIZE 7    [employer] [id number] Left 1 Implanted   TIBIAL BEARING IMPLANT  SIZE 7    [employer] [id number] Left 1 Implanted

## 2021-02-10 NOTE — ANESTHESIA PREPROCEDURE EVALUATION
Anesthesia Pre-Procedure Evaluation    Patient: Cameron Barnard   MRN: [id number] : 1950        Preoperative Diagnosis: DJD (degenerative joint disease) [M19.90]   Procedure : Procedure(s):  Left total knee arthroplasty     Past Medical History:   Diagnosis Date     Allergic state      DVT (deep venous thrombosis) (H)      Hypertension      Perforated bowel (H) 2020      Past Surgical History:   Procedure Laterality Date     COLONOSCOPY N/A 2020    Procedure: COLONOSCOPY;  Surgeon: Lakshmi Santana MD;  Location:  OR     ESOPHAGOSCOPY, GASTROSCOPY, DUODENOSCOPY (EGD), COMBINED N/A 2020    Procedure: ESOPHAGOGASTRODUODENOSCOPY (EGD);  Surgeon: Lakshmi Santana MD;  Location:  OR     LAPAROSCOPIC APPENDECTOMY N/A 2020    Procedure: Laparoscopic appendectomy;  Surgeon: Christ Parekh MD;  Location:  OR     LAPAROSCOPY DIAGNOSTIC (GENERAL) N/A 2020    Procedure: LAPAROSCOPIC  SMALL BOWEL RESCECTION;  Surgeon: Christ Parekh MD;  Location:  OR      Allergies   Allergen Reactions     Wheat Bran Other (See Comments)     Weight gain      Social History     Tobacco Use     Smoking status: Never Smoker     Smokeless tobacco: Never Used   Substance Use Topics     Alcohol use: Yes     Frequency: Never     Comment: 2 glasses of red wine per week      Wt Readings from Last 1 Encounters:   02/10/21 129.3 kg (285 lb)        Anesthesia Evaluation   Pt has had prior anesthetic. Type: General.        ROS/MED HX  ENT/Pulmonary:       Neurologic:  - neg neurologic ROS     Cardiovascular:     (+) hypertension-----    METS/Exercise Tolerance:     Hematologic:     (+) History of blood clots, pt is not anticoagulated,     Musculoskeletal:   (+) arthritis,     GI/Hepatic:  - neg GI/hepatic ROS     Renal/Genitourinary:  - neg Renal ROS     Endo:  - neg endo ROS     Psychiatric/Substance Use:  - neg psychiatric ROS     Infectious Disease:  - neg infectious disease ROS      Malignancy:  - neg malignancy ROS     Other:  - neg other ROS          Physical Exam    Airway  airway exam normal      Mallampati: I   TM distance: > 3 FB   Neck ROM: full   Mouth opening: > 3 cm    Respiratory Devices and Support         Dental  no notable dental history         Cardiovascular   cardiovascular exam normal          Pulmonary   pulmonary exam normal                OUTSIDE LABS:  CBC:   Lab Results   Component Value Date    WBC 9.2 09/29/2020    WBC 10.1 09/28/2020    HGB 12.1 (L) 09/29/2020    HGB 12.8 (L) 09/28/2020    HCT 36.0 (L) 09/29/2020    HCT 38.2 (L) 09/28/2020     09/29/2020     09/28/2020     BMP:   Lab Results   Component Value Date     09/30/2020     09/29/2020    POTASSIUM 3.6 09/30/2020    POTASSIUM 3.5 09/29/2020    CHLORIDE 104 09/30/2020    CHLORIDE 106 09/29/2020    CO2 27 09/30/2020    CO2 28 09/29/2020    BUN 7 09/30/2020    BUN 10 09/29/2020    CR 0.73 09/30/2020    CR 0.82 09/29/2020    GLC 75 09/30/2020    GLC 90 09/29/2020     COAGS:   Lab Results   Component Value Date    INR 1.59 (H) 10/03/2020     POC:   Lab Results   Component Value Date     (H) 09/26/2020     HEPATIC:   Lab Results   Component Value Date    ALBUMIN 3.0 (L) 09/26/2020    PROTTOTAL 7.2 09/26/2020    ALT 41 09/26/2020    AST 28 09/26/2020    ALKPHOS 70 09/26/2020    BILITOTAL 1.3 09/26/2020     OTHER:   Lab Results   Component Value Date    LACT 0.6 (L) 09/26/2020    PETER 7.8 (L) 09/30/2020    MAG 2.4 (H) 09/18/2020    LIPASE 70 (L) 09/26/2020       Anesthesia Plan    ASA Status:  2      Anesthesia Type: Spinal.              Consents    Anesthesia Plan(s) and associated risks, benefits, and realistic alternatives discussed. Questions answered and patient/representative(s) expressed understanding.     - Discussed with:  Patient      - Extended Intubation/Ventilatory Support Discussed: no Extended Intubation.      - Patient is DNR/DNI Status: No    Use of blood products  discussed: Yes.     - Discussed with: Patient.     - Consented: consented to blood products     Postoperative Care    Pain management: IV analgesics.   PONV prophylaxis: Ondansetron (or other 5HT-3), Dexamethasone or Solumedrol     Comments:                Rj Banks MD

## 2021-02-10 NOTE — PLAN OF CARE
Patient vital signs are at baseline: Yes  Patient able to ambulate as they were prior to admission or with assist devices provided by therapies during their stay:  Yes  Patient MUST void prior to discharge:  Yes  Patient able to tolerate oral intake:  Yes  Pain has adequate pain control using Oral analgesics:  No,  Reason:  IV Dilaudid needed        A/O.  CMS intact. Dressing D/I.  Ambulated in hallway with PT with Ax1 et walker. Voiding.  Tolerating regular diet.  Taking Oxycodone, Tylenol, and IV Dilaudid for pain.  Restarted on Coumadin this evening and will be bridged with Lovenox.  Plans to discharge home tomorrow with spouse. Will continue to monitor.

## 2021-02-10 NOTE — CONSULTS
[facility]  Hospitalist Consult Note  Name: Cameron Barnard    MRN: [id number]  YOB: 1950    Age: 70 year old  Date of admission: 2/10/2021  Primary care provider: Gary Bey     Requesting Physician: Dr. De Oliveira  Reason for consult:  Post-operative medical management         Assessment and Plan:   Cameron Barnard is a 70 year old male with a history of chronic left leg DVT, PE on chronic anticoagulation, HTN, HLD, obesity, SBO, gout, and DJD bilateral knees who was admitted on 2/10/2021 following elective left TKA and right knee corticosteroid injection by Dr. De Oliveira.      Assessment and plan by problem:  DJD bilateral knees:  s/p L TKA and R knee corticosteroid injection on 2/10/2021 by Dr. De Oliveira.  The patient is doing well.  Currently has well controlled pain and is hemodynamically stable. Will defer diet, activity, DVT prophylaxis, and pain control to the primary team. Currently the patient is on Lovenox 40 mg daily and his warfarin has been resumed although lower INR goal of 1.5-2. Continue physical and occupational therapy.  Continue incentive spirometry and follow hemoglobin to evaluate for surgical blood loss and potential need for transfusion.     Chronic left leg DVT, history PE: Chronically on warfarin with INR goal 2-3 per his anticoagulation notes.  Previous PE in 2017.  Chronic left femoral DVT as seen on his ultrasound from 2 days prior to surgery which shows a chronic nonocclusive and weblike thrombus of the left femoral and popliteal veins along with chronic weblike thrombus of the left great saphenous vein.  His warfarin was held 5 days prior to surgery and he has been on Lovenox bridge with holding dose morning of surgery.  -Warfarin resumption ordered by orthopedics team today although lower INR dose of 1.5-2, normally he would be 2-3.  Spoke with Pharm.D.  Give 7.5 mg warfarin tonight, daily INR  -He will receive Lovenox 40 mg DVT prophylaxis dose this  evening, recommend increasing to therapeutic dose for bridge as soon as allowed by orthopedics team    HLD: Resume omega-3 at home.    HTN: Blood pressure 152/90 with repeat 138/87 in clinic for preop.  He did not want to start any medications at that time, not currently on anything.  Blood pressure elevated here postoperatively.  -Monitor blood pressure, as needed IV hydralazine for severely elevated blood pressure    Obesity: BMI 36.    Code status: Full code  Prophylaxis: per primary team  Disposition: per primary team. PT/OT    Thank you for the consultation, we will continue to follow along during the hospitalization. Please page with any questions or concerns.         History of Present Illness:   Cameron Barnard is a 70 year old male with a history of chronic left leg DVT, PE on chronic anticoagulation, HTN, HLD, obesity, SBO, gout, and DJD bilateral knees who was admitted on 2/10/2021 following elective left TKA and right knee corticosteroid injection by Dr. De Oliveira.  Has had bilateral knee pain for very long time, failed conservative management.  Prior to surgery he was in his normal state of health with no recent fevers, chills, cough, chest pain, shortness of breath.  He held his warfarin for 5 days prior to surgery and has been on Lovenox bridge with last dose yesterday.  Pre-operative note was fully reviewed and recommendations acknowledged. Op note and anesthesia notes and flow sheets reviewed.     Seen postoperatively.  He is doing well without any nausea, shortness breath, chest pain.  Having some increased pain in the left knee now that he is having increased sensation down to the left leg as his block is wearing off.  Acetaminophen helped take the pain from 7 out of 10 down to 5 out of 10.  Trying oxycodone now before therapy.              Past Medical History reviewed:     Past Medical History:   Diagnosis Date     Allergic state      DVT (deep venous thrombosis) (H)     chronic left femoral DVT      HLD (hyperlipidemia)      Hypertension      Multiple subsegmental pulmonary emboli without acute cor pulmonale (H) 2017     Perforated bowel (H) 09/2020     Primary osteoarthritis of both knees              Past Surgical History reviewed:     Past Surgical History:   Procedure Laterality Date     COLONOSCOPY N/A 9/19/2020    Procedure: COLONOSCOPY;  Surgeon: Lakshmi Santnaa MD;  Location:  OR     ESOPHAGOSCOPY, GASTROSCOPY, DUODENOSCOPY (EGD), COMBINED N/A 9/19/2020    Procedure: ESOPHAGOGASTRODUODENOSCOPY (EGD);  Surgeon: Lakshmi Santana MD;  Location:  OR     LAPAROSCOPIC APPENDECTOMY N/A 9/27/2020    Procedure: Laparoscopic appendectomy;  Surgeon: Christ Parekh MD;  Location:  OR     LAPAROSCOPY DIAGNOSTIC (GENERAL) N/A 9/27/2020    Procedure: LAPAROSCOPIC  SMALL BOWEL RESCECTION;  Surgeon: Christ Parekh MD;  Location:  OR               Social History reviewed:     Social History     Tobacco Use     Smoking status: Never Smoker     Smokeless tobacco: Never Used   Substance Use Topics     Alcohol use: Yes     Frequency: Never     Comment: 2 glasses of red wine per week             Family History reviewed:     Family History   Problem Relation Age of Onset     Breast Cancer Sister              Allergies:     Allergies   Allergen Reactions     Wheat Bran Other (See Comments)     Weight gain             Medications:     Prior to Admission medications    Medication Sig Last Dose Taking? Auth Provider   acetaminophen (TYLENOL) 325 MG tablet Take 2 tablets (650 mg) by mouth every 4 hours as needed for other (mild pain)  Yes Gigi De Oliveira MD   acetaminophen (TYLENOL) 500 MG tablet Take 1,000 mg by mouth daily as needed 2/6/2021 Yes Unknown, Entered By History   Cholecalciferol (VITAMIN D3) 50 MCG (2000 UT) CAPS Take 5,000 Units by mouth daily  2/6/2021 Yes Reported, Patient   Fish Oil-Cholecalciferol (OMEGA-3 FISH OIL/VITAMIN D3 PO) Take 2 capsules by mouth daily  "[employer] product with Omega3/Vit D 2/6/2021 Yes Unknown, Entered By History   melatonin 5 MG CAPS Take 5 mg by mouth nightly as needed  2/6/2021 Yes Reported, Patient   Multiple Vitamins-Minerals (MULTIVITAMIN ADULTS 50+) TABS Take 1 tablet by mouth daily  2/6/2021 Yes Reported, Patient   oxyCODONE (ROXICODONE) 5 MG tablet Take 1-2 tablets (5-10 mg) by mouth every 3 hours as needed for pain (Moderate to Severe)  Yes Gigi De Oliveira MD   senna-docusate (SENOKOT-S/PERICOLACE) 8.6-50 MG tablet Take 1-2 tablets by mouth 2 times daily Take while on oral narcotics to prevent or treat constipation.  Yes Gigi De Oliveira MD   warfarin ANTICOAGULANT (COUMADIN) 5 MG tablet Take 10 mg by mouth daily On Fridays 2/5/2021 Yes Reported, Patient   warfarin ANTICOAGULANT (COUMADIN) 5 MG tablet Take 7.5 mg by mouth daily  2/6/2021 Yes Reported, Patient   Dietary Management Product (VASCULERA) TABS Take 1 tablet by mouth daily not started  Tyshawn Apple MD       Current hospital administered medication list (MAR) also reviewed.          Review of Systems:     A comprehensive greater than 10 system review of systems was carried out.  Pertinent positives and negatives are noted above.  Otherwise negative for contributory info.            Physical Exam:   Blood pressure (!) 165/89, pulse 60, temperature 96.9  F (36.1  C), temperature source Temporal, resp. rate 17, height 1.88 m (6' 2\"), weight 129.3 kg (285 lb), SpO2 95 %.    Exam:  Constitutional: Awake, NAD   Eyes: sclera white   HEENT: atraumatic, MMM  Respiratory:   lungs cta bilaterally, no crackles or wheeze  Cardiovascular: RRR.  No murmur   GI: non-tender, not distended, bowel sounds present  Skin: no rash or lesions, acyanotic  Musculoskeletal/extremities: Left leg Ace wrap.  No lower extremity edema.  Neurologic: A&O, speech clear,  can wiggle toes bilaterally and light touch sensation intact peripherally  Psychiatric: calm, cooperative, normal " affect           Data:   Imaging:  Reviewed.    EKG/Telemetry:  Reviewed.    Labs: Reviewed.   INR 1.04  COVID-19 PCR negative    Esa Parham MD  On license of [facility] Hospitalist  February 10, 2021

## 2021-02-10 NOTE — ANESTHESIA CARE TRANSFER NOTE
Patient: Cameron Barnard    Procedure(s):  Left total knee arthroplasty    Diagnosis: DJD (degenerative joint disease) [M19.90]  Diagnosis Additional Information: No value filed.    Anesthesia Type:   Spinal     Note:    Oropharynx: oropharynx clear of all foreign objects  Level of Consciousness: awake  Oxygen Supplementation: room air    Independent Airway: airway patency satisfactory and stable  Dentition: dentition unchanged  Vital Signs Stable: post-procedure vital signs reviewed and stable  Report to RN Given: handoff report given  Patient transferred to: PACU  Comments: Pt's VSS, A/O x3 resting comfortably post procedure, care continued by RN.   Handoff Report: Identifed the Patient, Identified the Reponsible Provider, Reviewed the pertinent medical history, Discussed the surgical course, Reviewed Intra-OP anesthesia mangement and issues during anesthesia, Set expectations for post-procedure period and Allowed opportunity for questions and acknowledgement of understanding      Vitals: (Last set prior to Anesthesia Care Transfer)  CRNA VITALS  2/10/2021 0911 - 2/10/2021 0948      2/10/2021             SpO2:  98 %    EKG:  Sinus rhythm        Electronically Signed By: RON Bear CRNA  February 10, 2021  9:48 AM

## 2021-02-10 NOTE — PROGRESS NOTES
02/10/21 1558   Quick Adds   Type of Visit Initial PT Evaluation   Living Environment   Living Environment Comments Pt lives in split level home with wife, 2 JAMES with L railing, then 7 stairs with B railing to main level. Ind with all mobility at baseline.    Self-Care   Usual Activity Tolerance moderate   Current Activity Tolerance moderate   Equipment Currently Used at Home cane, straight;raised toilet seat   Disability/Function   Fall history within last six months no   General Information   Onset of Illness/Injury or Date of Surgery 02/10/21   Referring Physician Gigi De Oliveira MD   Patient/Family Therapy Goals Statement (PT) To decrease pain and increase mobility level   Pertinent History of Current Problem (include personal factors and/or comorbidities that impact the POC) Pt is a 70 year old male POD0 s/p L TKA.   Existing Precautions/Restrictions weight bearing   Weight-Bearing Status - LLE weight-bearing as tolerated   Weight-Bearing Status - RLE full weight-bearing   Cognition   Orientation Status (Cognition) oriented x 4   Affect/Mental Status (Cognition) WNL   Follows Commands (Cognition) WNL   Pain Assessment   Patient Currently in Pain Yes, see Vital Sign flowsheet  (4/10 L knee)   Range of Motion (ROM)   ROM Comment B LE WFL except R knee/hip flexion   Strength   Strength Comments Able to complete B SLR; functionally demonstrated >3/5 B LE strength   Bed Mobility   Comment (Bed Mobility) SBA supine to sit   Transfers   Transfer Safety Comments CGA with FWW   Gait/Stairs (Locomotion)   Distance in Feet (Required for LE Total Joints) 12   Pattern (Gait) step-to   Deviations/Abnormal Patterns (Gait) antalgic;base of support, wide;gait speed decreased;stride length decreased   Maintains Weight-bearing Status (Gait) able to maintain   Comment (Gait/Stairs) CGA with FWW   Balance   Balance Comments good unsupported sitting; fair standing with B UE support   Clinical Impression   Criteria for  Skilled Therapeutic Intervention yes, treatment indicated   PT Diagnosis (PT) impaired functional mobility   Influenced by the following impairments decreased R knee/hip ROM; pain   Functional limitations due to impairments impaired bed mobility, transfers, ambulation, stairs   Clinical Presentation Stable/Uncomplicated   Clinical Presentation Rationale Pt is medically stable   Clinical Decision Making (Complexity) low complexity   Therapy Frequency (PT) Daily   Predicted Duration of Therapy Intervention (days/wks) 2 days   Planned Therapy Interventions (PT) balance training;bed mobility training;cryotherapy;gait training;home exercise program;patient/family education;ROM (range of motion);strengthening;transfer training   Anticipated Equipment Needs at Discharge (PT) walker, rolling   Risk & Benefits of therapy have been explained evaluation/treatment results reviewed;care plan/treatment goals reviewed;risks/benefits reviewed;current/potential barriers reviewed;participants voiced agreement with care plan;participants included;patient   PT Discharge Planning    PT Discharge Recommendation (DC Rec)   (defer to ortho)   PT Rationale for DC Rec Anticipate that patient will be at least Debbie with bed mobility and stairs; SBA for transfers and ambulation   PT Brief overview of current status  CGA with FWW   Total Evaluation Time   Total Evaluation Time (Minutes) 7

## 2021-02-10 NOTE — PHARMACY-ADMISSION MEDICATION HISTORY
Admission medication history interview status for this patient is complete. See [facility] admission navigator for allergy information, prior to admission medications and immunization status.     Medication history interview done via telephone during Covid-19 pandemic, indicate source(s): Patient  Medication history resources (including written lists, pill bottles, clinic record):None  Pharmacy:  [facility]    Changes made to PTA medication list:  Added: dose and frequency for Vitamin D  Deleted: Hair vitamin  Changed: none    Actions taken by pharmacist (provider contacted, etc):None     Additional medication history information: Patient was using enoxaparin to bridge warfarin.  Last dose of warfarin was Saturday 2/6 and lovenox was on Sunday 2/7.      Medication reconciliation/reorder completed by provider prior to medication history? yes    Prior to Admission medications    Medication Sig Last Dose Taking? Auth Provider   acetaminophen (TYLENOL) 325 MG tablet Take 2 tablets (650 mg) by mouth every 4 hours as needed for other (mild pain)  Yes Gigi De Oliveira MD   acetaminophen (TYLENOL) 500 MG tablet Take 1,000 mg by mouth daily as needed 2/6/2021 Yes Unknown, Entered By History   Cholecalciferol (VITAMIN D3) 50 MCG (2000 UT) CAPS Take 5,000 Units by mouth daily  2/6/2021 Yes Reported, Patient   Fish Oil-Cholecalciferol (OMEGA-3 FISH OIL/VITAMIN D3 PO) Take 2 capsules by mouth daily [employer] product with Omega3/Vit D 2/6/2021 Yes Unknown, Entered By History   melatonin 5 MG CAPS Take 5 mg by mouth nightly as needed  2/6/2021 Yes Reported, Patient   Multiple Vitamins-Minerals (MULTIVITAMIN ADULTS 50+) TABS Take 1 tablet by mouth daily  2/6/2021 Yes Reported, Patient   oxyCODONE (ROXICODONE) 5 MG tablet Take 1-2 tablets (5-10 mg) by mouth every 3 hours as needed for pain (Moderate to Severe)  Yes Gigi De Oliveira MD   senna-docusate (SENOKOT-S/PERICOLACE) 8.6-50 MG tablet Take 1-2 tablets by mouth  2 times daily Take while on oral narcotics to prevent or treat constipation.  Yes Gigi De Oliveira MD   warfarin ANTICOAGULANT (COUMADIN) 5 MG tablet Take 10 mg by mouth daily On Fridays 2/5/2021 Yes Reported, Patient   warfarin ANTICOAGULANT (COUMADIN) 5 MG tablet Take 7.5 mg by mouth daily  2/6/2021 Yes Reported, Patient   Dietary Management Product (VASCULERA) TABS Take 1 tablet by mouth daily not started  Tyshawn Apple MD Amanda Akogyeram-Salami, PharmD

## 2021-02-10 NOTE — PROGRESS NOTES
Printed prescriptions for oxycodone, tylenol, and senna sent to 6th floor via secure tube at this time; 6th floor notified of code.

## 2021-02-10 NOTE — ANESTHESIA POSTPROCEDURE EVALUATION
Patient: Cameron Barnard    Procedure(s):  Left total knee arthroplasty    Diagnosis:DJD (degenerative joint disease) [M19.90]  Diagnosis Additional Information: No value filed.    Anesthesia Type:  Spinal    Note:     Postop Pain Control: Uneventful            Sign Out: Well controlled pain   PONV: No   Neuro/Psych: Uneventful            Sign Out: Acceptable/Baseline neuro status   Airway/Respiratory: Uneventful            Sign Out: Acceptable/Baseline resp. status   CV/Hemodynamics: Uneventful            Sign Out: Acceptable CV status   Other NRE: NONE   DID A NON-ROUTINE EVENT OCCUR? No         Last vitals:  Vitals:    02/10/21 1222 02/10/21 1322 02/10/21 1422   BP: (!) 152/89 (!) 153/82 (!) 165/89   Pulse: 66 60 60   Resp: 16 22 17   Temp:      SpO2: 97% 96% 95%       Last vitals prior to Anesthesia Care Transfer:  CRNA VITALS  2/10/2021 0911 - 2/10/2021 1011      2/10/2021             SpO2:  98 %    EKG:  Sinus rhythm          Electronically Signed By: Rj Banks MD  February 10, 2021  2:47 PM

## 2021-02-10 NOTE — ANESTHESIA PROCEDURE NOTES
Pre-Procedure   Staff -   Anesthesiologist:  Rj Banks MD  Performed By: anesthesiologist  Location: OR  Pre-Anesthestic Checklist: patient identified, IV checked, site marked, risks and benefits discussed, informed consent, monitors and equipment checked and pre-op evaluation  Timeout:  Correct Patient: Yes   Correct Procedure: Yes   Correct Site: Yes   Correct Position: Yes   Correct Laterality: Yes   Site Marked: Yes    Procedure Documentation  Procedure: intrathecal  Diagnosis: DJD Knee  Patient Position:sitting  Patient Prep/Sterile Barriers: sterile gloves, mask, Betadine  Insertion Site: L3-4. (midline approach).  Spinal Needle (gauge): 24   Spinal/LP Needle Length (inches): 3  Spinal Needle Type: SprotteIntroducer used  Introducer: 20 G  # of attempts: 1 and  # of redirects:  2    Assessment/Narrative      Paresthesias: No.  Time Injected: 07:28 while  CSF fluid: clear.  Comments:  12mg of 0.75% bupivicaine injected into the IT space.

## 2021-02-10 NOTE — OP NOTE
Procedure Date: 02/10/2021      PREOPERATIVE DIAGNOSES:   1. Left knee primary osteoarthritis.   2. Right knee primary osteoarthritis.      POSTOPERATIVE DIAGNOSES:   1. Left knee primary osteoarthritis.   2. Right knee primary osteoarthritis.      PROCEDURES PERFORMED:   1. Left total knee arthroplasty.   2. Right knee intra-articular injection of corticosteroid, 80 mg of Depo-Medrol.      SURGEON:  Gigi De Oliveira MD      FIRST ASSISTANT:  Shayna Bergman PA-C      INDICATIONS FOR PROCEDURE:  Mr. Barnard is a very pleasant 70-year-old gentleman who has had ongoing bilateral knee pain, worse on the left than the right, for some time now.  He has failed conservative management with oral analgesics, activity modifications, and injections and now wishes to proceed with operative intervention.  We had a long discussion of treatment options.  He has varus osteoarthritis of both knees.  We discussed that he would benefit from total knee arthroplasty.  He understands this and wishes to proceed with surgery.  He understands the risks and alternatives.      NARRATIVE EVENTS:  After thorough preoperative evaluation and proper identification of the extremity to be operated on, Mr. Barnard was taken to the operating room, where he underwent a spinal anesthetic.  He was placed supine on the operating table.  Left leg was prepped and draped in the usual sterile manner.  After an appropriate surgical pause to confirm the patient's extremity to be operated on, 10 minutes after received 2 grams of Ancef, his left leg was exsanguinated and tourniquet was raised to 300 mmHg on the left upper thigh.  We approached the left knee through a midline incision centered over the patella.  Skin and soft tissue were sharply dissected down to the patella, where a median parapatellar arthrotomy was performed.  We performed a moderate medial release according to our preoperative templating, everted the patella, removed the infrapatellar fat pad, flexed  the knee, removed the osteophytes from distal femur, drilled and placed our intramedullary guide for our distal femoral resection.  We resected the distal femur at 5 degrees physiologic valgus to the femur, resecting 9 mm of distal femoral bone.  Once this was done, we incised the distal femur, it sized to fit a size 7 Arthrex iBalance femoral component.  We pinned our 4-in-1 cutting block in 3 degrees of external rotation to the posterior condyle in line with the epicondylar axis perpendicular to Whitesides line.  We made our anterior, posterior and chamfer resections and then proceeded to the tibia.  Here, we resected this perpendicular to the long axis of the tibia using extramedullary guides.  Once this was done, we removed all the excess soft tissues from the knee, mainly both cruciate ligaments and both meniscus.  At this point, we made our box cut for our posterior stabilized femoral component and placed our trial implants into position, a size 7 femur, size 7 tibia with a 13 mm thick PS polyethylene insert.  This gave us good stability and full range of motion and the patella tracked nicely.  At this point, we paid our attention to the patella, which measured 24.5 mm in thickness prior to resection.  We resected this down to 13 mm and placed a 10 mm thick x 37 mm diameter round tri-peg patellar button in position.  With patella and button in position, this measured 25 mm and tracked quite nicely.  At this point, we marked our tibial rotation, we punched for our tibial keel, thoroughly irrigated the knee, prepared to cement in our final components.  Using 1 batch of Simplex SpeedSet cement, we cemented in a size 7 femur, 7 tibia and a 37 round tri-peg patellar button.  Once the cement had cured, we retrialed our polyethylene inserts, found that the 13 mm PS polyethylene insert gave the best stability and full range of motion.  This was then impacted into position.  We removed all the excess cement from the  knee, thoroughly irrigated with dilute Betadine solution as well as saline.  Once this was done, we then closed the arthrotomy using interrupted #1 Vicryl sutures and a #1 running Stratafix suture.  We did place 1 gram of powdered vancomycin deep to the arthrotomy.  We closed the skin soft tissues with absorbable sutures and then a Prineo dressing skin closure.  At this point, we preped and drapped the right knee in our usual sterile manner.  The right knee was then injection with 2 ml of 40mg/ml depomedrol and 5 ml of .25% bupivicaine.  He tolerated the injection without difficulty and we dressed the injection site with a bandaid.  At this point, we put the patient in a well-padded postop dressing on his left knee and he was taken to the recovery room in stable condition.  He tolerated the procedure without difficulty.            TERRY SORIA MD             D: 02/10/2021   T: 02/10/2021   MT: VENESSA      Name:     CATHERINE NORIEGA   MRN:      [id number]        Account:        [id number]   :      1950           Procedure Date: 02/10/2021      Document: [id number]

## 2021-02-11 ENCOUNTER — APPOINTMENT (OUTPATIENT)
Dept: OCCUPATIONAL THERAPY | Facility: CLINIC | Age: 71
End: 2021-02-11
Attending: ORTHOPAEDIC SURGERY
Payer: COMMERCIAL

## 2021-02-11 ENCOUNTER — APPOINTMENT (OUTPATIENT)
Dept: PHYSICAL THERAPY | Facility: CLINIC | Age: 71
End: 2021-02-11
Attending: ORTHOPAEDIC SURGERY
Payer: COMMERCIAL

## 2021-02-11 VITALS
RESPIRATION RATE: 14 BRPM | OXYGEN SATURATION: 97 % | SYSTOLIC BLOOD PRESSURE: 140 MMHG | HEIGHT: 74 IN | TEMPERATURE: 97.5 F | WEIGHT: 285 LBS | DIASTOLIC BLOOD PRESSURE: 76 MMHG | BODY MASS INDEX: 36.57 KG/M2 | HEART RATE: 72 BPM

## 2021-02-11 LAB
HGB BLD-MCNC: 12.9 G/DL (ref 13.3–17.7)
INR PPP: 1.04 (ref 0.86–1.14)
PLATELET # BLD AUTO: 166 10E9/L (ref 150–450)

## 2021-02-11 PROCEDURE — 85049 AUTOMATED PLATELET COUNT: CPT | Performed by: ORTHOPAEDIC SURGERY

## 2021-02-11 PROCEDURE — 85018 HEMOGLOBIN: CPT | Performed by: ORTHOPAEDIC SURGERY

## 2021-02-11 PROCEDURE — 97535 SELF CARE MNGMENT TRAINING: CPT | Mod: GO | Performed by: OCCUPATIONAL THERAPIST

## 2021-02-11 PROCEDURE — 85610 PROTHROMBIN TIME: CPT | Performed by: ORTHOPAEDIC SURGERY

## 2021-02-11 PROCEDURE — 97165 OT EVAL LOW COMPLEX 30 MIN: CPT | Mod: GO | Performed by: OCCUPATIONAL THERAPIST

## 2021-02-11 PROCEDURE — 97116 GAIT TRAINING THERAPY: CPT | Mod: GP | Performed by: PHYSICAL THERAPIST

## 2021-02-11 PROCEDURE — 96372 THER/PROPH/DIAG INJ SC/IM: CPT | Performed by: ORTHOPAEDIC SURGERY

## 2021-02-11 PROCEDURE — 250N000013 HC RX MED GY IP 250 OP 250 PS 637: Performed by: ORTHOPAEDIC SURGERY

## 2021-02-11 PROCEDURE — 250N000011 HC RX IP 250 OP 636: Performed by: ORTHOPAEDIC SURGERY

## 2021-02-11 PROCEDURE — 36415 COLL VENOUS BLD VENIPUNCTURE: CPT | Performed by: ORTHOPAEDIC SURGERY

## 2021-02-11 PROCEDURE — 99213 OFFICE O/P EST LOW 20 MIN: CPT | Performed by: INTERNAL MEDICINE

## 2021-02-11 PROCEDURE — 97530 THERAPEUTIC ACTIVITIES: CPT | Mod: GP | Performed by: PHYSICAL THERAPIST

## 2021-02-11 PROCEDURE — 99207 PR CDG-CODE CATEGORY CHANGED: CPT | Performed by: INTERNAL MEDICINE

## 2021-02-11 RX ORDER — WARFARIN SODIUM 5 MG/1
10 TABLET ORAL
Status: DISCONTINUED | OUTPATIENT
Start: 2021-02-11 | End: 2021-02-11 | Stop reason: HOSPADM

## 2021-02-11 RX ORDER — HYDROXYZINE HYDROCHLORIDE 25 MG/1
25-50 TABLET, FILM COATED ORAL EVERY 4 HOURS PRN
Qty: 40 TABLET | Refills: 0 | Status: SHIPPED | OUTPATIENT
Start: 2021-02-11 | End: 2021-03-02

## 2021-02-11 RX ADMIN — DOCUSATE SODIUM 100 MG: 100 CAPSULE, LIQUID FILLED ORAL at 08:19

## 2021-02-11 RX ADMIN — ACETAMINOPHEN 975 MG: 325 TABLET, FILM COATED ORAL at 05:39

## 2021-02-11 RX ADMIN — ENOXAPARIN SODIUM 40 MG: 40 INJECTION SUBCUTANEOUS at 08:19

## 2021-02-11 RX ADMIN — CEFAZOLIN SODIUM 2 G: 2 INJECTION, SOLUTION INTRAVENOUS at 00:30

## 2021-02-11 RX ADMIN — FAMOTIDINE 20 MG: 20 TABLET ORAL at 08:19

## 2021-02-11 RX ADMIN — HYDROMORPHONE HYDROCHLORIDE 0.2 MG: 1 INJECTION, SOLUTION INTRAMUSCULAR; INTRAVENOUS; SUBCUTANEOUS at 00:26

## 2021-02-11 RX ADMIN — OXYCODONE HYDROCHLORIDE 10 MG: 5 TABLET ORAL at 12:39

## 2021-02-11 RX ADMIN — DOCUSATE SODIUM 50 MG AND SENNOSIDES 8.6 MG 1 TABLET: 8.6; 5 TABLET, FILM COATED ORAL at 08:23

## 2021-02-11 RX ADMIN — OXYCODONE HYDROCHLORIDE 10 MG: 5 TABLET ORAL at 03:13

## 2021-02-11 RX ADMIN — OXYCODONE HYDROCHLORIDE 10 MG: 5 TABLET ORAL at 08:30

## 2021-02-11 NOTE — PROGRESS NOTES
Patient's discharge instructions were reviewed with patient and/or spouse.   Patient verbalized understanding of discharge instructions, recommended follow up and was given an opportunity to ask questions.   Discharge medications sent home with patient/family: YES   Discharged with spouse.   Pt received new walker. Pain control gone over in detail.

## 2021-02-11 NOTE — PLAN OF CARE
Occupational Therapy Discharge Summary    Reason for therapy discharge:    All goals and outcomes met, no further needs identified.    Progress towards therapy goal(s). See goals on Care Plan in [facility] electronic health record for goal details.  Goals met    Therapy recommendation(s):    No further therapy is recommended.

## 2021-02-11 NOTE — PROGRESS NOTES
A/O lung sounds clear, up with assist of one walker and gait. Tolerating regular diet,-ve flatus,+ve bowel sounds.Pain controlled with prn Oxy,iv dilaudid and scheduled tylenol.Dressing clean dry and intact. Cms intact.Saline lock. Voiding.Plan to discharge home today.

## 2021-02-11 NOTE — PLAN OF CARE
Physical Therapy Discharge Summary    Reason for therapy discharge:    All goals and outcomes met, no further needs identified.    Progress towards therapy goal(s). See goals on Care Plan in [facility] electronic health record for goal details.  Goals met    Therapy recommendation(s):    Defer to ortho

## 2021-02-11 NOTE — PROGRESS NOTES
[facility]  Hospitalist Consult Progress Note  Esa Parham MD 02/11/21    Reason for Stay (Diagnosis): L TKA         Assessment and Plan:      Summary of Stay: Cameron Barnard is a 70 year old male with a history of chronic left leg DVT, PE on chronic anticoagulation, HTN, HLD, obesity, SBO, gout, and DJD bilateral knees who was admitted on 2/10/2021 following elective left TKA and right knee corticosteroid injection by Dr. De Oliveira.       Assessment and plan by problem:  DJD bilateral knees:  s/p L TKA and R knee corticosteroid injection on 2/10/2021 by Dr. De Oliveira.  He is having increased levels of pain in the left knee requiring oxycodone 10 mg dosing.  Will defer diet, activity, DVT prophylaxis, and pain control to the primary team. Currently the patient is on Lovenox 40 mg daily and his warfarin has been resumed although lower INR goal of 1.5-2. Continue physical and occupational therapy.  Continue incentive spirometry and follow hemoglobin to evaluate for surgical blood loss and potential need for transfusion.      Chronic left leg DVT, history PE: Chronically on warfarin with INR goal 2-3 per his anticoagulation notes.  Previous PE in 2017.  Chronic left femoral DVT as seen on his ultrasound from 2 days prior to surgery which shows a chronic nonocclusive and weblike thrombus of the left femoral and popliteal veins along with chronic weblike thrombus of the left great saphenous vein.  His warfarin was held 5 days prior to surgery and he has been on Lovenox bridge with holding dose morning of surgery.  -Warfarin resumption ordered by orthopedics team today although lower INR dose of 1.5-2, normally he would be 2-3.  Spoke with Pharm.D. continue warfarin at at bedtime, daily INR  -He will receive Lovenox 40 mg DVT prophylaxis dose this morning.  Recommend increasing to therapeutic dose 120 mg twice daily for bridge as soon as allowed by orthopedics team  -He has 9 doses at home for discharge  "and has follow-up appointment Monday for INR check     HLD: Resume omega-3 at home.     HTN: Blood pressure 152/90 with repeat 138/87 in clinic for preop.  He did not want to start any medications at that time, not currently on anything.  Blood pressure elevated here postoperatively in the 140 systolic.  Okay to hold off which could be driving up his blood pressure.     Obesity: BMI 36.     Code status: Full code  Prophylaxis: per primary team  Disposition: per primary team. PT/OT.  Patient does not feel he can be discharged today due to level of pain in the knee and weakness           Interval History (Subjective):      He is reporting increasing pain in the left knee.  There is a constant intense ache and then when tries to move it there is a sharp pain on the medial side radiating anteriorly.  He was able to get up yesterday and walk with therapy.  He found it difficult to get off of the toilet.  Denies any numbness or tingling going up the leg.  He does feel like the left leg was weak and he needed help lifting it.  He does not feel acutely ill to go home to the level of pain today.                  Physical Exam:      Last Vital Signs:  BP (!) 144/71 (BP Location: Right arm)   Pulse 68   Temp 97.3  F (36.3  C) (Temporal)   Resp 11   Ht 1.88 m (6' 2\")   Wt 129.3 kg (285 lb)   SpO2 98%   BMI 36.59 kg/m        Intake/Output Summary (Last 24 hours) at 2/11/2021 0901  Last data filed at 2/11/2021 0546  Gross per 24 hour   Intake 1240 ml   Output 1684 ml   Net -444 ml       Constitutional: Awake, NAD   Eyes: sclera white   HEENT: atraumatic, MMM  Respiratory:    no crackles or wheeze  Cardiovascular: RRR.  No murmur   GI: non-tender, not distended, bowel sounds present  Skin: no rash or lesions, acyanotic  Musculoskeletal/extremities: Left leg Ace wrap.  No lower extremity edema.  Neurologic: A&O, speech clear,  can wiggle toes bilaterally and light touch sensation intact in bilateral legs  Psychiatric: calm, " cooperative, normal affect         Medications:      All current medications were reviewed with changes reflected in problem list.         Data:      All new lab and imaging data was reviewed.   Labs:  Recent Labs   Lab 02/11/21  0641   HGB 12.9*        Recent Labs   Lab 02/11/21 0641 02/10/21  0620   INR 1.04 1.04      Imaging:   None today      Esa Parham MD

## 2021-02-11 NOTE — PROGRESS NOTES
"Orthopedic Surgery  2/11/2021  POD#: 1    S: Patient voices no complaints today other than pain.     O: Blood pressure (!) 144/71, pulse 68, temperature 97.3  F (36.3  C), temperature source Temporal, resp. rate 11, height 1.88 m (6' 2\"), weight 129.3 kg (285 lb), SpO2 98 %.  Lab Results   Component Value Date    HGB 12.9 02/11/2021     Lab Results   Component Value Date    INR 1.04 02/11/2021        I/O last 3 completed shifts:  In: 2240 [P.O.:240; I.V.:2000]  Out: 1684 [Urine:1675; Blood:9]    Neurovascularly intact.  Calves are negative bilaterally, both soft and nontender.  The wound is C/D/I.  The wound looks good with minimal erythema of the surrounding skin.    A: Mr. Barnard is doing well status post Procedure(s):  Left total knee arthroplasty  Right knee intra-articular injection of corticosteroid, 80 mg of Depo-Medrol.    P:   1. Mobilize and continue physical therapy. Passed all goals and ready to discharge later today.  2. Anticoagulation - therapeutic lovenox (has enough at home until his next INR draw on Monday), and PTA coumadin  3. Pain management - pt anxious about this, but has plan in place.  Has oxycontin ER 10mg at home from previous bowel surgery.  May take this BID in addition to oxycodone if pain is not controlled on IR tabs.  4. Anticipate discharge to home later today.  Leave ACE dressing in place, pt may remove tomorrow and shower at that time.      Shayna Bergman PA-C  O: [phone]  "

## 2021-02-11 NOTE — PROGRESS NOTES
02/11/21 1313   Quick Adds   Type of Visit Initial Occupational Therapy Evaluation   Living Environment   People in home spouse   Current Living Arrangements house  (split entry)   Home Accessibility stairs to enter home;stairs within home   Number of Stairs, Main Entrance 2   Number of Stairs, Within Home, Primary 7   Stair Railings, Within Home, Primary railings on both sides of stairs   Transportation Anticipated family or friend will provide   Living Environment Comments Pt has a walk in shower on lower level, tub and shower on main floor.   Self-Care   Usual Activity Tolerance moderate   Current Activity Tolerance moderate   Regular Exercise Yes   Activity/Exercise Type biking;other (see comments)  (pt has exercise bike in lower level of home)   Equipment Currently Used at Home cane, straight;raised toilet seat   Activity/Exercise/Self-Care Comment pt and spouse are retired   Disability/Function   Hearing Difficulty or Deaf yes   Describe hearing loss hearing loss on left side   Wear Glasses or Blind yes   Vision Management readers   Concentrating, Remembering or Making Decisions Difficulty no   Difficulty Communicating no   Difficulty Eating/Swallowing no   Walking or Climbing Stairs Difficulty no   Dressing/Bathing Difficulty no   Toileting issues no   Doing Errands Independently Difficulty (such as shopping) no   Fall history within last six months no   Change in Functional Status Since Onset of Current Illness/Injury no   General Information   Onset of Illness/Injury or Date of Surgery 02/10/21   Referring Physician Eren De Oliveira   Patient/Family Therapy Goal Statement (OT) return home and to his nromal routine   Additional Occupational Profile Info/Pertinent History of Current Problem Pt is a 70 year old male admitted for a left TKA.  Pt states his right knee needs surgery as well, will be having that surgery later.  Pt also had an admission for a perforated bowel in October 2020   Performance  Patterns (Routines, Roles, Habits) pt is independent in basic ADLS   Existing Precautions/Restrictions fall   Left Lower Extremity (Weight-bearing Status) weight-bearing as tolerated (WBAT)   Cognitive Status Examination   Orientation Status orientation to person, place and time   Affect/Mental Status (Cognitive) WNL   Visual Perception   Visual Impairment/Limitations corrective lenses for reading   Pain Assessment   Patient Currently in Pain Yes, see Vital Sign flowsheet   Range of Motion Comprehensive   General Range of Motion no range of motion deficits identified   Comment, General Range of Motion B UEs   Strength Comprehensive (MMT)   General Manual Muscle Testing (MMT) Assessment no strength deficits identified   Comment, General Manual Muscle Testing (MMT) Assessment B UEs   Coordination   Upper Extremity Coordination No deficits were identified   Bed Mobility   Bed Mobility supine-sit;sit-supine   Supine-Sit Wink (Bed Mobility) moderate assist (50% patient effort)   Sit-Supine Wink (Bed Mobility) moderate assist (50% patient effort)   Clinical Impression   Criteria for Skilled Therapeutic Interventions Met (OT) yes   OT Diagnosis decreased independence in ADLS   OT Problem List-Impairments impacting ADL problems related to;activity tolerance impaired;range of motion (ROM)   Assessment of Occupational Performance 1-3 Performance Deficits   Identified Performance Deficits decreased independence for dressing, bathing, functional mobility   Planned Therapy Interventions (OT) ADL retraining;strengthening;home program guidelines   Clinical Decision Making Complexity (OT) low complexity   Therapy Frequency (OT) 1x eval and treat   Risk & Benefits of therapy have been explained evaluation/treatment results reviewed;care plan/treatment goals reviewed;patient   OT Discharge Planning    OT Discharge Recommendation (DC Rec) Home with assist   OT Rationale for DC Rec pt is below baseline for ADLS and  functional mobility.  Would benefit from skilled OT to increase endurance and independence in ADLS   OT Brief overview of current status  Min/Mod A for ADLS and bathroom transfers   Total Evaluation Time (Minutes)   Total Evaluation Time (Minutes) 15

## 2021-02-16 NOTE — DISCHARGE SUMMARY
"Discharge Summary    Cameron Barnard MRN# [id number]   YOB: 1950 Age: 70 year old     Date of Admission: 2/10/2021    Date of Discharge: 2/11/2021    Reason for Admission: Cameron Barnard is an 70 year old male who was admitted to the hospital following surgery with Dr. Gigi De Oliveira.    Preoperative Diagnosis: DJD (degenerative joint disease) [M19.90]    Postoperative Diagnosis: DJD (degenerative joint disease) [M19.90]    Procedure Completed: left total knee arthroplasty     Hospital Course:  Mr. Barnard was admitted and underwent the above procedure. The patient tolerated the procedure well. There were no complications. Following surgery he was admitted to the floor.  During his stay he did not require any blood transfusions.  His last hemoglobin was noted to be   Lab Results   Component Value Date    HGB 12.9 02/11/2021   .  His pain was controlled with oral pain medication.  During his stay he progressed well in physical therapy and all the therapy goals were met.     Discharge Physical Exam:  BP (!) 140/76 (BP Location: Right arm)   Pulse 72   Temp 97.5  F (36.4  C) (Temporal)   Resp 14   Ht 1.88 m (6' 2\")   Wt 129.3 kg (285 lb)   SpO2 97%   BMI 36.59 kg/m    Neurovascularly intact, distal pulses present bilaterally.  Calves are negative bilaterally, both soft and nontender.  The wound looks good with minimal erythema of the surrounding skin.    Assessment: Mr. Barnard is stable and doing well status post left total knee arthroplasty on POD #1.    Plan: We will discharge him home on analgesics and deep venous thrombosis prophylaxis.  He will follow-up with Shayna Bergman PA-C or Dr. Gigi De Oliveira approximately 2 weeks from surgery.  He may call [phone] to schedule an appointment.    Discharge Instructions:  Discharge diet: Regular   Discharge activity: Weight bear as tolerated.  Advance from the walker to a cane as tolerated.  Discontinue the use of cane when comfortable.   Physical therapy: " Work on therapy exercises given in the hospital.     Discharge follow-up: Follow up with Shayna Bergman PA-C in 12-16 days.   Anticoagulation Resume PTA coumadin.   Wound care: Leave ACE dressing in place, pt may remove tomorrow and shower at that time. Dry dressings as needed. Leave Prineo dressing in place.  Keep wound clean and dry.  May get incision area wet in shower but do not soak or scrub.         Meds:   Cameron Barnard Gigi   Home Medication Instructions JOSE ANTONIO:[id number]    Printed on:02/16/21 7584   Medication Information                      acetaminophen (TYLENOL) 325 MG tablet  Take 2 tablets (650 mg) by mouth every 4 hours as needed for other (mild pain)             Cholecalciferol (VITAMIN D3) 50 MCG (2000 UT) CAPS  Take 5,000 Units by mouth daily              Dietary Management Product (VASCULERA) TABS  Take 1 tablet by mouth daily             enoxaparin ANTICOAGULANT (LOVENOX) 120 MG/0.8ML syringe  Inject 0.8 mLs (120 mg) Subcutaneous every 12 hours Until your follow up INR on 2/15             Fish Oil-Cholecalciferol (OMEGA-3 FISH OIL/VITAMIN D3 PO)  Take 2 capsules by mouth daily [employer] product with Omega3/Vit D             hydrOXYzine (ATARAX) 25 MG tablet  Take 1-2 tablets (25-50 mg) by mouth every 4 hours as needed (muscle spasms or pain)             melatonin 5 MG CAPS  Take 5 mg by mouth nightly as needed              Multiple Vitamins-Minerals (MULTIVITAMIN ADULTS 50+) TABS  Take 1 tablet by mouth daily              oxyCODONE (ROXICODONE) 5 MG tablet  Take 1-2 tablets (5-10 mg) by mouth every 3 hours as needed for pain (Moderate to Severe)             senna-docusate (SENOKOT-S/PERICOLACE) 8.6-50 MG tablet  Take 1-2 tablets by mouth 2 times daily Take while on oral narcotics to prevent or treat constipation.             warfarin ANTICOAGULANT (COUMADIN) 5 MG tablet  Take 10 mg by mouth daily On Fridays             warfarin ANTICOAGULANT (COUMADIN) 5 MG tablet  Take 7.5 mg by mouth daily                       Shayna Bergman PA-C  O: [phone]

## 2021-03-02 ENCOUNTER — HOSPITAL ENCOUNTER (OUTPATIENT)
Dept: WOUND CARE | Facility: CLINIC | Age: 71
Discharge: HOME | End: 2021-03-02
Attending: SURGERY | Admitting: SURGERY
Payer: COMMERCIAL

## 2021-03-02 VITALS
SYSTOLIC BLOOD PRESSURE: 145 MMHG | RESPIRATION RATE: 16 BRPM | TEMPERATURE: 96.4 F | HEART RATE: 91 BPM | DIASTOLIC BLOOD PRESSURE: 86 MMHG

## 2021-03-02 DIAGNOSIS — I89.0 LYMPHEDEMA OF LEFT LOWER EXTREMITY: ICD-10-CM

## 2021-03-02 PROCEDURE — G0463 HOSPITAL OUTPT CLINIC VISIT: HCPCS

## 2021-03-02 PROCEDURE — 99213 OFFICE O/P EST LOW 20 MIN: CPT | Performed by: SURGERY

## 2021-03-02 NOTE — DISCHARGE INSTRUCTIONS
[facility]  [address] [address]    Call us at [phone] if you have any questions about your wounds, have redness or swelling around your wound, have a fever of 101 or greater or if you have any other problems or concerns. We answer the phone Monday through Friday 8 am to 4 pm, please leave a message as we check the voicemail frequently throughout the day.     Cameron Barnard      1950    Medications/supplements to aid in healing:  Vitamin D3 5,000 iu per day  Vasculera to help your veins -take one tablet a day. Purchase from [facility]  Vitamin C 2,000 mg daily  Methyl Folate 1000 mcg daily   Vitamin B 12 1000 mcg daily  Melatonin 10 mg at night  Magnesium as directed on bottle  Compression: Apply clean compression Edemawear blue stripe from toe to calf than yellow stripe from calf to knee, to be worn 24/7.  Walk as much as you can. When you sit raise your ankle above your hips to decrease swelling.    LISA Apple M.D.. February 3, 2021  Wound Clinic follow up 4 weeks with Dr. Apple  Lymphedema Therapy call the scheduling line at [phone]. There are different locations to choose from.     LISA Apple M.D.. March 2, 2021          If you had a positive experience please indicate on your patient satisfaction survey form that [facility] will be sending you.

## 2021-03-02 NOTE — PROGRESS NOTES
Patient arrived for wound care visit. Certified Wound Care Nurse time spent evaluating patient record, completed a full evaluation and documented; provided recommendation based on treatment plan. Applied dressing, reviewed discharge instructions, patient education, and discussed plan of care with appropriate medical team staff members and patient and/or family members.

## 2021-03-03 NOTE — PROGRESS NOTES
[facility] [place] Progress Note    Subject: Cameron Barnard returns after a left total knee arthroplasty, swelling is worse after the surgery, has not been seen by certified lymphedema therapy as recommended pretty procedure due to an out of network issue, and trying to work with medical he states that he can only be seen by a certified lymphedema therapist and [place] and not at where we had recommended, he will be reaching out to the certified lymphedema therapy appointment line to arrange for a certified lymphedema therapy evaluation in [place].  He is utilizing EdemaWear, elevating leg when sitting, we had initiated preloading with flavonoids though this was less than 2 weeks prior to the left total knee arthroplasty.  More likely than not, he had significant lymphatic dysfunction preoperatively due to left lower extremity deep venous thromboses, consistent with lymphedema of venous etiology.  He remains on chronic anticoagulation.  No history of perioperative deep venous thromboses though he does have a history of pulmonary emboli in 2017.    PMH:   Past Medical History:   Diagnosis Date     Allergic state      DVT (deep venous thrombosis) (H)     chronic left femoral DVT     HLD (hyperlipidemia)      Hypertension      Multiple subsegmental pulmonary emboli without acute cor pulmonale (H) 2017     Perforated bowel (H) 09/2020     Primary osteoarthritis of both knees      Patient Active Problem List   Diagnosis     Anticoagulation monitoring, INR range 2-3     Ascending aorta dilation (H)     Deep vein thrombosis of left femoral vein (H)     Primary osteoarthritis of both knees     Pulmonary embolism (H)     Diverticulitis     Bowel obstruction (H)     History of bowel resection     Lymphedema of left lower extremity     S/P total knee arthroplasty     Social Hx:   Social History     Socioeconomic History     Marital status:      Spouse name: Not on file     Number of children:  Not on file     Years of education: Not on file     Highest education level: Not on file   Occupational History     Not on file   Social Needs     Financial resource strain: Not on file     Food insecurity     Worry: Not on file     Inability: Not on file     Transportation needs     Medical: Not on file     Non-medical: Not on file   Tobacco Use     Smoking status: Never Smoker     Smokeless tobacco: Never Used   Substance and Sexual Activity     Alcohol use: Yes     Frequency: Never     Comment: 2 glasses of red wine per week     Drug use: Never     Sexual activity: Not on file   Lifestyle     Physical activity     Days per week: Not on file     Minutes per session: Not on file     Stress: Not on file   Relationships     Social connections     Talks on phone: Not on file     Gets together: Not on file     Attends [employer] service: Not on file     Active member of club or organization: Not on file     Attends meetings of clubs or organizations: Not on file     Relationship status: Not on file     Intimate partner violence     Fear of current or ex partner: Not on file     Emotionally abused: Not on file     Physically abused: Not on file     Forced sexual activity: Not on file   Other Topics Concern     Parent/sibling w/ CABG, MI or angioplasty before 65F 55M? Not Asked   Social History Narrative     Not on file       Surgical Hx:   Past Surgical History:   Procedure Laterality Date     ARTHROPLASTY KNEE Left 2/10/2021    Procedure: Left total knee arthroplasty;  Surgeon: Gigi De Oliveira MD;  Location:  OR     COLONOSCOPY N/A 9/19/2020    Procedure: COLONOSCOPY;  Surgeon: Lakshmi Santana MD;  Location:  OR     ESOPHAGOSCOPY, GASTROSCOPY, DUODENOSCOPY (EGD), COMBINED N/A 9/19/2020    Procedure: ESOPHAGOGASTRODUODENOSCOPY (EGD);  Surgeon: Lakshmi Santana MD;  Location:  OR     INJECT STEROID (LOCATION) Right 2/10/2021    Procedure: Right knee intra-articular injection of  corticosteroid, 80 mg of Depo-Medrol;  Surgeon: Gigi De Oliveira MD;  Location:  OR     LAPAROSCOPIC APPENDECTOMY N/A 9/27/2020    Procedure: Laparoscopic appendectomy;  Surgeon: Christ Parekh MD;  Location: [facility] OR     LAPAROSCOPY DIAGNOSTIC (GENERAL) N/A 9/27/2020    Procedure: LAPAROSCOPIC  SMALL BOWEL RESCECTION;  Surgeon: Christ Parekh MD;  Location:  OR       Allergies:    Allergies   Allergen Reactions     Wheat Bran Other (See Comments)     Weight gain       Medications:   Current Outpatient Medications   Medication     acetaminophen (TYLENOL) 325 MG tablet     Cholecalciferol (VITAMIN D3) 50 MCG (2000 UT) CAPS     Dietary Management Product (VASCULERA) TABS     Fish Oil-Cholecalciferol (OMEGA-3 FISH OIL/VITAMIN D3 PO)     melatonin 5 MG CAPS     Multiple Vitamins-Minerals (MULTIVITAMIN ADULTS 50+) TABS     warfarin ANTICOAGULANT (COUMADIN) 5 MG tablet     No current facility-administered medications for this encounter.        Labs:   Recent Labs   Lab Test 02/11/21  0641 09/29/20  0739 09/29/20  0739 09/26/20  1204 09/26/20  1204   ALBUMIN  --   --   --   --  3.0*   HGB 12.9*  --  12.1*   < > 15.1   INR 1.04   < > 1.36*   < > 2.45*   WBC  --   --  9.2   < > 12.8*    < > = values in this interval not displayed.     Creatinine   Date Value Ref Range Status   09/30/2020 0.73 0.66 - 1.25 mg/dL Final     GFR Estimate   Date Value Ref Range Status   09/30/2020 >90 >60 mL/min/[1.73_m2] Final     Comment:     Non  GFR Calc  Starting 12/18/2018, serum creatinine based estimated GFR (eGFR) will be   calculated using the Chronic Kidney Disease Epidemiology Collaboration   (CKD-EPI) equation.       GFR Estimate If Black   Date Value Ref Range Status   09/30/2020 >90 >60 mL/min/[1.73_m2] Final     Comment:      GFR Calc  Starting 12/18/2018, serum creatinine based estimated GFR (eGFR) will be   calculated using the Chronic Kidney Disease Epidemiology  Collaboration   (CKD-EPI) equation.       Lab Results   Component Value Date    WBC 9.2 09/29/2020     Lab Results   Component Value Date    RBC 3.90 09/29/2020     Lab Results   Component Value Date    HGB 12.9 02/11/2021     Lab Results   Component Value Date    HCT 36.0 09/29/2020     No components found for: MCT  Lab Results   Component Value Date    MCV 92 09/29/2020     Lab Results   Component Value Date    MCH 31.0 09/29/2020     Lab Results   Component Value Date    MCHC 33.6 09/29/2020     Lab Results   Component Value Date    RDW 12.9 09/29/2020     Lab Results   Component Value Date     02/11/2021          Nutrition requirements were discussed with patient today.  Objective:  BP (!) 145/86 (BP Location: Left arm)   Pulse 91   Temp 96.4  F (35.8  C) (Temporal)   Resp 16         General:  Patient is alert and orientated, no acute distress.  Conversant.  See photodocumentation, edema of left lower extremity, utilizing edema wear, palpable left dorsalis pedis pulse, no ulcerations        Impression: Lymphedema of venous etiology, left lower extremity, history of left lower extremity deep venous thromboses with persistence of weblike stenoses within multiple deep venous structures of the left lower extremity, history of pulmonary emboli, on chronic anticoagulation      Plan: Certified lymphedema therapy evaluation, requires increased inelastic compression over the dynamic dermal edema wear.  Continue micronized purified flavonoid fraction utilization.  Patient will return to the clinic in 6 weeks time.     Tyshawn Apple MD on 3/2/2021 at 7:44 PM

## 2021-03-11 ENCOUNTER — OFFICE VISIT (OUTPATIENT)
Dept: VASCULAR SURGERY | Facility: CLINIC | Age: 71
End: 2021-03-11
Payer: COMMERCIAL

## 2021-03-11 DIAGNOSIS — I82.512 CHRONIC DEEP VEIN THROMBOSIS (DVT) OF FEMORAL VEIN OF LEFT LOWER EXTREMITY (H): Primary | ICD-10-CM

## 2021-03-11 DIAGNOSIS — I89.0 LYMPHEDEMA OF LEFT LOWER EXTREMITY: ICD-10-CM

## 2021-03-11 PROCEDURE — 99213 OFFICE O/P EST LOW 20 MIN: CPT | Performed by: SURGERY

## 2021-03-11 NOTE — PROGRESS NOTES
VEINSOLUTIONS CONSULTATION    HPI:    Cameron Barnard is a pleasant 70 year old male referred by Dr. River Apple for evaluation of left lower extremity postphlebitic syndrome.  The patient admits to swelling of both his lower extremities for years but had an acute exacerbation of the swelling of his left lower extremity when he was out walking his dog in 2018, slipped and fell on the ice, striking his left buttock.  This resulted in a left lower extremity deep vein thrombosis, complicated by  Small, bilateral pulmonary emboli.  He has been maintained on warfarin anticoagulation since that time.  He has not had recurrent venous thromboembolism.    His left lower extremity has been more edematous than his right since his deep vein thrombosis.  He has been wearing compression fairly regularly but has had difficulty controlling the swelling, especially since his left total knee replacement on February 10, 2020.    I have been asked to see him for evaluation of his venous insufficiency as a contributing factor to his lower extremity edema.  The patient denies a family history of venous insufficiency    PAST MEDICAL HISTORY:   Past Medical History:   Diagnosis Date     Allergic state      DVT (deep venous thrombosis) (H)     chronic left femoral DVT     HLD (hyperlipidemia)      Hypertension      Multiple subsegmental pulmonary emboli without acute cor pulmonale (H) 2017     Perforated bowel (H) 09/2020     Primary osteoarthritis of both knees        PAST SURGICAL HISTORY:   Past Surgical History:   Procedure Laterality Date     ARTHROPLASTY KNEE Left 2/10/2021    Procedure: Left total knee arthroplasty;  Surgeon: Gigi De Oliveira MD;  Location:  OR     COLONOSCOPY N/A 9/19/2020    Procedure: COLONOSCOPY;  Surgeon: Lakshmi Santana MD;  Location:  OR     ESOPHAGOSCOPY, GASTROSCOPY, DUODENOSCOPY (EGD), COMBINED N/A 9/19/2020    Procedure: ESOPHAGOGASTRODUODENOSCOPY (EGD);  Surgeon: Lakshmi Santana  MD Tiffani;  Location: [facility] OR     INJECT STEROID (LOCATION) Right 2/10/2021    Procedure: Right knee intra-articular injection of corticosteroid, 80 mg of Depo-Medrol;  Surgeon: Gigi De Oliveira MD;  Location: [facility] OR     LAPAROSCOPIC APPENDECTOMY N/A 9/27/2020    Procedure: Laparoscopic appendectomy;  Surgeon: Christ Parekh MD;  Location: [facility] OR     LAPAROSCOPY DIAGNOSTIC (GENERAL) N/A 9/27/2020    Procedure: LAPAROSCOPIC  SMALL BOWEL RESCECTION;  Surgeon: Christ Parekh MD;  Location: [facility] OR       FAMILY HISTORY:   Family History   Problem Relation Age of Onset     Breast Cancer Sister        SOCIAL HISTORY:   Social History     Tobacco Use     Smoking status: Never Smoker     Smokeless tobacco: Never Used   Substance Use Topics     Alcohol use: Yes     Frequency: Never     Comment: 2 glasses of red wine per week       REVIEW OF SYSTEMS: Review Of Systems  Skin: negative  Eyes: negative  Ears/Nose/Throat: negative  Respiratory: No shortness of breath, dyspnea on exertion, cough, or hemoptysis  Cardiovascular: negative  Gastrointestinal: Diarrhea, indigestion/reflux  Genitourinary: negative  Musculoskeletal: negative and leg pain, leg swelling  Neurologic: headaches  Psychiatric: negative  Hematologic/Lymphatic/Immunologic: negative  Endocrine: negative      Vital signs:  There were no vitals taken for this visit.    Current Outpatient Medications   Medication Sig Dispense Refill     acetaminophen (TYLENOL) 325 MG tablet Take 2 tablets (650 mg) by mouth every 4 hours as needed for other (mild pain) 100 tablet 0     Cholecalciferol (VITAMIN D3) 50 MCG (2000 UT) CAPS Take 5,000 Units by mouth daily        Dietary Management Product (VASCULERA) TABS Take 1 tablet by mouth daily 90 tablet 3     Fish Oil-Cholecalciferol (OMEGA-3 FISH OIL/VITAMIN D3 PO) Take 2 capsules by mouth daily [place] product with Omega3/Vit D       melatonin 5 MG CAPS Take 5 mg by mouth nightly as needed        Multiple  Vitamins-Minerals (MULTIVITAMIN ADULTS 50+) TABS Take 1 tablet by mouth daily        warfarin ANTICOAGULANT (COUMADIN) 5 MG tablet Take 7.5 mg by mouth daily          PHYSICAL EXAM:  General: Pleasant, NAD.   HEENT: Normocephalic, atraumatic, external ears and nose normal.   Respiratory: Normal respiratory effort.   Cardiovascular: Pulse is regular.   Musculoskeletal: Gait and station normal.  The joints of his fingers and toes without deformity.  There is no cyanosis of his nailbeds.   EXTREMITIES: Right lower extremity: 1-2+ edema with firmness suggestive of early lipodermatosclerosis.  I do not appreciate significant varicose veins.  Left lower extremity: Healing anterior knee incision from left total knee arthroplasty.  4 to 7 mm varicosity coursing from the mid medial left thigh, down the medial aspect of the left calf.  Area of stasis hyperpigmentation about the distal half of the anteromedial left leg.  2-3+ edema of the left lower extremity extending into the ankle and into the foot..    PULSES: R/L (3=normal pulse, 0=no palpable pulse) dorsalis pedis: 1/1; posterior tibial: 0/0.      Neurologic: Grossly normal  Psychiatric: Mood, affect, judgment and insight are normal     Venous Duplex Ultrasound:   Right lower extremity: No evidence of deep vein thrombosis.  His right common femoral, mid femoral and popliteal veins are incompetent.     His right great saphenous vein is incompetent from the proximal thigh to the ankle, measures 4.4 mm diameter maximally with reflux time of 1.5 seconds.  The right small saphenous vein and Vein of Giacomini are competent.  There are no significant incompetent perforators.  There is a 4.3 x 2.9 x 1.4 cm popliteal cyst consistent with a Baker's cyst.    Left lower extremity: No evidence of deep vein thrombosis.   chronic, weblike thrombus in one of the paired mid to distal femoral veins and popliteal vein.  Chronic nonocclusive thrombus noted in the proximal femoral vein as  well.  The left great saphenous vein is incompetent from the saphenofemoral junction to the ankle, measures 6 mm in diameter with reflux time of 2.8 seconds.  He has weblike thrombus noted in the thigh segment.  Left small saphenous vein is incompetent from the saphenopopliteal junction to the mid calf, measures 4.5 mm in diameter with reflux time of 1 second.  The Vein of Giacomini is competent.  There is a 3.5 mm diameter incompetent  13 cm from the left medial malleolus.    ASSESSMENT:  CEAP 4, VCSS 11 left lower extremity chronic venous insufficiency.  He recently underwent left total knee replacement which has exacerbated his left lower extremity edema.  He has both deep and superficial venous insufficiency in his left lower extremity which may respond to treatment of his superficial insufficiency.  I would prefer to allow one year for healing from his TKA before intervening on his right leg.  Dr. Apple has maximized non-invasive edema control with which I totally agree.    If he has been studied and found not to have an inherited hypercoagulable state, one could consider discontinuing his anticoagulation after his right total knee replacement if Hematology agrees.      PLAN:  Allow recovery from right total knee replacement for one year before intervention for superficial insufficiency.  Hopefully, non-invasive management will control edema adequately, but if not we will address.     Nestor Stinson MD, MD    Dictated using Dragon which may result in transcription errors.          VEINSOLUTIONS NEW PATIENT:

## 2021-03-11 NOTE — LETTER
3/11/2021         RE: Cameron Barnard  [address]  [place] [address]        Dear Colleague,    Thank you for referring your patient, Cameron Barnard, to the [facility] [place]. Please see a copy of my visit note below.    VEINSOLUTIONS CONSULTATION    HPI:    Cameron Barnard is a pleasant 70 year old male referred by Dr. River Apple for evaluation of left lower extremity postphlebitic syndrome.  The patient admits to swelling of both his lower extremities for years but had an acute exacerbation of the swelling of his left lower extremity when he was out walking his dog in 2018, slipped and fell on the ice, striking his left buttock.  This resulted in a left lower extremity deep vein thrombosis, complicated by  Small, bilateral pulmonary emboli.  He has been maintained on warfarin anticoagulation since that time.  He has not had recurrent venous thromboembolism.    His left lower extremity has been more edematous than his right since his deep vein thrombosis.  He has been wearing compression fairly regularly but has had difficulty controlling the swelling, especially since his left total knee replacement on February 10, 2020.    I have been asked to see him for evaluation of his venous insufficiency as a contributing factor to his lower extremity edema.  The patient denies a family history of venous insufficiency    PAST MEDICAL HISTORY:   Past Medical History:   Diagnosis Date     Allergic state      DVT (deep venous thrombosis) (H)     chronic left femoral DVT     HLD (hyperlipidemia)      Hypertension      Multiple subsegmental pulmonary emboli without acute cor pulmonale (H) 2017     Perforated bowel (H) 09/2020     Primary osteoarthritis of both knees        PAST SURGICAL HISTORY:   Past Surgical History:   Procedure Laterality Date     ARTHROPLASTY KNEE Left 2/10/2021    Procedure: Left total knee arthroplasty;  Surgeon: Gigi De Oliveira MD;  Location:  OR      COLONOSCOPY N/A 9/19/2020    Procedure: COLONOSCOPY;  Surgeon: Lakshmi Santana MD;  Location:  OR     ESOPHAGOSCOPY, GASTROSCOPY, DUODENOSCOPY (EGD), COMBINED N/A 9/19/2020    Procedure: ESOPHAGOGASTRODUODENOSCOPY (EGD);  Surgeon: Lakshmi Santana MD;  Location:  OR     INJECT STEROID (LOCATION) Right 2/10/2021    Procedure: Right knee intra-articular injection of corticosteroid, 80 mg of Depo-Medrol;  Surgeon: Gigi De Oliveira MD;  Location:  OR     LAPAROSCOPIC APPENDECTOMY N/A 9/27/2020    Procedure: Laparoscopic appendectomy;  Surgeon: Christ Parekh MD;  Location:  OR     LAPAROSCOPY DIAGNOSTIC (GENERAL) N/A 9/27/2020    Procedure: LAPAROSCOPIC  SMALL BOWEL RESCECTION;  Surgeon: Christ Parekh MD;  Location:  OR       FAMILY HISTORY:   Family History   Problem Relation Age of Onset     Breast Cancer Sister        SOCIAL HISTORY:   Social History     Tobacco Use     Smoking status: Never Smoker     Smokeless tobacco: Never Used   Substance Use Topics     Alcohol use: Yes     Frequency: Never     Comment: 2 glasses of red wine per week       REVIEW OF SYSTEMS: Review Of Systems  Skin: negative  Eyes: negative  Ears/Nose/Throat: negative  Respiratory: No shortness of breath, dyspnea on exertion, cough, or hemoptysis  Cardiovascular: negative  Gastrointestinal: Diarrhea, indigestion/reflux  Genitourinary: negative  Musculoskeletal: negative and leg pain, leg swelling  Neurologic: headaches  Psychiatric: negative  Hematologic/Lymphatic/Immunologic: negative  Endocrine: negative      Vital signs:  There were no vitals taken for this visit.    Current Outpatient Medications   Medication Sig Dispense Refill     acetaminophen (TYLENOL) 325 MG tablet Take 2 tablets (650 mg) by mouth every 4 hours as needed for other (mild pain) 100 tablet 0     Cholecalciferol (VITAMIN D3) 50 MCG (2000 UT) CAPS Take 5,000 Units by mouth daily        Dietary Management Product  (VASCULERA) TABS Take 1 tablet by mouth daily 90 tablet 3     Fish Oil-Cholecalciferol (OMEGA-3 FISH OIL/VITAMIN D3 PO) Take 2 capsules by mouth daily [employer] product with Omega3/Vit D       melatonin 5 MG CAPS Take 5 mg by mouth nightly as needed        Multiple Vitamins-Minerals (MULTIVITAMIN ADULTS 50+) TABS Take 1 tablet by mouth daily        warfarin ANTICOAGULANT (COUMADIN) 5 MG tablet Take 7.5 mg by mouth daily          PHYSICAL EXAM:  General: Pleasant, NAD.   HEENT: Normocephalic, atraumatic, external ears and nose normal.   Respiratory: Normal respiratory effort.   Cardiovascular: Pulse is regular.   Musculoskeletal: Gait and station normal.  The joints of his fingers and toes without deformity.  There is no cyanosis of his nailbeds.   EXTREMITIES: Right lower extremity: 1-2+ edema with firmness suggestive of early lipodermatosclerosis.  I do not appreciate significant varicose veins.  Left lower extremity: Healing anterior knee incision from left total knee arthroplasty.  4 to 7 mm varicosity coursing from the mid medial left thigh, down the medial aspect of the left calf.  Area of stasis hyperpigmentation about the distal half of the anteromedial left leg.  2-3+ edema of the left lower extremity extending into the ankle and into the foot..    PULSES: R/L (3=normal pulse, 0=no palpable pulse) dorsalis pedis: 1/1; posterior tibial: 0/0.      Neurologic: Grossly normal  Psychiatric: Mood, affect, judgment and insight are normal     Venous Duplex Ultrasound:   Right lower extremity: No evidence of deep vein thrombosis.  His right common femoral, mid femoral and popliteal veins are incompetent.     His right great saphenous vein is incompetent from the proximal thigh to the ankle, measures 4.4 mm diameter maximally with reflux time of 1.5 seconds.  The right small saphenous vein and Vein of Giacomini are competent.  There are no significant incompetent perforators.  There is a 4.3 x 2.9 x 1.4 cm  popliteal cyst consistent with a Baker's cyst.    Left lower extremity: No evidence of deep vein thrombosis.   chronic, weblike thrombus in one of the paired mid to distal femoral veins and popliteal vein.  Chronic nonocclusive thrombus noted in the proximal femoral vein as well.  The left great saphenous vein is incompetent from the saphenofemoral junction to the ankle, measures 6 mm in diameter with reflux time of 2.8 seconds.  He has weblike thrombus noted in the thigh segment.  Left small saphenous vein is incompetent from the saphenopopliteal junction to the mid calf, measures 4.5 mm in diameter with reflux time of 1 second.  The Vein of Giacomini is competent.  There is a 3.5 mm diameter incompetent  13 cm from the left medial malleolus.    ASSESSMENT:  CEAP 4, VCSS 11 left lower extremity chronic venous insufficiency.  He recently underwent left total knee replacement which has exacerbated his left lower extremity edema.  He has both deep and superficial venous insufficiency in his left lower extremity which may respond to treatment of his superficial insufficiency.  I would prefer to allow one year for healing from his TKA before intervening on his right leg.  Dr. Apple has maximized non-invasive edema control with which I totally agree.    If he has been studied and found not to have an inherited hypercoagulable state, one could consider discontinuing his anticoagulation after his right total knee replacement if Hematology agrees.      PLAN:  Allow recovery from right total knee replacement for one year before intervention for superficial insufficiency.  Hopefully, non-invasive management will control edema adequately, but if not we will address.     Nestor Stinson MD, MD    Dictated using Dragon which may result in transcription errors.          VEINSOLUTIONS NEW PATIENT:                  Again, thank you for allowing me to participate in the care of your patient.         Sincerely,        Nestor Stinson MD

## 2021-03-29 ENCOUNTER — HOSPITAL ENCOUNTER (OUTPATIENT)
Dept: OCCUPATIONAL THERAPY | Facility: CLINIC | Age: 71
Setting detail: THERAPIES SERIES
End: 2021-03-29
Attending: SURGERY
Payer: COMMERCIAL

## 2021-03-29 DIAGNOSIS — I89.0 LYMPHEDEMA OF LEFT LOWER EXTREMITY: ICD-10-CM

## 2021-03-29 PROCEDURE — 97165 OT EVAL LOW COMPLEX 30 MIN: CPT | Mod: GO

## 2021-03-29 PROCEDURE — 97140 MANUAL THERAPY 1/> REGIONS: CPT | Mod: GO

## 2021-03-29 NOTE — PROGRESS NOTES
03/29/21 1300   Quick Adds   Quick Adds Certification   Rehab Discipline   Discipline OT   Type of Visit   Type of visit Initial Edema Evaluation   General Information   Start of care 03/29/21   Referring physician Nestor Stinson   Orders Evaluate and treat as indicated   Order date 03/11/21   Medical diagnosis lymphedema   Onset of illness / date of surgery 03/11/21  (chronic (PTS) plus acute from TKA Feb 2021)   Edema onset 03/11/21  (chronic since DVT 2018; new acute falre 2/2 TKA 2021)   Affected body parts LLE;RLE  (LLE>RLE)   Edema etiology Surgery;TKA;Chronic Venous Insufficiency;DVT   Edema etiology comments Pt has history LLE DVT after he fell outside and injured his L hip. DVT resulted in B PE and pt been on warafin since 2018.  Pt has had larger LLE limb since DVT he describes is small when he wakes in morning and gets bigger as day goes on. Recent vasculart office imaging shows nonocclusive clot still in LLE at 2 points, venous insufficiency noted BLE, and recent LTKA Feb 2021 has created flare and increase in LLE lymphedema   Pertinent history of current problem (PT: include personal factors and/or comorbidities that impact the POC; OT: include additional occupational profile info) PMH significant for arthritis, L TKA Feb 2021, CVI, DVT LLE resulting in B PE 2018 and still on warafin at this time. Pt would liek to move forward and get R TKA by Aug 2021 as planned but wants LLE in better place to get RLE surgery.   Surgical / medical history reviewed Yes   Edema special tests Ultrasound  (see vascular note regarding venous issues/DVT)   Prior level of functional mobility I   Prior treatment Compression garments;Elevation  (edemawear; elevation; med-vasculara reported)   Community support Family / friend caregiver   Patient role / employment history Retired   Living environment [place] [facility]   Fall Risk Screen   Fall screen completed by OT   Have you fallen 2 or more times in the past  year? No   Have you fallen and had an injury in the past year? No   Is patient a fall risk? No   Abuse Screen (yes response referral indicated)   Feels Unsafe at Home or Work/School no   Feels Threatened by Someone no   Does Anyone Try to Keep You From Having Contact with Others or Doing Things Outside Your Home? no   Physical Signs of Abuse Present no   System Outcome Measures   Lymphedema Life Impact Scale (score range 0-72). A higher score indicates greater impairment. 7   Subjective Report   Patient report of symptoms slowing L TKA rehab; pain limiting walking   Patient / Family Goals   Patient / family goals statement to reduce swelling in LLE   Pain   Pain comments Pt has L knee pain related to post op rehab and swelling. Surgery approx 6 weeks ago.   Cognitive Status   Orientation Orientation to person, place and time   Level of consciousness Alert   Follows commands and answers questions 100% of the time   Personal safety and judgement Intact   Edema Exam / Assessment   Skin condition Pitting;Non-pitting   Skin condition comments non pitting mild lymphedema proximal ankle-knee crease and mid-upper thigh; 1+ pitting L foot, portions L ankle, and around joint capsule L knee in regions   Pitting 1+   Pitting location LLE   Scar   (L TKA scar-no concerns noted)   Capillary refill comments R foot colder and slower blood return R foot digits vs L foot; no concerns L foot blood return to blanched tissue   Stemmer sign Negative   Girth Measurements   Girth Measurements   (will obtain upon return for services)   Range of Motion   ROM comments knee flexion reported to be aprpox 120 degrees LLE; all other BLE AROM WFL   Strength   Strength comments NT'd   Activities of Daily Living   Activities of Daily Living I-Min A   Bed Mobility   Bed mobility I   Transfers   Transfers I   Gait / Locomotion   Gait / Locomotion I   Sensory   Sensory perception comments no neuropathy reported or identified   Vascular Assessment    Vascular Assessment Comments known BLE CVI and history LLE DVT and B PE resulting 2018  (see vascular note regarding LLE clot and venous state)   Coordination   Coordination Gross motor coordination appropriate   Muscle Tone   Muscle tone No deficits were identified   Planned Edema Interventions   Planned edema interventions Manual lymph drainage;Gradient compression bandaging;Fit for compression garment;Exercises;Precautions to prevent infection / exacerbation;Education;Skin care / precautions;Home management program development   Clinical Impression   Criteria for skilled therapeutic intervention met Yes   Therapy diagnosis lymphedema   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;Phlebolymphedema   Assessment of Occupational Performance 1-3 Performance Deficits   Identified Performance Deficits infection risk; pain limiting mobility; pain and lymphedema limiting engagement in PT/rehab L knee   Clinical Decision Making (Complexity) Low complexity   Treatment Frequency 2x/week;3x/week   Treatment duration 3x/wk x 2 weeks, then 2x/wk x 1 week, then 0x/wk x 3 weeks, then 1x/wk x 1 week   Patient / family and/or staff in agreement with plan of care No   Risks and benefits of therapy have been explained No   Clinical impression comments Pt can benefit from skilled lymphedema services to reduce LLE lymphedema in order to reduce risk skin infections, promote more ease and better engagement in PT for post operative LLE rehab, and promote less pain for improved I with transfers and mobility.   Goals   Edema Eval Goals 1;2;3;4;5;6   Goal 1   Goal identifier Ed   Goal description Pt will report understandign s/s lymphedema, s/s skin infections, and treatment options for safety and knowledge base needed for efficient and I home management LLE lymphedema   Target date 06/25/21   Goal 2   Goal identifier GCB Wearing   Goal description Tolerate gradient compression bandaging/wearing compression garments 23  hrs/day to prevent re-acccumulation of extracellular fluid for max reductions needed to reduce pain for improved walking and PT rehab engagement   Target date 06/25/21   Goal 3   Goal identifier GCB Donning   Goal description Demonstrate independence in applying gradient compression bandages to for I building with home management of LLE lymphedema needed to reduce risk skin infections and help reduce pain with daily management for better walking and PT rehab engagement    Target date 06/25/21   Goal 4   Goal identifier HEP/MLD   Goal description Demonstrate independence in performing prescribed exercises to facilate the lymph system and muscle pumping systems for max reducitons needed tor educe pain for improved walking and PT rehab engagement   Target date 06/25/21   Goal 5   Goal identifier Garments   Goal description Be independent in donning/doffing, wearing schedule, and care of compression garments for I building with home management of LLE lymphedema needed to reduce risk skin infections and help reduce pain with daily management for better walking and PT rehab engagement   Target date 06/25/21   Goal 6   Goal identifier Reductions   Goal description Pt will display a total LLE volume reduction of .25L+ for reductions needed to reduce pain with walking and promote less pain and better tolerance with PT rehab for LLE TKA   Target date 06/25/21   Total Evaluation Time   OT Eval, Low Complexity Minutes (43877) 25   Certification   Certification date from 03/29/21   Certification date to 06/25/21   Medical Diagnosis lymphedema   Certification I certify the need for these services furnished under this plan of treatment and while under my care.  (Physician co-signature of this document indicates review and certification of the therapy plan).

## 2021-03-29 NOTE — PROGRESS NOTES
[facility]        OUTPATIENT OCCUPATIONAL THERAPY EDEMA EVALUATION  PLAN OF TREATMENT FOR OUTPATIENT REHABILITATION  (COMPLETE FOR INITIAL CLAIMS ONLY)  Patient's Last Name, First Name, Cameron Fishman                           Provider s Name:   [facility] Medical Record No.  [id number]     Start of Care Date:  03/29/21   Onset Date:  03/11/21(chronic since DVT 2018; new acute falre 2/2 TKA 2021)   Type:  OT   Medical Diagnosis:  lymphedema   Therapy Diagnosis:  lymphedema Visits from SOC:  1                                     __________________________________________________________________________________   Plan of Treatment/Functional Goals:    Manual lymph drainage, Gradient compression bandaging, Fit for compression garment, Exercises, Precautions to prevent infection / exacerbation, Education, Skin care / precautions, Home management program development        GOALS  1. Goal description: Pt will report understandign s/s lymphedema, s/s skin infections, and treatment options for safety and knowledge base needed for efficient and I home management LLE lymphedema       Target date: 06/25/21  2. Goal description: Tolerate gradient compression bandaging/wearing compression garments 23 hrs/day to prevent re-acccumulation of extracellular fluid for max reductions needed to reduce pain for improved walking and PT rehab engagement       Target date: 06/25/21  3. Goal description: Demonstrate independence in applying gradient compression bandages to for I building with home management of LLE lymphedema needed to reduce risk skin infections and help reduce pain with daily management for better walking and PT rehab engagement        Target date: 06/25/21  4. Goal description: Demonstrate independence in performing prescribed exercises to facilate the lymph  system and muscle pumping systems for max reducitons needed tor educe pain for improved walking and PT rehab engagement       Target date: 06/25/21  5. Goal description: Be independent in donning/doffing, wearing schedule, and care of compression garments for I building with home management of LLE lymphedema needed to reduce risk skin infections and help reduce pain with daily management for better walking and PT rehab engagement       Target date: 06/25/21  6. Goal description: Pt will display a total LLE volume reduction of .25L+ for reductions needed to reduce pain with walking and promote less pain and better tolerance with PT rehab for LLE TKA       Target date: 06/25/21     7.             8.              Treatment Frequency: 2x/week, 3x/week   Treatment duration: 3x/wk x 2 weeks, then 2x/wk x 1 week, then 0x/wk x 3 weeks, then 1x/wk x 1 week    Tyshawn Powell I CERTIFY THE NEED FOR THESE SERVICES FURNISHED UNDER        THIS PLAN OF TREATMENT AND WHILE UNDER MY CARE     (Physician co-signature of this document indicates review and certification of the therapy plan).                   Certification date from: 03/29/21       Certification date to: 06/25/21           Referring physician: Nestor Stinson   Initial Assessment  See Epic Evaluation- Start of care: 03/29/21

## 2021-04-20 ENCOUNTER — PREP FOR PROCEDURE (OUTPATIENT)
Dept: SURGERY | Facility: CLINIC | Age: 71
End: 2021-04-20

## 2021-04-20 ENCOUNTER — OFFICE VISIT (OUTPATIENT)
Dept: SURGERY | Facility: CLINIC | Age: 71
End: 2021-04-20
Payer: COMMERCIAL

## 2021-04-20 DIAGNOSIS — Z90.49 STATUS POST SMALL BOWEL RESECTION: ICD-10-CM

## 2021-04-20 DIAGNOSIS — K43.9 VENTRAL HERNIA WITHOUT OBSTRUCTION OR GANGRENE: Primary | ICD-10-CM

## 2021-04-20 PROCEDURE — 99214 OFFICE O/P EST MOD 30 MIN: CPT | Performed by: SURGERY

## 2021-04-21 ENCOUNTER — HOSPITAL ENCOUNTER (OUTPATIENT)
Dept: CT IMAGING | Facility: CLINIC | Age: 71
Discharge: HOME | End: 2021-04-21
Attending: SURGERY | Admitting: SURGERY
Payer: COMMERCIAL

## 2021-04-21 ENCOUNTER — PREP FOR PROCEDURE (OUTPATIENT)
Dept: SURGERY | Facility: CLINIC | Age: 71
End: 2021-04-21

## 2021-04-21 DIAGNOSIS — Z90.49 STATUS POST SMALL BOWEL RESECTION: ICD-10-CM

## 2021-04-21 DIAGNOSIS — R19.00 ABDOMINAL MASS: ICD-10-CM

## 2021-04-21 LAB
CREAT BLD-MCNC: 1.1 MG/DL (ref 0.66–1.25)
GFR SERPL CREATININE-BSD FRML MDRD: 66 ML/MIN/{1.73_M2}

## 2021-04-21 PROCEDURE — 82565 ASSAY OF CREATININE: CPT

## 2021-04-21 PROCEDURE — 250N000011 HC RX IP 250 OP 636: Performed by: SURGERY

## 2021-04-21 PROCEDURE — 250N000009 HC RX 250: Performed by: SURGERY

## 2021-04-21 PROCEDURE — 74177 CT ABD & PELVIS W/CONTRAST: CPT

## 2021-04-21 RX ORDER — IOPAMIDOL 755 MG/ML
135 INJECTION, SOLUTION INTRAVASCULAR ONCE
Status: COMPLETED | OUTPATIENT
Start: 2021-04-21 | End: 2021-04-21

## 2021-04-21 RX ADMIN — SODIUM CHLORIDE 79 ML: 9 INJECTION, SOLUTION INTRAVENOUS at 07:47

## 2021-04-21 RX ADMIN — IOPAMIDOL 135 ML: 755 INJECTION, SOLUTION INTRAVENOUS at 07:46

## 2021-04-22 ENCOUNTER — PREP FOR PROCEDURE (OUTPATIENT)
Dept: SURGERY | Facility: CLINIC | Age: 71
End: 2021-04-22

## 2021-04-23 ENCOUNTER — PREP FOR PROCEDURE (OUTPATIENT)
Dept: SURGERY | Facility: CLINIC | Age: 71
End: 2021-04-23

## 2021-04-23 PROBLEM — R19.00 ABDOMINAL MASS: Status: ACTIVE | Noted: 2021-04-23

## 2021-04-23 NOTE — PROGRESS NOTES
Surgery Consultation, [facility]         Christ Parekh MD, MD    Cameron Barnard MRN# [id number]   YOB: 1950 Age: 70 year old     PCP:  Gary Bey [phone]    Chief Complaint: Abdominal wall bulge    Pt was seen in consultation from Gary Bey.    History of Present Illness:  Cameron Barnard is a 70 year old male who presented with increasing fullness along a previous abdominal incision.  Patient is known to me from an emergency department visit where he presented with abdominal pain.  He was found to have evidence of inflammation in the abdomen and he was taken to the operating room for exploration.  He was found to have a small bowel perforation and underwent small bowel resection.  He recovered well after surgery but has developed an area of fullness near the abdominal wall previous incision site.  No history of bowel obstruction related to this bulge.  Intermittent pain.    PMH:  Cameron Barnard  has a past medical history of Allergic state, DVT (deep venous thrombosis) (H), HLD (hyperlipidemia), Hypertension, Multiple subsegmental pulmonary emboli without acute cor pulmonale (H) (2017), Perforated bowel (H) (09/2020), and Primary osteoarthritis of both knees.  PSH:  Cameron Barnard  has a past surgical history that includes Esophagoscopy, gastroscopy, duodenoscopy (EGD), combined (N/A, 9/19/2020); Colonoscopy (N/A, 9/19/2020); Laparoscopy diagnostic (general) (N/A, 9/27/2020); Laparoscopic appendectomy (N/A, 9/27/2020); Arthroplasty knee (Left, 2/10/2021); and Inject steroid (location) (Right, 2/10/2021).    Home medications and allergies reviewed.    Social History:  Cameron Barnard  reports that he has never smoked. He has never used smokeless tobacco. He reports current alcohol use. He reports that he does not use drugs.  Family History:  Cameron Barnard family history includes Breast Cancer in his sister.    ROS:  The 10 point Review of Systems is  negative other than noted in the HPI.  No fevers or chills.  No recent weight loss.    Physical Exam:  There were no vitals taken for this visit.  0 lbs 0 oz  Overweight gentleman in no distress.  Patient has a pleasant affect and communicates well.   Pupils equal round and reactive to light.   No cervical lymphadenopathy or thyromegaly.   Lung fields clear, breathing comfortably.   Heart normal sinus rhythm.  No murmurs rubs or gallops.  Abdomen soft, nontender, nondistended.  Well-healed infraumbilical midline scar.  Obvious abdominal wall bulge, largely reducible.  Minimal tenderness, no overlying skin changes.  Skin warm, dry.  No obvious rashes or lesions.    All new lab and imaging data was reviewed.  We obtained a CT scan of the abdomen and this revealed a moderately widemouth hernia at the previous surgical site.  This contained nonobstructed small bowel.     Assessment/plan: Pleasant 70-year-old gentleman with multiple medical problems presents with an incisional hernia.  This is not causing obstruction at this time but has developed over a relatively short period of time.  Given these considerations, I would recommend an open incisional hernia repair.  This would be done as a hospital admission and would involve the use of mesh.  Risks and benefits of the procedure were explained to the patient.  Patient is on Coumadin and this would need to be held for the surgery.  I do not think it is an emergency, but I would not want to wait for too terribly long.  Surgical co-morbities include obesity, use of anticoagulants, reticulitis, history of DVT.    Chirag Parekh M.D.  [facility]  [phone]    Please route or send letter to:  Primary Care Provider (PCP) and Referring Provider

## 2021-04-26 ENCOUNTER — TELEPHONE (OUTPATIENT)
Dept: SURGERY | Facility: CLINIC | Age: 71
End: 2021-04-26

## 2021-04-26 DIAGNOSIS — Z11.59 ENCOUNTER FOR SCREENING FOR OTHER VIRAL DISEASES: ICD-10-CM

## 2021-04-26 NOTE — TELEPHONE ENCOUNTER
Type of surgery: Open incisional hernia repair  Location of surgery: [facility]  Date and time of surgery: 5/5/21 at 8am  Surgeon: Dr. Christ Parekh  Pre-Op Appt Date: Patient to schedule  Post-Op Appt Date: Patient to schedule   Packet sent out: Yes  Pre-cert/Authorization completed:  Not Applicable  Date: 4/26/21

## 2021-05-02 DIAGNOSIS — Z11.59 ENCOUNTER FOR SCREENING FOR OTHER VIRAL DISEASES: ICD-10-CM

## 2021-05-02 LAB
SARS-COV-2 RNA RESP QL NAA+PROBE: NORMAL
SPECIMEN SOURCE: NORMAL

## 2021-05-02 PROCEDURE — U0003 INFECTIOUS AGENT DETECTION BY NUCLEIC ACID (DNA OR RNA); SEVERE ACUTE RESPIRATORY SYNDROME CORONAVIRUS 2 (SARS-COV-2) (CORONAVIRUS DISEASE [COVID-19]), AMPLIFIED PROBE TECHNIQUE, MAKING USE OF HIGH THROUGHPUT TECHNOLOGIES AS DESCRIBED BY CMS-2020-01-R: HCPCS | Performed by: SURGERY

## 2021-05-02 PROCEDURE — U0005 INFEC AGEN DETEC AMPLI PROBE: HCPCS | Performed by: SURGERY

## 2021-05-04 RX ORDER — ACETAMINOPHEN 500 MG
1000 TABLET ORAL EVERY 6 HOURS PRN
COMMUNITY
End: 2022-01-01

## 2021-05-04 RX ORDER — DIOSMIN COMPLEX NO.1 630 MG
1 TABLET ORAL EVERY MORNING
COMMUNITY
End: 2021-01-01

## 2021-05-04 RX ORDER — CETIRIZINE HYDROCHLORIDE 10 MG/1
10 TABLET ORAL DAILY PRN
COMMUNITY
End: 2022-01-01

## 2021-05-04 NOTE — PROGRESS NOTES
PTA medications updated by Medication Scribe prior to surgery via phone call with patient        Comments:    Medication history sources: Patient, Surescripts and H&P  In the past week, patient estimated taking medication this percent of the time: Greater than 90%  Adherence assessment: N/A Not Observed    Significant changes made to the medication list:  Patient reports no longer taking the following meds (med scribe removed from PTA med list): Melatonin, Vitamin C, Vitamin B12      Additional medication history information:   None    The information provided in this note is only as accurate as the sources available at the time of update(s)      Prior to Admission medications    Medication Sig Last Dose Taking? Auth Provider   acetaminophen (TYLENOL) 500 MG tablet Take 1,000 mg by mouth every 6 hours as needed for mild pain 2 x 500 mg 4/29/2021 at HS Yes Reported, Patient   cetirizine (ZYRTEC) 10 MG tablet Take 10 mg by mouth daily as needed for allergies MORE THAN WEEK at PRN Yes Reported, Patient   Cholecalciferol (VITAMIN D3) 50 MCG (2000 UT) CAPS Take 5,000 Units by mouth every morning  4/30/2021 at AM Yes Reported, Patient   Dietary Management Product (VASCULERA) TABS Take 1 tablet by mouth every morning 4/30/2021 at AM Yes Reported, Patient   Doxylamine Succinate, Sleep, (UNISOM PO) Take 0.5 tablets by mouth nightly as needed 5/3/2021 at PRN Yes Reported, Patient   Fish Oil-Cholecalciferol (OMEGA-3 FISH OIL/VITAMIN D3 PO) Take 1 capsule by mouth 2 times daily  4/30/2021 at AM Yes Unknown, Entered By History   FOLIC ACID PO Take 1 mg by mouth every morning 4/30/2021 at AM Yes Reported, Patient   Multiple Vitamins-Minerals (MULTIVITAMIN ADULTS 50+) TABS Take 1 tablet by mouth every morning  4/30/2021 at AM Yes Reported, Patient   warfarin ANTICOAGULANT (COUMADIN) 5 MG tablet Take 7.5 mg by mouth every evening 1/2 + 5 mg 4/30/2021 at 1800 Yes Reported, Patient   enoxaparin ANTICOAGULANT (LOVENOX) 120 MG/0.8ML  syringe Inject 120 mg Subcutaneous daily BRIDGING THERAPY. 5/3/2021 at AM  Reported, Patient       Jarad Mcduffie, SUEhT  Medication [facility]

## 2021-05-05 ENCOUNTER — ANESTHESIA EVENT (OUTPATIENT)
Dept: SURGERY | Facility: CLINIC | Age: 71
DRG: 908 | End: 2021-05-05
Payer: COMMERCIAL

## 2021-05-05 ENCOUNTER — ANESTHESIA (OUTPATIENT)
Dept: SURGERY | Facility: CLINIC | Age: 71
DRG: 908 | End: 2021-05-05
Payer: COMMERCIAL

## 2021-05-05 ENCOUNTER — HOSPITAL ENCOUNTER (OUTPATIENT)
Facility: CLINIC | Age: 71
Discharge: HOME | DRG: 908 | End: 2021-05-07
Attending: SURGERY | Admitting: SURGERY
Payer: COMMERCIAL

## 2021-05-05 ENCOUNTER — SURGERY (OUTPATIENT)
Age: 71
End: 2021-05-05
Payer: COMMERCIAL

## 2021-05-05 ENCOUNTER — APPOINTMENT (OUTPATIENT)
Dept: SURGERY | Facility: PHYSICIAN GROUP | Age: 71
End: 2021-05-05
Payer: COMMERCIAL

## 2021-05-05 DIAGNOSIS — G89.18 ACUTE POST-OPERATIVE PAIN: Primary | ICD-10-CM

## 2021-05-05 DIAGNOSIS — K43.9 VENTRAL HERNIA WITHOUT OBSTRUCTION OR GANGRENE: ICD-10-CM

## 2021-05-05 LAB — INR PPP: 1 (ref 0.86–1.14)

## 2021-05-05 PROCEDURE — 999N000120 HC STATISTIC OUTPATIENT (NON-OBS) EVE

## 2021-05-05 PROCEDURE — 250N000011 HC RX IP 250 OP 636: Performed by: NURSE ANESTHETIST, CERTIFIED REGISTERED

## 2021-05-05 PROCEDURE — 999N000121 HC STATISTIC OUTPATIENT (NON-OBS) NIGHT

## 2021-05-05 PROCEDURE — 250N000009 HC RX 250: Performed by: SURGERY

## 2021-05-05 PROCEDURE — 250N000011 HC RX IP 250 OP 636: Performed by: SURGERY

## 2021-05-05 PROCEDURE — 360N000076 HC SURGERY LEVEL 3, PER MIN: Performed by: SURGERY

## 2021-05-05 PROCEDURE — 370N000017 HC ANESTHESIA TECHNICAL FEE, PER MIN: Performed by: SURGERY

## 2021-05-05 PROCEDURE — 250N000013 HC RX MED GY IP 250 OP 250 PS 637: Performed by: PHYSICIAN ASSISTANT

## 2021-05-05 PROCEDURE — 999N000119 HC STATISTIC OUTPATIENT (NON-OBS) DAY

## 2021-05-05 PROCEDURE — 250N000009 HC RX 250: Performed by: NURSE ANESTHETIST, CERTIFIED REGISTERED

## 2021-05-05 PROCEDURE — 36415 COLL VENOUS BLD VENIPUNCTURE: CPT | Performed by: SURGERY

## 2021-05-05 PROCEDURE — 49560 PR REPAIR INCISIONAL HERNIA,REDUCIBLE: CPT | Mod: AS | Performed by: PHYSICIAN ASSISTANT

## 2021-05-05 PROCEDURE — 85610 PROTHROMBIN TIME: CPT | Performed by: SURGERY

## 2021-05-05 PROCEDURE — 49568 PR REPAIR HERNIA WITH MESH: CPT | Mod: AS | Performed by: PHYSICIAN ASSISTANT

## 2021-05-05 PROCEDURE — C1781 MESH (IMPLANTABLE): HCPCS | Performed by: SURGERY

## 2021-05-05 PROCEDURE — 272N000001 HC OR GENERAL SUPPLY STERILE: Performed by: SURGERY

## 2021-05-05 PROCEDURE — 250N000011 HC RX IP 250 OP 636: Performed by: PHYSICIAN ASSISTANT

## 2021-05-05 PROCEDURE — 999N000141 HC STATISTIC PRE-PROCEDURE NURSING ASSESSMENT: Performed by: SURGERY

## 2021-05-05 PROCEDURE — 250N000025 HC SEVOFLURANE, PER MIN: Performed by: SURGERY

## 2021-05-05 PROCEDURE — 250N000011 HC RX IP 250 OP 636: Performed by: ANESTHESIOLOGY

## 2021-05-05 PROCEDURE — 49560 PR REPAIR INCISIONAL HERNIA,REDUCIBLE: CPT | Performed by: SURGERY

## 2021-05-05 PROCEDURE — 258N000003 HC RX IP 258 OP 636: Performed by: NURSE ANESTHETIST, CERTIFIED REGISTERED

## 2021-05-05 PROCEDURE — 49568 PR REPAIR HERNIA WITH MESH: CPT | Performed by: SURGERY

## 2021-05-05 PROCEDURE — 710N000009 HC RECOVERY PHASE 1, LEVEL 1, PER MIN: Performed by: SURGERY

## 2021-05-05 DEVICE — MESH SYMBOTEX COMPOSITE STEX 20CM X 15CM SYM2015: Type: IMPLANTABLE DEVICE | Site: ABDOMEN | Status: FUNCTIONAL

## 2021-05-05 RX ORDER — FENTANYL CITRATE 50 UG/ML
INJECTION, SOLUTION INTRAMUSCULAR; INTRAVENOUS PRN
Status: DISCONTINUED | OUTPATIENT
Start: 2021-05-05 | End: 2021-05-05

## 2021-05-05 RX ORDER — BUPIVACAINE HYDROCHLORIDE 5 MG/ML
INJECTION, SOLUTION EPIDURAL; INTRACAUDAL
Status: DISCONTINUED
Start: 2021-05-05 | End: 2021-05-05 | Stop reason: HOSPADM

## 2021-05-05 RX ORDER — HYDRALAZINE HYDROCHLORIDE 20 MG/ML
2.5-5 INJECTION INTRAMUSCULAR; INTRAVENOUS EVERY 10 MIN PRN
Status: DISCONTINUED | OUTPATIENT
Start: 2021-05-05 | End: 2021-05-05 | Stop reason: HOSPADM

## 2021-05-05 RX ORDER — NEOSTIGMINE METHYLSULFATE 1 MG/ML
VIAL (ML) INJECTION PRN
Status: DISCONTINUED | OUTPATIENT
Start: 2021-05-05 | End: 2021-05-05

## 2021-05-05 RX ORDER — GLYCOPYRROLATE 0.2 MG/ML
INJECTION, SOLUTION INTRAMUSCULAR; INTRAVENOUS PRN
Status: DISCONTINUED | OUTPATIENT
Start: 2021-05-05 | End: 2021-05-05

## 2021-05-05 RX ORDER — CYCLOBENZAPRINE HCL 10 MG
10 TABLET ORAL 3 TIMES DAILY
Status: DISCONTINUED | OUTPATIENT
Start: 2021-05-05 | End: 2021-05-07 | Stop reason: HOSPADM

## 2021-05-05 RX ORDER — OXYCODONE HYDROCHLORIDE 5 MG/1
5-10 TABLET ORAL
Status: DISCONTINUED | OUTPATIENT
Start: 2021-05-05 | End: 2021-05-07 | Stop reason: HOSPADM

## 2021-05-05 RX ORDER — CEFAZOLIN SODIUM 1 G/3ML
1 INJECTION, POWDER, FOR SOLUTION INTRAMUSCULAR; INTRAVENOUS SEE ADMIN INSTRUCTIONS
Status: DISCONTINUED | OUTPATIENT
Start: 2021-05-05 | End: 2021-05-05 | Stop reason: HOSPADM

## 2021-05-05 RX ORDER — HYDROMORPHONE HCL IN WATER/PF 6 MG/30 ML
.2-.3 PATIENT CONTROLLED ANALGESIA SYRINGE INTRAVENOUS
Status: DISCONTINUED | OUTPATIENT
Start: 2021-05-05 | End: 2021-05-07 | Stop reason: HOSPADM

## 2021-05-05 RX ORDER — PROCHLORPERAZINE MALEATE 5 MG
5 TABLET ORAL EVERY 6 HOURS PRN
Status: DISCONTINUED | OUTPATIENT
Start: 2021-05-05 | End: 2021-05-07 | Stop reason: HOSPADM

## 2021-05-05 RX ORDER — NALOXONE HYDROCHLORIDE 0.4 MG/ML
0.2 INJECTION, SOLUTION INTRAMUSCULAR; INTRAVENOUS; SUBCUTANEOUS
Status: DISCONTINUED | OUTPATIENT
Start: 2021-05-05 | End: 2021-05-05 | Stop reason: HOSPADM

## 2021-05-05 RX ORDER — FAMOTIDINE 20 MG/1
20 TABLET, FILM COATED ORAL 2 TIMES DAILY
Status: DISCONTINUED | OUTPATIENT
Start: 2021-05-05 | End: 2021-05-07 | Stop reason: HOSPADM

## 2021-05-05 RX ORDER — HYDRALAZINE HYDROCHLORIDE 20 MG/ML
10 INJECTION INTRAMUSCULAR; INTRAVENOUS
Status: DISCONTINUED | OUTPATIENT
Start: 2021-05-05 | End: 2021-05-07 | Stop reason: HOSPADM

## 2021-05-05 RX ORDER — ONDANSETRON 2 MG/ML
INJECTION INTRAMUSCULAR; INTRAVENOUS PRN
Status: DISCONTINUED | OUTPATIENT
Start: 2021-05-05 | End: 2021-05-05

## 2021-05-05 RX ORDER — NALOXONE HYDROCHLORIDE 0.4 MG/ML
0.4 INJECTION, SOLUTION INTRAMUSCULAR; INTRAVENOUS; SUBCUTANEOUS
Status: DISCONTINUED | OUTPATIENT
Start: 2021-05-05 | End: 2021-05-05 | Stop reason: HOSPADM

## 2021-05-05 RX ORDER — FENTANYL CITRATE 50 UG/ML
25-50 INJECTION, SOLUTION INTRAMUSCULAR; INTRAVENOUS
Status: DISCONTINUED | OUTPATIENT
Start: 2021-05-05 | End: 2021-05-05 | Stop reason: HOSPADM

## 2021-05-05 RX ORDER — LIDOCAINE 40 MG/G
CREAM TOPICAL
Status: DISCONTINUED | OUTPATIENT
Start: 2021-05-05 | End: 2021-05-07 | Stop reason: HOSPADM

## 2021-05-05 RX ORDER — ONDANSETRON 4 MG/1
4 TABLET, ORALLY DISINTEGRATING ORAL EVERY 30 MIN PRN
Status: DISCONTINUED | OUTPATIENT
Start: 2021-05-05 | End: 2021-05-05 | Stop reason: HOSPADM

## 2021-05-05 RX ORDER — SODIUM CHLORIDE, SODIUM LACTATE, POTASSIUM CHLORIDE, CALCIUM CHLORIDE 600; 310; 30; 20 MG/100ML; MG/100ML; MG/100ML; MG/100ML
INJECTION, SOLUTION INTRAVENOUS CONTINUOUS
Status: DISCONTINUED | OUTPATIENT
Start: 2021-05-05 | End: 2021-05-05 | Stop reason: HOSPADM

## 2021-05-05 RX ORDER — ACETAMINOPHEN 325 MG/1
975 TABLET ORAL EVERY 6 HOURS
Status: DISCONTINUED | OUTPATIENT
Start: 2021-05-05 | End: 2021-05-07 | Stop reason: HOSPADM

## 2021-05-05 RX ORDER — AMOXICILLIN 250 MG
1 CAPSULE ORAL 2 TIMES DAILY
Status: DISCONTINUED | OUTPATIENT
Start: 2021-05-05 | End: 2021-05-07 | Stop reason: HOSPADM

## 2021-05-05 RX ORDER — LIDOCAINE HYDROCHLORIDE 20 MG/ML
INJECTION, SOLUTION INFILTRATION; PERINEURAL PRN
Status: DISCONTINUED | OUTPATIENT
Start: 2021-05-05 | End: 2021-05-05

## 2021-05-05 RX ORDER — ONDANSETRON 2 MG/ML
4 INJECTION INTRAMUSCULAR; INTRAVENOUS EVERY 6 HOURS PRN
Status: DISCONTINUED | OUTPATIENT
Start: 2021-05-05 | End: 2021-05-07 | Stop reason: HOSPADM

## 2021-05-05 RX ORDER — NALOXONE HYDROCHLORIDE 0.4 MG/ML
0.4 INJECTION, SOLUTION INTRAMUSCULAR; INTRAVENOUS; SUBCUTANEOUS
Status: DISCONTINUED | OUTPATIENT
Start: 2021-05-05 | End: 2021-05-07 | Stop reason: HOSPADM

## 2021-05-05 RX ORDER — SODIUM CHLORIDE, SODIUM LACTATE, POTASSIUM CHLORIDE, CALCIUM CHLORIDE 600; 310; 30; 20 MG/100ML; MG/100ML; MG/100ML; MG/100ML
INJECTION, SOLUTION INTRAVENOUS CONTINUOUS PRN
Status: DISCONTINUED | OUTPATIENT
Start: 2021-05-05 | End: 2021-05-05

## 2021-05-05 RX ORDER — DEXAMETHASONE SODIUM PHOSPHATE 4 MG/ML
INJECTION, SOLUTION INTRA-ARTICULAR; INTRALESIONAL; INTRAMUSCULAR; INTRAVENOUS; SOFT TISSUE PRN
Status: DISCONTINUED | OUTPATIENT
Start: 2021-05-05 | End: 2021-05-05

## 2021-05-05 RX ORDER — HYDROMORPHONE HYDROCHLORIDE 1 MG/ML
.3-.5 INJECTION, SOLUTION INTRAMUSCULAR; INTRAVENOUS; SUBCUTANEOUS EVERY 10 MIN PRN
Status: DISCONTINUED | OUTPATIENT
Start: 2021-05-05 | End: 2021-05-05 | Stop reason: HOSPADM

## 2021-05-05 RX ORDER — SCOLOPAMINE TRANSDERMAL SYSTEM 1 MG/1
1 PATCH, EXTENDED RELEASE TRANSDERMAL
Status: DISCONTINUED | OUTPATIENT
Start: 2021-05-05 | End: 2021-05-07 | Stop reason: HOSPADM

## 2021-05-05 RX ORDER — ONDANSETRON 4 MG/1
4 TABLET, ORALLY DISINTEGRATING ORAL EVERY 6 HOURS PRN
Status: DISCONTINUED | OUTPATIENT
Start: 2021-05-05 | End: 2021-05-07 | Stop reason: HOSPADM

## 2021-05-05 RX ORDER — MAGNESIUM HYDROXIDE 1200 MG/15ML
LIQUID ORAL PRN
Status: DISCONTINUED | OUTPATIENT
Start: 2021-05-05 | End: 2021-05-05 | Stop reason: HOSPADM

## 2021-05-05 RX ORDER — NALOXONE HYDROCHLORIDE 0.4 MG/ML
0.2 INJECTION, SOLUTION INTRAMUSCULAR; INTRAVENOUS; SUBCUTANEOUS
Status: DISCONTINUED | OUTPATIENT
Start: 2021-05-05 | End: 2021-05-07 | Stop reason: HOSPADM

## 2021-05-05 RX ORDER — ONDANSETRON 2 MG/ML
4 INJECTION INTRAMUSCULAR; INTRAVENOUS EVERY 30 MIN PRN
Status: DISCONTINUED | OUTPATIENT
Start: 2021-05-05 | End: 2021-05-05 | Stop reason: HOSPADM

## 2021-05-05 RX ORDER — EPHEDRINE SULFATE 50 MG/ML
INJECTION, SOLUTION INTRAMUSCULAR; INTRAVENOUS; SUBCUTANEOUS PRN
Status: DISCONTINUED | OUTPATIENT
Start: 2021-05-05 | End: 2021-05-05

## 2021-05-05 RX ORDER — BUPIVACAINE HYDROCHLORIDE 5 MG/ML
INJECTION, SOLUTION PERINEURAL PRN
Status: DISCONTINUED | OUTPATIENT
Start: 2021-05-05 | End: 2021-05-05 | Stop reason: HOSPADM

## 2021-05-05 RX ORDER — CEFAZOLIN SODIUM IN 0.9 % NACL 3 G/100 ML
3 INTRAVENOUS SOLUTION, PIGGYBACK (ML) INTRAVENOUS
Status: COMPLETED | OUTPATIENT
Start: 2021-05-05 | End: 2021-05-05

## 2021-05-05 RX ORDER — MEPERIDINE HYDROCHLORIDE 25 MG/ML
12.5 INJECTION INTRAMUSCULAR; INTRAVENOUS; SUBCUTANEOUS
Status: DISCONTINUED | OUTPATIENT
Start: 2021-05-05 | End: 2021-05-05 | Stop reason: HOSPADM

## 2021-05-05 RX ORDER — PROPOFOL 10 MG/ML
INJECTION, EMULSION INTRAVENOUS PRN
Status: DISCONTINUED | OUTPATIENT
Start: 2021-05-05 | End: 2021-05-05

## 2021-05-05 RX ADMIN — HYDROMORPHONE HYDROCHLORIDE 0.5 MG: 1 INJECTION, SOLUTION INTRAMUSCULAR; INTRAVENOUS; SUBCUTANEOUS at 10:35

## 2021-05-05 RX ADMIN — LIDOCAINE HYDROCHLORIDE 100 MG: 20 INJECTION, SOLUTION INFILTRATION; PERINEURAL at 08:22

## 2021-05-05 RX ADMIN — ROCURONIUM BROMIDE 20 MG: 10 INJECTION INTRAVENOUS at 08:59

## 2021-05-05 RX ADMIN — SODIUM CHLORIDE, POTASSIUM CHLORIDE, SODIUM LACTATE AND CALCIUM CHLORIDE: 600; 310; 30; 20 INJECTION, SOLUTION INTRAVENOUS at 08:17

## 2021-05-05 RX ADMIN — HYDRALAZINE HYDROCHLORIDE 5 MG: 20 INJECTION INTRAMUSCULAR; INTRAVENOUS at 11:59

## 2021-05-05 RX ADMIN — FENTANYL CITRATE 50 MCG: 50 INJECTION, SOLUTION INTRAMUSCULAR; INTRAVENOUS at 09:01

## 2021-05-05 RX ADMIN — GLYCOPYRROLATE 0.8 MG: 0.2 INJECTION, SOLUTION INTRAMUSCULAR; INTRAVENOUS at 09:48

## 2021-05-05 RX ADMIN — BUPIVACAINE HYDROCHLORIDE 30 ML: 5 INJECTION, SOLUTION PERINEURAL at 08:40

## 2021-05-05 RX ADMIN — FENTANYL CITRATE 50 MCG: 0.05 INJECTION, SOLUTION INTRAMUSCULAR; INTRAVENOUS at 10:51

## 2021-05-05 RX ADMIN — ONDANSETRON 4 MG: 2 INJECTION INTRAMUSCULAR; INTRAVENOUS at 09:31

## 2021-05-05 RX ADMIN — PHENYLEPHRINE HYDROCHLORIDE 50 MCG: 10 INJECTION INTRAVENOUS at 08:37

## 2021-05-05 RX ADMIN — HYDROMORPHONE HYDROCHLORIDE 0.5 MG: 1 INJECTION, SOLUTION INTRAMUSCULAR; INTRAVENOUS; SUBCUTANEOUS at 11:04

## 2021-05-05 RX ADMIN — FAMOTIDINE 20 MG: 20 TABLET ORAL at 19:56

## 2021-05-05 RX ADMIN — ROCURONIUM BROMIDE 50 MG: 10 INJECTION INTRAVENOUS at 08:23

## 2021-05-05 RX ADMIN — SODIUM CHLORIDE 1000 ML: 900 IRRIGANT IRRIGATION at 08:39

## 2021-05-05 RX ADMIN — SCOPOLAMINE 1 PATCH: 1 PATCH TRANSDERMAL at 20:50

## 2021-05-05 RX ADMIN — FENTANYL CITRATE 50 MCG: 50 INJECTION, SOLUTION INTRAMUSCULAR; INTRAVENOUS at 08:22

## 2021-05-05 RX ADMIN — ROCURONIUM BROMIDE 5 MG: 10 INJECTION INTRAVENOUS at 09:18

## 2021-05-05 RX ADMIN — HYDROMORPHONE HYDROCHLORIDE 0.5 MG: 1 INJECTION, SOLUTION INTRAMUSCULAR; INTRAVENOUS; SUBCUTANEOUS at 09:07

## 2021-05-05 RX ADMIN — Medication 7.5 MG: at 09:15

## 2021-05-05 RX ADMIN — NEOSTIGMINE METHYLSULFATE 5 MG: 1 INJECTION, SOLUTION INTRAVENOUS at 09:48

## 2021-05-05 RX ADMIN — ACETAMINOPHEN 975 MG: 325 TABLET, FILM COATED ORAL at 18:05

## 2021-05-05 RX ADMIN — DEXAMETHASONE SODIUM PHOSPHATE 4 MG: 4 INJECTION, SOLUTION INTRA-ARTICULAR; INTRALESIONAL; INTRAMUSCULAR; INTRAVENOUS; SOFT TISSUE at 08:42

## 2021-05-05 RX ADMIN — SENNOSIDES AND DOCUSATE SODIUM 1 TABLET: 8.6; 5 TABLET ORAL at 19:56

## 2021-05-05 RX ADMIN — FENTANYL CITRATE 50 MCG: 50 INJECTION, SOLUTION INTRAMUSCULAR; INTRAVENOUS at 10:17

## 2021-05-05 RX ADMIN — PROPOFOL 30 MG: 10 INJECTION, EMULSION INTRAVENOUS at 08:28

## 2021-05-05 RX ADMIN — PROCHLORPERAZINE MALEATE 5 MG: 5 TABLET ORAL at 15:57

## 2021-05-05 RX ADMIN — ROCURONIUM BROMIDE 5 MG: 10 INJECTION INTRAVENOUS at 09:24

## 2021-05-05 RX ADMIN — ONDANSETRON 4 MG: 4 TABLET, ORALLY DISINTEGRATING ORAL at 13:59

## 2021-05-05 RX ADMIN — ACETAMINOPHEN 975 MG: 325 TABLET, FILM COATED ORAL at 12:59

## 2021-05-05 RX ADMIN — MIDAZOLAM 2 MG: 1 INJECTION INTRAMUSCULAR; INTRAVENOUS at 08:17

## 2021-05-05 RX ADMIN — ONDANSETRON 4 MG: 2 INJECTION INTRAMUSCULAR; INTRAVENOUS at 19:48

## 2021-05-05 RX ADMIN — OXYCODONE HYDROCHLORIDE 5 MG: 5 TABLET ORAL at 15:57

## 2021-05-05 RX ADMIN — Medication 3 G: at 08:32

## 2021-05-05 RX ADMIN — CYCLOBENZAPRINE 10 MG: 10 TABLET, FILM COATED ORAL at 19:56

## 2021-05-05 RX ADMIN — CYCLOBENZAPRINE 10 MG: 10 TABLET, FILM COATED ORAL at 13:00

## 2021-05-05 RX ADMIN — HYDROMORPHONE HYDROCHLORIDE 0.5 MG: 1 INJECTION, SOLUTION INTRAMUSCULAR; INTRAVENOUS; SUBCUTANEOUS at 11:36

## 2021-05-05 RX ADMIN — PROPOFOL 200 MG: 10 INJECTION, EMULSION INTRAVENOUS at 08:22

## 2021-05-05 ASSESSMENT — MIFFLIN-ST. JEOR: SCORE: 2133.37

## 2021-05-05 NOTE — OP NOTE
General Surgery Operative Note    PREOPERATIVE DIAGNOSIS:  Ventral hernia without obstruction or gangrene [K43.9]    POSTOPERATIVE DIAGNOSIS: Same    PROCEDURE:   Procedure(s):  OPEN INCISIONAL HERNIA REPAIR    ANESTHESIA:  General.    PREOPERATIVE MEDICATIONS: Ancef    SURGEON:  Christ Parekh MD    ASSISTANT:  Kylah Gonzalez PA-C.  Assistant was required owing to challenging exposure and need for retraction.    INDICATIONS: Patient underwent exploratory laparotomy for perforated bowel approximately 9 months ago.  He presented with abdominal wall bulging and a CT scan confirmed widemouth incisional hernia containing small bowel.  He now proceeds for open ventral hernia repair.    PROCEDURE:  The patient was taken to the operating suite and uneventfully endotracheally intubated.  The abdomen was prepped and draped in a sterile fashion.  Surgeon initiated timeout was acknowledged.  Ioban was placed over the abdomen and we excised out his previous laparotomy scar.  This was taken down through skin and subcutaneous tissue until the anterior rectus sheath was encountered.  We dissected down around the margin of the hernia until we got to the fascial edges circumferentially.  Hernia sac was taken down and entered.  Several small adhesive bands to the inside of the hernia sac were taken down as well.  Hernia defect measured approximately 8 x 10 cm.  I began creating a retrorectus space circumferentially and took this to the lateral border of the rectus sheath bilaterally.  This was done with electrocautery.  Along the midline superiorly and inferiorly this plane was contiguous with the preperitoneal space.  Once we had created a large enough space to accommodate our mesh, the posterior rectus sheath was reapproximated along the midline with a running 2-0 Prolene suture.  We then placed a 15 x 20 cm piece of Symbotex mesh in the retrorectus space.  This was anchored with 4 0-Vicryl sutures.  The mesh laid nicely in the space  with minimal tension.  We then closed the anterior rectus sheath over the top of the mesh with a running looped 0 Maxon and several interrupted 0 Vicryl sutures.  The wound was irrigated and the soft tissues were reapproximated with 0 Vicryl, 3-0 Vicryl.  The skin edges were reapproximated with 4-0 Vicryl and Steri-Strips.  The patient was uneventfully extubated, awakened and taken to the PACU in stable condition.  At the conclusion of the case, all lap and needle counts were correct.      ESTIMATED BLOOD LOSS: 10 mL      INTRAOPERATIVE FINDINGS: 8 x 10 cm incisional hernia closed primarily and reinforced with retrorectus mesh.    Christ Parekh MD, MD

## 2021-05-05 NOTE — BRIEF OP NOTE
[facility]  General Surgery Brief Operative Note    Pre-operative diagnosis: Ventral hernia without obstruction or gangrene [K43.9]   Post-operative diagnosis Same   Procedure: Procedure(s):  OPEN INCISIONAL HERNIA REPAIR    Surgeon(s), Assistant(s): Surgeon(s) and Role:     * Christ Parekh MD - Primary     * Kylah Gonzalez PA-C - Assisting   Estimated blood loss: 10 mL    Drains: None   Specimens: ID Type Source Tests Collected by Time Destination   1 : TISSUE DISPOSED IN OR PER EXCEPTIONS LIST Other (specify in comments) Other OR DOCUMENTATION ONLY Christ Parekh MD 5/5/2021  9:38 AM       Findings: None.   Complications:  Implants:  Condition: None  None  Stable   Comments:      Kylah Gonzalez PA-C  [facility]         See dictated operative report for full details

## 2021-05-05 NOTE — ANESTHESIA PREPROCEDURE EVALUATION
Anesthesia Pre-Procedure Evaluation    Patient: Cameron Barnard   MRN: [id number] : 1950        Preoperative Diagnosis: Ventral hernia without obstruction or gangrene [K43.9]   Procedure : Procedure(s):  OPEN INCISIONAL HERNIA REPAIR     Past Medical History:   Diagnosis Date     Allergic state      DVT (deep venous thrombosis) (H)     chronic left femoral DVT     HLD (hyperlipidemia)      Hypertension      Multiple subsegmental pulmonary emboli without acute cor pulmonale (H) 2017     Perforated bowel (H) 2020     Primary osteoarthritis of both knees      Pulmonary embolism (H)       Past Surgical History:   Procedure Laterality Date     ARTHROPLASTY KNEE Left 2/10/2021    Procedure: Left total knee arthroplasty;  Surgeon: Gigi De Oliveira MD;  Location:  OR     COLONOSCOPY N/A 2020    Procedure: COLONOSCOPY;  Surgeon: Lakshmi Santana MD;  Location:  OR     ESOPHAGOSCOPY, GASTROSCOPY, DUODENOSCOPY (EGD), COMBINED N/A 2020    Procedure: ESOPHAGOGASTRODUODENOSCOPY (EGD);  Surgeon: Lakshmi Santana MD;  Location:  OR     INJECT STEROID (LOCATION) Right 2/10/2021    Procedure: Right knee intra-articular injection of corticosteroid, 80 mg of Depo-Medrol;  Surgeon: Gigi De Oliveira MD;  Location:  OR     LAPAROSCOPIC APPENDECTOMY N/A 2020    Procedure: Laparoscopic appendectomy;  Surgeon: Christ Parekh MD;  Location:  OR     LAPAROSCOPY DIAGNOSTIC (GENERAL) N/A 2020    Procedure: LAPAROSCOPIC  SMALL BOWEL RESCECTION;  Surgeon: Christ Parekh MD;  Location:  OR      Allergies   Allergen Reactions     Wheat Bran Other (See Comments)     Weight gain      Social History     Tobacco Use     Smoking status: Never Smoker     Smokeless tobacco: Never Used   Substance Use Topics     Alcohol use: Yes     Frequency: Never     Comment: 2 glasses of red wine per week      Wt Readings from Last 1 Encounters:   21 131.4 kg (289 lb 9.6 oz)         Anesthesia Evaluation            ROS/MED HX  ENT/Pulmonary:     (+) JASWINDER risk factors, hypertension, obese,  (-) sleep apnea   Neurologic:       Cardiovascular:     (+) Dyslipidemia hypertension-----    METS/Exercise Tolerance:     Hematologic:     (+) History of blood clots, pt is anticoagulated,     Musculoskeletal:   (+) arthritis,     GI/Hepatic:    (-) GERD   Renal/Genitourinary:       Endo:     (+) Obesity,     Psychiatric/Substance Use:       Infectious Disease:       Malignancy:       Other:            Physical Exam    Airway        Mallampati: II   TM distance: > 3 FB   Neck ROM: full   Mouth opening: > 3 cm    Respiratory Devices and Support         Dental  no notable dental history         Cardiovascular   cardiovascular exam normal          Pulmonary   pulmonary exam normal                OUTSIDE LABS:  CBC:   Lab Results   Component Value Date    WBC 9.2 09/29/2020    WBC 10.1 09/28/2020    HGB 12.9 (L) 02/11/2021    HGB 12.1 (L) 09/29/2020    HCT 36.0 (L) 09/29/2020    HCT 38.2 (L) 09/28/2020     02/11/2021     09/29/2020     BMP:   Lab Results   Component Value Date     09/30/2020     09/29/2020    POTASSIUM 3.6 09/30/2020    POTASSIUM 3.5 09/29/2020    CHLORIDE 104 09/30/2020    CHLORIDE 106 09/29/2020    CO2 27 09/30/2020    CO2 28 09/29/2020    BUN 7 09/30/2020    BUN 10 09/29/2020    CR 0.73 09/30/2020    CR 0.82 09/29/2020    GLC 75 09/30/2020    GLC 90 09/29/2020     COAGS:   Lab Results   Component Value Date    INR 1.04 02/11/2021     POC:   Lab Results   Component Value Date     (H) 09/26/2020     HEPATIC:   Lab Results   Component Value Date    ALBUMIN 3.0 (L) 09/26/2020    PROTTOTAL 7.2 09/26/2020    ALT 41 09/26/2020    AST 28 09/26/2020    ALKPHOS 70 09/26/2020    BILITOTAL 1.3 09/26/2020     OTHER:   Lab Results   Component Value Date    LACT 0.6 (L) 09/26/2020    PETER 7.8 (L) 09/30/2020    MAG 2.4 (H) 09/18/2020    LIPASE 70 (L) 09/26/2020        Anesthesia Plan    ASA Status:  3   NPO Status:  NPO Appropriate    Anesthesia Type: General.     - Airway: ETT   Induction: Intravenous.   Maintenance: Balanced.   Techniques and Equipment:     - Airway: Video-Laryngoscope         Consents    Anesthesia Plan(s) and associated risks, benefits, and realistic alternatives discussed. Questions answered and patient/representative(s) expressed understanding.     - Discussed with:  Patient         Postoperative Care    Pain management: IV analgesics.   PONV prophylaxis: Ondansetron (or other 5HT-3)     Comments:                WARREN OROZCO MD

## 2021-05-05 NOTE — PLAN OF CARE
AO. VSS on 3L NC ex htn. Clear liquid diet: tolerating. A1 GB+ W. Proper IS use technique demonstrated. Pt voided 200 mL. Able to sit at side of bed, stand with walker and took 2 steps. Int nausea: PRN zofran + compazine x1. Pain reported along vertical abd incision along midline/umbilicus: PRN oxy, ice. +2 edema in LLE: PCDs. CMS intact. PRN hydralazine ordered for elevated BP.

## 2021-05-05 NOTE — ANESTHESIA CARE TRANSFER NOTE
Patient: Cameron Barnard    Procedure(s):  OPEN INCISIONAL HERNIA REPAIR    Diagnosis: Ventral hernia without obstruction or gangrene [K43.9]  Diagnosis Additional Information: No value filed.    Anesthesia Type:   General     Note:    Oropharynx: oropharynx clear of all foreign objects  Level of Consciousness: awake  Oxygen Supplementation: face mask    Independent Airway: airway patency satisfactory and stable  Dentition: dentition unchanged  Vital Signs Stable: post-procedure vital signs reviewed and stable  Report to RN Given: handoff report given  Patient transferred to: PACU    Handoff Report: Identifed the Patient, Identified the Reponsible Provider, Reviewed the pertinent medical history, Discussed the surgical course, Reviewed Intra-OP anesthesia mangement and issues during anesthesia, Set expectations for post-procedure period and Allowed opportunity for questions and acknowledgement of understanding      Vitals: (Last set prior to Anesthesia Care Transfer)  CRNA VITALS  5/5/2021 0947 - 5/5/2021 1023      5/5/2021             Pulse:  67    SpO2:  98 %    Resp Rate (set):  10        Electronically Signed By: RON Armas CRNA  May 5, 2021  10:23 AM

## 2021-05-05 NOTE — ANESTHESIA PROCEDURE NOTES
Airway       Patient location during procedure: OR  Staff -        Anesthesiologist:  Rich Martinez MD       CRNA: Jenifer Townsend APRN CRNA       Other Anesthesia Staff: Loulou Shipley       Performed By: SRNA  Consent for Airway        Urgency: elective  Indications and Patient Condition       Indications for airway management: janina-procedural       Induction type:intravenous       Mask difficulty assessment: 2 - vent by mask + OA or adjuvant +/- NMBA    Final Airway Details       Final airway type: endotracheal airway       Successful airway: ETT - single and Oral  Endotracheal Airway Details        ETT size (mm): 8.0       Cuffed: yes       Successful intubation technique: video laryngoscopy       VL Blade Size: Glidescope 4       Grade View of Cords: 1       Adjucts: stylet       Position: Right       Measured from: gums/teeth       Secured at (cm): 23       Bite block used: None    Post intubation assessment        Placement verified by: capnometry, equal breath sounds and chest rise        Number of attempts at approach: 1       Number of other approaches attempted: 0       Secured with: pink tape       Ease of procedure: easy       Dentition: Intact and Unchanged    Medication(s) Administered   Medication Administration Time: 5/5/2021 8:28 AM

## 2021-05-05 NOTE — ANESTHESIA POSTPROCEDURE EVALUATION
Patient: Cameron Barnard    Procedure(s):  OPEN INCISIONAL HERNIA REPAIR    Diagnosis:Ventral hernia without obstruction or gangrene [K43.9]  Diagnosis Additional Information: No value filed.    Anesthesia Type:  General    Note:  Disposition: Outpatient   Postop Pain Control: Uneventful            Sign Out: Well controlled pain   PONV: No   Neuro/Psych: Uneventful            Sign Out: Acceptable/Baseline neuro status   Airway/Respiratory: Uneventful            Sign Out: Acceptable/Baseline resp. status   CV/Hemodynamics: Uneventful            Sign Out: Acceptable CV status; No obvious hypovolemia; No obvious fluid overload   Other NRE: NONE   DID A NON-ROUTINE EVENT OCCUR? No           Last vitals:  Vitals:    05/05/21 1215 05/05/21 1239 05/05/21 1309   BP: (!) 169/99 (!) 157/80 (!) 148/76   Pulse: 67 94 82   Resp: 16 18 14   Temp:      SpO2: 96% 95% 93%       Last vitals prior to Anesthesia Care Transfer:  CRNA VITALS  5/5/2021 0947 - 5/5/2021 1047      5/5/2021             Pulse:  67    SpO2:  98 %    Resp Rate (set):  10          Electronically Signed By: WARREN OROZCO MD  May 5, 2021  1:41 PM

## 2021-05-05 NOTE — OR NURSING
PATIENT STATES DR WANTS HIM TO TAKE 120MG OF LOVENOX BID  TOMORROW AM AND THEN RESTART COUMADIN TODAY OR TOMORROW.   BLOOD WORK ON Friday.

## 2021-05-06 LAB
ANION GAP SERPL CALCULATED.3IONS-SCNC: 3 MMOL/L (ref 3–14)
BUN SERPL-MCNC: 14 MG/DL (ref 7–30)
CALCIUM SERPL-MCNC: 8.2 MG/DL (ref 8.5–10.1)
CHLORIDE SERPL-SCNC: 103 MMOL/L (ref 94–109)
CO2 SERPL-SCNC: 31 MMOL/L (ref 20–32)
CREAT SERPL-MCNC: 0.91 MG/DL (ref 0.66–1.25)
CREAT SERPL-MCNC: 0.92 MG/DL (ref 0.66–1.25)
GFR SERPL CREATININE-BSD FRML MDRD: 82 ML/MIN/{1.73_M2}
GFR SERPL CREATININE-BSD FRML MDRD: 84 ML/MIN/{1.73_M2}
GLUCOSE SERPL-MCNC: 98 MG/DL (ref 70–99)
HGB BLD-MCNC: 13.7 G/DL (ref 13.3–17.7)
POTASSIUM SERPL-SCNC: 4 MMOL/L (ref 3.4–5.3)
SODIUM SERPL-SCNC: 137 MMOL/L (ref 133–144)

## 2021-05-06 PROCEDURE — 82565 ASSAY OF CREATININE: CPT | Performed by: PHYSICIAN ASSISTANT

## 2021-05-06 PROCEDURE — 85018 HEMOGLOBIN: CPT | Performed by: PHYSICIAN ASSISTANT

## 2021-05-06 PROCEDURE — 96372 THER/PROPH/DIAG INJ SC/IM: CPT | Performed by: PHYSICIAN ASSISTANT

## 2021-05-06 PROCEDURE — 250N000013 HC RX MED GY IP 250 OP 250 PS 637: Performed by: SURGERY

## 2021-05-06 PROCEDURE — 999N000120 HC STATISTIC OUTPATIENT (NON-OBS) EVE

## 2021-05-06 PROCEDURE — 36415 COLL VENOUS BLD VENIPUNCTURE: CPT | Performed by: PHYSICIAN ASSISTANT

## 2021-05-06 PROCEDURE — 80048 BASIC METABOLIC PNL TOTAL CA: CPT | Performed by: PHYSICIAN ASSISTANT

## 2021-05-06 PROCEDURE — 250N000011 HC RX IP 250 OP 636: Performed by: SURGERY

## 2021-05-06 PROCEDURE — 250N000011 HC RX IP 250 OP 636: Performed by: PHYSICIAN ASSISTANT

## 2021-05-06 PROCEDURE — 999N000121 HC STATISTIC OUTPATIENT (NON-OBS) NIGHT

## 2021-05-06 PROCEDURE — 250N000013 HC RX MED GY IP 250 OP 250 PS 637: Performed by: PHYSICIAN ASSISTANT

## 2021-05-06 PROCEDURE — 999N000119 HC STATISTIC OUTPATIENT (NON-OBS) DAY

## 2021-05-06 RX ORDER — CYCLOBENZAPRINE HCL 10 MG
10 TABLET ORAL 3 TIMES DAILY
Qty: 30 TABLET | Refills: 0 | Status: SHIPPED | OUTPATIENT
Start: 2021-05-06 | End: 2021-05-27

## 2021-05-06 RX ORDER — BISACODYL 10 MG
10 SUPPOSITORY, RECTAL RECTAL ONCE
Status: DISCONTINUED | OUTPATIENT
Start: 2021-05-06 | End: 2021-05-07 | Stop reason: HOSPADM

## 2021-05-06 RX ORDER — AMOXICILLIN 250 MG
1 CAPSULE ORAL 2 TIMES DAILY
Qty: 20 TABLET | Refills: 0 | Status: SHIPPED | OUTPATIENT
Start: 2021-05-06 | End: 2021-05-27

## 2021-05-06 RX ADMIN — FAMOTIDINE 20 MG: 20 TABLET ORAL at 08:37

## 2021-05-06 RX ADMIN — CYCLOBENZAPRINE 10 MG: 10 TABLET, FILM COATED ORAL at 20:38

## 2021-05-06 RX ADMIN — CYCLOBENZAPRINE 10 MG: 10 TABLET, FILM COATED ORAL at 08:37

## 2021-05-06 RX ADMIN — SENNOSIDES AND DOCUSATE SODIUM 1 TABLET: 8.6; 5 TABLET ORAL at 20:38

## 2021-05-06 RX ADMIN — FAMOTIDINE 20 MG: 20 TABLET ORAL at 20:38

## 2021-05-06 RX ADMIN — ENOXAPARIN SODIUM 60 MG: 60 INJECTION SUBCUTANEOUS at 20:38

## 2021-05-06 RX ADMIN — ACETAMINOPHEN 975 MG: 325 TABLET, FILM COATED ORAL at 18:48

## 2021-05-06 RX ADMIN — WARFARIN SODIUM 7.5 MG: 5 TABLET ORAL at 18:49

## 2021-05-06 RX ADMIN — ACETAMINOPHEN 975 MG: 325 TABLET, FILM COATED ORAL at 23:59

## 2021-05-06 RX ADMIN — ACETAMINOPHEN 975 MG: 325 TABLET, FILM COATED ORAL at 13:19

## 2021-05-06 RX ADMIN — ACETAMINOPHEN 975 MG: 325 TABLET, FILM COATED ORAL at 00:07

## 2021-05-06 RX ADMIN — HYDRALAZINE HYDROCHLORIDE 10 MG: 20 INJECTION INTRAMUSCULAR; INTRAVENOUS at 00:10

## 2021-05-06 RX ADMIN — ACETAMINOPHEN 975 MG: 325 TABLET, FILM COATED ORAL at 06:24

## 2021-05-06 RX ADMIN — CYCLOBENZAPRINE 10 MG: 10 TABLET, FILM COATED ORAL at 13:20

## 2021-05-06 NOTE — DISCHARGE INSTRUCTIONS
CoVid 19 Information    We want to give you information regarding Covid. Please consult your primary care provider with any questions you might have.     Patient who have symptoms (cough, fever, or shortness of breath), need to isolate for 7 days from when symptoms started OR 72 hours after fever resolves (without fever reducing medications) AND improvement of respiratory symptoms (whichever is longer).      Isolate yourself at home (in own room/own bathroom if possible)    Do Not allow any visitors    Do Not go to work or school    Do Not go to [employer],  centers, shopping, or other public places.    Do Not shake hands.    Avoid close and intimate contact with others (hugging, kissing).    Follow CDC recommendations for household cleaning of frequently touched services.     After the initial 7 days, continue to isolate yourself from household members as much as possible. To continue decrease the risk of community spread and exposure, you and any members of your household should limit activities in public for 14 days after starting home isolation.     You can reference the following CDC link for helpful home isolation/care tips:  https://www.cdc.gov/coronavirus/2019-ncov/downloads/10Things.pdf    Protect Others:    Cover Your Mouth and Nose with a mask, disposable tissue or wash cloth to avoid spreading germs to others.    Wash your hands and face frequently with soap and water    Call Your Primary Doctor If: Breathing difficulty develops or you become worse.    For more information about COVID19 and options for caring for yourself at home, please visit the CDC website at https://www.cdc.gov/coronavirus/2019-ncov/about/steps-when-sick.html  For more options for care at [facility], please visit our website at [email or web address]      [facility]  Discharge Instructions: Post-Operative Open Incisional Hernia Repair    ACTIVITY    Take frequent,  short walks and increase your activity gradually.      Avoid strenuous physical activity or heavy lifting greater than 15lbs. for 4 weeks.  You may climb stairs.    You may drive without restrictions when you are not using any prescription pain medication and feel comfortable in a car.    You may return to work/school when you are comfortable without any prescription pain medication.    WOUND CARE    You may remove your bandage and shower 48 hours after the surgery.  Pat your incision dry and leave it open to air.  Re-apply dressing (Band-Aids or gauze/tape) as needed for comfort or drainage.    You may have steri-strips (looks like white tape) on your incision.  You may peel off the steri-strips 2 weeks after your surgery if they have not peeled off on their own.     Do not soak your incision in a tub or pool for 2 weeks.     Do not apply any lotions, creams, or ointments to your incision.    A ridge under your incision is normal and will gradually resolve.    DIET    Start with liquids, then gradually resume your regular diet as tolerated.     Drink plenty of fluids to stay hydrated.    PAIN    Expect some tenderness and discomfort at the incision site(s).  Use the prescribed pain medication at your discretion.  Expect gradual resolution of your pain over several days.    You may take ibuprofen with food (unless you have been told not to) or acetaminophen/Tylenol instead of or in addition to your prescribed pain medication.     Do not drink alcohol or drive while you are taking pain medications.    You may apply ice to your incisions in 20 minute intervals as needed for the next 48 hours.  After that time, consider switching to heat if you prefer.    EXPECTATIONS    Pain medications can cause constipation.  Limit use when possible.  Take over the counter stool softener/stimulant, such as Colace or Senna, 1-2 times a day with plenty of water.  You may take a mild over the counter laxative, such as Miralax or a  suppository, as needed.      You may discontinue these medications once you are having regular bowel movements and/or are no longer taking your narcotic pain medication.      FOLLOW UP    Follow up with Dr. Parekh in 2 weeks. Call [phone] to schedule an appointment or if you have any concerns. We are located at [address] [place] [address].     Follow up with your INR clinic tomorrow.    CALL OUR OFFICE [phone] IF YOU HAVE:     Chills or fever above 101 F.    Increased redness, warmth, or drainage at your incisions.    Significant bleeding.    Pain not relieved by your pain medication or rest.    Increasing pain after the first 48 hours.    Any other concerns or questions.           Revised September 2020

## 2021-05-06 NOTE — PHARMACY-ANTICOAGULATION SERVICE
Clinical Pharmacy - Warfarin Dosing Consult     Pharmacy has been consulted to manage this patient s warfarin therapy.  Indication: DVT/ PE Treatment  Therapy Goal: INR 2-3  Warfarin Prior to Admission: Yes  Warfarin PTA Regimen: 7.5 mg daily  Significant drug interactions: lovenox    INR   Date Value Ref Range Status   05/05/2021 1.00 0.86 - 1.14 Final   02/11/2021 1.04 0.86 - 1.14 Final       Recommend warfarin 7.5 mg today.  Pharmacy will monitor Cameron Barnard daily and order warfarin doses to achieve specified goal.      Please contact pharmacy as soon as possible if the warfarin needs to be held for a procedure or if the warfarin goals change.

## 2021-05-06 NOTE — PROGRESS NOTES
Outpatient in a Bed discharge goals PRIOR TO DISCHARGE     Comments: List all Outpatient in a Bed discharge goals to be met before discharge home:   ~ Patient able to ambulate as they were prior to admission or with assist devices provided by therapies during their stay: partially met-using walker SBA   ~ Nurse to Notify Provider when Outpatient in a Bed discharge goals have been met and patient is ready for discharge.

## 2021-05-06 NOTE — PLAN OF CARE
POD 1. A&Ox4. Slight hypertension, PRN Hydralazine given. All other VSS on 3L oxymask. Placed on oxymask due to pt de-sating when falling asleep with nasal cannula. CMS intact. Bladder scanned for 598, encouraged pt to ambulate in arevalo, pt was able to void after ambulating, voiding adequately. Tolerating clear liquid diet. PRN Zofran, cool washcloth, and scopolamine patch given for nausea with relief. Up SBA with walker. Abdominal binder on. Scheduled Tylenol and ice given for pain with relief. Phil BLE with edema. Using incentive spirometer at bedside. IV-SL. Continue to monitor.

## 2021-05-06 NOTE — PROGRESS NOTES
Care Coordination:    Per MD request Pt sent for review to Inpt status.         Nadya Duff RN BSN  Inpatient Care Coordination  [facility]  [phone]

## 2021-05-06 NOTE — PLAN OF CARE
Pt is A&Ox4, VSS on RA, SBA w/ GB and walker, Full liquid diet, tolerating well, IV SL, ABD incision dressing C/D/I, ABD binder in place, possible dchrg tomorrow, continue to monitor

## 2021-05-06 NOTE — UTILIZATION REVIEW
Admission Status; Secondary Review Determination   Under the authority of the Utilization Management Committee, the utilization review process indicated a secondary review on the above patient. The review outcome is based on review of the medical records, discussions with staff, and applying clinical experience noted on the date of the review.     (x) Outpatient Status with extended recovery is appropriate - This patient does not meet hospital inpatient criteria.   RATIONALE FOR DETERMINATION   71 years old  man who underwent a OPEN INCISIONAL HERNIA REPAIR. Patient was admitted to the hospital after the procedure.  No documented complications or unexpected recovery. Patient can be safely monitored and supported in ongoing outpatient/extended recovery setting.   The severity of illness, intensity of service provided, expected LOS and risk for adverse outcome doesn't meet inpatient hospital admission.     The information on this document is developed by the utilization review team in order for the business office to ensure compliance. This only denotes the appropriateness of proper admission status and does not reflect the quality of care rendered.   The definitions of Inpatient Status and Observation Status used in making the determination above are those provided in the CMS Coverage Manual, Chapter 1 and Chapter 6, section 70.4.     Sincerely,   Sam Chen MD    Physician Advisor  Utilization Review/ Case Management  [facility].

## 2021-05-06 NOTE — PROVIDER NOTIFICATION
MD Notification    Notified Person: MD    Notified Person Name: On call for Dr Parekh, (Dr. Rea)    Notification Date/Time: 5/5/21 @2043    Notification Interaction: Phone call    Purpose of Notification: Pt still nausea despite zofran, too early to receive compazine, can he have something else?    Orders Received: scopolamine patch     Comments:

## 2021-05-06 NOTE — PROGRESS NOTES
"[facility]  GENERAL SURGERY Progress Note    Admission Date: 2021         Assessment and Plan:     Cameron Barnard is a 71 year old male S/P Procedure(s): OPEN INCISIONAL HERNIA REPAIR, 1 Day Post-Op. 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  - ADAT  - Pain regimen: Tylenol and flexeril ATC, oxy PRN  - Hgb stable, discussed anticoagulation plan with Dr. Parekh- resume Lovenox 60mcg tonight (twice daily starting tomorrow) and Warfarin tonight, Pharmacy consult placed   - Continue Pepcid  - Labs ok  - Dulcolax supp  - Ambulate with assistance 4x day and encourage IS  - Home likely tomorrow due to anticoagulation plan and pain control, discussed with care coordinator             Interval History:     Quite sore with movement, concerned about staying another night, however on anticoagulation, tolerating liquids, +Flatus/-BM, UO adequate, ambulating some, reports using a walker. Meds reviewed.                      Physical Exam:   Blood pressure 118/60, pulse 60, temperature 96  F (35.6  C), temperature source Oral, resp. rate 18, height 1.88 m (6' 2\"), weight 131.4 kg (289 lb 9.6 oz), SpO2 95 %.  Temperature Temp  Av.8  F (36  C)  Min: 95.1  F (35.1  C)  Max: 97.8  F (36.6  C)   I/O last 3 completed shifts:  In: 1500 [I.V.:1500]  Out: 865 [Urine:855; Blood:10]  Constitutional:  Awake, alert, oriented, and in no apparent distress.   Lungs: No increased work of breathing, good air exchange, clear to auscultation bilaterally, and no crackles or wheezing.   Cardiovascular: Regular rate and rhythm, normal S1 and S2, and no murmur noted.   Abdomen: Soft, non-distended, appropriately tender at incision(s).    Wound(s): Clean, dry, and intact. No erythema or drainage.    Extremities: No edema or calf tenderness. +SCDs          Data:     Recent Labs   Lab Test 21  0613 21  0641 20  0739 20  0710 20  0735   WBC  --   --  9.2 10.1 8.7   HGB 13.7 12.9* " 12.1* 12.8* 13.3   HCT  --   --  36.0* 38.2* 39.2*   PLT  --  166 285 284 247      Recent Labs   Lab Test 05/06/21  0613 09/30/20  0814 09/29/20  0739    137 138   POTASSIUM 4.0 3.6 3.5   CHLORIDE 103 104 106   CO2 31 27 28   BUN 14 7 10   CR 0.91 0.73 0.82     ENRIQUETA ChongC  [facility]  [phone]

## 2021-05-07 VITALS
RESPIRATION RATE: 18 BRPM | BODY MASS INDEX: 37.17 KG/M2 | OXYGEN SATURATION: 95 % | DIASTOLIC BLOOD PRESSURE: 77 MMHG | SYSTOLIC BLOOD PRESSURE: 125 MMHG | WEIGHT: 289.6 LBS | HEART RATE: 85 BPM | HEIGHT: 74 IN | TEMPERATURE: 99.1 F

## 2021-05-07 LAB — INR PPP: 1.05 (ref 0.86–1.14)

## 2021-05-07 PROCEDURE — 36415 COLL VENOUS BLD VENIPUNCTURE: CPT | Performed by: PHYSICIAN ASSISTANT

## 2021-05-07 PROCEDURE — 250N000013 HC RX MED GY IP 250 OP 250 PS 637: Performed by: PHYSICIAN ASSISTANT

## 2021-05-07 PROCEDURE — 250N000011 HC RX IP 250 OP 636: Performed by: PHYSICIAN ASSISTANT

## 2021-05-07 PROCEDURE — 85610 PROTHROMBIN TIME: CPT | Performed by: PHYSICIAN ASSISTANT

## 2021-05-07 PROCEDURE — 999N000119 HC STATISTIC OUTPATIENT (NON-OBS) DAY

## 2021-05-07 PROCEDURE — 96372 THER/PROPH/DIAG INJ SC/IM: CPT | Performed by: PHYSICIAN ASSISTANT

## 2021-05-07 RX ORDER — OXYCODONE HYDROCHLORIDE 5 MG/1
5-10 TABLET ORAL EVERY 4 HOURS PRN
Qty: 12 TABLET | Refills: 0 | Status: SHIPPED | OUTPATIENT
Start: 2021-05-07 | End: 2021-05-27

## 2021-05-07 RX ADMIN — FAMOTIDINE 20 MG: 20 TABLET ORAL at 08:08

## 2021-05-07 RX ADMIN — ENOXAPARIN SODIUM 60 MG: 60 INJECTION SUBCUTANEOUS at 08:08

## 2021-05-07 RX ADMIN — SENNOSIDES AND DOCUSATE SODIUM 1 TABLET: 8.6; 5 TABLET ORAL at 08:08

## 2021-05-07 RX ADMIN — ACETAMINOPHEN 975 MG: 325 TABLET, FILM COATED ORAL at 06:12

## 2021-05-07 RX ADMIN — CYCLOBENZAPRINE 10 MG: 10 TABLET, FILM COATED ORAL at 08:08

## 2021-05-07 NOTE — PLAN OF CARE
VSS. A/Ox4. SBA w/ walker. Adv to reg diet. Denies pain, has midline abdominal incision that is covered in gauze. No new drainage observed. Abdominal binder in place. Pt receiving scheduled Tylenol. Pt is passing gas but has not yet had a BM. May discharge today.

## 2021-05-07 NOTE — PLAN OF CARE
Pt A&OX4. VSS on RA. Denies pain. Tolerated ok with full liquid diet. Abdominal incision CDI, Ice applied. Passing gas, no BM yet. Abdominal binder in use. IS at 2000 level. Up SBA with walker to bathroom/bedside urinal, voiding adequately.  Ambulated once in the hallway. On scheduled tylenol. PIV SL. Discharging home tomorrow in the morning. Continue to monitor.

## 2021-05-07 NOTE — PROGRESS NOTES
Surgery  Pt doing well, pain manageable  Incision looks fine  Okay for discharge today  Anticoagulation management discussed  Chirag Parekh MD  General Surgery, Office [phone]

## 2021-05-07 NOTE — PROGRESS NOTES
"[facility]  GENERAL SURGERY Progress Note    Admission Date: 2021         Assessment and Plan:     Cameron Barnard is a 71 year old male S/P Procedure(s): OPEN INCISIONAL HERNIA REPAIR, 2 Day Post-Op. 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  - ADAT  - Pain regimen: Tylenol and flexeril ATC, oxy PRN  - Lovenox 60mcg bid and Warfarin resumed yesterday, Pharmacy consulted- appreciate assistance  - Continue Pepcid  - Dulcolax supp  - Ambulate with assistance 4x day and encourage IS  - Discharge home, discharge instructions discussed, F/U with Dr. Parekh in 2-3 weeks (appointment made0  - Anticoagulation plan discussed with Pharmacy and Dr. Parekh- Resume Lovenox 120mcg bid starting tonight, Coumadin dose 7.5mg on , , and ; Follow up at INR clinic Monday              Interval History:     Pain controlled, tolerating diet, +Flatus/BM, UO adequate, ambulating.  Meds reviewed.                      Physical Exam:   Blood pressure (!) 142/80, pulse 70, temperature 97.7  F (36.5  C), temperature source Oral, resp. rate 18, height 1.88 m (6' 2\"), weight 131.4 kg (289 lb 9.6 oz), SpO2 95 %.  Temperature Temp  Av.4  F (36.3  C)  Min: 96.3  F (35.7  C)  Max: 98.3  F (36.8  C)   I/O last 3 completed shifts:  In: 930 [P.O.:930]  Out: 1325 [Urine:1325]  Constitutional:  Awake, alert, oriented, and in no apparent distress.   Lungs: No increased work of breathing, good air exchange, clear to auscultation bilaterally, and no crackles or wheezing.   Cardiovascular: Regular rate and rhythm, normal S1 and S2, and no murmur noted.   Abdomen: Soft, non-distended, appropriately tender at incision(s), + BS.   Wound(s): Clean, dry, and intact. No erythema or drainage.    Extremities: No edema or calf tenderness. +SCDs          Data:     Recent Labs   Lab Test 21  0600 21  0716 21  0641   INR 1.05 1.00 1.04     Kylah Gonzalez PA-C  [facility]  [phone]  "

## 2021-05-07 NOTE — PLAN OF CARE
A&Ox4. VSS on RA. Up independently with walker in room. Regular diet well tolerated. Reports minimal pain, declines medication intervention. Passing flatus, bowel sounds active. Lovenox and warfarin. Discharge instructions reviewed at the bedside; understanding verbalized. Discharge home.

## 2021-05-10 ENCOUNTER — HOSPITAL ENCOUNTER (INPATIENT)
Facility: CLINIC | Age: 71
LOS: 3 days | Discharge: HOME | DRG: 908 | End: 2021-05-13
Attending: EMERGENCY MEDICINE | Admitting: SURGERY
Payer: COMMERCIAL

## 2021-05-10 ENCOUNTER — ANESTHESIA (OUTPATIENT)
Dept: SURGERY | Facility: CLINIC | Age: 71
DRG: 908 | End: 2021-05-10
Payer: COMMERCIAL

## 2021-05-10 ENCOUNTER — APPOINTMENT (OUTPATIENT)
Dept: CT IMAGING | Facility: CLINIC | Age: 71
DRG: 908 | End: 2021-05-10
Attending: EMERGENCY MEDICINE
Payer: COMMERCIAL

## 2021-05-10 ENCOUNTER — SURGERY (OUTPATIENT)
Age: 71
End: 2021-05-10
Payer: COMMERCIAL

## 2021-05-10 ENCOUNTER — ANESTHESIA EVENT (OUTPATIENT)
Dept: SURGERY | Facility: CLINIC | Age: 71
DRG: 908 | End: 2021-05-10
Payer: COMMERCIAL

## 2021-05-10 DIAGNOSIS — Z79.01 ANTICOAGULATED: ICD-10-CM

## 2021-05-10 DIAGNOSIS — S30.1XXA ABDOMINAL WALL HEMATOMA, INITIAL ENCOUNTER: ICD-10-CM

## 2021-05-10 DIAGNOSIS — I82.512 CHRONIC DEEP VEIN THROMBOSIS (DVT) OF FEMORAL VEIN OF LEFT LOWER EXTREMITY (H): ICD-10-CM

## 2021-05-10 DIAGNOSIS — S30.1XXA ABDOMINAL WALL HEMATOMA, INITIAL ENCOUNTER: Primary | ICD-10-CM

## 2021-05-10 LAB
ABO + RH BLD: NORMAL
ABO + RH BLD: NORMAL
ALBUMIN SERPL-MCNC: 3.3 G/DL (ref 3.4–5)
ALP SERPL-CCNC: 59 U/L (ref 40–150)
ALT SERPL W P-5'-P-CCNC: 25 U/L (ref 0–70)
ANION GAP SERPL CALCULATED.3IONS-SCNC: 7 MMOL/L (ref 3–14)
AST SERPL W P-5'-P-CCNC: 23 U/L (ref 0–45)
BASOPHILS # BLD AUTO: 0 10E9/L (ref 0–0.2)
BASOPHILS NFR BLD AUTO: 0.2 %
BILIRUB SERPL-MCNC: 0.5 MG/DL (ref 0.2–1.3)
BLD GP AB SCN SERPL QL: NORMAL
BLOOD BANK CMNT PATIENT-IMP: NORMAL
BUN SERPL-MCNC: 14 MG/DL (ref 7–30)
CALCIUM SERPL-MCNC: 8.9 MG/DL (ref 8.5–10.1)
CHLORIDE SERPL-SCNC: 108 MMOL/L (ref 94–109)
CO2 SERPL-SCNC: 25 MMOL/L (ref 20–32)
CREAT SERPL-MCNC: 0.92 MG/DL (ref 0.66–1.25)
DIFFERENTIAL METHOD BLD: ABNORMAL
EOSINOPHIL # BLD AUTO: 0.3 10E9/L (ref 0–0.7)
EOSINOPHIL NFR BLD AUTO: 2 %
ERYTHROCYTE [DISTWIDTH] IN BLOOD BY AUTOMATED COUNT: 12.6 % (ref 10–15)
GFR SERPL CREATININE-BSD FRML MDRD: 83 ML/MIN/{1.73_M2}
GLUCOSE SERPL-MCNC: 130 MG/DL (ref 70–99)
HCT VFR BLD AUTO: 42.4 % (ref 40–53)
HGB BLD-MCNC: 14.4 G/DL (ref 13.3–17.7)
IMM GRANULOCYTES # BLD: 0.1 10E9/L (ref 0–0.4)
IMM GRANULOCYTES NFR BLD: 0.6 %
INR PPP: 1.39 (ref 0.86–1.14)
LABORATORY COMMENT REPORT: NORMAL
LACTATE BLD-SCNC: 2.2 MMOL/L (ref 0.7–2)
LYMPHOCYTES # BLD AUTO: 1.8 10E9/L (ref 0.8–5.3)
LYMPHOCYTES NFR BLD AUTO: 14.4 %
MCH RBC QN AUTO: 31.2 PG (ref 26.5–33)
MCHC RBC AUTO-ENTMCNC: 34 G/DL (ref 31.5–36.5)
MCV RBC AUTO: 92 FL (ref 78–100)
MONOCYTES # BLD AUTO: 1.4 10E9/L (ref 0–1.3)
MONOCYTES NFR BLD AUTO: 11.3 %
NEUTROPHILS # BLD AUTO: 8.8 10E9/L (ref 1.6–8.3)
NEUTROPHILS NFR BLD AUTO: 71.5 %
NRBC # BLD AUTO: 0 10*3/UL
NRBC BLD AUTO-RTO: 0 /100
PLATELET # BLD AUTO: 253 10E9/L (ref 150–450)
POTASSIUM SERPL-SCNC: 3.8 MMOL/L (ref 3.4–5.3)
PROT SERPL-MCNC: 7.2 G/DL (ref 6.8–8.8)
RBC # BLD AUTO: 4.62 10E12/L (ref 4.4–5.9)
SARS-COV-2 RNA RESP QL NAA+PROBE: NEGATIVE
SODIUM SERPL-SCNC: 140 MMOL/L (ref 133–144)
SPECIMEN EXP DATE BLD: NORMAL
SPECIMEN SOURCE: NORMAL
WBC # BLD AUTO: 12.3 10E9/L (ref 4–11)

## 2021-05-10 PROCEDURE — 99285 EMERGENCY DEPT VISIT HI MDM: CPT | Mod: 25

## 2021-05-10 PROCEDURE — 250N000015 HC RX FACTOR IP MED 636 OP 636: Performed by: EMERGENCY MEDICINE

## 2021-05-10 PROCEDURE — 85610 PROTHROMBIN TIME: CPT | Performed by: EMERGENCY MEDICINE

## 2021-05-10 PROCEDURE — 49000 EXPLORATION OF ABDOMEN: CPT | Mod: 78 | Performed by: SURGERY

## 2021-05-10 PROCEDURE — 710N000009 HC RECOVERY PHASE 1, LEVEL 1, PER MIN: Performed by: SURGERY

## 2021-05-10 PROCEDURE — 258N000003 HC RX IP 258 OP 636: Performed by: EMERGENCY MEDICINE

## 2021-05-10 PROCEDURE — 250N000011 HC RX IP 250 OP 636: Performed by: EMERGENCY MEDICINE

## 2021-05-10 PROCEDURE — 250N000009 HC RX 250: Performed by: NURSE ANESTHETIST, CERTIFIED REGISTERED

## 2021-05-10 PROCEDURE — 360N000077 HC SURGERY LEVEL 4, PER MIN: Performed by: SURGERY

## 2021-05-10 PROCEDURE — 96374 THER/PROPH/DIAG INJ IV PUSH: CPT | Mod: 59

## 2021-05-10 PROCEDURE — 86850 RBC ANTIBODY SCREEN: CPT | Performed by: EMERGENCY MEDICINE

## 2021-05-10 PROCEDURE — 370N000017 HC ANESTHESIA TECHNICAL FEE, PER MIN: Performed by: SURGERY

## 2021-05-10 PROCEDURE — 85025 COMPLETE CBC W/AUTO DIFF WBC: CPT | Performed by: EMERGENCY MEDICINE

## 2021-05-10 PROCEDURE — 272N000001 HC OR GENERAL SUPPLY STERILE: Performed by: SURGERY

## 2021-05-10 PROCEDURE — 86900 BLOOD TYPING SEROLOGIC ABO: CPT | Performed by: EMERGENCY MEDICINE

## 2021-05-10 PROCEDURE — 30283B1 TRANSFUSION OF NONAUTOLOGOUS 4-FACTOR PROTHROMBIN COMPLEX CONCENTRATE INTO VEIN, PERCUTANEOUS APPROACH: ICD-10-PCS | Performed by: SURGERY

## 2021-05-10 PROCEDURE — 250N000009 HC RX 250: Performed by: EMERGENCY MEDICINE

## 2021-05-10 PROCEDURE — 250N000011 HC RX IP 250 OP 636: Performed by: NURSE ANESTHETIST, CERTIFIED REGISTERED

## 2021-05-10 PROCEDURE — 999N000141 HC STATISTIC PRE-PROCEDURE NURSING ASSESSMENT: Performed by: SURGERY

## 2021-05-10 PROCEDURE — C9803 HOPD COVID-19 SPEC COLLECT: HCPCS

## 2021-05-10 PROCEDURE — 74177 CT ABD & PELVIS W/CONTRAST: CPT

## 2021-05-10 PROCEDURE — 86901 BLOOD TYPING SEROLOGIC RH(D): CPT | Performed by: EMERGENCY MEDICINE

## 2021-05-10 PROCEDURE — 250N000011 HC RX IP 250 OP 636: Performed by: SURGERY

## 2021-05-10 PROCEDURE — 96375 TX/PRO/DX INJ NEW DRUG ADDON: CPT

## 2021-05-10 PROCEDURE — 250N000006 HC OR RX SURGIFLO W/THROMBIN KIT 2ML 1991 OPNP: Performed by: SURGERY

## 2021-05-10 PROCEDURE — C9132 KCENTRA, PER I.U.: HCPCS | Performed by: EMERGENCY MEDICINE

## 2021-05-10 PROCEDURE — 83605 ASSAY OF LACTIC ACID: CPT | Performed by: EMERGENCY MEDICINE

## 2021-05-10 PROCEDURE — 80053 COMPREHEN METABOLIC PANEL: CPT | Performed by: EMERGENCY MEDICINE

## 2021-05-10 PROCEDURE — 120N000001 HC R&B MED SURG/OB

## 2021-05-10 PROCEDURE — 258N000003 HC RX IP 258 OP 636: Performed by: NURSE ANESTHETIST, CERTIFIED REGISTERED

## 2021-05-10 PROCEDURE — 250N000025 HC SEVOFLURANE, PER MIN: Performed by: SURGERY

## 2021-05-10 PROCEDURE — 87635 SARS-COV-2 COVID-19 AMP PRB: CPT | Performed by: EMERGENCY MEDICINE

## 2021-05-10 RX ORDER — LIDOCAINE HYDROCHLORIDE 20 MG/ML
INJECTION, SOLUTION INFILTRATION; PERINEURAL PRN
Status: DISCONTINUED | OUTPATIENT
Start: 2021-05-10 | End: 2021-05-11

## 2021-05-10 RX ORDER — HYDROMORPHONE HYDROCHLORIDE 1 MG/ML
0.5 INJECTION, SOLUTION INTRAMUSCULAR; INTRAVENOUS; SUBCUTANEOUS ONCE
Status: COMPLETED | OUTPATIENT
Start: 2021-05-10 | End: 2021-05-10

## 2021-05-10 RX ORDER — EPHEDRINE SULFATE 50 MG/ML
INJECTION, SOLUTION INTRAMUSCULAR; INTRAVENOUS; SUBCUTANEOUS PRN
Status: DISCONTINUED | OUTPATIENT
Start: 2021-05-10 | End: 2021-05-11

## 2021-05-10 RX ORDER — OXYCODONE HYDROCHLORIDE 5 MG/1
5-10 TABLET ORAL EVERY 4 HOURS PRN
Status: CANCELLED | OUTPATIENT
Start: 2021-05-10

## 2021-05-10 RX ORDER — DEXAMETHASONE SODIUM PHOSPHATE 4 MG/ML
INJECTION, SOLUTION INTRA-ARTICULAR; INTRALESIONAL; INTRAMUSCULAR; INTRAVENOUS; SOFT TISSUE PRN
Status: DISCONTINUED | OUTPATIENT
Start: 2021-05-10 | End: 2021-05-11

## 2021-05-10 RX ORDER — ONDANSETRON 2 MG/ML
INJECTION INTRAMUSCULAR; INTRAVENOUS PRN
Status: DISCONTINUED | OUTPATIENT
Start: 2021-05-10 | End: 2021-05-11

## 2021-05-10 RX ORDER — ONDANSETRON 2 MG/ML
4 INJECTION INTRAMUSCULAR; INTRAVENOUS ONCE
Status: COMPLETED | OUTPATIENT
Start: 2021-05-10 | End: 2021-05-10

## 2021-05-10 RX ORDER — FENTANYL CITRATE 50 UG/ML
INJECTION, SOLUTION INTRAMUSCULAR; INTRAVENOUS PRN
Status: DISCONTINUED | OUTPATIENT
Start: 2021-05-10 | End: 2021-05-11

## 2021-05-10 RX ORDER — IOPAMIDOL 755 MG/ML
135 INJECTION, SOLUTION INTRAVASCULAR ONCE
Status: COMPLETED | OUTPATIENT
Start: 2021-05-10 | End: 2021-05-10

## 2021-05-10 RX ORDER — PROPOFOL 10 MG/ML
INJECTION, EMULSION INTRAVENOUS PRN
Status: DISCONTINUED | OUTPATIENT
Start: 2021-05-10 | End: 2021-05-11

## 2021-05-10 RX ORDER — HYDROMORPHONE HYDROCHLORIDE 1 MG/ML
INJECTION, SOLUTION INTRAMUSCULAR; INTRAVENOUS; SUBCUTANEOUS
Status: DISCONTINUED
Start: 2021-05-10 | End: 2021-05-10 | Stop reason: HOSPADM

## 2021-05-10 RX ORDER — CEFAZOLIN SODIUM IN 0.9 % NACL 3 G/100 ML
3 INTRAVENOUS SOLUTION, PIGGYBACK (ML) INTRAVENOUS
Status: COMPLETED | OUTPATIENT
Start: 2021-05-10 | End: 2021-05-10

## 2021-05-10 RX ORDER — CEFAZOLIN SODIUM 1 G/3ML
1 INJECTION, POWDER, FOR SOLUTION INTRAMUSCULAR; INTRAVENOUS SEE ADMIN INSTRUCTIONS
Status: DISCONTINUED | OUTPATIENT
Start: 2021-05-10 | End: 2021-05-11 | Stop reason: HOSPADM

## 2021-05-10 RX ORDER — SODIUM CHLORIDE, SODIUM LACTATE, POTASSIUM CHLORIDE, CALCIUM CHLORIDE 600; 310; 30; 20 MG/100ML; MG/100ML; MG/100ML; MG/100ML
INJECTION, SOLUTION INTRAVENOUS CONTINUOUS PRN
Status: DISCONTINUED | OUTPATIENT
Start: 2021-05-10 | End: 2021-05-11

## 2021-05-10 RX ORDER — HYDROMORPHONE HYDROCHLORIDE 1 MG/ML
0.5 INJECTION, SOLUTION INTRAMUSCULAR; INTRAVENOUS; SUBCUTANEOUS ONCE
Status: DISCONTINUED | OUTPATIENT
Start: 2021-05-10 | End: 2021-05-11

## 2021-05-10 RX ADMIN — PHENYLEPHRINE HYDROCHLORIDE 50 MCG: 10 INJECTION INTRAVENOUS at 23:36

## 2021-05-10 RX ADMIN — SODIUM CHLORIDE 1000 ML: 9 INJECTION, SOLUTION INTRAVENOUS at 21:13

## 2021-05-10 RX ADMIN — Medication 7.5 MG: at 23:58

## 2021-05-10 RX ADMIN — SODIUM CHLORIDE 1000 ML: 0.9 IRRIGANT IRRIGATION at 23:50

## 2021-05-10 RX ADMIN — IOPAMIDOL 135 ML: 755 INJECTION, SOLUTION INTRAVENOUS at 21:04

## 2021-05-10 RX ADMIN — SODIUM CHLORIDE, POTASSIUM CHLORIDE, SODIUM LACTATE AND CALCIUM CHLORIDE: 600; 310; 30; 20 INJECTION, SOLUTION INTRAVENOUS at 23:31

## 2021-05-10 RX ADMIN — DEXAMETHASONE SODIUM PHOSPHATE 4 MG: 4 INJECTION, SOLUTION INTRA-ARTICULAR; INTRALESIONAL; INTRAMUSCULAR; INTRAVENOUS; SOFT TISSUE at 23:40

## 2021-05-10 RX ADMIN — PHENYLEPHRINE HYDROCHLORIDE 150 MCG: 10 INJECTION INTRAVENOUS at 23:45

## 2021-05-10 RX ADMIN — PROPOFOL 30 MCG/KG/MIN: 10 INJECTION, EMULSION INTRAVENOUS at 23:35

## 2021-05-10 RX ADMIN — PHYTONADIONE 10 MG: 10 INJECTION, EMULSION INTRAMUSCULAR; INTRAVENOUS; SUBCUTANEOUS at 22:48

## 2021-05-10 RX ADMIN — PROTHROMBIN, COAGULATION FACTOR VII HUMAN, COAGULATION FACTOR IX HUMAN, COAGULATION FACTOR X HUMAN, PROTEIN C, PROTEIN S HUMAN, AND WATER 2264 UNITS: KIT at 22:20

## 2021-05-10 RX ADMIN — ROCURONIUM BROMIDE 25 MG: 10 INJECTION INTRAVENOUS at 23:48

## 2021-05-10 RX ADMIN — PHENYLEPHRINE HYDROCHLORIDE 100 MCG: 10 INJECTION INTRAVENOUS at 23:43

## 2021-05-10 RX ADMIN — LIDOCAINE HYDROCHLORIDE 80 MG: 20 INJECTION, SOLUTION INFILTRATION; PERINEURAL at 23:36

## 2021-05-10 RX ADMIN — SODIUM CHLORIDE 80 ML: 9 INJECTION, SOLUTION INTRAVENOUS at 21:04

## 2021-05-10 RX ADMIN — PHENYLEPHRINE HYDROCHLORIDE 200 MCG: 10 INJECTION INTRAVENOUS at 23:49

## 2021-05-10 RX ADMIN — ONDANSETRON 4 MG: 2 INJECTION INTRAMUSCULAR; INTRAVENOUS at 21:14

## 2021-05-10 RX ADMIN — HYDROMORPHONE HYDROCHLORIDE 1 MG: 1 INJECTION, SOLUTION INTRAMUSCULAR; INTRAVENOUS; SUBCUTANEOUS at 21:13

## 2021-05-10 RX ADMIN — HYDROMORPHONE HYDROCHLORIDE 0.5 MG: 1 INJECTION, SOLUTION INTRAMUSCULAR; INTRAVENOUS; SUBCUTANEOUS at 22:26

## 2021-05-10 RX ADMIN — ONDANSETRON 4 MG: 2 INJECTION INTRAMUSCULAR; INTRAVENOUS at 23:52

## 2021-05-10 RX ADMIN — FENTANYL CITRATE 100 MCG: 50 INJECTION, SOLUTION INTRAMUSCULAR; INTRAVENOUS at 23:36

## 2021-05-10 RX ADMIN — SUCCINYLCHOLINE CHLORIDE 140 MG: 20 INJECTION, SOLUTION INTRAMUSCULAR; INTRAVENOUS; PARENTERAL at 23:37

## 2021-05-10 RX ADMIN — PROPOFOL 200 MG: 10 INJECTION, EMULSION INTRAVENOUS at 23:37

## 2021-05-10 RX ADMIN — Medication 3 G: at 23:40

## 2021-05-10 ASSESSMENT — ENCOUNTER SYMPTOMS
CONSTIPATION: 0
BLOOD IN STOOL: 0
VOMITING: 0
SHORTNESS OF BREATH: 0
APPETITE CHANGE: 0
ABDOMINAL PAIN: 1
TROUBLE SWALLOWING: 0
COUGH: 0
DYSURIA: 0
NAUSEA: 1
FEVER: 0

## 2021-05-10 ASSESSMENT — MIFFLIN-ST. JEOR: SCORE: 2135.18

## 2021-05-10 NOTE — ED TRIAGE NOTES
5/5 had hernia with Dr Parekh. Last night started having loose BMs  and today developed abd pain, states abd is distended, unable to pass gas and no BMs today. Taken 10mg oxy at 1600 w/o any relief. C/o nausea

## 2021-05-11 ENCOUNTER — APPOINTMENT (OUTPATIENT)
Dept: PHYSICAL THERAPY | Facility: CLINIC | Age: 71
DRG: 908 | End: 2021-05-11
Attending: PHYSICIAN ASSISTANT
Payer: COMMERCIAL

## 2021-05-11 LAB
GLUCOSE BLDC GLUCOMTR-MCNC: 135 MG/DL (ref 70–99)
HGB BLD-MCNC: 11.3 G/DL (ref 13.3–17.7)

## 2021-05-11 PROCEDURE — 0WCG0ZZ EXTIRPATION OF MATTER FROM PERITONEAL CAVITY, OPEN APPROACH: ICD-10-PCS | Performed by: SURGERY

## 2021-05-11 PROCEDURE — 250N000013 HC RX MED GY IP 250 OP 250 PS 637: Performed by: PHYSICIAN ASSISTANT

## 2021-05-11 PROCEDURE — 97116 GAIT TRAINING THERAPY: CPT | Mod: GP

## 2021-05-11 PROCEDURE — 250N000011 HC RX IP 250 OP 636: Performed by: SURGERY

## 2021-05-11 PROCEDURE — 250N000011 HC RX IP 250 OP 636: Performed by: PHYSICIAN ASSISTANT

## 2021-05-11 PROCEDURE — 258N000003 HC RX IP 258 OP 636: Performed by: NURSE ANESTHETIST, CERTIFIED REGISTERED

## 2021-05-11 PROCEDURE — 85018 HEMOGLOBIN: CPT | Performed by: PHYSICIAN ASSISTANT

## 2021-05-11 PROCEDURE — 250N000011 HC RX IP 250 OP 636: Performed by: NURSE ANESTHETIST, CERTIFIED REGISTERED

## 2021-05-11 PROCEDURE — 250N000009 HC RX 250: Performed by: SURGERY

## 2021-05-11 PROCEDURE — 250N000009 HC RX 250: Performed by: NURSE ANESTHETIST, CERTIFIED REGISTERED

## 2021-05-11 PROCEDURE — 36415 COLL VENOUS BLD VENIPUNCTURE: CPT | Performed by: PHYSICIAN ASSISTANT

## 2021-05-11 PROCEDURE — 120N000001 HC R&B MED SURG/OB

## 2021-05-11 PROCEDURE — 258N000003 HC RX IP 258 OP 636: Performed by: PHYSICIAN ASSISTANT

## 2021-05-11 PROCEDURE — 999N001017 HC STATISTIC GLUCOSE BY METER IP

## 2021-05-11 PROCEDURE — 97161 PT EVAL LOW COMPLEX 20 MIN: CPT | Mod: GP

## 2021-05-11 RX ORDER — DIPHENHYDRAMINE HYDROCHLORIDE 50 MG/ML
12.5 INJECTION INTRAMUSCULAR; INTRAVENOUS EVERY 6 HOURS PRN
Status: DISCONTINUED | OUTPATIENT
Start: 2021-05-11 | End: 2021-05-13 | Stop reason: HOSPADM

## 2021-05-11 RX ORDER — ACETAMINOPHEN 325 MG/1
650 TABLET ORAL EVERY 4 HOURS PRN
Status: DISCONTINUED | OUTPATIENT
Start: 2021-05-14 | End: 2021-05-13 | Stop reason: HOSPADM

## 2021-05-11 RX ORDER — NALOXONE HYDROCHLORIDE 0.4 MG/ML
0.2 INJECTION, SOLUTION INTRAMUSCULAR; INTRAVENOUS; SUBCUTANEOUS
Status: DISCONTINUED | OUTPATIENT
Start: 2021-05-11 | End: 2021-05-13 | Stop reason: HOSPADM

## 2021-05-11 RX ORDER — CYCLOBENZAPRINE HCL 10 MG
10 TABLET ORAL 3 TIMES DAILY
Status: DISCONTINUED | OUTPATIENT
Start: 2021-05-11 | End: 2021-05-13 | Stop reason: HOSPADM

## 2021-05-11 RX ORDER — OXYCODONE HYDROCHLORIDE 5 MG/1
10 TABLET ORAL EVERY 4 HOURS PRN
Status: DISCONTINUED | OUTPATIENT
Start: 2021-05-11 | End: 2021-05-13 | Stop reason: HOSPADM

## 2021-05-11 RX ORDER — FAMOTIDINE 20 MG/1
20 TABLET, FILM COATED ORAL 2 TIMES DAILY
Status: DISCONTINUED | OUTPATIENT
Start: 2021-05-11 | End: 2021-05-13 | Stop reason: HOSPADM

## 2021-05-11 RX ORDER — LIDOCAINE 40 MG/G
CREAM TOPICAL
Status: DISCONTINUED | OUTPATIENT
Start: 2021-05-11 | End: 2021-05-13 | Stop reason: HOSPADM

## 2021-05-11 RX ORDER — MAGNESIUM HYDROXIDE 1200 MG/15ML
LIQUID ORAL PRN
Status: DISCONTINUED | OUTPATIENT
Start: 2021-05-10 | End: 2021-05-11 | Stop reason: HOSPADM

## 2021-05-11 RX ORDER — BISACODYL 10 MG
10 SUPPOSITORY, RECTAL RECTAL DAILY
Status: DISCONTINUED | OUTPATIENT
Start: 2021-05-11 | End: 2021-05-13 | Stop reason: HOSPADM

## 2021-05-11 RX ORDER — FENTANYL CITRATE 50 UG/ML
25-50 INJECTION, SOLUTION INTRAMUSCULAR; INTRAVENOUS
Status: DISCONTINUED | OUTPATIENT
Start: 2021-05-11 | End: 2021-05-11 | Stop reason: HOSPADM

## 2021-05-11 RX ORDER — SODIUM CHLORIDE, SODIUM LACTATE, POTASSIUM CHLORIDE, CALCIUM CHLORIDE 600; 310; 30; 20 MG/100ML; MG/100ML; MG/100ML; MG/100ML
INJECTION, SOLUTION INTRAVENOUS CONTINUOUS
Status: DISCONTINUED | OUTPATIENT
Start: 2021-05-11 | End: 2021-05-11 | Stop reason: HOSPADM

## 2021-05-11 RX ORDER — NALOXONE HYDROCHLORIDE 0.4 MG/ML
0.4 INJECTION, SOLUTION INTRAMUSCULAR; INTRAVENOUS; SUBCUTANEOUS
Status: DISCONTINUED | OUTPATIENT
Start: 2021-05-11 | End: 2021-05-13 | Stop reason: HOSPADM

## 2021-05-11 RX ORDER — AMOXICILLIN 250 MG
1 CAPSULE ORAL 2 TIMES DAILY
Status: DISCONTINUED | OUTPATIENT
Start: 2021-05-11 | End: 2021-05-13 | Stop reason: HOSPADM

## 2021-05-11 RX ORDER — ACETAMINOPHEN 325 MG/1
975 TABLET ORAL EVERY 8 HOURS
Status: DISCONTINUED | OUTPATIENT
Start: 2021-05-11 | End: 2021-05-13 | Stop reason: HOSPADM

## 2021-05-11 RX ORDER — OXYCODONE HYDROCHLORIDE 5 MG/1
5 TABLET ORAL EVERY 4 HOURS PRN
Status: DISCONTINUED | OUTPATIENT
Start: 2021-05-11 | End: 2021-05-13 | Stop reason: HOSPADM

## 2021-05-11 RX ORDER — ONDANSETRON 4 MG/1
4 TABLET, ORALLY DISINTEGRATING ORAL EVERY 30 MIN PRN
Status: DISCONTINUED | OUTPATIENT
Start: 2021-05-11 | End: 2021-05-11 | Stop reason: HOSPADM

## 2021-05-11 RX ORDER — HYDROMORPHONE HYDROCHLORIDE 1 MG/ML
0.4 INJECTION, SOLUTION INTRAMUSCULAR; INTRAVENOUS; SUBCUTANEOUS
Status: DISCONTINUED | OUTPATIENT
Start: 2021-05-11 | End: 2021-05-13 | Stop reason: HOSPADM

## 2021-05-11 RX ORDER — PROPOFOL 10 MG/ML
INJECTION, EMULSION INTRAVENOUS CONTINUOUS PRN
Status: DISCONTINUED | OUTPATIENT
Start: 2021-05-10 | End: 2021-05-11

## 2021-05-11 RX ORDER — HYDROMORPHONE HYDROCHLORIDE 1 MG/ML
.3-.5 INJECTION, SOLUTION INTRAMUSCULAR; INTRAVENOUS; SUBCUTANEOUS EVERY 5 MIN PRN
Status: DISCONTINUED | OUTPATIENT
Start: 2021-05-11 | End: 2021-05-11 | Stop reason: HOSPADM

## 2021-05-11 RX ORDER — PROCHLORPERAZINE MALEATE 5 MG
5 TABLET ORAL EVERY 6 HOURS PRN
Status: DISCONTINUED | OUTPATIENT
Start: 2021-05-11 | End: 2021-05-13 | Stop reason: HOSPADM

## 2021-05-11 RX ORDER — ONDANSETRON 2 MG/ML
4 INJECTION INTRAMUSCULAR; INTRAVENOUS EVERY 30 MIN PRN
Status: DISCONTINUED | OUTPATIENT
Start: 2021-05-11 | End: 2021-05-11 | Stop reason: HOSPADM

## 2021-05-11 RX ORDER — NEOSTIGMINE METHYLSULFATE 1 MG/ML
VIAL (ML) INJECTION PRN
Status: DISCONTINUED | OUTPATIENT
Start: 2021-05-11 | End: 2021-05-11

## 2021-05-11 RX ORDER — DIPHENHYDRAMINE HCL 12.5MG/5ML
12.5 LIQUID (ML) ORAL EVERY 6 HOURS PRN
Status: DISCONTINUED | OUTPATIENT
Start: 2021-05-11 | End: 2021-05-13 | Stop reason: HOSPADM

## 2021-05-11 RX ORDER — GLYCOPYRROLATE 0.2 MG/ML
INJECTION, SOLUTION INTRAMUSCULAR; INTRAVENOUS PRN
Status: DISCONTINUED | OUTPATIENT
Start: 2021-05-11 | End: 2021-05-11

## 2021-05-11 RX ORDER — ONDANSETRON 2 MG/ML
4 INJECTION INTRAMUSCULAR; INTRAVENOUS EVERY 6 HOURS PRN
Status: DISCONTINUED | OUTPATIENT
Start: 2021-05-11 | End: 2021-05-13 | Stop reason: HOSPADM

## 2021-05-11 RX ORDER — SODIUM CHLORIDE, SODIUM LACTATE, POTASSIUM CHLORIDE, CALCIUM CHLORIDE 600; 310; 30; 20 MG/100ML; MG/100ML; MG/100ML; MG/100ML
INJECTION, SOLUTION INTRAVENOUS CONTINUOUS
Status: DISCONTINUED | OUTPATIENT
Start: 2021-05-11 | End: 2021-05-13 | Stop reason: HOSPADM

## 2021-05-11 RX ORDER — CEFAZOLIN SODIUM IN 0.9 % NACL 3 G/100 ML
3 INTRAVENOUS SOLUTION, PIGGYBACK (ML) INTRAVENOUS EVERY 8 HOURS
Status: COMPLETED | OUTPATIENT
Start: 2021-05-11 | End: 2021-05-11

## 2021-05-11 RX ORDER — ONDANSETRON 4 MG/1
4 TABLET, ORALLY DISINTEGRATING ORAL EVERY 6 HOURS PRN
Status: DISCONTINUED | OUTPATIENT
Start: 2021-05-11 | End: 2021-05-13 | Stop reason: HOSPADM

## 2021-05-11 RX ORDER — HYDROMORPHONE HCL IN WATER/PF 6 MG/30 ML
0.2 PATIENT CONTROLLED ANALGESIA SYRINGE INTRAVENOUS
Status: DISCONTINUED | OUTPATIENT
Start: 2021-05-11 | End: 2021-05-13 | Stop reason: HOSPADM

## 2021-05-11 RX ADMIN — OXYCODONE HYDROCHLORIDE 10 MG: 5 TABLET ORAL at 08:32

## 2021-05-11 RX ADMIN — Medication 3 G: at 08:35

## 2021-05-11 RX ADMIN — LIDOCAINE HYDROCHLORIDE 20 ML: 10 INJECTION, SOLUTION INFILTRATION; PERINEURAL at 01:04

## 2021-05-11 RX ADMIN — FAMOTIDINE 20 MG: 20 TABLET ORAL at 08:32

## 2021-05-11 RX ADMIN — SODIUM CHLORIDE 1000 ML: 0.9 IRRIGANT IRRIGATION at 00:28

## 2021-05-11 RX ADMIN — SODIUM CHLORIDE, POTASSIUM CHLORIDE, SODIUM LACTATE AND CALCIUM CHLORIDE: 600; 310; 30; 20 INJECTION, SOLUTION INTRAVENOUS at 00:35

## 2021-05-11 RX ADMIN — SENNOSIDES AND DOCUSATE SODIUM 1 TABLET: 8.6; 5 TABLET ORAL at 08:33

## 2021-05-11 RX ADMIN — CYCLOBENZAPRINE 10 MG: 10 TABLET, FILM COATED ORAL at 15:43

## 2021-05-11 RX ADMIN — NEOSTIGMINE METHYLSULFATE 5 MG: 1 INJECTION, SOLUTION INTRAVENOUS at 00:58

## 2021-05-11 RX ADMIN — HYDROMORPHONE HYDROCHLORIDE 0.5 MG: 1 INJECTION, SOLUTION INTRAMUSCULAR; INTRAVENOUS; SUBCUTANEOUS at 01:34

## 2021-05-11 RX ADMIN — CYCLOBENZAPRINE 10 MG: 10 TABLET, FILM COATED ORAL at 08:32

## 2021-05-11 RX ADMIN — GLYCOPYRROLATE 0.8 MG: 0.2 INJECTION, SOLUTION INTRAMUSCULAR; INTRAVENOUS at 00:58

## 2021-05-11 RX ADMIN — SODIUM CHLORIDE, POTASSIUM CHLORIDE, SODIUM LACTATE AND CALCIUM CHLORIDE: 600; 310; 30; 20 INJECTION, SOLUTION INTRAVENOUS at 03:09

## 2021-05-11 RX ADMIN — FAMOTIDINE 20 MG: 20 TABLET ORAL at 20:11

## 2021-05-11 RX ADMIN — CYCLOBENZAPRINE 10 MG: 10 TABLET, FILM COATED ORAL at 22:24

## 2021-05-11 RX ADMIN — Medication 1 LOZENGE: at 05:56

## 2021-05-11 RX ADMIN — SENNOSIDES AND DOCUSATE SODIUM 1 TABLET: 8.6; 5 TABLET ORAL at 20:11

## 2021-05-11 RX ADMIN — ACETAMINOPHEN 975 MG: 325 TABLET, FILM COATED ORAL at 12:14

## 2021-05-11 RX ADMIN — OXYCODONE HYDROCHLORIDE 5 MG: 5 TABLET ORAL at 03:07

## 2021-05-11 RX ADMIN — Medication 10 MG: at 00:30

## 2021-05-11 RX ADMIN — Medication 3 G: at 15:42

## 2021-05-11 RX ADMIN — ACETAMINOPHEN 975 MG: 325 TABLET, FILM COATED ORAL at 03:07

## 2021-05-11 RX ADMIN — ACETAMINOPHEN 975 MG: 325 TABLET, FILM COATED ORAL at 20:11

## 2021-05-11 ASSESSMENT — ACTIVITIES OF DAILY LIVING (ADL)
ADLS_ACUITY_SCORE: 15
ADLS_ACUITY_SCORE: 15
ADLS_ACUITY_SCORE: 13
ADLS_ACUITY_SCORE: 15
ADLS_ACUITY_SCORE: 15

## 2021-05-11 NOTE — ANESTHESIA CARE TRANSFER NOTE
Patient: Cameron Barnard    Procedure(s):  EXPLORATORY LAPAROTOMY AND EVACUATION OF HEMATOMA    Diagnosis: Abdominal wall hematoma, initial encounter [S30.1XXA]  Diagnosis Additional Information: No value filed.    Anesthesia Type:   General     Note:    Oropharynx: oropharynx clear of all foreign objects  Level of Consciousness: awake  Oxygen Supplementation: face mask  Level of Supplemental Oxygen (L/min / FiO2): 10  Independent Airway: airway patency satisfactory and stable  Dentition: dentition unchanged  Vital Signs Stable: post-procedure vital signs reviewed and stable  Report to RN Given: handoff report given  Patient transferred to: PACU    Handoff Report: Identifed the Patient, Identified the Reponsible Provider, Reviewed the pertinent medical history, Discussed the surgical course, Reviewed Intra-OP anesthesia mangement and issues during anesthesia, Set expectations for post-procedure period and Allowed opportunity for questions and acknowledgement of understanding      Vitals: (Last set prior to Anesthesia Care Transfer)  CRNA VITALS  5/11/2021 0045 - 5/11/2021 0119      5/11/2021             EKG:  Sinus rhythm        Electronically Signed By: RON Lorenz CRNA  May 11, 2021  1:19 AM

## 2021-05-11 NOTE — PROGRESS NOTES
Surgery  Events of last night reviewed  Agree with periop abx  Will discuss management of anticoagulation  Chirag Parekh MD  General Surgery, Office [phone]

## 2021-05-11 NOTE — BRIEF OP NOTE
[facility]  General Surgery Brief Operative Note    Pre-operative diagnosis: Abdominal wall hematoma, initial encounter [S30.1XXA]   Post-operative diagnosis Same   Procedure: Procedure(s):  EXPLORATORY LAPAROTOMY AND EVACUATION OF HEMATOMA    Surgeon(s), Assistant(s): Surgeon(s) and Role:     * Hari Ceballos MD - Primary     * Kylah Gonzalez PA-C - Assisting   Estimated blood loss: 50 mL    Drains: None   Specimens: * No specimens in log *   Findings: Retrorectus hematoma evacuated   Complications:  Implants:  Condition: None  None  Stable   Comments:      Kylah Gonzalez PA-C  [facility]         See dictated operative report for full details

## 2021-05-11 NOTE — PHARMACY-ADMISSION MEDICATION HISTORY
Admission medication history interview status for the 5/10/2021  admission is complete. See EPIC admission navigator for prior to admission medications     Medication history source reliability:Good    Actions taken by pharmacist (provider contacted, etc):None     Additional medication history information not noted on PTA med list :None    Medication reconciliation/reorder completed by provider prior to medication history? No    Time spent in this activity: 15min    Prior to Admission medications    Medication Sig Last Dose Taking? Auth Provider   acetaminophen (TYLENOL) 500 MG tablet Take 1,000 mg by mouth every 6 hours as needed for mild pain 2 x 500 mg 5/9/2021 at Unknown time Yes Reported, Patient   cetirizine (ZYRTEC) 10 MG tablet Take 10 mg by mouth daily as needed for allergies 5/9/2021 at Unknown time Yes Reported, Patient   Cholecalciferol (VITAMIN D3) 50 MCG (2000 UT) CAPS Take 5,000 Units by mouth every morning  5/9/2021 at Unknown time Yes Reported, Patient   cyclobenzaprine (FLEXERIL) 10 MG tablet Take 1 tablet (10 mg) by mouth 3 times daily 5/9/2021 at Unknown time Yes Kylah Gonzalez PA-C   Dietary Management Product (VASCULERA) TABS Take 1 tablet by mouth every morning Past Month at Unknown time Yes Reported, Patient   enoxaparin ANTICOAGULANT (LOVENOX) 120 MG/0.8ML syringe Inject 120 mg Subcutaneous daily BRIDGING THERAPY. 5/10/2021 at Unknown time Yes Reported, Patient   Fish Oil-Cholecalciferol (OMEGA-3 FISH OIL/VITAMIN D3 PO) Take 1 capsule by mouth 2 times daily  5/9/2021 at Unknown time Yes Unknown, Entered By History   FOLIC ACID PO Take 1 mg by mouth every morning 5/9/2021 at Unknown time Yes Reported, Patient   Multiple Vitamins-Minerals (MULTIVITAMIN ADULTS 50+) TABS Take 1 tablet by mouth every morning  5/9/2021 at Unknown time Yes Reported, Patient   oxyCODONE (ROXICODONE) 5 MG tablet Take 1-2 tablets (5-10 mg) by mouth every 4 hours as needed for moderate to severe pain 5/10/2021 at  1600 Yes Kylah Gonzalez PA-C   senna-docusate (SENOKOT-S/PERICOLACE) 8.6-50 MG tablet Take 1 tablet by mouth 2 times daily 5/9/2021 at Unknown time Yes Kylah Gonzalez PA-C   warfarin ANTICOAGULANT (COUMADIN) 5 MG tablet Take 7.5 mg by mouth every evening 1/2 + 5 mg 5/9/2021 at 7,5 Yes Reported, Patient

## 2021-05-11 NOTE — PLAN OF CARE
At 0240h, into the unit from PACU s/p ex. Laparotomy & evacuation of hematoma. Patient is AOx4. VSS. O2Sat  95% on 2lpm of O2 via NC. Clear bilateral breath sound, IS 2000 ml x 10 reps. Tolerating clear liquid diet, denies n/v. Voiding adequately, hypoactive BS x4. [place] drressing on midline abdomen, CDI. Abdomen soft, ecchymotic. Pedal pulses palpable, pitting edema 2+ sensation intact on BLE. Stopped  ml/hr infusing on L hand, tolerating PO. Pain managed w/ oxycodone PRN x1 & sched tylenol. Dangled & ambulated to bathroom at 6am.

## 2021-05-11 NOTE — PLAN OF CARE
Physical Therapy Discharge Summary    Reason for therapy discharge:    All goals and outcomes met, no further needs identified.    Progress towards therapy goal(s). [facility] for goal details.  Goals met    Therapy recommendation(s):    No further therapy is recommended.  Walk the halls while admitted.

## 2021-05-11 NOTE — ED PROVIDER NOTES
"  History   Chief Complaint:  Abdominal Pain and Post-op Problem     HPI   Cameron Barnard is a 71 year old male on coumadin with history of DVT/PE in 2017, hypertension, hyperlipidemia, and diverticulitis who presents with abdominal pain and post-op problem. He is status-post incisional hernia repair on 05/05 by Dr. Parekh. He developed abdominal pain and distension today but reports loose stools three times yesterday. After lunch, he felt a \"grumbing\" which was otherwise benign until his abdomen worsened with pain.  He states this happened after he sat up in a chair.  He feels like there is an expanding hard mass in his anterior abdomen which is painful.  He took 10 mg oxycodone today at 1600 for pain management, but has not felt any change in his pain. He has been unable to pass a bowel movement and is unable to pass gas today. His nausea has resolved and denies vomiting. He is still on 120 mg twice a day Lovenox for bridging and 7.5 mg  of Coumadin which he last took last night. Denies fevers, shortness of breath, cough, chest pain, dysuria, blood in stool, vomiting, or difficulties drinking fluids.  He is Covid vaccinated was Covid negative last week.    Review of Systems   Constitutional: Negative for appetite change and fever.   HENT: Negative for trouble swallowing.    Respiratory: Negative for cough and shortness of breath.    Cardiovascular: Negative for chest pain.   Gastrointestinal: Positive for abdominal pain and nausea. Negative for blood in stool, constipation and vomiting.   Genitourinary: Negative for dysuria.   All other systems reviewed and are negative.        Allergies:  No known drug allergies    Medications:  Flexeril   Lovenox  Oxycodone   Coumadin     Past Medical History:    DVT   Hyperlipidemia   Hypertension  Perforated Bowel   Primary Osteoarthritis of Both Knees  PE  Diverticulitis   Ascending Aortic Dilation   Lymphedema of Left Lower Extremity     Past Surgical History:  " "  Arthroplasty Knee - Left   Colonoscopy   EGD   Herniorrhaphy Incisional   Laparoscopic Appendectomy     Family History:    Sister - Breast Cancer    Social History:  Arrives with wife.     Physical Exam     Patient Vitals for the past 24 hrs:   BP Temp Temp src Pulse Resp SpO2 Height Weight   05/10/21 2254 (!) 139/101 97.7  F (36.5  C) Temporal 105 20 98 % -- --   05/10/21 1828 (!) 120/96 98.1  F (36.7  C) Oral 124 20 99 % 1.88 m (6' 2\") 131.5 kg (290 lb)       Physical Exam    Physical Exam   Constitutional:  Patient is oriented to person, place, and time. They appear well-developed and well-nourished. Mild distress secondary to abdominal pain.    HENT:   Mouth/Throat:   Oropharynx is clear and moist.   Eyes:    Conjunctivae normal and EOM are normal. Pupils are equal, round, and reactive to light.   Neck:    Normal range of motion.   Cardiovascular: Tachycardic, regular rhythm and normal heart sounds.  Exam reveals no gallop and no friction rub.  No murmur heard.  Pulmonary/Chest:  Effort normal and breath sounds normal. Patient has no wheezes. Patient has no rales.   Abdominal:   Absent bowel sounds. Severe tenderness over a palpable mass. Supraumbilically to the epigastrium as well to the left side of the umbilicus. Guarding with rebound.   Musculoskeletal:  Normal range of motion. Patient exhibits no edema.   Neurological:   Patient is alert and oriented to person, place, and time. Patient has normal strength. No cranial nerve deficit or sensory deficit. GCS 15  Skin:   Skin is warm and dry. No rash noted. No erythema.   Psychiatric:   Patient has a normal mood and affect. Patient's behavior is normal. Judgment and thought content normal.     Emergency Department Course     Imaging:  CT Abdomen Pelvis w Contrast  1.  Large rectus sheath hematoma measuring up to 25.7 cm and with  small foci of active contrast extravasation as described above.  2.  Separate smaller subcutaneous midline ventral abdominal " wall  hematoma measuring up to 7.6 cm. No associated active contrast  extravasation.  Reading per radiology    Laboratory:   CBC: WBC 12.3 (H), HGB 14.4,    CMP: Glucose 130, Albumin 3.3 (L), o/w WNL (Creatinine: 0.92)    Lactic Acid: 2.2 (H)  INR: 1.39 (H)  AB0/Rh type and screen: Pending    Asymptomatic COVID19 Virus PCR by nasopharyngeal swab pending    Emergency Department Course:    Reviewed:  I reviewed nursing notes, vitals, past medical history and care everywhere    Assessments:  2025 I obtained history and examined the patient as noted above.   2146 I rechecked the patient and explained findings. He rates his pain now a 2/10.   2208 I informed the patient of our plan for transfer to the OR.    Consults:   2138 I spoke to Dr. Kerr of radiology. They report a large hematoma and possible bleeding in his abdomen.   2202 I spoke with Dr. Ceballos of the OR service regarding patient's presentation, findings, and plan of care.     Interventions:  2113 0.9% NaCl bolus 1,000 mL IV  2113 Dilaudid 1 mg IV   2114 Zofran 4 mg IV   2145 Kcentra 2,264 Units IV   2226 Dilaudid 0.5 mg IV     Disposition:  The patient was transferred to the OR under the care of Dr. Ceballos    Impression & Plan     Medical Decision Making:  Camerno Barnard is a 71 year old male who presents to the emergency department for acute onset of abdominal pain with a mass. He states he just sat up in the chair when he had the acute onset of pain and noticed a growing mass in his upper abdomen. He was excruciating tender on exam with tachycardia. He is otherwise been recovering well with no concern for pneumonia, UTI, COVID. Blood was obtained and he has a mild leukocytosis, his hemoglobin is normal, metabolic panel is unremarkable. As lactic acid is a touch elevated and his INR is up therapeutic at 1.3,but he has had been bridging with Lovenox. CT of his abdomen does show a large rectus sheath hematoma measuring almost 7.6 centimeters with  a small active contrast extravasation.     Patient did get pain relief with the above medication. I talked to the radiologist with the findings and then spoke to Dr. Ceballos who is on for general surgery. At this time, it was determined to take him to the OR to clean out the hematoma and evaluate for acute bleeding due to the apparent active extravasation. The patient has been NPO for greater than 8 hours. Kcentra, and vitamin K were administered after discussing this case with the clinical pharmacist. He will be transferred directly to the OR.     Covid-19  Cameron Barnard was evaluated during a global COVID-19 pandemic, which necessitated consideration that the patient might be at risk for infection with the SARS-CoV-2 virus that causes COVID-19.   Applicable protocols for evaluation were followed during the patient's care. COVID-19 was considered as part of the patient's evaluation. The plan for testing is: a test was obtained during this visit.    Diagnosis:    ICD-10-CM    1. Abdominal wall hematoma, initial encounter  S30.1XXA Asymptomatic SARS-CoV-2 COVID-19 Virus (Coronavirus) by PCR     ABO/Rh type and screen     ceFAZolin (ANCEF) intermittent infusion 3 g (pre-mix)     ceFAZolin (ANCEF) 1 g vial to attach to  ml bag for ADULT or 50 ml bag for PEDS     Case Request: LAPAROTOMY     Case Request: LAPAROTOMY     Laparotomy exploratory     Laparotomy exploratory   2. Abdominal wall hematoma, initial encounter  S30.1XXA Asymptomatic SARS-CoV-2 COVID-19 Virus (Coronavirus) by PCR     ABO/Rh type and screen     ceFAZolin (ANCEF) intermittent infusion 3 g (pre-mix)     ceFAZolin (ANCEF) 1 g vial to attach to  ml bag for ADULT or 50 ml bag for PEDS     Case Request: LAPAROTOMY     Case Request: LAPAROTOMY     Laparotomy exploratory     Laparotomy exploratory    Added automatically from request for surgery [id number]   3. Postprocedural hematoma of abdominal wall  M96.89    4. Anticoagulated  Z79.01         Scribe Disclosure:  I, Mike Hernandez, am serving as a scribe at 8:17 PM on 5/10/2021 to document services personally performed by Lulu Devi MD based on my observations and the provider's statements to me.            Lulu Devi MD  05/10/21 4516

## 2021-05-11 NOTE — ANESTHESIA POSTPROCEDURE EVALUATION
Patient: Cameron Barnard    Procedure(s):  EXPLORATORY LAPAROTOMY AND EVACUATION OF HEMATOMA    Diagnosis:Abdominal wall hematoma, initial encounter [S30.1XXA]  Diagnosis Additional Information: No value filed.    Anesthesia Type:  General    Note:  Disposition: Inpatient   Postop Pain Control: Uneventful            Sign Out: Well controlled pain   PONV: No   Neuro/Psych: Uneventful            Sign Out: Acceptable/Baseline neuro status   Airway/Respiratory: Uneventful            Sign Out: Acceptable/Baseline resp. status   CV/Hemodynamics: Uneventful            Sign Out: Acceptable CV status   Other NRE: NONE   DID A NON-ROUTINE EVENT OCCUR? No           Last vitals:  Vitals:    05/11/21 0130 05/11/21 0134 05/11/21 0145   BP: 127/80  (!) 140/82   Pulse: 98  97   Resp: 17 16 14   Temp: 37.2  C (99  F)  37.2  C (98.9  F)   SpO2: 95% 97% 94%       Last vitals prior to Anesthesia Care Transfer:  CRNA VITALS  5/11/2021 0045 - 5/11/2021 0145      5/11/2021             EKG:  Sinus rhythm          Electronically Signed By: Griffin Noonan MD  May 11, 2021  2:22 AM

## 2021-05-11 NOTE — ANESTHESIA PREPROCEDURE EVALUATION
Anesthesia Pre-Procedure Evaluation    Patient: Cameron Barnard   MRN: [id number] : 1950        Preoperative Diagnosis: Abdominal wall hematoma, initial encounter [S30.1XXA]   Procedure : Procedure(s):  LAPAROTOMY     Past Medical History:   Diagnosis Date     Allergic state      DVT (deep venous thrombosis) (H)     chronic left femoral DVT     HLD (hyperlipidemia)      Hypertension      Multiple subsegmental pulmonary emboli without acute cor pulmonale (H) 2017     Perforated bowel (H) 2020     Primary osteoarthritis of both knees      Pulmonary embolism (H)       Past Surgical History:   Procedure Laterality Date     ARTHROPLASTY KNEE Left 2/10/2021    Procedure: Left total knee arthroplasty;  Surgeon: Gigi De Oliveira MD;  Location: [facility] OR     COLONOSCOPY N/A 2020    Procedure: COLONOSCOPY;  Surgeon: Lakshmi Santana MD;  Location:  OR     ESOPHAGOSCOPY, GASTROSCOPY, DUODENOSCOPY (EGD), COMBINED N/A 2020    Procedure: ESOPHAGOGASTRODUODENOSCOPY (EGD);  Surgeon: Lakshmi Santana MD;  Location:  OR     HERNIORRHAPHY INCISIONAL (LOCATION) N/A 2021    Procedure: OPEN INCISIONAL HERNIA REPAIR;  Surgeon: Christ Parekh MD;  Location:  OR     INJECT STEROID (LOCATION) Right 2/10/2021    Procedure: Right knee intra-articular injection of corticosteroid, 80 mg of Depo-Medrol;  Surgeon: Gigi De Oliveira MD;  Location:  OR     LAPAROSCOPIC APPENDECTOMY N/A 2020    Procedure: Laparoscopic appendectomy;  Surgeon: Christ Parekh MD;  Location:  OR     LAPAROSCOPY DIAGNOSTIC (GENERAL) N/A 2020    Procedure: LAPAROSCOPIC  SMALL BOWEL RESCECTION;  Surgeon: Christ Parekh MD;  Location:  OR      No Known Allergies   Social History     Tobacco Use     Smoking status: Never Smoker     Smokeless tobacco: Never Used   Substance Use Topics     Alcohol use: Yes     Frequency: Never     Comment: 2 glasses of red wine per week      Wt  Readings from Last 1 Encounters:   05/10/21 131.5 kg (290 lb)        Anesthesia Evaluation            ROS/MED HX  ENT/Pulmonary:     (+) JASWINDER risk factors, hypertension, obese,     Neurologic:       Cardiovascular:     (+) Dyslipidemia hypertension-----Taking blood thinners     METS/Exercise Tolerance:     Hematologic: Comments: H/o DVT/PE    (+) History of blood clots,     Musculoskeletal:   (+) arthritis,     GI/Hepatic: Comment: Diverticulosis  S/p recent ventral hernia repair      Renal/Genitourinary:       Endo:     (+) Obesity,     Psychiatric/Substance Use:       Infectious Disease:       Malignancy:       Other:            Physical Exam    Airway        Mallampati: II   TM distance: > 3 FB   Neck ROM: full   Mouth opening: > 3 cm    Respiratory Devices and Support         Dental  no notable dental history         Cardiovascular   cardiovascular exam normal          Pulmonary   pulmonary exam normal                OUTSIDE LABS:  CBC:   Lab Results   Component Value Date    WBC 12.3 (H) 05/10/2021    WBC 9.2 09/29/2020    HGB 14.4 05/10/2021    HGB 13.7 05/06/2021    HCT 42.4 05/10/2021    HCT 36.0 (L) 09/29/2020     05/10/2021     02/11/2021     BMP:   Lab Results   Component Value Date     05/10/2021     05/06/2021    POTASSIUM 3.8 05/10/2021    POTASSIUM 4.0 05/06/2021    CHLORIDE 108 05/10/2021    CHLORIDE 103 05/06/2021    CO2 25 05/10/2021    CO2 31 05/06/2021    BUN 14 05/10/2021    BUN 14 05/06/2021    CR 0.92 05/10/2021    CR 0.92 05/06/2021     (H) 05/10/2021    GLC 98 05/06/2021     COAGS:   Lab Results   Component Value Date    INR 1.39 (H) 05/10/2021     POC:   Lab Results   Component Value Date     (H) 09/26/2020     HEPATIC:   Lab Results   Component Value Date    ALBUMIN 3.3 (L) 05/10/2021    PROTTOTAL 7.2 05/10/2021    ALT 25 05/10/2021    AST 23 05/10/2021    ALKPHOS 59 05/10/2021    BILITOTAL 0.5 05/10/2021     OTHER:   Lab Results   Component Value  Date    LACT 2.2 (H) 05/10/2021    PETER 8.9 05/10/2021    MAG 2.4 (H) 09/18/2020    LIPASE 70 (L) 09/26/2020       Anesthesia Plan    ASA Status:  3, emergent    NPO Status:  NPO Appropriate    Anesthesia Type: General.     - Airway: ETT   Induction: Intravenous, RSI, Propofol.   Maintenance: Balanced.   Techniques and Equipment:     - Airway: Video-Laryngoscope     - Lines/Monitors: 2nd IV     - Blood: T&S     Consents    Anesthesia Plan(s) and associated risks, benefits, and realistic alternatives discussed. Questions answered and patient/representative(s) expressed understanding.     - Discussed with:  Patient         Postoperative Care    Pain management: IV analgesics, Multi-modal analgesia.   PONV prophylaxis: Ondansetron (or other 5HT-3), Dexamethasone or Solumedrol, Background Propofol Infusion     Comments:         H&P reviewed: Unable to attach H&P to encounter due to EHR limitations. H&P Update: appropriate H&P reviewed, patient examined. No interval changes since H&P (within 30 days).         Griffin Noonan MD

## 2021-05-11 NOTE — PROGRESS NOTES
05/11/21 1332   Quick Adds   Type of Visit Initial PT Evaluation   Living Environment   People in home spouse   Current Living Arrangements house  (split entry)   Home Accessibility stairs to enter home;stairs within home   Number of Stairs, Main Entrance 2   Stair Railings, Main Entrance railing on right side (ascending)   Number of Stairs, Within Home, Primary 7   Stair Railings, Within Home, Primary railing on right side (ascending)   Self-Care   Usual Activity Tolerance moderate   Current Activity Tolerance fair   Regular Exercise No   Equipment Currently Used at Home other (see comments)  (heel lift in R shoe due to leg length discrepancy)   Disability/Function   Fall history within last six months no   General Information   Onset of Illness/Injury or Date of Surgery 05/10/21   Referring Physician Kylah Gonzalez PA-C   Patient/Family Therapy Goals Statement (PT) Return home   Pertinent History of Current Problem (include personal factors and/or comorbidities that impact the POC) Pt admitted wtih abdominal pain, loose stools, hematoma and is now POD 1 ex lap with hematoma evacuation. PMH: hernia repair 5/5, DVT, HTN, diverticulosis, L TKA 2/2021.   Existing Precautions/Restrictions fall   Weight-Bearing Status - LLE full weight-bearing   Weight-Bearing Status - RLE full weight-bearing   Cognition   Orientation Status (Cognition) oriented x 4   Affect/Mental Status (Cognition) WNL   Follows Commands (Cognition) WNL   Pain Assessment   Patient Currently in Pain Yes, see Vital Sign flowsheet  (1/10 at rest)   Integumentary/Edema   Integumentary/Edema no deficits were identifed   Posture    Posture Forward head position;Protracted shoulders   Range of Motion (ROM)   ROM Quick Adds ROM WNL   Strength   Strength Comments Generalized weakness present from recent surgeries   Bed Mobility   Comment (Bed Mobility) Independent   Transfers   Transfer Safety Comments Independent   Gait/Stairs (Locomotion)   Comment  (Gait/Stairs) SBA gait in arevalo with mild balance deficits   Balance   Balance Comments Mild balance deficits noted   Sensory Examination   Sensory Perception patient reports no sensory changes   Clinical Impression   Criteria for Skilled Therapeutic Intervention yes, treatment indicated   PT Diagnosis (PT) Difficulty ambulating   Influenced by the following impairments Dec activity tolerance, pain   Functional limitations due to impairments Difficutly ambulating   Clinical Presentation Stable/Uncomplicated   Clinical Presentation Rationale medically stable post-op   Clinical Decision Making (Complexity) low complexity   Therapy Frequency (PT) One time eval and treatment only   Predicted Duration of Therapy Intervention (days/wks) 1 day   Planned Therapy Interventions (PT) gait training;stair training   Risk & Benefits of therapy have been explained evaluation/treatment results reviewed;care plan/treatment goals reviewed;risks/benefits reviewed;current/potential barriers reviewed;participants voiced agreement with care plan   PT Discharge Planning    PT Discharge Recommendation (DC Rec) home   PT Rationale for DC Rec Pt is moving safely, motivated to walk and will be safe to return home with spouse. No skilled therapy needs at [facility].   Total Evaluation Time   Total Evaluation Time (Minutes) 10

## 2021-05-12 LAB
ANION GAP SERPL CALCULATED.3IONS-SCNC: 2 MMOL/L (ref 3–14)
BUN SERPL-MCNC: 13 MG/DL (ref 7–30)
CALCIUM SERPL-MCNC: 7.9 MG/DL (ref 8.5–10.1)
CHLORIDE SERPL-SCNC: 105 MMOL/L (ref 94–109)
CO2 SERPL-SCNC: 29 MMOL/L (ref 20–32)
CREAT SERPL-MCNC: 0.92 MG/DL (ref 0.66–1.25)
ERYTHROCYTE [DISTWIDTH] IN BLOOD BY AUTOMATED COUNT: 12.7 % (ref 10–15)
GFR SERPL CREATININE-BSD FRML MDRD: 83 ML/MIN/{1.73_M2}
GLUCOSE BLDC GLUCOMTR-MCNC: 83 MG/DL (ref 70–99)
GLUCOSE SERPL-MCNC: 89 MG/DL (ref 70–99)
HCT VFR BLD AUTO: 31.2 % (ref 40–53)
HGB BLD-MCNC: 10 G/DL (ref 13.3–17.7)
MCH RBC QN AUTO: 30.6 PG (ref 26.5–33)
MCHC RBC AUTO-ENTMCNC: 32.1 G/DL (ref 31.5–36.5)
MCV RBC AUTO: 95 FL (ref 78–100)
PLATELET # BLD AUTO: 168 10E9/L (ref 150–450)
POTASSIUM SERPL-SCNC: 4.1 MMOL/L (ref 3.4–5.3)
RBC # BLD AUTO: 3.27 10E12/L (ref 4.4–5.9)
SODIUM SERPL-SCNC: 136 MMOL/L (ref 133–144)
T4 FREE SERPL-MCNC: 1.26 NG/DL (ref 0.76–1.46)
TSH SERPL DL<=0.005 MIU/L-ACNC: 0.38 MU/L (ref 0.4–4)
WBC # BLD AUTO: 7.2 10E9/L (ref 4–11)

## 2021-05-12 PROCEDURE — 250N000013 HC RX MED GY IP 250 OP 250 PS 637: Performed by: PHYSICIAN ASSISTANT

## 2021-05-12 PROCEDURE — 85027 COMPLETE CBC AUTOMATED: CPT | Performed by: PHYSICIAN ASSISTANT

## 2021-05-12 PROCEDURE — 84443 ASSAY THYROID STIM HORMONE: CPT | Performed by: PHYSICIAN ASSISTANT

## 2021-05-12 PROCEDURE — 999N001017 HC STATISTIC GLUCOSE BY METER IP

## 2021-05-12 PROCEDURE — 120N000001 HC R&B MED SURG/OB

## 2021-05-12 PROCEDURE — 36415 COLL VENOUS BLD VENIPUNCTURE: CPT | Performed by: PHYSICIAN ASSISTANT

## 2021-05-12 PROCEDURE — 80048 BASIC METABOLIC PNL TOTAL CA: CPT | Performed by: PHYSICIAN ASSISTANT

## 2021-05-12 PROCEDURE — 84439 ASSAY OF FREE THYROXINE: CPT | Performed by: PHYSICIAN ASSISTANT

## 2021-05-12 PROCEDURE — 99223 1ST HOSP IP/OBS HIGH 75: CPT | Performed by: INTERNAL MEDICINE

## 2021-05-12 RX ADMIN — ACETAMINOPHEN 975 MG: 325 TABLET, FILM COATED ORAL at 02:42

## 2021-05-12 RX ADMIN — SENNOSIDES AND DOCUSATE SODIUM 1 TABLET: 8.6; 5 TABLET ORAL at 09:35

## 2021-05-12 RX ADMIN — SENNOSIDES AND DOCUSATE SODIUM 1 TABLET: 8.6; 5 TABLET ORAL at 21:08

## 2021-05-12 RX ADMIN — CYCLOBENZAPRINE 10 MG: 10 TABLET, FILM COATED ORAL at 09:35

## 2021-05-12 RX ADMIN — FAMOTIDINE 20 MG: 20 TABLET ORAL at 09:35

## 2021-05-12 RX ADMIN — CYCLOBENZAPRINE 10 MG: 10 TABLET, FILM COATED ORAL at 21:08

## 2021-05-12 RX ADMIN — FAMOTIDINE 20 MG: 20 TABLET ORAL at 21:08

## 2021-05-12 RX ADMIN — ACETAMINOPHEN 975 MG: 325 TABLET, FILM COATED ORAL at 11:16

## 2021-05-12 RX ADMIN — ACETAMINOPHEN 975 MG: 325 TABLET, FILM COATED ORAL at 18:40

## 2021-05-12 RX ADMIN — CYCLOBENZAPRINE 10 MG: 10 TABLET, FILM COATED ORAL at 15:03

## 2021-05-12 ASSESSMENT — ACTIVITIES OF DAILY LIVING (ADL)
ADLS_ACUITY_SCORE: 13

## 2021-05-12 NOTE — PLAN OF CARE
Pt A&Ox 4. CMS intact. VSS on stable. Independent. Taking oxycodone and tylenol for pain.  BM hypoactive and has not passed Lucille. Tolerating full liquid diet. Voiding adequately. Ambulated on the hallway by the hour. Continue to monitor.

## 2021-05-12 NOTE — PLAN OF CARE
Pt A&Ox 4. CMS intact. VSS on stable. Lungs clear. Dressing on abdominal incision CDI. Independent. Taking tylenol for pain.  BS+ and passing flatus. Tolerating regular diet. Voiding adequately. Ambulated on the hallway by the hour. Continue to monitor.

## 2021-05-12 NOTE — PROGRESS NOTES
Surgery  Pt doing well, ambulating  Abdomen feels soft, appropriately tender  Will ask for vascular medicine assistance in long-term anticoagulation management.  Okay for regular diet.  Likely home tomorrow  Chirag Parekh MD  General Surgery, Office [phone]

## 2021-05-12 NOTE — OP NOTE
SURGEON: Johann Payton MD   FIRST ASSISTANT: Kylah Gonzalez PA-C, TESS , The physicians assistant was medically necessary for their expertise in hemostasis, suctioning, suturing, and retraction.    PREOPERATIVE DIAGNOSIS: Postoperative abdominal wall hematoma after incisional hernia repair.   POSTOPERATIVE DIAGNOSIS: Same  OPERATIVE PROCEDURE:   1. Abdominal exploration.   2.  Evacuation of abdominal wall hematoma.  Over 500 mL of clot and dark blood  3.  Suturing and Vistaseal application for hemostasis.   ANESTHESIA: General.   ESTIMATED BLOOD LOSS: Less than 50 mL.   EVENTS: After induction of general endotracheal anesthesia,Cameron Barnard abdomen was prepped and draped in the usual sterile fashion.  Previous midline incision was reopened sharply, and electrocautery dissection down to the abdominal wall fascia.  We encountered some subcutaneous clots which were evacuated, then noticed some tearing of the left anterior rectus sheath.   Some of the fascial sutures were removed and this allows to get into the retrorectus space.  Here we encountered the majority of the clot and blood.  This was all removed and irrigated.  The mesh was in good position.  And area of bleeding could be seen in the undersurface of the left rectus muscle, this was secured with figure-of-eight Vicryl suture.  We evaluated all the surgical field multiple times and no additional bleeding noted.  A layer of Vistaseal was applied to the retrorectus space.  The abdominal wall fascia in the midline and the tear we encountered toward the left were all repaired with multiple interrupted 0 Vicryl sutures.  The subcutaneous tissues were reapproximated with 3-0 Vicryl.  Skin was approximated with 4-0 Vicryl.  Sterile dressing applied.  No immediate complications. All counts correct.     JOHANN PAYTON MD

## 2021-05-12 NOTE — CONSULTS
[facility]  Vascular Medicine Consultation         Guero Polanco MD, BRITNEY, [facility]    Cameron Barnard MRN# [id number]   YOB: 1950 Age: 71 year old      Date of Admission:  5/10/2021  Date of Consult: 5/12/2021         Assessment and Plan:   1.  Large rectus sheath hematoma status post abdominal exploration and evacuation of abdominal wall hematoma 5/11/2021:   POD 1    2.  Chronic left lower extremity deep venous thrombosis May 2017 and at the same time small bilateral PE fourth order pulmonary vessels, since then on anticoagulation and recently underwent incisional hernia repair then followed by bridging with Lovenox and restarted warfarin:  History of left GSV thrombus on recent venous comp studies still chronic nonocclusive weblike thrombus in the left GSV February 2021  (Reviewed extensive records in the [facility] system multiple venous duplexes done within the last few years still chronic weblike DVT in left femoral, popliteal vein , is non-smoker no personal history of malignancy no family history of hypercoagulable status, this was his first lifetime thromboembolic event risk factors were obesity and sedentary life at that time)    3.  Ascending aorta dilatation stable 4.3 cm (cardiac MR January 2020)   Previous CTA chest in December 2019 read as 4.5 cm.    4.  Obesity with BMI greater than 36:    5.  Coronary atherosclerosis involving all 3 vessels and also very high coronary calcium score around 800 in  2019 CT done at [facility]:    6. HTN goal of blood pressure less than 130/80    7.  Hyperlipidemia goal of LDL less than 70;    This is a very pleasant male developed abdominal wall hematoma while on anticoagulation with Lovenox bridging and warfarin few days after , postoperatively after open ventral hernia repair.  He was taking warfarin since 2017 for first lifetime thromboembolic episode of left lower extremity DVT and PE.  Subsequent multiple  venous duplexes chronic web like DVT noted in the femoral vein and popliteal vein.  He denies any chest pain, shortness of breath or palpitations.  He also developed chronic venous insufficiency and subsequently seen and evaluated by Dr. Apple and Dr. Stinson.  Wearing compression stockings and elevating the legs when able and also he was initiated vascular in February 2021.    His risk factors for DVT were obesity and sedentary lifestyle  Reviewed extensive records in the [facility] system and imaging studies.  Unclear why he was continued approximately 4 years of anticoagulation.  Given large rectal sheath hematoma and only 1 time DVT and PE it is reasonable to stop anticoagulation.    At present my recommendations,  Stop anticoagulation of Lovenox bridging and warfarin  Patient should get D-dimer and left lower extremity venous duplex in 6 to 8 weeks through primary care physician's office (if elevated D-dimer, new or worsening old DVT then only consider anticoagulation at that time otherwise continue aspirin only)  No need for hypercoagulable studies  Okay to take baby aspirin daily  Utilize compression stockings  Lose weight  Consider getting fasting lipids and he will benefit with statin given coronary calcification in 3 vessels  Monitor blood pressure and treat if greater than 130/80    Thank you for the consultation !  We will follow with you    This note was dictated by utilizing Dragon software    Copy of this note to Dr. Marie, primary care physician     Guero Polanco MD, FAHA, FS, FNLA, [facility]  Vascular medicine  Clinical hypertension specialist  Clinical lipidologist                Code Status:   Full Code         Primary Care Physician:   Gary Bey [phone]         Requesting Physician:      Christ Parekh MD         Chief Complaint:    evaluation of anticoagulation duration with known HX od DVT/PE 5/2017 taking warfarin and recent hernia surgery followed by lovenox bridging now  with large rectus sheath hematoma     History is obtained from the patient, reviewed epic.         History of Present Illness:   Cameron Barnard is a 71 year old very pleasant obese male with history of multiple medical problems including hypertension, hyperlipidemia very high coronary calcium score and CT with three-vessel calcification, history of left lower extremity DVT with small bilateral subsegmental PE in May 2017 at the time he was sedentary and started warfarin since then taking and maintaining INR therapeutic range underwent exploratory laparotomy for perforated bowel approximately 9 months ago followed by he developed abdominal wall bulging and a CT scan confirmed widemouth incisional hernia containing small bowel he underwent open ventral repair on May 5, 2021, postoperatively did well and he was bridged with Lovenox injections and started warfarin.  He now presented with large rectus sheath hematoma and underwent surgical evacuation on May 11, 2021.  INR was reversed and he also received Kcentra before surgery.  He is non-smoker, no family history of hypercoagulable status, no personal history of malignancy.      I was asked to evaluate for duration of anticoagulation    His primary care is through [facility] system    Reviewed extensive records, imaging studies , labs etc. in the [facility] and care everywhere             Past Medical History:     Past Medical History:   Diagnosis Date     Allergic state      DVT (deep venous thrombosis) (H)     chronic left femoral DVT     HLD (hyperlipidemia)      Hypertension      Multiple subsegmental pulmonary emboli without acute cor pulmonale (H) 2017     Perforated bowel (H) 09/2020     Primary osteoarthritis of both knees      Pulmonary embolism (H)                Past Surgical History:     Past Surgical History:   Procedure Laterality Date     ARTHROPLASTY KNEE Left 2/10/2021    Procedure: Left total knee arthroplasty;  Surgeon: Gigi De Oliveira MD;   Location: [facility] OR     COLONOSCOPY N/A 9/19/2020    Procedure: COLONOSCOPY;  Surgeon: Lakshmi Santana MD;  Location:  OR     ESOPHAGOSCOPY, GASTROSCOPY, DUODENOSCOPY (EGD), COMBINED N/A 9/19/2020    Procedure: ESOPHAGOGASTRODUODENOSCOPY (EGD);  Surgeon: Lakshmi Santana MD;  Location:  OR     HERNIORRHAPHY INCISIONAL (LOCATION) N/A 5/5/2021    Procedure: OPEN INCISIONAL HERNIA REPAIR;  Surgeon: Christ Parekh MD;  Location:  OR     INJECT STEROID (LOCATION) Right 2/10/2021    Procedure: Right knee intra-articular injection of corticosteroid, 80 mg of Depo-Medrol;  Surgeon: Gigi De Oliveira MD;  Location:  OR     LAPAROSCOPIC APPENDECTOMY N/A 9/27/2020    Procedure: Laparoscopic appendectomy;  Surgeon: Christ Parekh MD;  Location:  OR     LAPAROSCOPY DIAGNOSTIC (GENERAL) N/A 9/27/2020    Procedure: LAPAROSCOPIC  SMALL BOWEL RESCECTION;  Surgeon: Christ Parekh MD;  Location:  OR     LAPAROTOMY EXPLORATORY N/A 5/10/2021    Procedure: EXPLORATORY LAPAROTOMY AND EVACUATION OF HEMATOMA;  Surgeon: Hari Ceballos MD;  Location:  OR              Home Medications:     Prior to Admission medications    Medication Sig Last Dose Taking? Auth Provider   acetaminophen (TYLENOL) 500 MG tablet Take 1,000 mg by mouth every 6 hours as needed for mild pain 2 x 500 mg 5/9/2021 at Unknown time Yes Reported, Patient   cetirizine (ZYRTEC) 10 MG tablet Take 10 mg by mouth daily as needed for allergies 5/9/2021 at Unknown time Yes Reported, Patient   Cholecalciferol (VITAMIN D3) 50 MCG (2000 UT) CAPS Take 5,000 Units by mouth every morning  5/9/2021 at Unknown time Yes Reported, Patient   cyclobenzaprine (FLEXERIL) 10 MG tablet Take 1 tablet (10 mg) by mouth 3 times daily 5/9/2021 at Unknown time Yes Kylah Gonzalez PA-C   Dietary Management Product (VASCULERA) TABS Take 1 tablet by mouth every morning Past Month at Unknown time Yes Reported, Patient   enoxaparin ANTICOAGULANT  (LOVENOX) 120 MG/0.8ML syringe Inject 120 mg Subcutaneous daily BRIDGING THERAPY. 5/10/2021 at Unknown time Yes Reported, Patient   Fish Oil-Cholecalciferol (OMEGA-3 FISH OIL/VITAMIN D3 PO) Take 1 capsule by mouth 2 times daily  5/9/2021 at Unknown time Yes Unknown, Entered By History   FOLIC ACID PO Take 1 mg by mouth every morning 5/9/2021 at Unknown time Yes Reported, Patient   Multiple Vitamins-Minerals (MULTIVITAMIN ADULTS 50+) TABS Take 1 tablet by mouth every morning  5/9/2021 at Unknown time Yes Reported, Patient   oxyCODONE (ROXICODONE) 5 MG tablet Take 1-2 tablets (5-10 mg) by mouth every 4 hours as needed for moderate to severe pain 5/10/2021 at 1600 Yes Kylah Gonzalez PA-C   senna-docusate (SENOKOT-S/PERICOLACE) 8.6-50 MG tablet Take 1 tablet by mouth 2 times daily 5/9/2021 at Unknown time Yes Kylah Gonzalez PA-C   warfarin ANTICOAGULANT (COUMADIN) 5 MG tablet Take 7.5 mg by mouth every evening 1/2 + 5 mg 5/9/2021 at 7,5 Yes Reported, Patient            Current Medications:           acetaminophen  975 mg Oral Q8H     bisacodyl  10 mg Rectal Daily     cyclobenzaprine  10 mg Oral TID     famotidine  20 mg Oral BID    Or     famotidine  20 mg Intravenous BID     senna-docusate  1 tablet Oral BID     sodium chloride (PF)  3 mL Intracatheter Q8H     [START ON 5/14/2021] acetaminophen, sore throat lozenge, diphenhydrAMINE **OR** diphenhydrAMINE, HYDROmorphone **OR** HYDROmorphone, lidocaine 4%, lidocaine (buffered or not buffered), naloxone, naloxone, naloxone, naloxone, ondansetron **OR** ondansetron, oxyCODONE **OR** oxyCODONE, prochlorperazine **OR** prochlorperazine, sodium chloride (PF)         Allergies:   No Known Allergies         Social History:   Cameorn Yu Rice  reports that he has never smoked. He has never used smokeless tobacco. He reports that he does not drink alcohol or use drugs.            Family History:     Family History   Problem Relation Age of Onset     Breast Cancer Sister   "              Review of Systems:   The 10 point Review of Systems is negative other than noted in the HPI.             Physical Exam:    , Blood pressure 115/68, pulse 89, temperature 98  F (36.7  C), temperature source Oral, resp. rate 16, height 1.88 m (6' 2\"), weight 131.5 kg (290 lb), SpO2 97 %.  290 lbs 0 oz    Constitutional:  Negative, pain well controlled resting comfortably   Psych:  Normal affect and mood   Neuro:  Alert awake oriented x3   Eyes:  PERRLA, EOMI   ENT:  Negative to   Lymph:  No palpable lymphadenopathy   Cardiovascular:  RRR   Lungs:  CTA   Abdomen:  Dressing in place intact dry binder in place   Skin:  NAD   Musculoskeletal:  No edema, good palpable pulses     Other:           Data:   All new lab and imaging data was reviewed.    Results for orders placed or performed during the hospital encounter of 05/10/21   CT Abdomen Pelvis w Contrast     Status: None    Narrative    CT ABDOMEN PELVIS W CONTRAST 5/10/2021 9:07 PM    CLINICAL HISTORY: Abdominal distension    TECHNIQUE: CT scan of the abdomen and pelvis was performed following  injection of IV contrast. Multiplanar reformats were obtained. Dose  reduction techniques were used.  CONTRAST: 135mL Isovue-370    COMPARISON: CT 4/21/2021    FINDINGS:   LOWER CHEST: Normal.    HEPATOBILIARY: Cholelithiasis. Normal liver and bile ducts.    PANCREAS: Normal.    SPLEEN: Normal.    ADRENAL GLANDS: Normal.    KIDNEYS/BLADDER: Stable mild bilateral perinephric stranding. No  hydronephrosis. Normal urinary bladder.    BOWEL: Small duodenal diverticulum. Normal caliber small bowel.  Nonvisualized appendix. Colonic diverticulosis without evidence of  acute diverticulitis.    Status post ventral abdominal incisional hernia repair. Large rectus  sheath hematoma measuring 21.0 x 10.8 x 25.7 cm. Small focus of active  contrast extravasation at the left side image 125 series 3 and similar  small focus of active contrast extravasation at the right side of " the  hematoma on the same image. Smaller adjacent subcutaneous mid ventral  abdominal wall hematoma measuring 7.6 x 4.6 x 6.5 cm. Expected  anterior abdominal wall subcutaneous emphysema and edema. Mild ventral  abdominal wall skin thickening.    No intra-abdominal free air.    PELVIC ORGANS: Mildly enlarged prostate gland. Presacral edema.    ADDITIONAL FINDINGS: None.    MUSCULOSKELETAL: Normal.      Impression    IMPRESSION:   1.  Large rectus sheath hematoma measuring up to 25.7 cm and with  small foci of active contrast extravasation as described above.  2.  Separate smaller subcutaneous midline ventral abdominal wall  hematoma measuring up to 7.6 cm. No associated active contrast  extravasation.    A phone call report regarding the critical rectus sheath and ventral  abdominal wall hematomas as well as associated active contrast  extravasation findings on this exam was made to Dr. Lulu Devi  at 9:38 PM on 5/10/2021.     HIRA FUENTES MD   CBC with platelets differential     Status: Abnormal   Result Value Ref Range    WBC 12.3 (H) 4.0 - 11.0 10e9/L    RBC Count 4.62 4.4 - 5.9 10e12/L    Hemoglobin 14.4 13.3 - 17.7 g/dL    Hematocrit 42.4 40.0 - 53.0 %    MCV 92 78 - 100 fl    MCH 31.2 26.5 - 33.0 pg    MCHC 34.0 31.5 - 36.5 g/dL    RDW 12.6 10.0 - 15.0 %    Platelet Count 253 150 - 450 10e9/L    Diff Method Automated Method     % Neutrophils 71.5 %    % Lymphocytes 14.4 %    % Monocytes 11.3 %    % Eosinophils 2.0 %    % Basophils 0.2 %    % Immature Granulocytes 0.6 %    Nucleated RBCs 0 0 /100    Absolute Neutrophil 8.8 (H) 1.6 - 8.3 10e9/L    Absolute Lymphocytes 1.8 0.8 - 5.3 10e9/L    Absolute Monocytes 1.4 (H) 0.0 - 1.3 10e9/L    Absolute Eosinophils 0.3 0.0 - 0.7 10e9/L    Absolute Basophils 0.0 0.0 - 0.2 10e9/L    Abs Immature Granulocytes 0.1 0 - 0.4 10e9/L    Absolute Nucleated RBC 0.0    Comprehensive metabolic panel     Status: Abnormal   Result Value Ref Range    Sodium 140 133 - 144  mmol/L    Potassium 3.8 3.4 - 5.3 mmol/L    Chloride 108 94 - 109 mmol/L    Carbon Dioxide 25 20 - 32 mmol/L    Anion Gap 7 3 - 14 mmol/L    Glucose 130 (H) 70 - 99 mg/dL    Urea Nitrogen 14 7 - 30 mg/dL    Creatinine 0.92 0.66 - 1.25 mg/dL    GFR Estimate 83 >60 mL/min/[1.73_m2]    GFR Estimate If Black >90 >60 mL/min/[1.73_m2]    Calcium 8.9 8.5 - 10.1 mg/dL    Bilirubin Total 0.5 0.2 - 1.3 mg/dL    Albumin 3.3 (L) 3.4 - 5.0 g/dL    Protein Total 7.2 6.8 - 8.8 g/dL    Alkaline Phosphatase 59 40 - 150 U/L    ALT 25 0 - 70 U/L    AST 23 0 - 45 U/L   Lactic acid     Status: Abnormal   Result Value Ref Range    Lactic Acid 2.2 (H) 0.7 - 2.0 mmol/L   INR     Status: Abnormal   Result Value Ref Range    INR 1.39 (H) 0.86 - 1.14   Asymptomatic SARS-CoV-2 COVID-19 Virus (Coronavirus) by PCR     Status: None    Specimen: Nasopharyngeal   Result Value Ref Range    SARS-CoV-2 Virus Specimen Source Nasopharyngeal     SARS-CoV-2 PCR Result NEGATIVE     SARS-CoV-2 PCR Comment (Note)    Glucose by meter     Status: Abnormal   Result Value Ref Range    Glucose 135 (H) 70 - 99 mg/dL   Hemoglobin     Status: Abnormal   Result Value Ref Range    Hemoglobin 11.3 (L) 13.3 - 17.7 g/dL   Basic metabolic panel     Status: Abnormal   Result Value Ref Range    Sodium 136 133 - 144 mmol/L    Potassium 4.1 3.4 - 5.3 mmol/L    Chloride 105 94 - 109 mmol/L    Carbon Dioxide 29 20 - 32 mmol/L    Anion Gap 2 (L) 3 - 14 mmol/L    Glucose 89 70 - 99 mg/dL    Urea Nitrogen 13 7 - 30 mg/dL    Creatinine 0.92 0.66 - 1.25 mg/dL    GFR Estimate 83 >60 mL/min/[1.73_m2]    GFR Estimate If Black >90 >60 mL/min/[1.73_m2]    Calcium 7.9 (L) 8.5 - 10.1 mg/dL   CBC with platelets     Status: Abnormal   Result Value Ref Range    WBC 7.2 4.0 - 11.0 10e9/L    RBC Count 3.27 (L) 4.4 - 5.9 10e12/L    Hemoglobin 10.0 (L) 13.3 - 17.7 g/dL    Hematocrit 31.2 (L) 40.0 - 53.0 %    MCV 95 78 - 100 fl    MCH 30.6 26.5 - 33.0 pg    MCHC 32.1 31.5 - 36.5 g/dL    RDW 12.7  10.0 - 15.0 %    Platelet Count 168 150 - 450 10e9/L   Glucose by meter     Status: None   Result Value Ref Range    Glucose 83 70 - 99 mg/dL   ABO/Rh type and screen     Status: None   Result Value Ref Range    ABO A     RH(D) Pos     Antibody Screen Neg     Test Valid Only At [facility]        Specimen Expires 05/13/2021

## 2021-05-13 VITALS
SYSTOLIC BLOOD PRESSURE: 118 MMHG | RESPIRATION RATE: 16 BRPM | OXYGEN SATURATION: 97 % | BODY MASS INDEX: 37.22 KG/M2 | WEIGHT: 290 LBS | TEMPERATURE: 97.6 F | DIASTOLIC BLOOD PRESSURE: 66 MMHG | HEART RATE: 85 BPM | HEIGHT: 74 IN

## 2021-05-13 LAB
ALBUMIN SERPL-MCNC: 2.6 G/DL (ref 3.4–5)
ALP SERPL-CCNC: 51 U/L (ref 40–150)
ALT SERPL W P-5'-P-CCNC: 17 U/L (ref 0–70)
ANION GAP SERPL CALCULATED.3IONS-SCNC: 3 MMOL/L (ref 3–14)
AST SERPL W P-5'-P-CCNC: 16 U/L (ref 0–45)
BASOPHILS # BLD AUTO: 0 10E9/L (ref 0–0.2)
BASOPHILS NFR BLD AUTO: 0.3 %
BILIRUB SERPL-MCNC: 0.7 MG/DL (ref 0.2–1.3)
BUN SERPL-MCNC: 11 MG/DL (ref 7–30)
CALCIUM SERPL-MCNC: 8.1 MG/DL (ref 8.5–10.1)
CHLORIDE SERPL-SCNC: 106 MMOL/L (ref 94–109)
CHOLEST SERPL-MCNC: 125 MG/DL
CO2 SERPL-SCNC: 29 MMOL/L (ref 20–32)
CREAT SERPL-MCNC: 0.98 MG/DL (ref 0.66–1.25)
DIFFERENTIAL METHOD BLD: ABNORMAL
EOSINOPHIL # BLD AUTO: 0.2 10E9/L (ref 0–0.7)
EOSINOPHIL NFR BLD AUTO: 2.6 %
ERYTHROCYTE [DISTWIDTH] IN BLOOD BY AUTOMATED COUNT: 12.6 % (ref 10–15)
GFR SERPL CREATININE-BSD FRML MDRD: 77 ML/MIN/{1.73_M2}
GLUCOSE SERPL-MCNC: 98 MG/DL (ref 70–99)
HCT VFR BLD AUTO: 31 % (ref 40–53)
HDLC SERPL-MCNC: 40 MG/DL
HGB BLD-MCNC: 10.1 G/DL (ref 13.3–17.7)
IMM GRANULOCYTES # BLD: 0 10E9/L (ref 0–0.4)
IMM GRANULOCYTES NFR BLD: 0.5 %
LDLC SERPL CALC-MCNC: 63 MG/DL
LYMPHOCYTES # BLD AUTO: 1.3 10E9/L (ref 0.8–5.3)
LYMPHOCYTES NFR BLD AUTO: 19.2 %
MCH RBC QN AUTO: 30.8 PG (ref 26.5–33)
MCHC RBC AUTO-ENTMCNC: 32.6 G/DL (ref 31.5–36.5)
MCV RBC AUTO: 95 FL (ref 78–100)
MONOCYTES # BLD AUTO: 0.9 10E9/L (ref 0–1.3)
MONOCYTES NFR BLD AUTO: 13.3 %
NEUTROPHILS # BLD AUTO: 4.2 10E9/L (ref 1.6–8.3)
NEUTROPHILS NFR BLD AUTO: 64.1 %
NONHDLC SERPL-MCNC: 85 MG/DL
NRBC # BLD AUTO: 0 10*3/UL
NRBC BLD AUTO-RTO: 0 /100
PLATELET # BLD AUTO: 194 10E9/L (ref 150–450)
POTASSIUM SERPL-SCNC: 4 MMOL/L (ref 3.4–5.3)
PROT SERPL-MCNC: 6 G/DL (ref 6.8–8.8)
RBC # BLD AUTO: 3.28 10E12/L (ref 4.4–5.9)
SODIUM SERPL-SCNC: 138 MMOL/L (ref 133–144)
TRIGL SERPL-MCNC: 109 MG/DL
WBC # BLD AUTO: 6.6 10E9/L (ref 4–11)

## 2021-05-13 PROCEDURE — 80053 COMPREHEN METABOLIC PANEL: CPT | Performed by: INTERNAL MEDICINE

## 2021-05-13 PROCEDURE — 80061 LIPID PANEL: CPT | Performed by: INTERNAL MEDICINE

## 2021-05-13 PROCEDURE — 36415 COLL VENOUS BLD VENIPUNCTURE: CPT | Performed by: INTERNAL MEDICINE

## 2021-05-13 PROCEDURE — 85025 COMPLETE CBC W/AUTO DIFF WBC: CPT | Performed by: INTERNAL MEDICINE

## 2021-05-13 PROCEDURE — 250N000013 HC RX MED GY IP 250 OP 250 PS 637: Performed by: PHYSICIAN ASSISTANT

## 2021-05-13 PROCEDURE — 99233 SBSQ HOSP IP/OBS HIGH 50: CPT | Performed by: INTERNAL MEDICINE

## 2021-05-13 RX ORDER — CEPHALEXIN 500 MG/1
500 CAPSULE ORAL 3 TIMES DAILY
Qty: 21 CAPSULE | Refills: 0 | Status: SHIPPED | OUTPATIENT
Start: 2021-05-13 | End: 2021-06-01

## 2021-05-13 RX ORDER — ASPIRIN 81 MG/1
81 TABLET ORAL DAILY
Status: DISCONTINUED | OUTPATIENT
Start: 2021-05-13 | End: 2021-05-13 | Stop reason: HOSPADM

## 2021-05-13 RX ADMIN — SENNOSIDES AND DOCUSATE SODIUM 1 TABLET: 8.6; 5 TABLET ORAL at 09:42

## 2021-05-13 RX ADMIN — CYCLOBENZAPRINE 10 MG: 10 TABLET, FILM COATED ORAL at 09:42

## 2021-05-13 RX ADMIN — FAMOTIDINE 20 MG: 20 TABLET ORAL at 09:42

## 2021-05-13 RX ADMIN — ACETAMINOPHEN 975 MG: 325 TABLET, FILM COATED ORAL at 03:21

## 2021-05-13 ASSESSMENT — ACTIVITIES OF DAILY LIVING (ADL)
ADLS_ACUITY_SCORE: 13

## 2021-05-13 NOTE — DISCHARGE SUMMARY
Discharge Summary    Cameron Barnard MRN# [id number]   YOB: 1950 Age: 71 year old     Date of Admission:  5/10/2021  Date of Discharge:  5/13/2021  Admitting Physician:  Hari Ceballos MD  Discharging Service:  Surgery  Primary Provider: Gary Bey         Admission/Discharge Diagnosis:   Principle Diagnosis:   Anticoagulated [Z79.01]  Postprocedural hematoma of abdominal wall [M96.89]  Abdominal wall hematoma, initial encounter [S30.1XXA]  Secondary diagnosis: None         Procedures:   Procedure(s):  EXPLORATORY LAPAROTOMY AND EVACUATION OF HEMATOMA         Brief History of Illness:   This patient was a 71 year old male who presented with an expanding abdominal wall mass.  He underwent an open ventral hernia repair by Dr. Parekh on 5/5/21 and was anticoagulated for a hx of DVT and PE.  His imaging was consistent with a large rectus sheath hematoma.  After discussing the risks, benefits, and possible complications, an informed consent was obtained and the patient underwent the above procedure. Please see the Operative Report for full details.         Hospital Course:   The patient recovered as anticipated.  His hemoglobin was stable during the hospitalization.  He was seen by vascular medicine.  Dr. Polanco recommended stopping Lovenox and warfarin and starting ASA 81mg daily.  He will need follow up with his PCP for a D-dimer and LLE venous duplex in 6-8 weeks as well as a lipid panel.  I also started Keflex in concern of mild erythema at his incision.  He has follow up appointments with Dr. Parekh and his PCP in the next couple of weeks. The patient's pain was controlled and he was tolerating food on day of discharge.  The patient was discharged to home in stable condition by the surgical service.  He verbalized understanding of all discharge instructions.  He was asked to call with any further questions or concerns.  Please see chart for details.          Consultations:     Consultation  "during this admission received from vascular medicine          Discharge Disposition:     Discharged to home          Condition on Discharge:     Discharge condition: Stable   Discharge vitals: Blood pressure 118/66, pulse 85, temperature 97.6  F (36.4  C), temperature source Axillary, resp. rate 16, height 1.88 m (6' 2\"), weight 131.5 kg (290 lb), SpO2 97 %.          Discharge Medications:     Current Discharge Medication List      START taking these medications    Details   aspirin (ASA) 81 MG EC tablet Take 1 tablet (81 mg) by mouth daily  Qty: 30 tablet, Refills: 0    Associated Diagnoses: Chronic deep vein thrombosis (DVT) of femoral vein of left lower extremity (H)      Keflex 500mg tid x 7 days- incisional erythema     CONTINUE these medications which have NOT CHANGED    Details   acetaminophen (TYLENOL) 500 MG tablet Take 1,000 mg by mouth every 6 hours as needed for mild pain 2 x 500 mg      cetirizine (ZYRTEC) 10 MG tablet Take 10 mg by mouth daily as needed for allergies      Cholecalciferol (VITAMIN D3) 50 MCG (2000 UT) CAPS Take 5,000 Units by mouth every morning       cyclobenzaprine (FLEXERIL) 10 MG tablet Take 1 tablet (10 mg) by mouth 3 times daily  Qty: 30 tablet, Refills: 0    Associated Diagnoses: Acute post-operative pain      Dietary Management Product (VASCULERA) TABS Take 1 tablet by mouth every morning      Fish Oil-Cholecalciferol (OMEGA-3 FISH OIL/VITAMIN D3 PO) Take 1 capsule by mouth 2 times daily       FOLIC ACID PO Take 1 mg by mouth every morning      Multiple Vitamins-Minerals (MULTIVITAMIN ADULTS 50+) TABS Take 1 tablet by mouth every morning       oxyCODONE (ROXICODONE) 5 MG tablet Take 1-2 tablets (5-10 mg) by mouth every 4 hours as needed for moderate to severe pain  Qty: 12 tablet, Refills: 0    Associated Diagnoses: Acute post-operative pain      senna-docusate (SENOKOT-S/PERICOLACE) 8.6-50 MG tablet Take 1 tablet by mouth 2 times daily  Qty: 20 tablet, Refills: 0    Associated " Diagnoses: Acute post-operative pain         STOP taking these medications       enoxaparin ANTICOAGULANT (LOVENOX) 120 MG/0.8ML syringe Comments:   Reason for Stopping:         warfarin ANTICOAGULANT (COUMADIN) 5 MG tablet Comments:   Reason for Stopping:                  Discharge Instructions:    Follow up with Dr. Parekh and your PCP as scheduled.    Start Aspirin 81mg daily. Stop anticoagulation of Lovenox bridging and   warfarin.  You will need to obtain a D-dimer and left lower extremity   venous duplex in 6 to 8 weeks through primary care physician's office (if   elevated D-dimer, new or worsening old DVT then only consider   anticoagulation at that time otherwise continue aspirin only). You also   need fasting lipid panel.      Utilize compression stockings, weight loss regimen, and monitor blood   pressure and treat if greater than 130/80.        After Care Instructions     Activity      Your activity upon discharge: activity as tolerated. No heavy lifting > 20 lbs or strenuous exercise x 4 weeks. No driving or alcohol while on pain meds.         Diet      Follow this diet upon discharge: Advance to a regular diet as tolerated         Wound care and dressings      Instructions to care for your wound at home: keep wound(s) clean and dry. You may shower, but do not soak incisions x 2 weeks. Leave steri strips in place, they will fall off on their own. Apply dry dressing as needed.             Kylah Gonzalez PA-C, dictating on behalf of Hari Ceballos MD  Office #: [phone]

## 2021-05-13 NOTE — PLAN OF CARE
A&Ox4. VSS w/ HTN. Pain well controlled with scheduled tylenol. Abdominal incision dressing marked without change. CMS intact. LS: clear. BS: audible. +void, +flatus, -BM. Up IND.

## 2021-05-13 NOTE — PLAN OF CARE
19-23: A&O x4, VSS on RA. Abd drsg CDI ex scant old drainage with abd binder in place. Lower aspect of abdomen bruise/ecchymotic area. Denies N/V/pain. Ambulates to bathroom independently. +flatus, no stool. Plans for possible discharge tomorrow.

## 2021-05-13 NOTE — PROGRESS NOTES
[facility]    Vascular Medicine Progress Note    Date of Service (when I saw the patient): 05/13/2021     Assessment & Plan   Cameron Barnard is a 71 year old male who was admitted on 5/10/2021.   1.  Large rectus sheath hematoma status post abdominal exploration and evacuation of abdominal wall hematoma 5/11/2021:   POD 2     2.  Chronic left lower extremity deep venous thrombosis May 2017 and at the same time small bilateral PE fourth order pulmonary vessels, since then on anticoagulation and recently underwent incisional hernia repair then followed by bridging with Lovenox and restarted warfarin:  History of left GSV thrombus on recent venous comp studies still chronic nonocclusive weblike thrombus in the left GSV February 2021  (Reviewed extensive records in the [facility] system multiple venous duplexes done within the last few years still chronic weblike DVT in left femoral, popliteal vein , is non-smoker no personal history of malignancy no family history of hypercoagulable status, this was his first lifetime thromboembolic event risk factors were obesity and sedentary life at that time)     3.  Ascending aorta dilatation stable 4.3 cm (cardiac MR January 2020)   Previous CTA chest in December 2019 read as 4.5 cm.     4.  Obesity with BMI greater than 36:     5.  Coronary atherosclerosis involving all 3 vessels and also very high coronary calcium score around 800 in  2019 CT done at [facility]:     6. HTN goal of blood pressure less than 130/80     7.  Hyperlipidemia goal of LDL less than 70;  (Lipid panel on May 13, 2021 total cholesterol 125, triglycerides 109 HDL 40 and LDL 63)     This is a very pleasant male developed abdominal wall hematoma while on anticoagulation with Lovenox bridging and warfarin few days after , postoperatively after open ventral hernia repair.  He was taking warfarin since 2017 for first lifetime thromboembolic episode of left lower  extremity DVT and PE.  Subsequent multiple venous duplexes chronic web like DVT noted in the femoral vein and popliteal vein.  He denies any chest pain, shortness of breath or palpitations.  He also developed chronic venous insufficiency and subsequently seen and evaluated by Dr. Apple and Dr. Stinson.  Wearing compression stockings and elevating the legs when able and also he was initiated vascular in February 2021.     His risk factors for DVT were obesity and sedentary lifestyle  Reviewed extensive records in the [facility] system and imaging studies.  Unclear why he was continued approximately 4 years of anticoagulation.  Given large rectal sheath hematoma and only one time DVT and PE it is reasonable to stop anticoagulation.     At present my recommendations,  No need for anticoagulation ( one episode of DVT and PE took warfarin for 4 years )  Patient should get D-dimer and left lower extremity venous duplex in 6 to 8 weeks through primary care physician's office (if elevated D-dimer, new or worsening old DVT then only consider anticoagulation )  Take OTC two baby aspirins daily with food given CAD and HX of DVT and PE  No need for hypercoagulable studies  Utilize compression stockings, elevate legs  Lose weight  He has excellent lipids currently not on any statin previously did not tolerate Crestor.  Even though lipids are excellent range given very high coronary calcium score in 3 vessels he will benefit with low-dose alternate statin consider prescribing atorvastatin 10 mg daily or simvastatin 20 mg daily (please schedule to see primary care physician next week and we will discuss with him first)    Vascular medicine service will sign off  Please call us with any questions    Copy to primary MD, Dr. Iglesia Polanco MD, FA, FS, FNLA, [facility]  Vascular medicine  Clinical hypertension specialist  Clinical lipidologist    Interval History      Doing well, ambulating, passing the gas,  Pain  well controlled  Reviewed labs with the patient excellent lipids normal renal function normal liver function tests    Physical Exam   Temp: 97.6  F (36.4  C) Temp src: Axillary BP: 118/66 Pulse: 85   Resp: 16 SpO2: 97 % O2 Device: None (Room air)    Vitals:    05/10/21 1828   Weight: 131.5 kg (290 lb)     Vital Signs with Ranges  Temp:  [97.6  F (36.4  C)-98.3  F (36.8  C)] 97.6  F (36.4  C)  Pulse:  [82-93] 85  Resp:  [16-18] 16  BP: (118-149)/(53-85) 118/66  SpO2:  [95 %-98 %] 97 %  I/O last 3 completed shifts:  In: 160 [P.O.:120; I.V.:40]  Out: 450 [Urine:450]    Constitutional: Awake, alert, cooperative, no apparent distress, and appears stated age.  Eyes: Lids and lashes normal, pupils equal, round and reactive to light, extra ocular muscles intact, sclera clear, conjunctiva normal.  ENT: Normocephalic, without obvious abnormality  Respiratory: No increased work of breathing, good air exchange, clear to auscultation bilaterally, no crackles or wheezing.  Cardiovascular: Normal apical impulse, regular rate and rhythm, normal S1 and S2, no S3 or S4, and no murmur noted.  GI:  normal bowel sounds, binder in place  Lymph/Hematologic: No cervical lymphadenopathy and no supraclavicular lymphadenopathy.  Skin: No bruising or bleeding, normal skin color, texture, turgor, no redness, warmth, or swelling, no rashes, no lesions,  nails normal without discoloration or clubbing and no jaundice.  Musculoskeletal: There is no redness, warmth, or swelling of the joints.  Full range of motion noted.  Motor strength is 5 out of 5 all extremities bilaterally.  Tone is normal.  Neurologic: Awake, alert, oriented to name, place and time.  Cranial nerves II-XII are grossly intact.  Motor is 5 out of 5 bilaterally.   Neuropsychiatric: Calm, normal eye contact, alert, normal affect, oriented to self, place, time and situation, memory for past and recent events intact and thought process normal.  Pulses:  Good peripheral  pulses.    Medications     lactated ringers 10 mL/hr at 05/12/21 0936       acetaminophen  975 mg Oral Q8H     bisacodyl  10 mg Rectal Daily     cyclobenzaprine  10 mg Oral TID     famotidine  20 mg Oral BID    Or     famotidine  20 mg Intravenous BID     senna-docusate  1 tablet Oral BID     sodium chloride (PF)  3 mL Intracatheter Q8H       Data   Recent Labs   Lab 05/13/21  0743 05/12/21  0753 05/11/21  1542 05/10/21  1840 05/07/21  0600 05/07/21  0600   WBC 6.6 7.2  --  12.3*  --   --    HGB 10.1* 10.0* 11.3* 14.4   < >  --    MCV 95 95  --  92  --   --     168  --  253  --   --    INR  --   --   --  1.39*  --  1.05    136  --  140  --   --    POTASSIUM 4.0 4.1  --  3.8  --   --    CHLORIDE 106 105  --  108  --   --    CO2 29 29  --  25  --   --    BUN 11 13  --  14  --   --    CR 0.98 0.92  --  0.92  --   --    ANIONGAP 3 2*  --  7  --   --    PETER 8.1* 7.9*  --  8.9  --   --    GLC 98 89  --  130*  --   --    ALBUMIN 2.6*  --   --  3.3*  --   --    PROTTOTAL 6.0*  --   --  7.2  --   --    BILITOTAL 0.7  --   --  0.5  --   --    ALKPHOS 51  --   --  59  --   --    ALT 17  --   --  25  --   --    AST 16  --   --  23  --   --     < > = values in this interval not displayed.

## 2021-05-13 NOTE — PLAN OF CARE
A&Ox4. VSS on RA. Denies pain. Up in room independently. Voiding via BR. Regular diet, tolerating. Abdominal incision WDL with steristrips. Abdominal binder. Abdomen soft/tender. +BS, hypoactive. +flatus. Bruising to abdomen. Trace edema to BLE. CMS intact. Discharge instructions discussed with patient. Discharge meds discussed and given to patient. Wife to provide transportation home. Personal belongings with patient.

## 2021-05-13 NOTE — PROGRESS NOTES
"[facility]  GENERAL SURGERY Progress Note    Admission Date: 5/10/2021  2021         Assessment and Plan:     Cameron Barnard is a 71 year old male with a rectus sheath hematoma following an opne ventral hernia repair ; s/p ex lap with evacuation of hematoma, POD 3  - ADAT  - Pain controlled with Tylenol, Flexeril and oxy  - Hgb stable  - Mild erythema at incision, will start Keflex 500mg tid x 7 days  - Appreciate Dr. Polanco's recommendations- stop Lovenox and warfarin, start ASA 81mg, D-dimer and LLE venous duplex in 6-8 weeks, lipid panel OP.    - Ambulate 4x day and encourage IS  - Discharge home             Interval History:     Pain controlled, tolerating diet, +Flatus, UO adequate, ambulating. Meds reviewed.                      Physical Exam:   Blood pressure 118/66, pulse 85, temperature 97.6  F (36.4  C), temperature source Axillary, resp. rate 16, height 1.88 m (6' 2\"), weight 131.5 kg (290 lb), SpO2 97 %.  Temperature Temp  Av  F (36.7  C)  Min: 97.6  F (36.4  C)  Max: 98.3  F (36.8  C)   I/O last 3 completed shifts:  In: 160 [P.O.:120; I.V.:40]  Out: 450 [Urine:450]  Constitutional:  Awake, alert, oriented, and in no apparent distress.   Lungs: No increased work of breathing, good air exchange, clear to auscultation bilaterally, and no crackles or wheezing.   Cardiovascular: Regular rate and rhythm, normal S1 and S2, and no murmur noted.   Abdomen: Soft, non-distended, appropriately tender at incision(s), + BS.  Ecchymosis RLQ.    Wound(s): Clean, dry, and intact. Mild erythema around incision, No drainage.    Extremities: Mild edema B/L extremities, no calf tenderness. +SCDs          Data:     Recent Labs   Lab Test 21  0743 21  0753 21  1542 05/10/21  1840   WBC 6.6 7.2  --  12.3*   HGB 10.1* 10.0* 11.3* 14.4   HCT 31.0* 31.2*  --  42.4    168  --  253      Recent Labs   Lab Test 21  0743 21  0753 05/10/21  1840    136 140 "   POTASSIUM 4.0 4.1 3.8   CHLORIDE 106 105 108   CO2 29 29 25   BUN 11 13 14   CR 0.98 0.92 0.92       Kylah Gonzalez PA-C  [facility]  [phone]

## 2021-05-17 ENCOUNTER — TELEPHONE (OUTPATIENT)
Dept: SURGERY | Facility: CLINIC | Age: 71
End: 2021-05-17

## 2021-05-17 NOTE — TELEPHONE ENCOUNTER
Name of caller: Patient    Reason for Call:  Symptoms-  Seepage and a bit of blood oozing from incision.     Surgeon:  Dr. Parekh     Recent Surgery:  Yes.    If yes, when & what type:  5/5/21    OPEN INCISIONAL HERNIA REPAIR-MRG    Then:  5/10/21      EXPLORATORY LAPAROTOMY AND EVACUATION OF HEMATOMA-LEL    Best phone number to reach pt at is: [phone]    Ok to leave a message with medical info? Yes.

## 2021-05-17 NOTE — TELEPHONE ENCOUNTER
"Patient underwent OPEN incisional hernia repair 5/5/21 with Dr. Parekh and presented to ED on 5/10/21 for abdominal wall hematoma.  Dr. Ceballos performed ex lap and evacuation of hematoma 5/10/21.    Patient reports that incision has started \"weeping.\"  His wife reports the output is \"pink\" in color, thin consistency.    No s/s of infection.  No pain.  No other concerns.    Informed them that there is likely a build up of fluid in the area and because incision is not healed entirely, body is releasing it, which is not a bad thing.    He does have follow up with this week, Thursday, with his PCP and next week with Dr. Parekh.    Encouraged him to keep area covered. Change gauze when saturated.  If drainage changes in quality/characteristics, or if drainage increases, he should call back and we will have him seen before his scheduled visit.    He is in agreement with this plan and will call PRN.    Shayna Rios RN-BSN    "

## 2021-05-27 ENCOUNTER — OFFICE VISIT (OUTPATIENT)
Dept: SURGERY | Facility: CLINIC | Age: 71
End: 2021-05-27
Payer: COMMERCIAL

## 2021-05-27 DIAGNOSIS — Z09 SURGERY FOLLOW-UP EXAMINATION: Primary | ICD-10-CM

## 2021-05-27 PROCEDURE — 99024 POSTOP FOLLOW-UP VISIT: CPT | Performed by: SURGERY

## 2021-05-27 NOTE — LETTER
[facility]    [address], [address]  [place], [place] [address]  Phone [phone]  Fax [phone] [address], [address]  [place] [address]  Phone [phone]  Fax [phone]    [email or web address]     May 27, 2021    Re: Cameron ABDALLA Akil  : 1950      Surgery Postop Note     Cameron Barnard presents today for surgical followup.  he is doing well following open ventral hernia repair.  Incisions look fine with no signs of wound infection.  There is some firmness at the incision which I think is normal post-surgical changes.  I expect him to make a complete recovery.  Thank you for the opportunity to help in his care.     Chirag Parekh M.D.  [facility]  [phone]

## 2021-06-01 NOTE — PROGRESS NOTES
Surgery Postop Note    Cameron Barnard presents today for surgical followup.  he is doing well following open ventral hernia repair.  Incisions look fine with no signs of wound infection.  There is some firmness at the incision which I think is normal post-surgical changes.  I expect him to make a complete recovery.  Thank you for the opportunity to help in his care.    Chirag Parekh M.D.  [facility]  [phone]    Please route or send letter to:  Primary Care Provider (PCP) and Referring Provider

## 2021-06-10 ENCOUNTER — OFFICE VISIT (OUTPATIENT)
Dept: SURGERY | Facility: CLINIC | Age: 71
End: 2021-06-10
Payer: COMMERCIAL

## 2021-06-10 DIAGNOSIS — R18.8 ABDOMINAL FLUID COLLECTION: ICD-10-CM

## 2021-06-10 DIAGNOSIS — S30.1XXD ABDOMINAL WALL SEROMA, SUBSEQUENT ENCOUNTER: Primary | ICD-10-CM

## 2021-06-10 DIAGNOSIS — Z09 SURGERY FOLLOW-UP EXAMINATION: ICD-10-CM

## 2021-06-10 PROCEDURE — 99024 POSTOP FOLLOW-UP VISIT: CPT | Performed by: SURGERY

## 2021-06-10 NOTE — PROGRESS NOTES
Surgery Postop Note    Cameron Barnard presents today for surgical followup.  he is doing well following open ventral hernia repair.  He continues to have an area of fullness in the central abdomen just above his previous repair.  At this point, this seems most consistent with a postoperative seroma.  Absolutely no evidence of drainage or infection.  I have ordered a CT scan of the abdomen and requested that if a large fluid collection is encountered, that a drainage procedure be performed.  As long as he does not develop an infection in this fluid, I expect him to make a complete recovery.  Thank you for the opportunity to help in his care.    Chirag Parekh M.D.  [facility]  [phone]    Please route or send letter to:  Primary Care Provider (PCP) and Referring Provider

## 2021-06-11 ENCOUNTER — TELEPHONE (OUTPATIENT)
Dept: ULTRASOUND IMAGING | Facility: CLINIC | Age: 71
End: 2021-06-11

## 2021-06-11 NOTE — TELEPHONE ENCOUNTER
Order for abscess drain reviewed per Dr. Guillaume, can image first, if hematoma, no drain, if liquified can consider drain placement. Patient is on no blood thinning medications and would need a current history and physical for sedation for procedure.

## 2021-06-14 DIAGNOSIS — R18.8 ABDOMINAL FLUID COLLECTION: Primary | ICD-10-CM

## 2021-06-14 DIAGNOSIS — Z11.59 ENCOUNTER FOR SCREENING FOR OTHER VIRAL DISEASES: ICD-10-CM

## 2021-06-14 DIAGNOSIS — Z90.49 STATUS POST SMALL BOWEL RESECTION: ICD-10-CM

## 2021-06-14 DIAGNOSIS — S30.1XXD ABDOMINAL WALL SEROMA, SUBSEQUENT ENCOUNTER: ICD-10-CM

## 2021-06-19 DIAGNOSIS — Z11.59 ENCOUNTER FOR SCREENING FOR OTHER VIRAL DISEASES: ICD-10-CM

## 2021-06-19 LAB
LABORATORY COMMENT REPORT: NORMAL
SARS-COV-2 RNA RESP QL NAA+PROBE: NEGATIVE
SARS-COV-2 RNA RESP QL NAA+PROBE: NORMAL
SPECIMEN SOURCE: NORMAL
SPECIMEN SOURCE: NORMAL

## 2021-06-19 PROCEDURE — U0003 INFECTIOUS AGENT DETECTION BY NUCLEIC ACID (DNA OR RNA); SEVERE ACUTE RESPIRATORY SYNDROME CORONAVIRUS 2 (SARS-COV-2) (CORONAVIRUS DISEASE [COVID-19]), AMPLIFIED PROBE TECHNIQUE, MAKING USE OF HIGH THROUGHPUT TECHNOLOGIES AS DESCRIBED BY CMS-2020-01-R: HCPCS | Performed by: SURGERY

## 2021-06-19 PROCEDURE — U0005 INFEC AGEN DETEC AMPLI PROBE: HCPCS | Performed by: SURGERY

## 2021-06-21 ENCOUNTER — TELEPHONE (OUTPATIENT)
Dept: MEDSURG UNIT | Facility: CLINIC | Age: 71
End: 2021-06-21

## 2021-06-21 NOTE — TELEPHONE ENCOUNTER
Pre-Procedure Negative COVID Test Results    Results Reviewed  The patient has a negative COVID test result within the required timeframe for the scheduled procedure.     No COVID pre-call needed.     LEONOR Paulino RN

## 2021-06-22 ENCOUNTER — HOSPITAL ENCOUNTER (OUTPATIENT)
Facility: CLINIC | Age: 71
Discharge: HOME | End: 2021-06-22
Attending: SURGERY
Payer: COMMERCIAL

## 2021-06-22 ENCOUNTER — HOSPITAL ENCOUNTER (OUTPATIENT)
Dept: ULTRASOUND IMAGING | Facility: CLINIC | Age: 71
End: 2021-06-22
Attending: SURGERY
Payer: COMMERCIAL

## 2021-06-22 ENCOUNTER — HOSPITAL ENCOUNTER (OUTPATIENT)
Dept: CT IMAGING | Facility: CLINIC | Age: 71
End: 2021-06-22
Attending: SURGERY
Payer: COMMERCIAL

## 2021-06-22 VITALS
OXYGEN SATURATION: 99 % | HEART RATE: 65 BPM | DIASTOLIC BLOOD PRESSURE: 62 MMHG | SYSTOLIC BLOOD PRESSURE: 144 MMHG | TEMPERATURE: 96.8 F | RESPIRATION RATE: 16 BRPM

## 2021-06-22 DIAGNOSIS — S30.1XXD ABDOMINAL WALL SEROMA, SUBSEQUENT ENCOUNTER: ICD-10-CM

## 2021-06-22 DIAGNOSIS — R18.8 ABDOMINAL FLUID COLLECTION: ICD-10-CM

## 2021-06-22 DIAGNOSIS — Z90.49 STATUS POST SMALL BOWEL RESECTION: ICD-10-CM

## 2021-06-22 LAB
CREAT BLD-MCNC: 1 MG/DL (ref 0.66–1.25)
GFR SERPL CREATININE-BSD FRML MDRD: 73 ML/MIN/{1.73_M2}
GRAM STN SPEC: NORMAL
GRAM STN SPEC: NORMAL
SPECIMEN SOURCE: NORMAL

## 2021-06-22 PROCEDURE — 96374 THER/PROPH/DIAG INJ IV PUSH: CPT | Mod: 59

## 2021-06-22 PROCEDURE — 87070 CULTURE OTHR SPECIMN AEROBIC: CPT | Performed by: RADIOLOGY

## 2021-06-22 PROCEDURE — 250N000009 HC RX 250: Performed by: RADIOLOGY

## 2021-06-22 PROCEDURE — 82565 ASSAY OF CREATININE: CPT

## 2021-06-22 PROCEDURE — 74177 CT ABD & PELVIS W/CONTRAST: CPT

## 2021-06-22 PROCEDURE — 87205 SMEAR GRAM STAIN: CPT | Performed by: RADIOLOGY

## 2021-06-22 PROCEDURE — 999N000154 HC STATISTIC RADIOLOGY XRAY, US, CT, MAR, NM

## 2021-06-22 PROCEDURE — 272N000431 US DRAIN OF SEROMA/HEMATOMA/ABSCESS/CYST

## 2021-06-22 PROCEDURE — 36415 COLL VENOUS BLD VENIPUNCTURE: CPT

## 2021-06-22 PROCEDURE — 96375 TX/PRO/DX INJ NEW DRUG ADDON: CPT

## 2021-06-22 PROCEDURE — 250N000009 HC RX 250: Performed by: SURGERY

## 2021-06-22 PROCEDURE — 250N000011 HC RX IP 250 OP 636: Performed by: SURGERY

## 2021-06-22 RX ORDER — AMOXICILLIN 250 MG
1 CAPSULE ORAL DAILY
COMMUNITY
End: 2022-01-01

## 2021-06-22 RX ORDER — LIDOCAINE HYDROCHLORIDE 10 MG/ML
10 INJECTION, SOLUTION EPIDURAL; INFILTRATION; INTRACAUDAL; PERINEURAL ONCE
Status: COMPLETED | OUTPATIENT
Start: 2021-06-22 | End: 2021-06-22

## 2021-06-22 RX ORDER — LIDOCAINE 40 MG/G
CREAM TOPICAL
Status: DISCONTINUED | OUTPATIENT
Start: 2021-06-22 | End: 2021-06-22 | Stop reason: HOSPADM

## 2021-06-22 RX ORDER — FENTANYL CITRATE 50 UG/ML
25-50 INJECTION, SOLUTION INTRAMUSCULAR; INTRAVENOUS EVERY 5 MIN PRN
Status: DISCONTINUED | OUTPATIENT
Start: 2021-06-22 | End: 2021-06-22 | Stop reason: HOSPADM

## 2021-06-22 RX ORDER — NALOXONE HYDROCHLORIDE 0.4 MG/ML
0.4 INJECTION, SOLUTION INTRAMUSCULAR; INTRAVENOUS; SUBCUTANEOUS
Status: DISCONTINUED | OUTPATIENT
Start: 2021-06-22 | End: 2021-06-22 | Stop reason: HOSPADM

## 2021-06-22 RX ORDER — NALOXONE HYDROCHLORIDE 0.4 MG/ML
0.2 INJECTION, SOLUTION INTRAMUSCULAR; INTRAVENOUS; SUBCUTANEOUS
Status: DISCONTINUED | OUTPATIENT
Start: 2021-06-22 | End: 2021-06-22 | Stop reason: HOSPADM

## 2021-06-22 RX ORDER — FLUMAZENIL 0.1 MG/ML
0.2 INJECTION, SOLUTION INTRAVENOUS
Status: DISCONTINUED | OUTPATIENT
Start: 2021-06-22 | End: 2021-06-22 | Stop reason: HOSPADM

## 2021-06-22 RX ORDER — IOPAMIDOL 755 MG/ML
135 INJECTION, SOLUTION INTRAVASCULAR ONCE
Status: COMPLETED | OUTPATIENT
Start: 2021-06-22 | End: 2021-06-22

## 2021-06-22 RX ORDER — ASPIRIN 81 MG/1
81 TABLET ORAL DAILY
COMMUNITY
End: 2021-01-01

## 2021-06-22 RX ADMIN — SODIUM CHLORIDE 79 ML: 9 INJECTION, SOLUTION INTRAVENOUS at 08:27

## 2021-06-22 RX ADMIN — IOPAMIDOL 135 ML: 755 INJECTION, SOLUTION INTRAVENOUS at 08:28

## 2021-06-22 RX ADMIN — LIDOCAINE HYDROCHLORIDE 10 ML: 10 INJECTION, SOLUTION EPIDURAL; INFILTRATION; INTRACAUDAL; PERINEURAL at 10:24

## 2021-06-22 NOTE — PROGRESS NOTES
550cc of old blood out.  Unable to drain the deeper pocket behind the Mesh.  Back to care suites for a discharge.

## 2021-06-22 NOTE — PROGRESS NOTES
Care Suites Discharge Nursing Note    Patient Information  Name: Cameron Barnard  Age: 71 year old    Discharge Education:  Discharge instructions reviewed: Yes  Additional education/resources provided: none  Patient/patient representative verbalizes understanding: Yes  Patient discharging on new medications: No  Medication education completed: N/A    Discharge Plans:   Discharge location: home  Discharge ride contacted: Yes  Approximate discharge time: 1130    Discharge Criteria:  Discharge criteria met and vital signs stable: Yes    Patient Belongs:  Patient belongings returned to patient: Yes    Kaiden Yeboah RN

## 2021-06-22 NOTE — DISCHARGE INSTRUCTIONS
Abscess Drain Discharge Instructions     After you go home:      You may resume your normal diet    Have an adult stay with you for 6 hours if you received sedation       For 24 hours - due to the sedation you received:    Relax and take it easy    Do NOT make any important or legal decisions    Do NOT drive or operate machines at home or at work    Do NOT drink alcohol    Care of Puncture Site:      For the first 48 hrs, check your puncture site every couple hours while you are awake     Check the tube site twice a day for signs of infection    Keep the dressing around the tube dry    Change the dressing every 2-3 days if it is guaze/tape. If there is a Stay Fix dressing intact - this should be changed weekly.    You may shower but do not get the dressing wet. Place a waterproof cover over the dressing (such as plastic wrap). Change the dressing if it becomes wet.    No tub baths, whirlpools or swimming until the tube is removed     Activity       You may go back to normal activity in 24 hours    Wait 48 hours before lifting, straining, exercise or other strenuous activity    Medicines:      You may resume  medications    Resume your Warfarin/Coumadin at your regular dose today. Follow up with your provider to have your INR rechecked    Resume your Platelet Inhibitors and Aspirin tomorrow at your regular dose    For minor pain, you may take Acetaminophen (Tylenol) or Ibuprofen (Advil)                 Call the provider who ordered this procedure if:      Increased pain or a large or growing hard lump around the site    Blood or fluid is draining from the site    The site is red, swollen, hot or tender    Chills or a fever greater than 101 F (38 C)    Pain that is getting worse    Any questions or concerns    Call  911 or go to the Emergency Room if:      Severe pain or trouble breathing    Bleeding that you cannot control    Other Instructions:      If the tube falls out - cover the opening with gauze &  tape    Record drainage output amounts & bring sheet to return appointments    Take your temperature daily    If you have questions call:          [facility] Radiology Dept @ [phone]        The provider who performed your procedure was _________________.

## 2021-06-27 LAB
BACTERIA SPEC CULT: NO GROWTH
SPECIMEN SOURCE: NORMAL

## 2021-08-24 ENCOUNTER — HOSPITAL ENCOUNTER (OUTPATIENT)
Dept: CT IMAGING | Facility: CLINIC | Age: 71
Discharge: HOME | End: 2021-08-24
Attending: INTERNAL MEDICINE | Admitting: INTERNAL MEDICINE
Payer: COMMERCIAL

## 2021-08-24 DIAGNOSIS — I82.402 DEEP VEIN THROMBOSIS OF LEFT LOWER LIMB (H): ICD-10-CM

## 2021-08-24 DIAGNOSIS — O22.30 DVT (DEEP VEIN THROMBOSIS) IN PREGNANCY: ICD-10-CM

## 2021-08-24 LAB
CREAT BLD-MCNC: 1.1 MG/DL (ref 0.7–1.3)
GFR SERPL CREATININE-BSD FRML MDRD: >60 ML/MIN/1.73M2

## 2021-08-24 PROCEDURE — 73706 CT ANGIO LWR EXTR W/O&W/DYE: CPT | Mod: LT

## 2021-08-24 PROCEDURE — 250N000011 HC RX IP 250 OP 636: Performed by: INTERNAL MEDICINE

## 2021-08-24 PROCEDURE — 250N000009 HC RX 250: Performed by: INTERNAL MEDICINE

## 2021-08-24 PROCEDURE — 82565 ASSAY OF CREATININE: CPT

## 2021-08-24 RX ORDER — IOPAMIDOL 755 MG/ML
100 INJECTION, SOLUTION INTRAVASCULAR ONCE
Status: COMPLETED | OUTPATIENT
Start: 2021-08-24 | End: 2021-08-24

## 2021-08-24 RX ADMIN — IOPAMIDOL 100 ML: 755 INJECTION, SOLUTION INTRAVENOUS at 13:24

## 2021-08-24 RX ADMIN — SODIUM CHLORIDE 80 ML: 9 INJECTION, SOLUTION INTRAVENOUS at 13:24

## 2022-01-01 ENCOUNTER — APPOINTMENT (OUTPATIENT)
Dept: INTERVENTIONAL RADIOLOGY/VASCULAR | Facility: CLINIC | Age: 72
End: 2022-01-01
Attending: INTERNAL MEDICINE
Payer: COMMERCIAL

## 2022-01-01 ENCOUNTER — HOSPITAL ENCOUNTER (OUTPATIENT)
Facility: CLINIC | Age: 72
End: 2022-01-01
Payer: COMMERCIAL

## 2022-01-01 ENCOUNTER — APPOINTMENT (OUTPATIENT)
Dept: CT IMAGING | Facility: CLINIC | Age: 72
DRG: 308 | End: 2022-01-01
Attending: EMERGENCY MEDICINE
Payer: COMMERCIAL

## 2022-01-01 ENCOUNTER — HOSPITAL ENCOUNTER (OUTPATIENT)
Facility: CLINIC | Age: 72
Discharge: HOME | End: 2022-09-27
Admitting: RADIOLOGY
Payer: COMMERCIAL

## 2022-01-01 ENCOUNTER — HOSPITAL ENCOUNTER (EMERGENCY)
Facility: CLINIC | Age: 72
Discharge: HOME | DRG: 308 | End: 2022-09-17
Attending: EMERGENCY MEDICINE | Admitting: EMERGENCY MEDICINE
Payer: COMMERCIAL

## 2022-01-01 ENCOUNTER — TELEPHONE (OUTPATIENT)
Dept: MEDSURG UNIT | Facility: CLINIC | Age: 72
End: 2022-01-01

## 2022-01-01 ENCOUNTER — APPOINTMENT (OUTPATIENT)
Dept: CARDIOLOGY | Facility: CLINIC | Age: 72
DRG: 436 | End: 2022-01-01
Attending: INTERNAL MEDICINE
Payer: COMMERCIAL

## 2022-01-01 ENCOUNTER — HOSPITAL ENCOUNTER (INPATIENT)
Facility: CLINIC | Age: 72
LOS: 5 days | Discharge: HOME | DRG: 435 | End: 2022-09-14
Attending: EMERGENCY MEDICINE | Admitting: INTERNAL MEDICINE
Payer: COMMERCIAL

## 2022-01-01 ENCOUNTER — HOSPITAL ENCOUNTER (INPATIENT)
Facility: CLINIC | Age: 72
LOS: 7 days | Discharge: HOSPICE | DRG: 436 | End: 2022-10-08
Attending: EMERGENCY MEDICINE | Admitting: INTERNAL MEDICINE
Payer: COMMERCIAL

## 2022-01-01 ENCOUNTER — TELEPHONE (OUTPATIENT)
Dept: SURGERY | Facility: CLINIC | Age: 72
End: 2022-01-01
Payer: COMMERCIAL

## 2022-01-01 ENCOUNTER — HOSPITAL ENCOUNTER (OUTPATIENT)
Facility: CLINIC | Age: 72
End: 2022-01-01
Admitting: ORTHOPAEDIC SURGERY
Payer: COMMERCIAL

## 2022-01-01 ENCOUNTER — ANESTHESIA EVENT (OUTPATIENT)
Dept: SURGERY | Facility: CLINIC | Age: 72
End: 2022-01-01
Payer: COMMERCIAL

## 2022-01-01 ENCOUNTER — APPOINTMENT (OUTPATIENT)
Dept: CARDIOLOGY | Facility: CLINIC | Age: 72
DRG: 436 | End: 2022-01-01
Attending: NURSE PRACTITIONER
Payer: COMMERCIAL

## 2022-01-01 ENCOUNTER — TELEPHONE (OUTPATIENT)
Dept: CARDIOLOGY | Facility: CLINIC | Age: 72
End: 2022-01-01

## 2022-01-01 ENCOUNTER — APPOINTMENT (OUTPATIENT)
Dept: PHYSICAL THERAPY | Facility: CLINIC | Age: 72
DRG: 436 | End: 2022-01-01
Attending: INTERNAL MEDICINE
Payer: COMMERCIAL

## 2022-01-01 ENCOUNTER — APPOINTMENT (OUTPATIENT)
Dept: PHYSICAL THERAPY | Facility: CLINIC | Age: 72
End: 2022-01-01
Attending: ORTHOPAEDIC SURGERY
Payer: COMMERCIAL

## 2022-01-01 ENCOUNTER — APPOINTMENT (OUTPATIENT)
Dept: ULTRASOUND IMAGING | Facility: CLINIC | Age: 72
DRG: 436 | End: 2022-01-01
Attending: INTERNAL MEDICINE
Payer: COMMERCIAL

## 2022-01-01 ENCOUNTER — PATIENT OUTREACH (OUTPATIENT)
Dept: CARE COORDINATION | Facility: CLINIC | Age: 72
End: 2022-01-01

## 2022-01-01 ENCOUNTER — APPOINTMENT (OUTPATIENT)
Dept: ULTRASOUND IMAGING | Facility: CLINIC | Age: 72
End: 2022-01-01
Attending: EMERGENCY MEDICINE
Payer: COMMERCIAL

## 2022-01-01 ENCOUNTER — ONCOLOGY VISIT (OUTPATIENT)
Dept: ONCOLOGY | Facility: CLINIC | Age: 72
End: 2022-01-01
Attending: INTERNAL MEDICINE
Payer: COMMERCIAL

## 2022-01-01 ENCOUNTER — APPOINTMENT (OUTPATIENT)
Dept: PHYSICAL THERAPY | Facility: CLINIC | Age: 72
DRG: 436 | End: 2022-01-01
Payer: COMMERCIAL

## 2022-01-01 ENCOUNTER — APPOINTMENT (OUTPATIENT)
Dept: CT IMAGING | Facility: CLINIC | Age: 72
DRG: 435 | End: 2022-01-01
Attending: EMERGENCY MEDICINE
Payer: COMMERCIAL

## 2022-01-01 ENCOUNTER — APPOINTMENT (OUTPATIENT)
Dept: GENERAL RADIOLOGY | Facility: CLINIC | Age: 72
End: 2022-01-01
Attending: ORTHOPAEDIC SURGERY
Payer: COMMERCIAL

## 2022-01-01 ENCOUNTER — APPOINTMENT (OUTPATIENT)
Dept: GENERAL RADIOLOGY | Facility: CLINIC | Age: 72
DRG: 308 | End: 2022-01-01
Attending: STUDENT IN AN ORGANIZED HEALTH CARE EDUCATION/TRAINING PROGRAM
Payer: COMMERCIAL

## 2022-01-01 ENCOUNTER — DOCUMENTATION ONLY (OUTPATIENT)
Dept: ONCOLOGY | Facility: CLINIC | Age: 72
End: 2022-01-01

## 2022-01-01 ENCOUNTER — LAB (OUTPATIENT)
Dept: URGENT CARE | Facility: URGENT CARE | Age: 72
End: 2022-01-01
Attending: ORTHOPAEDIC SURGERY
Payer: COMMERCIAL

## 2022-01-01 ENCOUNTER — HOSPITAL ENCOUNTER (OUTPATIENT)
Facility: CLINIC | Age: 72
End: 2022-01-01
Admitting: INTERNAL MEDICINE
Payer: COMMERCIAL

## 2022-01-01 ENCOUNTER — APPOINTMENT (OUTPATIENT)
Dept: CARDIOLOGY | Facility: CLINIC | Age: 72
DRG: 436 | End: 2022-01-01
Attending: EMERGENCY MEDICINE
Payer: COMMERCIAL

## 2022-01-01 ENCOUNTER — INFUSION THERAPY VISIT (OUTPATIENT)
Dept: INFUSION THERAPY | Facility: CLINIC | Age: 72
End: 2022-01-01
Attending: INTERNAL MEDICINE
Payer: COMMERCIAL

## 2022-01-01 ENCOUNTER — APPOINTMENT (OUTPATIENT)
Dept: ULTRASOUND IMAGING | Facility: CLINIC | Age: 72
DRG: 435 | End: 2022-01-01
Attending: EMERGENCY MEDICINE
Payer: COMMERCIAL

## 2022-01-01 ENCOUNTER — HOSPITAL ENCOUNTER (INPATIENT)
Facility: CLINIC | Age: 72
LOS: 1 days | End: 2022-10-10
Attending: INTERNAL MEDICINE | Admitting: INTERNAL MEDICINE
Payer: COMMERCIAL

## 2022-01-01 ENCOUNTER — DOCUMENTATION ONLY (OUTPATIENT)
Dept: INFUSION THERAPY | Facility: CLINIC | Age: 72
End: 2022-01-01

## 2022-01-01 ENCOUNTER — MEDICAL CORRESPONDENCE (OUTPATIENT)
Dept: HEALTH INFORMATION MANAGEMENT | Facility: CLINIC | Age: 72
End: 2022-01-01

## 2022-01-01 ENCOUNTER — HOSPITAL ENCOUNTER (OUTPATIENT)
Facility: CLINIC | Age: 72
Discharge: HOME | End: 2022-02-05
Attending: ORTHOPAEDIC SURGERY | Admitting: ORTHOPAEDIC SURGERY
Payer: COMMERCIAL

## 2022-01-01 ENCOUNTER — APPOINTMENT (OUTPATIENT)
Dept: CT IMAGING | Facility: CLINIC | Age: 72
DRG: 436 | End: 2022-01-01
Attending: EMERGENCY MEDICINE
Payer: COMMERCIAL

## 2022-01-01 ENCOUNTER — ANESTHESIA (OUTPATIENT)
Dept: SURGERY | Facility: CLINIC | Age: 72
End: 2022-01-01
Payer: COMMERCIAL

## 2022-01-01 ENCOUNTER — APPOINTMENT (OUTPATIENT)
Dept: CT IMAGING | Facility: CLINIC | Age: 72
DRG: 435 | End: 2022-01-01
Attending: INTERNAL MEDICINE
Payer: COMMERCIAL

## 2022-01-01 ENCOUNTER — APPOINTMENT (OUTPATIENT)
Dept: CARDIOLOGY | Facility: CLINIC | Age: 72
DRG: 308 | End: 2022-01-01
Attending: EMERGENCY MEDICINE
Payer: COMMERCIAL

## 2022-01-01 ENCOUNTER — TELEPHONE (OUTPATIENT)
Dept: INTERVENTIONAL RADIOLOGY/VASCULAR | Facility: CLINIC | Age: 72
End: 2022-01-01

## 2022-01-01 ENCOUNTER — PATIENT OUTREACH (OUTPATIENT)
Dept: ONCOLOGY | Facility: CLINIC | Age: 72
End: 2022-01-01

## 2022-01-01 ENCOUNTER — HOSPITAL ENCOUNTER (EMERGENCY)
Facility: CLINIC | Age: 72
Discharge: HOME | End: 2022-09-19
Attending: EMERGENCY MEDICINE | Admitting: EMERGENCY MEDICINE
Payer: COMMERCIAL

## 2022-01-01 ENCOUNTER — APPOINTMENT (OUTPATIENT)
Dept: ULTRASOUND IMAGING | Facility: CLINIC | Age: 72
DRG: 435 | End: 2022-01-01
Attending: INTERNAL MEDICINE
Payer: COMMERCIAL

## 2022-01-01 ENCOUNTER — DOCUMENTATION ONLY (OUTPATIENT)
Dept: OTHER | Facility: CLINIC | Age: 72
End: 2022-01-01

## 2022-01-01 ENCOUNTER — HOSPITAL ENCOUNTER (INPATIENT)
Facility: CLINIC | Age: 72
LOS: 3 days | Discharge: HOME | DRG: 308 | End: 2022-09-23
Attending: EMERGENCY MEDICINE | Admitting: INTERNAL MEDICINE
Payer: COMMERCIAL

## 2022-01-01 VITALS
DIASTOLIC BLOOD PRESSURE: 83 MMHG | OXYGEN SATURATION: 95 % | HEART RATE: 75 BPM | HEIGHT: 73 IN | BODY MASS INDEX: 37.77 KG/M2 | RESPIRATION RATE: 18 BRPM | WEIGHT: 285 LBS | TEMPERATURE: 97.1 F | SYSTOLIC BLOOD PRESSURE: 133 MMHG

## 2022-01-01 VITALS
HEART RATE: 85 BPM | RESPIRATION RATE: 16 BRPM | WEIGHT: 290.8 LBS | DIASTOLIC BLOOD PRESSURE: 85 MMHG | BODY MASS INDEX: 38.37 KG/M2 | TEMPERATURE: 98.7 F | OXYGEN SATURATION: 97 % | SYSTOLIC BLOOD PRESSURE: 138 MMHG

## 2022-01-01 VITALS
HEART RATE: 67 BPM | BODY MASS INDEX: 38.6 KG/M2 | WEIGHT: 285 LBS | HEIGHT: 72 IN | RESPIRATION RATE: 16 BRPM | SYSTOLIC BLOOD PRESSURE: 130 MMHG | OXYGEN SATURATION: 94 % | DIASTOLIC BLOOD PRESSURE: 76 MMHG | TEMPERATURE: 97.7 F

## 2022-01-01 VITALS
WEIGHT: 286.2 LBS | HEIGHT: 73 IN | TEMPERATURE: 96.8 F | BODY MASS INDEX: 37.93 KG/M2 | HEART RATE: 78 BPM | SYSTOLIC BLOOD PRESSURE: 105 MMHG | OXYGEN SATURATION: 96 % | RESPIRATION RATE: 16 BRPM | DIASTOLIC BLOOD PRESSURE: 74 MMHG

## 2022-01-01 VITALS
SYSTOLIC BLOOD PRESSURE: 130 MMHG | DIASTOLIC BLOOD PRESSURE: 76 MMHG | RESPIRATION RATE: 16 BRPM | TEMPERATURE: 97.6 F | HEART RATE: 60 BPM | OXYGEN SATURATION: 97 %

## 2022-01-01 VITALS
OXYGEN SATURATION: 94 % | RESPIRATION RATE: 30 BRPM | HEART RATE: 105 BPM | DIASTOLIC BLOOD PRESSURE: 72 MMHG | SYSTOLIC BLOOD PRESSURE: 101 MMHG | TEMPERATURE: 98 F

## 2022-01-01 VITALS
BODY MASS INDEX: 40.56 KG/M2 | HEIGHT: 73 IN | HEART RATE: 110 BPM | WEIGHT: 306 LBS | SYSTOLIC BLOOD PRESSURE: 136 MMHG | DIASTOLIC BLOOD PRESSURE: 83 MMHG | OXYGEN SATURATION: 96 % | RESPIRATION RATE: 22 BRPM

## 2022-01-01 VITALS
TEMPERATURE: 98.1 F | HEART RATE: 80 BPM | SYSTOLIC BLOOD PRESSURE: 115 MMHG | OXYGEN SATURATION: 95 % | DIASTOLIC BLOOD PRESSURE: 80 MMHG | RESPIRATION RATE: 32 BRPM

## 2022-01-01 VITALS
SYSTOLIC BLOOD PRESSURE: 102 MMHG | OXYGEN SATURATION: 97 % | HEART RATE: 76 BPM | TEMPERATURE: 97 F | DIASTOLIC BLOOD PRESSURE: 81 MMHG | RESPIRATION RATE: 16 BRPM

## 2022-01-01 VITALS
WEIGHT: 295.6 LBS | DIASTOLIC BLOOD PRESSURE: 85 MMHG | HEIGHT: 73 IN | SYSTOLIC BLOOD PRESSURE: 109 MMHG | HEART RATE: 91 BPM | OXYGEN SATURATION: 94 % | BODY MASS INDEX: 39.18 KG/M2 | TEMPERATURE: 97.7 F | RESPIRATION RATE: 20 BRPM

## 2022-01-01 DIAGNOSIS — I26.99 OTHER ACUTE PULMONARY EMBOLISM, UNSPECIFIED WHETHER ACUTE COR PULMONALE PRESENT (H): ICD-10-CM

## 2022-01-01 DIAGNOSIS — Z11.59 ENCOUNTER FOR SCREENING FOR OTHER VIRAL DISEASES: ICD-10-CM

## 2022-01-01 DIAGNOSIS — C25.9 MALIGNANT NEOPLASM OF PANCREAS, UNSPECIFIED LOCATION OF MALIGNANCY (H): ICD-10-CM

## 2022-01-01 DIAGNOSIS — G89.3 CANCER ASSOCIATED PAIN: ICD-10-CM

## 2022-01-01 DIAGNOSIS — C25.0 MALIGNANT NEOPLASM OF HEAD OF PANCREAS (H): Primary | ICD-10-CM

## 2022-01-01 DIAGNOSIS — Z96.651 S/P TOTAL KNEE ARTHROPLASTY, RIGHT: Primary | ICD-10-CM

## 2022-01-01 DIAGNOSIS — R10.84 ABDOMINAL PAIN, GENERALIZED: ICD-10-CM

## 2022-01-01 DIAGNOSIS — I31.39 PERICARDIAL EFFUSION: Primary | ICD-10-CM

## 2022-01-01 DIAGNOSIS — I31.39 PERICARDIAL EFFUSION: ICD-10-CM

## 2022-01-01 DIAGNOSIS — R18.8 OTHER ASCITES: ICD-10-CM

## 2022-01-01 DIAGNOSIS — D73.5 SPLENIC INFARCT: ICD-10-CM

## 2022-01-01 DIAGNOSIS — I48.91 ATRIAL FIBRILLATION WITH RAPID VENTRICULAR RESPONSE (H): Primary | ICD-10-CM

## 2022-01-01 DIAGNOSIS — I48.91 ATRIAL FIBRILLATION WITH RAPID VENTRICULAR RESPONSE (H): ICD-10-CM

## 2022-01-01 DIAGNOSIS — E87.1 HYPONATREMIA: ICD-10-CM

## 2022-01-01 DIAGNOSIS — C25.9 MALIGNANT NEOPLASM OF PANCREAS, UNSPECIFIED LOCATION OF MALIGNANCY (H): Primary | ICD-10-CM

## 2022-01-01 DIAGNOSIS — C25.0 MALIGNANT NEOPLASM OF HEAD OF PANCREAS (H): ICD-10-CM

## 2022-01-01 DIAGNOSIS — R18.0 MALIGNANT ASCITES (H): ICD-10-CM

## 2022-01-01 DIAGNOSIS — Z11.59 ENCOUNTER FOR SCREENING FOR OTHER VIRAL DISEASES: Primary | ICD-10-CM

## 2022-01-01 LAB
% LINING CELLS, BODY FLUID: 5 %
ABO/RH(D): NORMAL
ALBUMIN BODY FLUID SOURCE: NORMAL
ALBUMIN FLD-MCNC: 2.3 G/DL
ALBUMIN SERPL-MCNC: 1.6 G/DL (ref 3.4–5)
ALBUMIN SERPL-MCNC: 1.7 G/DL (ref 3.4–5)
ALBUMIN SERPL-MCNC: 1.8 G/DL (ref 3.4–5)
ALBUMIN SERPL-MCNC: 1.8 G/DL (ref 3.4–5)
ALBUMIN SERPL-MCNC: 1.9 G/DL (ref 3.4–5)
ALBUMIN SERPL-MCNC: 2 G/DL (ref 3.4–5)
ALBUMIN SERPL-MCNC: 2.7 G/DL (ref 3.4–5)
ALBUMIN SERPL-MCNC: 3 G/DL (ref 3.4–5)
ALBUMIN SERPL-MCNC: 3.2 G/DL (ref 3.4–5)
ALBUMIN UR-MCNC: 20 MG/DL
ALBUMIN UR-MCNC: 30 MG/DL
ALBUMIN UR-MCNC: NEGATIVE MG/DL
ALDOST SERPL-MCNC: 71.9 NG/DL (ref 0–31)
ALP SERPL-CCNC: 103 U/L (ref 40–150)
ALP SERPL-CCNC: 107 U/L (ref 40–150)
ALP SERPL-CCNC: 112 U/L (ref 40–150)
ALP SERPL-CCNC: 112 U/L (ref 40–150)
ALP SERPL-CCNC: 123 U/L (ref 40–150)
ALP SERPL-CCNC: 139 U/L (ref 40–150)
ALP SERPL-CCNC: 77 U/L (ref 40–150)
ALP SERPL-CCNC: 86 U/L (ref 40–150)
ALT SERPL W P-5'-P-CCNC: 17 U/L (ref 0–70)
ALT SERPL W P-5'-P-CCNC: 18 U/L (ref 0–70)
ALT SERPL W P-5'-P-CCNC: 18 U/L (ref 0–70)
ALT SERPL W P-5'-P-CCNC: 19 U/L (ref 0–70)
ALT SERPL W P-5'-P-CCNC: 24 U/L (ref 0–70)
ALT SERPL W P-5'-P-CCNC: 34 U/L (ref 0–70)
ALT SERPL W P-5'-P-CCNC: 58 U/L (ref 0–70)
ALT SERPL W P-5'-P-CCNC: 58 U/L (ref 0–70)
ANION GAP SERPL CALCULATED.3IONS-SCNC: 11 MMOL/L (ref 3–14)
ANION GAP SERPL CALCULATED.3IONS-SCNC: 16 MMOL/L (ref 3–14)
ANION GAP SERPL CALCULATED.3IONS-SCNC: 5 MMOL/L (ref 3–14)
ANION GAP SERPL CALCULATED.3IONS-SCNC: 5 MMOL/L (ref 3–14)
ANION GAP SERPL CALCULATED.3IONS-SCNC: 6 MMOL/L (ref 3–14)
ANION GAP SERPL CALCULATED.3IONS-SCNC: 7 MMOL/L (ref 3–14)
ANION GAP SERPL CALCULATED.3IONS-SCNC: 8 MMOL/L (ref 3–14)
ANION GAP SERPL CALCULATED.3IONS-SCNC: 8 MMOL/L (ref 3–14)
ANION GAP SERPL CALCULATED.3IONS-SCNC: 9 MMOL/L (ref 3–14)
ANTIBODY SCREEN: NEGATIVE
APPEARANCE FLD: ABNORMAL
APPEARANCE FLD: ABNORMAL
APPEARANCE UR: CLEAR
APTT PPP: 110 SECONDS (ref 22–38)
APTT PPP: 26 SECONDS (ref 22–38)
APTT PPP: 29 SECONDS (ref 22–38)
APTT PPP: 34 SECONDS (ref 22–38)
APTT PPP: 38 SECONDS (ref 22–38)
APTT PPP: 39 SECONDS (ref 22–38)
APTT PPP: 42 SECONDS (ref 22–38)
APTT PPP: 63 SECONDS (ref 22–38)
APTT PPP: 64 SECONDS (ref 22–38)
APTT PPP: 76 SECONDS (ref 22–38)
APTT PPP: 77 SECONDS (ref 22–38)
AST SERPL W P-5'-P-CCNC: 100 U/L (ref 0–45)
AST SERPL W P-5'-P-CCNC: 17 U/L (ref 0–45)
AST SERPL W P-5'-P-CCNC: 24 U/L (ref 0–45)
AST SERPL W P-5'-P-CCNC: 25 U/L (ref 0–45)
AST SERPL W P-5'-P-CCNC: 31 U/L (ref 0–45)
AST SERPL W P-5'-P-CCNC: 49 U/L (ref 0–45)
AST SERPL W P-5'-P-CCNC: 63 U/L (ref 0–45)
AST SERPL W P-5'-P-CCNC: 67 U/L (ref 0–45)
ATRIAL RATE - MUSE: 144 BPM
ATRIAL RATE - MUSE: 92 BPM
BACTERIA FLD CULT: NO GROWTH
BACTERIA FLD CULT: NO GROWTH
BASOPHILS # BLD AUTO: 0 10E3/UL (ref 0–0.2)
BASOPHILS # BLD AUTO: 0.1 10E3/UL (ref 0–0.2)
BASOPHILS # BLD MANUAL: 0 10E3/UL (ref 0–0.2)
BASOPHILS NFR BLD AUTO: 0 %
BASOPHILS NFR BLD MANUAL: 0 %
BASOPHILS NFR FLD MANUAL: 1 %
BILIRUB DIRECT SERPL-MCNC: 0.2 MG/DL (ref 0–0.2)
BILIRUB SERPL-MCNC: 0.7 MG/DL (ref 0.2–1.3)
BILIRUB SERPL-MCNC: 0.8 MG/DL (ref 0.2–1.3)
BILIRUB SERPL-MCNC: 0.8 MG/DL (ref 0.2–1.3)
BILIRUB SERPL-MCNC: 0.9 MG/DL (ref 0.2–1.3)
BILIRUB SERPL-MCNC: 0.9 MG/DL (ref 0.2–1.3)
BILIRUB SERPL-MCNC: 1 MG/DL (ref 0.2–1.3)
BILIRUB SERPL-MCNC: 1.3 MG/DL (ref 0.2–1.3)
BILIRUB SERPL-MCNC: 1.5 MG/DL (ref 0.2–1.3)
BILIRUB UR QL STRIP: NEGATIVE
BUN SERPL-MCNC: 10 MG/DL (ref 7–30)
BUN SERPL-MCNC: 19 MG/DL (ref 7–30)
BUN SERPL-MCNC: 28 MG/DL (ref 7–30)
BUN SERPL-MCNC: 30 MG/DL (ref 7–30)
BUN SERPL-MCNC: 30 MG/DL (ref 7–30)
BUN SERPL-MCNC: 33 MG/DL (ref 7–30)
BUN SERPL-MCNC: 59 MG/DL (ref 7–30)
BUN SERPL-MCNC: 59 MG/DL (ref 7–30)
BUN SERPL-MCNC: 60 MG/DL (ref 7–30)
BUN SERPL-MCNC: 61 MG/DL (ref 7–30)
BUN SERPL-MCNC: 64 MG/DL (ref 7–30)
BUN SERPL-MCNC: 80 MG/DL (ref 7–30)
BUN SERPL-MCNC: 84 MG/DL (ref 7–30)
BUN SERPL-MCNC: 88 MG/DL (ref 7–30)
BUN SERPL-MCNC: 94 MG/DL (ref 7–30)
BURR CELLS BLD QL SMEAR: SLIGHT
CALCIUM SERPL-MCNC: 7.1 MG/DL (ref 8.5–10.1)
CALCIUM SERPL-MCNC: 7.4 MG/DL (ref 8.5–10.1)
CALCIUM SERPL-MCNC: 7.5 MG/DL (ref 8.5–10.1)
CALCIUM SERPL-MCNC: 7.5 MG/DL (ref 8.5–10.1)
CALCIUM SERPL-MCNC: 7.6 MG/DL (ref 8.5–10.1)
CALCIUM SERPL-MCNC: 7.8 MG/DL (ref 8.5–10.1)
CALCIUM SERPL-MCNC: 7.9 MG/DL (ref 8.5–10.1)
CALCIUM SERPL-MCNC: 7.9 MG/DL (ref 8.5–10.1)
CALCIUM SERPL-MCNC: 8 MG/DL (ref 8.5–10.1)
CALCIUM SERPL-MCNC: 8.1 MG/DL (ref 8.5–10.1)
CALCIUM SERPL-MCNC: 8.1 MG/DL (ref 8.5–10.1)
CALCIUM SERPL-MCNC: 8.2 MG/DL (ref 8.5–10.1)
CALCIUM SERPL-MCNC: 8.2 MG/DL (ref 8.5–10.1)
CALCIUM SERPL-MCNC: 8.3 MG/DL (ref 8.5–10.1)
CALCIUM SERPL-MCNC: 8.7 MG/DL (ref 8.5–10.1)
CANCER AG19-9 SERPL IA-ACNC: 1724 U/ML
CANCER AG19-9 SERPL IA-ACNC: 2767 U/ML
CEA SERPL-MCNC: 3.3 NG/ML
CELL COUNT BODY FLUID SOURCE: ABNORMAL
CELL COUNT BODY FLUID SOURCE: ABNORMAL
CHLORIDE BLD-SCNC: 101 MMOL/L (ref 94–109)
CHLORIDE BLD-SCNC: 101 MMOL/L (ref 94–109)
CHLORIDE BLD-SCNC: 102 MMOL/L (ref 94–109)
CHLORIDE BLD-SCNC: 102 MMOL/L (ref 94–109)
CHLORIDE BLD-SCNC: 103 MMOL/L (ref 94–109)
CHLORIDE BLD-SCNC: 104 MMOL/L (ref 94–109)
CHLORIDE BLD-SCNC: 106 MMOL/L (ref 94–109)
CHLORIDE BLD-SCNC: 108 MMOL/L (ref 94–109)
CHLORIDE BLD-SCNC: 94 MMOL/L (ref 94–109)
CHLORIDE BLD-SCNC: 96 MMOL/L (ref 94–109)
CHLORIDE BLD-SCNC: 97 MMOL/L (ref 94–109)
CHLORIDE BLD-SCNC: 97 MMOL/L (ref 94–109)
CHLORIDE BLD-SCNC: 98 MMOL/L (ref 94–109)
CHLORIDE BLD-SCNC: 98 MMOL/L (ref 94–109)
CHLORIDE BLD-SCNC: 99 MMOL/L (ref 94–109)
CO2 SERPL-SCNC: 14 MMOL/L (ref 20–32)
CO2 SERPL-SCNC: 21 MMOL/L (ref 20–32)
CO2 SERPL-SCNC: 21 MMOL/L (ref 20–32)
CO2 SERPL-SCNC: 22 MMOL/L (ref 20–32)
CO2 SERPL-SCNC: 23 MMOL/L (ref 20–32)
CO2 SERPL-SCNC: 24 MMOL/L (ref 20–32)
CO2 SERPL-SCNC: 25 MMOL/L (ref 20–32)
CO2 SERPL-SCNC: 25 MMOL/L (ref 20–32)
CO2 SERPL-SCNC: 26 MMOL/L (ref 20–32)
CO2 SERPL-SCNC: 27 MMOL/L (ref 20–32)
CO2 SERPL-SCNC: 28 MMOL/L (ref 20–32)
COLOR FLD: ABNORMAL
COLOR FLD: YELLOW
COLOR UR AUTO: ABNORMAL
COLOR UR AUTO: YELLOW
COLOR UR AUTO: YELLOW
CORTIS SERPL-MCNC: 37 UG/DL
CPB POCT: YES
CREAT SERPL-MCNC: 0.82 MG/DL (ref 0.66–1.25)
CREAT SERPL-MCNC: 0.91 MG/DL (ref 0.66–1.25)
CREAT SERPL-MCNC: 0.92 MG/DL (ref 0.66–1.25)
CREAT SERPL-MCNC: 0.99 MG/DL (ref 0.66–1.25)
CREAT SERPL-MCNC: 1.02 MG/DL (ref 0.66–1.25)
CREAT SERPL-MCNC: 1.07 MG/DL (ref 0.66–1.25)
CREAT SERPL-MCNC: 1.07 MG/DL (ref 0.66–1.25)
CREAT SERPL-MCNC: 1.08 MG/DL (ref 0.66–1.25)
CREAT SERPL-MCNC: 1.24 MG/DL (ref 0.66–1.25)
CREAT SERPL-MCNC: 1.25 MG/DL (ref 0.66–1.25)
CREAT SERPL-MCNC: 1.28 MG/DL (ref 0.66–1.25)
CREAT SERPL-MCNC: 1.3 MG/DL (ref 0.66–1.25)
CREAT SERPL-MCNC: 1.3 MG/DL (ref 0.66–1.25)
CREAT SERPL-MCNC: 1.32 MG/DL (ref 0.66–1.25)
CREAT SERPL-MCNC: 1.38 MG/DL (ref 0.66–1.25)
CREAT SERPL-MCNC: 1.51 MG/DL (ref 0.66–1.25)
CREAT SERPL-MCNC: 1.59 MG/DL (ref 0.66–1.25)
CREAT SERPL-MCNC: 1.61 MG/DL (ref 0.66–1.25)
CREAT SERPL-MCNC: 1.74 MG/DL (ref 0.66–1.25)
CREAT UR-MCNC: 171 MG/DL
DIASTOLIC BLOOD PRESSURE - MUSE: NORMAL MMHG
DIASTOLIC BLOOD PRESSURE - MUSE: NORMAL MMHG
EOSINOPHIL # BLD AUTO: 0 10E3/UL (ref 0–0.7)
EOSINOPHIL # BLD AUTO: 0.1 10E3/UL (ref 0–0.7)
EOSINOPHIL # BLD AUTO: 0.1 10E3/UL (ref 0–0.7)
EOSINOPHIL # BLD AUTO: 0.2 10E3/UL (ref 0–0.7)
EOSINOPHIL # BLD AUTO: 0.3 10E3/UL (ref 0–0.7)
EOSINOPHIL # BLD AUTO: 0.3 10E3/UL (ref 0–0.7)
EOSINOPHIL # BLD MANUAL: 0.1 10E3/UL (ref 0–0.7)
EOSINOPHIL NFR BLD AUTO: 0 %
EOSINOPHIL NFR BLD AUTO: 1 %
EOSINOPHIL NFR BLD AUTO: 2 %
EOSINOPHIL NFR BLD AUTO: 3 %
EOSINOPHIL NFR BLD MANUAL: 2 %
EOSINOPHIL NFR BLD MANUAL: 3 %
EOSINOPHIL NFR BLD MANUAL: 6 %
EOSINOPHIL NFR FLD MANUAL: 1 %
EOSINOPHIL NFR FLD MANUAL: 4 %
ERYTHROCYTE [DISTWIDTH] IN BLOOD BY AUTOMATED COUNT: 13.4 % (ref 10–15)
ERYTHROCYTE [DISTWIDTH] IN BLOOD BY AUTOMATED COUNT: 13.6 % (ref 10–15)
ERYTHROCYTE [DISTWIDTH] IN BLOOD BY AUTOMATED COUNT: 13.6 % (ref 10–15)
ERYTHROCYTE [DISTWIDTH] IN BLOOD BY AUTOMATED COUNT: 13.7 % (ref 10–15)
ERYTHROCYTE [DISTWIDTH] IN BLOOD BY AUTOMATED COUNT: 13.8 % (ref 10–15)
ERYTHROCYTE [DISTWIDTH] IN BLOOD BY AUTOMATED COUNT: 13.9 % (ref 10–15)
ERYTHROCYTE [DISTWIDTH] IN BLOOD BY AUTOMATED COUNT: 14 % (ref 10–15)
ERYTHROCYTE [DISTWIDTH] IN BLOOD BY AUTOMATED COUNT: 14 % (ref 10–15)
ERYTHROCYTE [DISTWIDTH] IN BLOOD BY AUTOMATED COUNT: 14.1 % (ref 10–15)
FASTING STATUS PATIENT QL REPORTED: NO
FRACT EXCRET NA UR+SERPL-RTO: NORMAL %
GFR SERPL CREATININE-BSD FRML MDRD: 41 ML/MIN/1.73M2
GFR SERPL CREATININE-BSD FRML MDRD: 45 ML/MIN/1.73M2
GFR SERPL CREATININE-BSD FRML MDRD: 48 ML/MIN/1.73M2
GFR SERPL CREATININE-BSD FRML MDRD: 54 ML/MIN/1.73M2
GFR SERPL CREATININE-BSD FRML MDRD: 57 ML/MIN/1.73M2
GFR SERPL CREATININE-BSD FRML MDRD: 58 ML/MIN/1.73M2
GFR SERPL CREATININE-BSD FRML MDRD: 58 ML/MIN/1.73M2
GFR SERPL CREATININE-BSD FRML MDRD: 59 ML/MIN/1.73M2
GFR SERPL CREATININE-BSD FRML MDRD: 61 ML/MIN/1.73M2
GFR SERPL CREATININE-BSD FRML MDRD: 61 ML/MIN/1.73M2
GFR SERPL CREATININE-BSD FRML MDRD: 72 ML/MIN/1.73M2
GFR SERPL CREATININE-BSD FRML MDRD: 73 ML/MIN/1.73M2
GFR SERPL CREATININE-BSD FRML MDRD: 73 ML/MIN/1.73M2
GFR SERPL CREATININE-BSD FRML MDRD: 78 ML/MIN/1.73M2
GFR SERPL CREATININE-BSD FRML MDRD: 80 ML/MIN/1.73M2
GFR SERPL CREATININE-BSD FRML MDRD: 88 ML/MIN/1.73M2
GFR SERPL CREATININE-BSD FRML MDRD: 90 ML/MIN/1.73M2
GFR SERPL CREATININE-BSD FRML MDRD: >90 ML/MIN/1.73M2
GLUCOSE BLD-MCNC: 103 MG/DL (ref 70–99)
GLUCOSE BLD-MCNC: 105 MG/DL (ref 70–99)
GLUCOSE BLD-MCNC: 112 MG/DL (ref 70–99)
GLUCOSE BLD-MCNC: 113 MG/DL (ref 70–99)
GLUCOSE BLD-MCNC: 113 MG/DL (ref 70–99)
GLUCOSE BLD-MCNC: 117 MG/DL (ref 70–99)
GLUCOSE BLD-MCNC: 118 MG/DL (ref 70–99)
GLUCOSE BLD-MCNC: 120 MG/DL (ref 70–99)
GLUCOSE BLD-MCNC: 121 MG/DL (ref 70–99)
GLUCOSE BLD-MCNC: 122 MG/DL (ref 70–99)
GLUCOSE BLD-MCNC: 124 MG/DL (ref 70–99)
GLUCOSE BLD-MCNC: 127 MG/DL (ref 70–99)
GLUCOSE BLD-MCNC: 128 MG/DL (ref 70–99)
GLUCOSE BLD-MCNC: 131 MG/DL (ref 70–99)
GLUCOSE BLD-MCNC: 142 MG/DL (ref 70–99)
GLUCOSE BLD-MCNC: 143 MG/DL (ref 70–99)
GLUCOSE BLD-MCNC: 145 MG/DL (ref 70–99)
GLUCOSE BLD-MCNC: 165 MG/DL (ref 70–99)
GLUCOSE BLDC GLUCOMTR-MCNC: 144 MG/DL (ref 70–99)
GLUCOSE BODY FLUID SOURCE: NORMAL
GLUCOSE FLD-MCNC: 143 MG/DL
GLUCOSE UR STRIP-MCNC: NEGATIVE MG/DL
GRAM STAIN RESULT: NORMAL
GRAM STAIN RESULT: NORMAL
HCO3 BLDV-SCNC: 24 MMOL/L (ref 21–28)
HCO3 BLDV-SCNC: 26 MMOL/L (ref 21–28)
HCO3 BLDV-SCNC: 26 MMOL/L (ref 21–28)
HCT VFR BLD AUTO: 35.3 % (ref 40–53)
HCT VFR BLD AUTO: 36.2 % (ref 40–53)
HCT VFR BLD AUTO: 40 % (ref 40–53)
HCT VFR BLD AUTO: 41 % (ref 40–53)
HCT VFR BLD AUTO: 41.4 % (ref 40–53)
HCT VFR BLD AUTO: 41.5 % (ref 40–53)
HCT VFR BLD AUTO: 42.9 % (ref 40–53)
HCT VFR BLD AUTO: 44.1 % (ref 40–53)
HCT VFR BLD AUTO: 45.8 % (ref 40–53)
HCT VFR BLD AUTO: 46.2 % (ref 40–53)
HCT VFR BLD AUTO: 46.4 % (ref 40–53)
HCT VFR BLD AUTO: 47.7 % (ref 40–53)
HCT VFR BLD AUTO: 48.3 % (ref 40–53)
HCT VFR BLD AUTO: 48.6 % (ref 40–53)
HCT VFR BLD CALC: 44 % (ref 40–53)
HGB BLD-MCNC: 11.8 G/DL (ref 13.3–17.7)
HGB BLD-MCNC: 11.8 G/DL (ref 13.3–17.7)
HGB BLD-MCNC: 12.8 G/DL (ref 13.3–17.7)
HGB BLD-MCNC: 13 G/DL (ref 13.3–17.7)
HGB BLD-MCNC: 13.3 G/DL (ref 13.3–17.7)
HGB BLD-MCNC: 13.5 G/DL (ref 13.3–17.7)
HGB BLD-MCNC: 13.6 G/DL (ref 13.3–17.7)
HGB BLD-MCNC: 13.9 G/DL (ref 13.3–17.7)
HGB BLD-MCNC: 14.6 G/DL (ref 13.3–17.7)
HGB BLD-MCNC: 14.9 G/DL (ref 13.3–17.7)
HGB BLD-MCNC: 15 G/DL (ref 13.3–17.7)
HGB BLD-MCNC: 15.1 G/DL (ref 13.3–17.7)
HGB BLD-MCNC: 15.2 G/DL (ref 13.3–17.7)
HGB BLD-MCNC: 15.3 G/DL (ref 13.3–17.7)
HGB BLD-MCNC: 15.8 G/DL (ref 13.3–17.7)
HGB BLD-MCNC: 15.9 G/DL (ref 13.3–17.7)
HGB UR QL STRIP: NEGATIVE
HOLD SPECIMEN: NORMAL
HYALINE CASTS: 3 /LPF
IMM GRANULOCYTES # BLD: 0 10E3/UL
IMM GRANULOCYTES # BLD: 0.1 10E3/UL
IMM GRANULOCYTES # BLD: 0.1 10E3/UL
IMM GRANULOCYTES # BLD: 0.2 10E3/UL
IMM GRANULOCYTES # BLD: 0.2 10E3/UL
IMM GRANULOCYTES # BLD: 0.3 10E3/UL
IMM GRANULOCYTES NFR BLD: 0 %
IMM GRANULOCYTES NFR BLD: 0 %
IMM GRANULOCYTES NFR BLD: 1 %
IMM GRANULOCYTES NFR BLD: 2 %
INR PPP: 1.02 (ref 0.85–1.15)
INR PPP: 1.05 (ref 0.85–1.15)
INR PPP: 1.7 (ref 0.85–1.15)
INR PPP: 1.71 (ref 0.85–1.15)
INR PPP: 2.49 (ref 0.85–1.15)
INTERPRETATION ECG - MUSE: NORMAL
INTERPRETATION ECG - MUSE: NORMAL
KETONES UR STRIP-MCNC: 20 MG/DL
KETONES UR STRIP-MCNC: NEGATIVE MG/DL
KETONES UR STRIP-MCNC: NEGATIVE MG/DL
LACTATE BLD-SCNC: 1.6 MMOL/L
LACTATE BLD-SCNC: 1.6 MMOL/L
LACTATE SERPL-SCNC: 1.8 MMOL/L (ref 0.7–2)
LACTATE SERPL-SCNC: 2.4 MMOL/L (ref 0.7–2)
LEUKOCYTE ESTERASE UR QL STRIP: NEGATIVE
LIPASE SERPL-CCNC: 49 U/L (ref 73–393)
LIPASE SERPL-CCNC: 58 U/L (ref 73–393)
LIPASE SERPL-CCNC: 97 U/L (ref 73–393)
LVEF ECHO: NORMAL
LYMPHOCYTES # BLD AUTO: 0.6 10E3/UL (ref 0.8–5.3)
LYMPHOCYTES # BLD AUTO: 0.6 10E3/UL (ref 0.8–5.3)
LYMPHOCYTES # BLD AUTO: 1 10E3/UL (ref 0.8–5.3)
LYMPHOCYTES # BLD AUTO: 1.2 10E3/UL (ref 0.8–5.3)
LYMPHOCYTES # BLD AUTO: 1.2 10E3/UL (ref 0.8–5.3)
LYMPHOCYTES # BLD AUTO: 1.3 10E3/UL (ref 0.8–5.3)
LYMPHOCYTES # BLD MANUAL: 0.2 10E3/UL (ref 0.8–5.3)
LYMPHOCYTES # BLD MANUAL: 0.3 10E3/UL (ref 0.8–5.3)
LYMPHOCYTES # BLD MANUAL: 0.3 10E3/UL (ref 0.8–5.3)
LYMPHOCYTES NFR BLD AUTO: 12 %
LYMPHOCYTES NFR BLD AUTO: 3 %
LYMPHOCYTES NFR BLD AUTO: 4 %
LYMPHOCYTES NFR BLD AUTO: 6 %
LYMPHOCYTES NFR BLD AUTO: 7 %
LYMPHOCYTES NFR BLD AUTO: 8 %
LYMPHOCYTES NFR BLD MANUAL: 12 %
LYMPHOCYTES NFR BLD MANUAL: 20 %
LYMPHOCYTES NFR BLD MANUAL: 7 %
LYMPHOCYTES NFR FLD MANUAL: 26 %
LYMPHOCYTES NFR FLD MANUAL: 30 %
MAGNESIUM SERPL-MCNC: 3.2 MG/DL (ref 1.6–2.3)
MCH RBC QN AUTO: 29.1 PG (ref 26.5–33)
MCH RBC QN AUTO: 29.2 PG (ref 26.5–33)
MCH RBC QN AUTO: 29.3 PG (ref 26.5–33)
MCH RBC QN AUTO: 29.4 PG (ref 26.5–33)
MCH RBC QN AUTO: 29.4 PG (ref 26.5–33)
MCH RBC QN AUTO: 29.5 PG (ref 26.5–33)
MCH RBC QN AUTO: 29.6 PG (ref 26.5–33)
MCH RBC QN AUTO: 29.7 PG (ref 26.5–33)
MCH RBC QN AUTO: 30 PG (ref 26.5–33)
MCH RBC QN AUTO: 30.1 PG (ref 26.5–33)
MCH RBC QN AUTO: 30.2 PG (ref 26.5–33)
MCH RBC QN AUTO: 30.3 PG (ref 26.5–33)
MCHC RBC AUTO-ENTMCNC: 31.9 G/DL (ref 31.5–36.5)
MCHC RBC AUTO-ENTMCNC: 32 G/DL (ref 31.5–36.5)
MCHC RBC AUTO-ENTMCNC: 32.1 G/DL (ref 31.5–36.5)
MCHC RBC AUTO-ENTMCNC: 32.4 G/DL (ref 31.5–36.5)
MCHC RBC AUTO-ENTMCNC: 32.5 G/DL (ref 31.5–36.5)
MCHC RBC AUTO-ENTMCNC: 32.6 G/DL (ref 31.5–36.5)
MCHC RBC AUTO-ENTMCNC: 32.7 G/DL (ref 31.5–36.5)
MCHC RBC AUTO-ENTMCNC: 32.9 G/DL (ref 31.5–36.5)
MCHC RBC AUTO-ENTMCNC: 33 G/DL (ref 31.5–36.5)
MCHC RBC AUTO-ENTMCNC: 33.1 G/DL (ref 31.5–36.5)
MCHC RBC AUTO-ENTMCNC: 33.2 G/DL (ref 31.5–36.5)
MCHC RBC AUTO-ENTMCNC: 33.4 G/DL (ref 31.5–36.5)
MCV RBC AUTO: 88 FL (ref 78–100)
MCV RBC AUTO: 90 FL (ref 78–100)
MCV RBC AUTO: 91 FL (ref 78–100)
MCV RBC AUTO: 92 FL (ref 78–100)
METAMYELOCYTES # BLD MANUAL: 0.2 10E3/UL
METAMYELOCYTES # BLD MANUAL: 0.3 10E3/UL
METAMYELOCYTES NFR BLD MANUAL: 10 %
METAMYELOCYTES NFR BLD MANUAL: 6 %
MONOCYTES # BLD AUTO: 0.4 10E3/UL (ref 0–1.3)
MONOCYTES # BLD AUTO: 1.1 10E3/UL (ref 0–1.3)
MONOCYTES # BLD AUTO: 1.4 10E3/UL (ref 0–1.3)
MONOCYTES # BLD AUTO: 1.4 10E3/UL (ref 0–1.3)
MONOCYTES # BLD AUTO: 1.8 10E3/UL (ref 0–1.3)
MONOCYTES # BLD AUTO: 2.3 10E3/UL (ref 0–1.3)
MONOCYTES # BLD MANUAL: 0 10E3/UL (ref 0–1.3)
MONOCYTES NFR BLD AUTO: 11 %
MONOCYTES NFR BLD AUTO: 11 %
MONOCYTES NFR BLD AUTO: 13 %
MONOCYTES NFR BLD AUTO: 2 %
MONOCYTES NFR BLD AUTO: 8 %
MONOCYTES NFR BLD AUTO: 9 %
MONOCYTES NFR BLD MANUAL: 1 %
MONOCYTES NFR BLD MANUAL: 1 %
MONOCYTES NFR BLD MANUAL: 3 %
MONOS+MACROS NFR FLD MANUAL: 51 %
MONOS+MACROS NFR FLD MANUAL: 54 %
MUCOUS THREADS #/AREA URNS LPF: PRESENT /LPF
NEUTROPHILS # BLD AUTO: 13.2 10E3/UL (ref 1.6–8.3)
NEUTROPHILS # BLD AUTO: 13.7 10E3/UL (ref 1.6–8.3)
NEUTROPHILS # BLD AUTO: 14 10E3/UL (ref 1.6–8.3)
NEUTROPHILS # BLD AUTO: 14.5 10E3/UL (ref 1.6–8.3)
NEUTROPHILS # BLD AUTO: 21 10E3/UL (ref 1.6–8.3)
NEUTROPHILS # BLD AUTO: 7.3 10E3/UL (ref 1.6–8.3)
NEUTROPHILS # BLD MANUAL: 1.1 10E3/UL (ref 1.6–8.3)
NEUTROPHILS # BLD MANUAL: 2 10E3/UL (ref 1.6–8.3)
NEUTROPHILS # BLD MANUAL: 2.1 10E3/UL (ref 1.6–8.3)
NEUTROPHILS NFR BLD AUTO: 74 %
NEUTROPHILS NFR BLD AUTO: 79 %
NEUTROPHILS NFR BLD AUTO: 80 %
NEUTROPHILS NFR BLD AUTO: 82 %
NEUTROPHILS NFR BLD AUTO: 86 %
NEUTROPHILS NFR BLD AUTO: 94 %
NEUTROPHILS NFR BLD MANUAL: 71 %
NEUTROPHILS NFR BLD MANUAL: 74 %
NEUTROPHILS NFR BLD MANUAL: 84 %
NEUTS BAND NFR FLD MANUAL: 13 %
NEUTS BAND NFR FLD MANUAL: 6 %
NITRATE UR QL: NEGATIVE
NRBC # BLD AUTO: 0 10E3/UL
NRBC BLD AUTO-RTO: 0 /100
NT-PROBNP SERPL-MCNC: 100 PG/ML (ref 0–900)
NT-PROBNP SERPL-MCNC: 2023 PG/ML (ref 0–900)
NT-PROBNP SERPL-MCNC: 2046 PG/ML (ref 0–900)
OSMOLALITY SERPL: 292 MMOL/KG (ref 280–301)
OSMOLALITY UR: 412 MMOL/KG (ref 100–1200)
OTHER CELLS FLD MANUAL: 2 %
OTHER CELLS FLD MANUAL: 7 %
P AXIS - MUSE: 28 DEGREES
P AXIS - MUSE: NORMAL DEGREES
PATH REPORT.COMMENTS IMP SPEC: ABNORMAL
PATH REPORT.COMMENTS IMP SPEC: YES
PATH REPORT.COMMENTS IMP SPEC: YES
PATH REPORT.FINAL DX SPEC: ABNORMAL
PATH REPORT.FINAL DX SPEC: ABNORMAL
PATH REPORT.GROSS SPEC: ABNORMAL
PATH REPORT.GROSS SPEC: ABNORMAL
PATH REPORT.MICROSCOPIC SPEC OTHER STN: ABNORMAL
PATH REPORT.RELEVANT HX SPEC: ABNORMAL
PCO2 BLDV: 35 MM HG (ref 40–50)
PCO2 BLDV: 36 MM HG (ref 40–50)
PCO2 BLDV: 40 MM HG (ref 40–50)
PH BLDV: 7.4 [PH] (ref 7.32–7.43)
PH BLDV: 7.47 [PH] (ref 7.32–7.43)
PH BLDV: 7.48 [PH] (ref 7.32–7.43)
PH UR STRIP: 5.5 [PH] (ref 5–7)
PH UR STRIP: 5.5 [PH] (ref 5–7)
PH UR STRIP: 6 [PH] (ref 5–7)
PHOTO IMAGE: ABNORMAL
PLAT MORPH BLD: ABNORMAL
PLATELET # BLD AUTO: 124 10E3/UL (ref 150–450)
PLATELET # BLD AUTO: 163 10E3/UL (ref 150–450)
PLATELET # BLD AUTO: 171 10E3/UL (ref 150–450)
PLATELET # BLD AUTO: 175 10E3/UL (ref 150–450)
PLATELET # BLD AUTO: 186 10E3/UL (ref 150–450)
PLATELET # BLD AUTO: 191 10E3/UL (ref 150–450)
PLATELET # BLD AUTO: 229 10E3/UL (ref 150–450)
PLATELET # BLD AUTO: 263 10E3/UL (ref 150–450)
PLATELET # BLD AUTO: 267 10E3/UL (ref 150–450)
PLATELET # BLD AUTO: 311 10E3/UL (ref 150–450)
PLATELET # BLD AUTO: 323 10E3/UL (ref 150–450)
PLATELET # BLD AUTO: 332 10E3/UL (ref 150–450)
PLATELET # BLD AUTO: 376 10E3/UL (ref 150–450)
PLATELET # BLD AUTO: 407 10E3/UL (ref 150–450)
PLATELET # BLD AUTO: 534 10E3/UL (ref 150–450)
PO2 BLDV: 30 MM HG (ref 25–47)
PO2 BLDV: 58 MM HG (ref 25–47)
PO2 BLDV: 60 MM HG (ref 25–47)
POTASSIUM BLD-SCNC: 4.1 MMOL/L (ref 3.4–5.3)
POTASSIUM BLD-SCNC: 4.1 MMOL/L (ref 3.4–5.3)
POTASSIUM BLD-SCNC: 4.2 MMOL/L (ref 3.4–5.3)
POTASSIUM BLD-SCNC: 4.7 MMOL/L (ref 3.4–5.3)
POTASSIUM BLD-SCNC: 4.8 MMOL/L (ref 3.4–5.3)
POTASSIUM BLD-SCNC: 5 MMOL/L (ref 3.4–5.3)
POTASSIUM BLD-SCNC: 5.4 MMOL/L (ref 3.4–5.3)
POTASSIUM BLD-SCNC: 5.6 MMOL/L (ref 3.4–5.3)
POTASSIUM BLD-SCNC: 5.7 MMOL/L (ref 3.4–5.3)
POTASSIUM BLD-SCNC: 5.8 MMOL/L (ref 3.4–5.3)
POTASSIUM BLD-SCNC: 5.9 MMOL/L (ref 3.4–5.3)
PR INTERVAL - MUSE: 188 MS
PR INTERVAL - MUSE: NORMAL MS
PROT FLD-MCNC: 3.5 G/DL
PROT FLD-MCNC: 3.8 G/DL
PROT SERPL-MCNC: 5.5 G/DL (ref 6.8–8.8)
PROT SERPL-MCNC: 5.7 G/DL (ref 6.8–8.8)
PROT SERPL-MCNC: 5.9 G/DL (ref 6.8–8.8)
PROT SERPL-MCNC: 6.1 G/DL (ref 6.8–8.8)
PROT SERPL-MCNC: 6.3 G/DL (ref 6.8–8.8)
PROT SERPL-MCNC: 6.4 G/DL (ref 6.8–8.8)
PROT SERPL-MCNC: 6.8 G/DL (ref 6.8–8.8)
PROT SERPL-MCNC: 6.8 G/DL (ref 6.8–8.8)
PROTEIN BODY FLUID SOURCE: NORMAL
PROTEIN BODY FLUID SOURCE: NORMAL
QRS DURATION - MUSE: 94 MS
QRS DURATION - MUSE: 98 MS
QT - MUSE: 296 MS
QT - MUSE: 368 MS
QTC - MUSE: 450 MS
QTC - MUSE: 455 MS
R AXIS - MUSE: -29 DEGREES
R AXIS - MUSE: -52 DEGREES
RADIOLOGIST FLAGS: ABNORMAL
RBC # BLD AUTO: 3.97 10E6/UL (ref 4.4–5.9)
RBC # BLD AUTO: 4.01 10E6/UL (ref 4.4–5.9)
RBC # BLD AUTO: 4.39 10E6/UL (ref 4.4–5.9)
RBC # BLD AUTO: 4.52 10E6/UL (ref 4.4–5.9)
RBC # BLD AUTO: 4.52 10E6/UL (ref 4.4–5.9)
RBC # BLD AUTO: 4.6 10E6/UL (ref 4.4–5.9)
RBC # BLD AUTO: 4.78 10E6/UL (ref 4.4–5.9)
RBC # BLD AUTO: 4.91 10E6/UL (ref 4.4–5.9)
RBC # BLD AUTO: 5 10E6/UL (ref 4.4–5.9)
RBC # BLD AUTO: 5.01 10E6/UL (ref 4.4–5.9)
RBC # BLD AUTO: 5.05 10E6/UL (ref 4.4–5.9)
RBC # BLD AUTO: 5.16 10E6/UL (ref 4.4–5.9)
RBC # BLD AUTO: 5.3 10E6/UL (ref 4.4–5.9)
RBC # BLD AUTO: 5.34 10E6/UL (ref 4.4–5.9)
RBC MORPH BLD: ABNORMAL
RBC URINE: 1 /HPF
RBC URINE: 2 /HPF
RBC URINE: 4 /HPF
SAO2 % BLDV: 57 % (ref 94–100)
SAO2 % BLDV: 92 % (ref 94–100)
SAO2 % BLDV: 92 % (ref 94–100)
SARS-COV-2 RNA RESP QL NAA+PROBE: NEGATIVE
SCANNED LAB RESULT: NORMAL
SODIUM BLD-SCNC: 136 MMOL/L (ref 133–144)
SODIUM SERPL-SCNC: 126 MMOL/L (ref 133–144)
SODIUM SERPL-SCNC: 126 MMOL/L (ref 133–144)
SODIUM SERPL-SCNC: 127 MMOL/L (ref 133–144)
SODIUM SERPL-SCNC: 127 MMOL/L (ref 133–144)
SODIUM SERPL-SCNC: 128 MMOL/L (ref 133–144)
SODIUM SERPL-SCNC: 128 MMOL/L (ref 133–144)
SODIUM SERPL-SCNC: 129 MMOL/L (ref 133–144)
SODIUM SERPL-SCNC: 130 MMOL/L (ref 133–144)
SODIUM SERPL-SCNC: 132 MMOL/L (ref 133–144)
SODIUM SERPL-SCNC: 138 MMOL/L (ref 133–144)
SODIUM SERPL-SCNC: 138 MMOL/L (ref 133–144)
SODIUM SERPL-SCNC: 140 MMOL/L (ref 133–144)
SODIUM UR-SCNC: <5 MMOL/L
SODIUM UR-SCNC: <5 MMOL/L
SP GR UR STRIP: 1.01 (ref 1–1.03)
SPECIMEN EXPIRATION DATE: NORMAL
SQUAMOUS EPITHELIAL: <1 /HPF
SQUAMOUS EPITHELIAL: <1 /HPF
SYSTOLIC BLOOD PRESSURE - MUSE: NORMAL MMHG
SYSTOLIC BLOOD PRESSURE - MUSE: NORMAL MMHG
T AXIS - MUSE: -8 DEGREES
T AXIS - MUSE: 26 DEGREES
TROPONIN I SERPL HS-MCNC: 16 NG/L
TROPONIN I SERPL HS-MCNC: 6 NG/L
TSH SERPL DL<=0.005 MIU/L-ACNC: 1.96 MU/L (ref 0.4–4)
UFH PPP CHRO-ACNC: 0.33 IU/ML
UFH PPP CHRO-ACNC: 0.44 IU/ML
UFH PPP CHRO-ACNC: 0.47 IU/ML
UFH PPP CHRO-ACNC: 0.55 IU/ML
UFH PPP CHRO-ACNC: 0.62 IU/ML
UFH PPP CHRO-ACNC: 0.76 IU/ML
UFH PPP CHRO-ACNC: <0.1 IU/ML
URATE SERPL-MCNC: 11 MG/DL (ref 3.5–7.2)
URATE SERPL-MCNC: 5 MG/DL (ref 3.5–7.2)
UROBILINOGEN UR STRIP-MCNC: NORMAL MG/DL
VENTRICULAR RATE- MUSE: 139 BPM
VENTRICULAR RATE- MUSE: 92 BPM
WBC # BLD AUTO: 1.6 10E3/UL (ref 4–11)
WBC # BLD AUTO: 1.6 10E3/UL (ref 4–11)
WBC # BLD AUTO: 10.1 10E3/UL (ref 4–11)
WBC # BLD AUTO: 15.3 10E3/UL (ref 4–11)
WBC # BLD AUTO: 17 10E3/UL (ref 4–11)
WBC # BLD AUTO: 17.6 10E3/UL (ref 4–11)
WBC # BLD AUTO: 17.8 10E3/UL (ref 4–11)
WBC # BLD AUTO: 17.9 10E3/UL (ref 4–11)
WBC # BLD AUTO: 2.5 10E3/UL (ref 4–11)
WBC # BLD AUTO: 2.7 10E3/UL (ref 4–11)
WBC # BLD AUTO: 22.2 10E3/UL (ref 4–11)
WBC # BLD AUTO: 8 10E3/UL (ref 4–11)
WBC # BLD AUTO: 8.5 10E3/UL (ref 4–11)
WBC # BLD AUTO: 8.7 10E3/UL (ref 4–11)
WBC # BLD AUTO: 8.9 10E3/UL (ref 4–11)
WBC # FLD AUTO: 2313 /UL
WBC # FLD AUTO: 2721 /UL
WBC URINE: 0 /HPF
WBC URINE: 1 /HPF
WBC URINE: <1 /HPF

## 2022-01-01 PROCEDURE — 250N000009 HC RX 250: Performed by: RADIOLOGY

## 2022-01-01 PROCEDURE — 250N000013 HC RX MED GY IP 250 OP 250 PS 637: Performed by: INTERNAL MEDICINE

## 2022-01-01 PROCEDURE — 93321 DOPPLER ECHO F-UP/LMTD STD: CPT | Mod: 26 | Performed by: INTERNAL MEDICINE

## 2022-01-01 PROCEDURE — 80053 COMPREHEN METABOLIC PANEL: CPT | Performed by: INTERNAL MEDICINE

## 2022-01-01 PROCEDURE — 999N000154 HC STATISTIC RADIOLOGY XRAY, US, CT, MAR, NM

## 2022-01-01 PROCEDURE — 84550 ASSAY OF BLOOD/URIC ACID: CPT | Performed by: INTERNAL MEDICINE

## 2022-01-01 PROCEDURE — 85730 THROMBOPLASTIN TIME PARTIAL: CPT | Performed by: INTERNAL MEDICINE

## 2022-01-01 PROCEDURE — 83690 ASSAY OF LIPASE: CPT | Performed by: EMERGENCY MEDICINE

## 2022-01-01 PROCEDURE — 250N000013 HC RX MED GY IP 250 OP 250 PS 637: Performed by: HOSPITALIST

## 2022-01-01 PROCEDURE — G0463 HOSPITAL OUTPT CLINIC VISIT: HCPCS

## 2022-01-01 PROCEDURE — 87205 SMEAR GRAM STAIN: CPT | Performed by: EMERGENCY MEDICINE

## 2022-01-01 PROCEDURE — 210N000002 HC R&B HEART CARE

## 2022-01-01 PROCEDURE — 99153 MOD SED SAME PHYS/QHP EA: CPT

## 2022-01-01 PROCEDURE — 84300 ASSAY OF URINE SODIUM: CPT | Performed by: INTERNAL MEDICINE

## 2022-01-01 PROCEDURE — 258N000003 HC RX IP 258 OP 636: Performed by: ORTHOPAEDIC SURGERY

## 2022-01-01 PROCEDURE — 96372 THER/PROPH/DIAG INJ SC/IM: CPT | Performed by: ORTHOPAEDIC SURGERY

## 2022-01-01 PROCEDURE — 250N000009 HC RX 250: Performed by: ANESTHESIOLOGY

## 2022-01-01 PROCEDURE — 85007 BL SMEAR W/DIFF WBC COUNT: CPT | Performed by: INTERNAL MEDICINE

## 2022-01-01 PROCEDURE — 96366 THER/PROPH/DIAG IV INF ADDON: CPT

## 2022-01-01 PROCEDURE — 97530 THERAPEUTIC ACTIVITIES: CPT | Mod: GP

## 2022-01-01 PROCEDURE — 85520 HEPARIN ASSAY: CPT | Performed by: INTERNAL MEDICINE

## 2022-01-01 PROCEDURE — 99232 SBSQ HOSP IP/OBS MODERATE 35: CPT | Performed by: HOSPITALIST

## 2022-01-01 PROCEDURE — 250N000009 HC RX 250: Performed by: INTERNAL MEDICINE

## 2022-01-01 PROCEDURE — 0W9G3ZZ DRAINAGE OF PERITONEAL CAVITY, PERCUTANEOUS APPROACH: ICD-10-PCS | Performed by: RADIOLOGY

## 2022-01-01 PROCEDURE — 81001 URINALYSIS AUTO W/SCOPE: CPT | Performed by: EMERGENCY MEDICINE

## 2022-01-01 PROCEDURE — 96376 TX/PRO/DX INJ SAME DRUG ADON: CPT

## 2022-01-01 PROCEDURE — 83735 ASSAY OF MAGNESIUM: CPT | Performed by: EMERGENCY MEDICINE

## 2022-01-01 PROCEDURE — 250N000013 HC RX MED GY IP 250 OP 250 PS 637: Performed by: STUDENT IN AN ORGANIZED HEALTH CARE EDUCATION/TRAINING PROGRAM

## 2022-01-01 PROCEDURE — 82803 BLOOD GASES ANY COMBINATION: CPT

## 2022-01-01 PROCEDURE — 250N000011 HC RX IP 250 OP 636: Performed by: RADIOLOGY

## 2022-01-01 PROCEDURE — 85520 HEPARIN ASSAY: CPT | Performed by: HOSPITALIST

## 2022-01-01 PROCEDURE — 250N000013 HC RX MED GY IP 250 OP 250 PS 637: Performed by: ORTHOPAEDIC SURGERY

## 2022-01-01 PROCEDURE — 258N000003 HC RX IP 258 OP 636: Performed by: EMERGENCY MEDICINE

## 2022-01-01 PROCEDURE — 99223 1ST HOSP IP/OBS HIGH 75: CPT | Performed by: NURSE PRACTITIONER

## 2022-01-01 PROCEDURE — 36591 DRAW BLOOD OFF VENOUS DEVICE: CPT | Performed by: EMERGENCY MEDICINE

## 2022-01-01 PROCEDURE — U0005 INFEC AGEN DETEC AMPLI PROBE: HCPCS

## 2022-01-01 PROCEDURE — 93308 TTE F-UP OR LMTD: CPT | Mod: 26 | Performed by: INTERNAL MEDICINE

## 2022-01-01 PROCEDURE — 99223 1ST HOSP IP/OBS HIGH 75: CPT | Mod: AI | Performed by: INTERNAL MEDICINE

## 2022-01-01 PROCEDURE — 120N000001 HC R&B MED SURG/OB

## 2022-01-01 PROCEDURE — 36415 COLL VENOUS BLD VENIPUNCTURE: CPT | Performed by: EMERGENCY MEDICINE

## 2022-01-01 PROCEDURE — 84157 ASSAY OF PROTEIN OTHER: CPT | Performed by: EMERGENCY MEDICINE

## 2022-01-01 PROCEDURE — 82533 TOTAL CORTISOL: CPT | Performed by: INTERNAL MEDICINE

## 2022-01-01 PROCEDURE — 82947 ASSAY GLUCOSE BLOOD QUANT: CPT | Performed by: INTERNAL MEDICINE

## 2022-01-01 PROCEDURE — 250N000009 HC RX 250: Performed by: ORTHOPAEDIC SURGERY

## 2022-01-01 PROCEDURE — C9803 HOPD COVID-19 SPEC COLLECT: HCPCS

## 2022-01-01 PROCEDURE — 82310 ASSAY OF CALCIUM: CPT | Performed by: EMERGENCY MEDICINE

## 2022-01-01 PROCEDURE — 258N000003 HC RX IP 258 OP 636: Performed by: INTERNAL MEDICINE

## 2022-01-01 PROCEDURE — 82310 ASSAY OF CALCIUM: CPT | Performed by: STUDENT IN AN ORGANIZED HEALTH CARE EDUCATION/TRAINING PROGRAM

## 2022-01-01 PROCEDURE — 36415 COLL VENOUS BLD VENIPUNCTURE: CPT | Performed by: ORTHOPAEDIC SURGERY

## 2022-01-01 PROCEDURE — 36415 COLL VENOUS BLD VENIPUNCTURE: CPT | Performed by: HOSPITALIST

## 2022-01-01 PROCEDURE — 36591 DRAW BLOOD OFF VENOUS DEVICE: CPT

## 2022-01-01 PROCEDURE — 85610 PROTHROMBIN TIME: CPT | Performed by: INTERNAL MEDICINE

## 2022-01-01 PROCEDURE — 85018 HEMOGLOBIN: CPT | Performed by: ORTHOPAEDIC SURGERY

## 2022-01-01 PROCEDURE — 99207 PR NO BILLABLE SERVICE THIS VISIT: CPT | Performed by: INTERNAL MEDICINE

## 2022-01-01 PROCEDURE — 84244 ASSAY OF RENIN: CPT | Performed by: INTERNAL MEDICINE

## 2022-01-01 PROCEDURE — 250N000009 HC RX 250: Performed by: NURSE PRACTITIONER

## 2022-01-01 PROCEDURE — 272N000706 US PARACENTESIS WITHOUT ALBUMIN

## 2022-01-01 PROCEDURE — 80048 BASIC METABOLIC PNL TOTAL CA: CPT | Performed by: EMERGENCY MEDICINE

## 2022-01-01 PROCEDURE — 93325 DOPPLER ECHO COLOR FLOW MAPG: CPT | Mod: 26 | Performed by: INTERNAL MEDICINE

## 2022-01-01 PROCEDURE — 93325 DOPPLER ECHO COLOR FLOW MAPG: CPT

## 2022-01-01 PROCEDURE — 96365 THER/PROPH/DIAG IV INF INIT: CPT | Mod: 59

## 2022-01-01 PROCEDURE — 83605 ASSAY OF LACTIC ACID: CPT | Performed by: EMERGENCY MEDICINE

## 2022-01-01 PROCEDURE — 80048 BASIC METABOLIC PNL TOTAL CA: CPT | Performed by: INTERNAL MEDICINE

## 2022-01-01 PROCEDURE — 84295 ASSAY OF SERUM SODIUM: CPT | Performed by: INTERNAL MEDICINE

## 2022-01-01 PROCEDURE — 87070 CULTURE OTHR SPECIMN AEROBIC: CPT | Performed by: EMERGENCY MEDICINE

## 2022-01-01 PROCEDURE — 250N000011 HC RX IP 250 OP 636: Performed by: ANESTHESIOLOGY

## 2022-01-01 PROCEDURE — 99152 MOD SED SAME PHYS/QHP 5/>YRS: CPT

## 2022-01-01 PROCEDURE — 250N000011 HC RX IP 250 OP 636: Performed by: INTERNAL MEDICINE

## 2022-01-01 PROCEDURE — 250N000011 HC RX IP 250 OP 636: Performed by: ORTHOPAEDIC SURGERY

## 2022-01-01 PROCEDURE — 99233 SBSQ HOSP IP/OBS HIGH 50: CPT | Performed by: INTERNAL MEDICINE

## 2022-01-01 PROCEDURE — 85004 AUTOMATED DIFF WBC COUNT: CPT | Performed by: EMERGENCY MEDICINE

## 2022-01-01 PROCEDURE — 99222 1ST HOSP IP/OBS MODERATE 55: CPT | Performed by: INTERNAL MEDICINE

## 2022-01-01 PROCEDURE — 85027 COMPLETE CBC AUTOMATED: CPT | Performed by: INTERNAL MEDICINE

## 2022-01-01 PROCEDURE — C1776 JOINT DEVICE (IMPLANTABLE): HCPCS | Performed by: ORTHOPAEDIC SURGERY

## 2022-01-01 PROCEDURE — 88341 IMHCHEM/IMCYTCHM EA ADD ANTB: CPT | Mod: 26 | Performed by: PATHOLOGY

## 2022-01-01 PROCEDURE — 96361 HYDRATE IV INFUSION ADD-ON: CPT

## 2022-01-01 PROCEDURE — 49083 ABD PARACENTESIS W/IMAGING: CPT

## 2022-01-01 PROCEDURE — 97161 PT EVAL LOW COMPLEX 20 MIN: CPT | Mod: GP | Performed by: PHYSICAL THERAPIST

## 2022-01-01 PROCEDURE — 85018 HEMOGLOBIN: CPT | Performed by: INTERNAL MEDICINE

## 2022-01-01 PROCEDURE — 250N000009 HC RX 250: Performed by: EMERGENCY MEDICINE

## 2022-01-01 PROCEDURE — 250N000011 HC RX IP 250 OP 636: Performed by: EMERGENCY MEDICINE

## 2022-01-01 PROCEDURE — 80053 COMPREHEN METABOLIC PANEL: CPT | Performed by: EMERGENCY MEDICINE

## 2022-01-01 PROCEDURE — 82248 BILIRUBIN DIRECT: CPT | Performed by: EMERGENCY MEDICINE

## 2022-01-01 PROCEDURE — 99223 1ST HOSP IP/OBS HIGH 75: CPT | Performed by: INTERNAL MEDICINE

## 2022-01-01 PROCEDURE — 99232 SBSQ HOSP IP/OBS MODERATE 35: CPT | Performed by: STUDENT IN AN ORGANIZED HEALTH CARE EDUCATION/TRAINING PROGRAM

## 2022-01-01 PROCEDURE — 82310 ASSAY OF CALCIUM: CPT | Performed by: INTERNAL MEDICINE

## 2022-01-01 PROCEDURE — 99207 PR SC NO CHARGE VISIT: CPT | Performed by: INTERNAL MEDICINE

## 2022-01-01 PROCEDURE — 96365 THER/PROPH/DIAG IV INF INIT: CPT

## 2022-01-01 PROCEDURE — 250N000013 HC RX MED GY IP 250 OP 250 PS 637: Performed by: NURSE PRACTITIONER

## 2022-01-01 PROCEDURE — 86901 BLOOD TYPING SEROLOGIC RH(D): CPT | Performed by: EMERGENCY MEDICINE

## 2022-01-01 PROCEDURE — 83880 ASSAY OF NATRIURETIC PEPTIDE: CPT | Performed by: EMERGENCY MEDICINE

## 2022-01-01 PROCEDURE — 74177 CT ABD & PELVIS W/CONTRAST: CPT

## 2022-01-01 PROCEDURE — 250N000011 HC RX IP 250 OP 636: Performed by: NURSE ANESTHETIST, CERTIFIED REGISTERED

## 2022-01-01 PROCEDURE — G0463 HOSPITAL OUTPT CLINIC VISIT: HCPCS | Mod: 25

## 2022-01-01 PROCEDURE — C1894 INTRO/SHEATH, NON-LASER: HCPCS | Performed by: INTERNAL MEDICINE

## 2022-01-01 PROCEDURE — 999N000127 HC STATISTIC PERIPHERAL IV START W US GUIDANCE

## 2022-01-01 PROCEDURE — U0003 INFECTIOUS AGENT DETECTION BY NUCLEIC ACID (DNA OR RNA); SEVERE ACUTE RESPIRATORY SYNDROME CORONAVIRUS 2 (SARS-COV-2) (CORONAVIRUS DISEASE [COVID-19]), AMPLIFIED PROBE TECHNIQUE, MAKING USE OF HIGH THROUGHPUT TECHNOLOGIES AS DESCRIBED BY CMS-2020-01-R: HCPCS | Performed by: EMERGENCY MEDICINE

## 2022-01-01 PROCEDURE — 36415 COLL VENOUS BLD VENIPUNCTURE: CPT | Performed by: STUDENT IN AN ORGANIZED HEALTH CARE EDUCATION/TRAINING PROGRAM

## 2022-01-01 PROCEDURE — 85025 COMPLETE CBC W/AUTO DIFF WBC: CPT | Performed by: EMERGENCY MEDICINE

## 2022-01-01 PROCEDURE — 99233 SBSQ HOSP IP/OBS HIGH 50: CPT | Performed by: STUDENT IN AN ORGANIZED HEALTH CARE EDUCATION/TRAINING PROGRAM

## 2022-01-01 PROCEDURE — 99285 EMERGENCY DEPT VISIT HI MDM: CPT | Mod: 25

## 2022-01-01 PROCEDURE — 93308 TTE F-UP OR LMTD: CPT

## 2022-01-01 PROCEDURE — 250N000013 HC RX MED GY IP 250 OP 250 PS 637: Performed by: PHYSICIAN ASSISTANT

## 2022-01-01 PROCEDURE — 36561 INSERT TUNNELED CV CATH: CPT

## 2022-01-01 PROCEDURE — 99232 SBSQ HOSP IP/OBS MODERATE 35: CPT | Performed by: INTERNAL MEDICINE

## 2022-01-01 PROCEDURE — 272N000602 HC WOUND GLUE CR1

## 2022-01-01 PROCEDURE — 250N000011 HC RX IP 250 OP 636

## 2022-01-01 PROCEDURE — 36415 COLL VENOUS BLD VENIPUNCTURE: CPT | Performed by: NURSE PRACTITIONER

## 2022-01-01 PROCEDURE — 82042 OTHER SOURCE ALBUMIN QUAN EA: CPT | Performed by: EMERGENCY MEDICINE

## 2022-01-01 PROCEDURE — 84484 ASSAY OF TROPONIN QUANT: CPT | Performed by: EMERGENCY MEDICINE

## 2022-01-01 PROCEDURE — 0FB13ZX EXCISION OF RIGHT LOBE LIVER, PERCUTANEOUS APPROACH, DIAGNOSTIC: ICD-10-PCS | Performed by: RADIOLOGY

## 2022-01-01 PROCEDURE — 85610 PROTHROMBIN TIME: CPT | Performed by: EMERGENCY MEDICINE

## 2022-01-01 PROCEDURE — 82565 ASSAY OF CREATININE: CPT | Performed by: INTERNAL MEDICINE

## 2022-01-01 PROCEDURE — 370N000017 HC ANESTHESIA TECHNICAL FEE, PER MIN: Performed by: ORTHOPAEDIC SURGERY

## 2022-01-01 PROCEDURE — 85610 PROTHROMBIN TIME: CPT | Performed by: NURSE PRACTITIONER

## 2022-01-01 PROCEDURE — 80053 COMPREHEN METABOLIC PANEL: CPT | Performed by: NURSE PRACTITIONER

## 2022-01-01 PROCEDURE — 80048 BASIC METABOLIC PNL TOTAL CA: CPT | Performed by: STUDENT IN AN ORGANIZED HEALTH CARE EDUCATION/TRAINING PROGRAM

## 2022-01-01 PROCEDURE — 999N000111 HC STATISTIC OT IP EVAL DEFER: Performed by: OCCUPATIONAL THERAPIST

## 2022-01-01 PROCEDURE — 999N000141 HC STATISTIC PRE-PROCEDURE NURSING ASSESSMENT: Performed by: ORTHOPAEDIC SURGERY

## 2022-01-01 PROCEDURE — 250N000011 HC RX IP 250 OP 636: Performed by: NURSE PRACTITIONER

## 2022-01-01 PROCEDURE — 96374 THER/PROPH/DIAG INJ IV PUSH: CPT | Mod: 59

## 2022-01-01 PROCEDURE — 278N000051 HC OR IMPLANT GENERAL: Performed by: ORTHOPAEDIC SURGERY

## 2022-01-01 PROCEDURE — 99239 HOSP IP/OBS DSCHRG MGMT >30: CPT | Performed by: STUDENT IN AN ORGANIZED HEALTH CARE EDUCATION/TRAINING PROGRAM

## 2022-01-01 PROCEDURE — 97161 PT EVAL LOW COMPLEX 20 MIN: CPT | Mod: GP

## 2022-01-01 PROCEDURE — 88307 TISSUE EXAM BY PATHOLOGIST: CPT | Mod: 26 | Performed by: PATHOLOGY

## 2022-01-01 PROCEDURE — 258N000003 HC RX IP 258 OP 636: Performed by: ANESTHESIOLOGY

## 2022-01-01 PROCEDURE — 86301 IMMUNOASSAY TUMOR CA 19-9: CPT | Performed by: INTERNAL MEDICINE

## 2022-01-01 PROCEDURE — 99239 HOSP IP/OBS DSCHRG MGMT >30: CPT | Performed by: INTERNAL MEDICINE

## 2022-01-01 PROCEDURE — 83880 ASSAY OF NATRIURETIC PEPTIDE: CPT | Performed by: INTERNAL MEDICINE

## 2022-01-01 PROCEDURE — 88112 CYTOPATH CELL ENHANCE TECH: CPT | Mod: TC | Performed by: EMERGENCY MEDICINE

## 2022-01-01 PROCEDURE — 85014 HEMATOCRIT: CPT | Performed by: INTERNAL MEDICINE

## 2022-01-01 PROCEDURE — 97116 GAIT TRAINING THERAPY: CPT | Mod: GP

## 2022-01-01 PROCEDURE — 99221 1ST HOSP IP/OBS SF/LOW 40: CPT | Performed by: INTERNAL MEDICINE

## 2022-01-01 PROCEDURE — 99215 OFFICE O/P EST HI 40 MIN: CPT | Performed by: INTERNAL MEDICINE

## 2022-01-01 PROCEDURE — 999N000012 HC STATISTIC ANGIOGRAM, STENT, VERTEBRO PLASTY

## 2022-01-01 PROCEDURE — 99152 MOD SED SAME PHYS/QHP 5/>YRS: CPT | Performed by: INTERNAL MEDICINE

## 2022-01-01 PROCEDURE — 97116 GAIT TRAINING THERAPY: CPT | Mod: GP | Performed by: PHYSICAL THERAPIST

## 2022-01-01 PROCEDURE — 83930 ASSAY OF BLOOD OSMOLALITY: CPT | Performed by: INTERNAL MEDICINE

## 2022-01-01 PROCEDURE — 89051 BODY FLUID CELL COUNT: CPT | Performed by: EMERGENCY MEDICINE

## 2022-01-01 PROCEDURE — 82565 ASSAY OF CREATININE: CPT | Performed by: ORTHOPAEDIC SURGERY

## 2022-01-01 PROCEDURE — 110N000005 HC R&B HOSPICE, ACCENT

## 2022-01-01 PROCEDURE — 93005 ELECTROCARDIOGRAM TRACING: CPT

## 2022-01-01 PROCEDURE — 99232 SBSQ HOSP IP/OBS MODERATE 35: CPT | Performed by: NURSE PRACTITIONER

## 2022-01-01 PROCEDURE — 82088 ASSAY OF ALDOSTERONE: CPT | Performed by: INTERNAL MEDICINE

## 2022-01-01 PROCEDURE — 86850 RBC ANTIBODY SCREEN: CPT | Performed by: EMERGENCY MEDICINE

## 2022-01-01 PROCEDURE — 99233 SBSQ HOSP IP/OBS HIGH 50: CPT | Mod: 25 | Performed by: INTERNAL MEDICINE

## 2022-01-01 PROCEDURE — 88112 CYTOPATH CELL ENHANCE TECH: CPT | Mod: 26 | Performed by: PATHOLOGY

## 2022-01-01 PROCEDURE — 84132 ASSAY OF SERUM POTASSIUM: CPT | Performed by: HOSPITALIST

## 2022-01-01 PROCEDURE — 96375 TX/PRO/DX INJ NEW DRUG ADDON: CPT

## 2022-01-01 PROCEDURE — 96417 CHEMO IV INFUS EACH ADDL SEQ: CPT

## 2022-01-01 PROCEDURE — 84132 ASSAY OF SERUM POTASSIUM: CPT | Performed by: INTERNAL MEDICINE

## 2022-01-01 PROCEDURE — 85004 AUTOMATED DIFF WBC COUNT: CPT | Performed by: INTERNAL MEDICINE

## 2022-01-01 PROCEDURE — 85610 PROTHROMBIN TIME: CPT | Performed by: RADIOLOGY

## 2022-01-01 PROCEDURE — 710N000009 HC RECOVERY PHASE 1, LEVEL 1, PER MIN: Performed by: ORTHOPAEDIC SURGERY

## 2022-01-01 PROCEDURE — C1788 PORT, INDWELLING, IMP: HCPCS

## 2022-01-01 PROCEDURE — 258N000001 HC RX 258: Performed by: ORTHOPAEDIC SURGERY

## 2022-01-01 PROCEDURE — 36415 COLL VENOUS BLD VENIPUNCTURE: CPT | Performed by: INTERNAL MEDICINE

## 2022-01-01 PROCEDURE — 88307 TISSUE EXAM BY PATHOLOGIST: CPT | Mod: TC | Performed by: INTERNAL MEDICINE

## 2022-01-01 PROCEDURE — 272N000196 HC ACCESSORY CR5

## 2022-01-01 PROCEDURE — 36415 COLL VENOUS BLD VENIPUNCTURE: CPT | Performed by: RADIOLOGY

## 2022-01-01 PROCEDURE — 272N000001 HC OR GENERAL SUPPLY STERILE: Performed by: ORTHOPAEDIC SURGERY

## 2022-01-01 PROCEDURE — 97110 THERAPEUTIC EXERCISES: CPT | Mod: GP

## 2022-01-01 PROCEDURE — 250N000009 HC RX 250: Performed by: NURSE ANESTHETIST, CERTIFIED REGISTERED

## 2022-01-01 PROCEDURE — 82947 ASSAY GLUCOSE BLOOD QUANT: CPT | Performed by: ORTHOPAEDIC SURGERY

## 2022-01-01 PROCEDURE — 82378 CARCINOEMBRYONIC ANTIGEN: CPT | Performed by: INTERNAL MEDICINE

## 2022-01-01 PROCEDURE — 88342 IMHCHEM/IMCYTCHM 1ST ANTB: CPT | Mod: 26 | Performed by: PATHOLOGY

## 2022-01-01 PROCEDURE — 99232 SBSQ HOSP IP/OBS MODERATE 35: CPT | Performed by: PHYSICIAN ASSISTANT

## 2022-01-01 PROCEDURE — 999N000063 XR KNEE PORT RIGHT 1/2 VIEWS: Mod: RT

## 2022-01-01 PROCEDURE — 272N000431 US BIOPSY LIVER

## 2022-01-01 PROCEDURE — 258N000003 HC RX IP 258 OP 636: Performed by: NURSE ANESTHETIST, CERTIFIED REGISTERED

## 2022-01-01 PROCEDURE — 99238 HOSP IP/OBS DSCHRG MGMT 30/<: CPT | Performed by: HOSPITALIST

## 2022-01-01 PROCEDURE — 36591 DRAW BLOOD OFF VENOUS DEVICE: CPT | Performed by: INTERNAL MEDICINE

## 2022-01-01 PROCEDURE — 84443 ASSAY THYROID STIM HORMONE: CPT | Performed by: INTERNAL MEDICINE

## 2022-01-01 PROCEDURE — 97530 THERAPEUTIC ACTIVITIES: CPT | Mod: GP | Performed by: PHYSICAL THERAPIST

## 2022-01-01 PROCEDURE — U0003 INFECTIOUS AGENT DETECTION BY NUCLEIC ACID (DNA OR RNA); SEVERE ACUTE RESPIRATORY SYNDROME CORONAVIRUS 2 (SARS-COV-2) (CORONAVIRUS DISEASE [COVID-19]), AMPLIFIED PROBE TECHNIQUE, MAKING USE OF HIGH THROUGHPUT TECHNOLOGIES AS DESCRIBED BY CMS-2020-01-R: HCPCS

## 2022-01-01 PROCEDURE — 85520 HEPARIN ASSAY: CPT | Performed by: EMERGENCY MEDICINE

## 2022-01-01 PROCEDURE — 71275 CT ANGIOGRAPHY CHEST: CPT

## 2022-01-01 PROCEDURE — 85027 COMPLETE CBC AUTOMATED: CPT | Performed by: HOSPITALIST

## 2022-01-01 PROCEDURE — 250N000013 HC RX MED GY IP 250 OP 250 PS 637: Performed by: ANESTHESIOLOGY

## 2022-01-01 PROCEDURE — 93306 TTE W/DOPPLER COMPLETE: CPT

## 2022-01-01 PROCEDURE — 83605 ASSAY OF LACTIC ACID: CPT | Performed by: INTERNAL MEDICINE

## 2022-01-01 PROCEDURE — 82374 ASSAY BLOOD CARBON DIOXIDE: CPT | Performed by: STUDENT IN AN ORGANIZED HEALTH CARE EDUCATION/TRAINING PROGRAM

## 2022-01-01 PROCEDURE — 250N000009 HC RX 250: Performed by: HOSPITALIST

## 2022-01-01 PROCEDURE — 76705 ECHO EXAM OF ABDOMEN: CPT

## 2022-01-01 PROCEDURE — 80048 BASIC METABOLIC PNL TOTAL CA: CPT | Performed by: HOSPITALIST

## 2022-01-01 PROCEDURE — 82803 BLOOD GASES ANY COMBINATION: CPT | Mod: 91

## 2022-01-01 PROCEDURE — 99207 PR NO CHARGE LOS: CPT | Performed by: PHYSICIAN ASSISTANT

## 2022-01-01 PROCEDURE — 99238 HOSP IP/OBS DSCHRG MGMT 30/<: CPT

## 2022-01-01 PROCEDURE — 85730 THROMBOPLASTIN TIME PARTIAL: CPT | Performed by: RADIOLOGY

## 2022-01-01 PROCEDURE — 272N000001 HC OR GENERAL SUPPLY STERILE: Performed by: INTERNAL MEDICINE

## 2022-01-01 PROCEDURE — 96413 CHEMO IV INFUSION 1 HR: CPT

## 2022-01-01 PROCEDURE — 99214 OFFICE O/P EST MOD 30 MIN: CPT | Performed by: NURSE PRACTITIONER

## 2022-01-01 PROCEDURE — 85049 AUTOMATED PLATELET COUNT: CPT | Performed by: ORTHOPAEDIC SURGERY

## 2022-01-01 PROCEDURE — 88342 IMHCHEM/IMCYTCHM 1ST ANTB: CPT | Mod: TC | Performed by: INTERNAL MEDICINE

## 2022-01-01 PROCEDURE — 93306 TTE W/DOPPLER COMPLETE: CPT | Mod: 26 | Performed by: INTERNAL MEDICINE

## 2022-01-01 PROCEDURE — 71046 X-RAY EXAM CHEST 2 VIEWS: CPT

## 2022-01-01 PROCEDURE — 93321 DOPPLER ECHO F-UP/LMTD STD: CPT

## 2022-01-01 PROCEDURE — 82945 GLUCOSE OTHER FLUID: CPT | Performed by: EMERGENCY MEDICINE

## 2022-01-01 PROCEDURE — 88305 TISSUE EXAM BY PATHOLOGIST: CPT | Mod: 26 | Performed by: PATHOLOGY

## 2022-01-01 PROCEDURE — 99231 SBSQ HOSP IP/OBS SF/LOW 25: CPT | Performed by: HOSPITALIST

## 2022-01-01 PROCEDURE — 258N000003 HC RX IP 258 OP 636: Performed by: NURSE PRACTITIONER

## 2022-01-01 PROCEDURE — 99207 PR NO CHARGE LOS: CPT | Performed by: INTERNAL MEDICINE

## 2022-01-01 PROCEDURE — 83935 ASSAY OF URINE OSMOLALITY: CPT | Performed by: INTERNAL MEDICINE

## 2022-01-01 PROCEDURE — 360N000077 HC SURGERY LEVEL 4, PER MIN: Performed by: ORTHOPAEDIC SURGERY

## 2022-01-01 PROCEDURE — 250N000011 HC RX IP 250 OP 636: Performed by: PHYSICIAN ASSISTANT

## 2022-01-01 DEVICE — PATELLA IMPLANT DOME, 34 X 9MM, VIT-E
Type: IMPLANTABLE DEVICE | Site: KNEE | Status: FUNCTIONAL
Brand: ARTHREX®

## 2022-01-01 DEVICE — SPEEDSET FULL DOSE ANTIBIOTIC BONE CEMENT, 10 PACK CATALOG NUMBER IS 6192-1-010
Type: IMPLANTABLE DEVICE | Site: KNEE | Status: FUNCTIONAL
Brand: SIMPLEX

## 2022-01-01 RX ORDER — LORAZEPAM 2 MG/ML
0.5 INJECTION INTRAMUSCULAR ONCE
Status: COMPLETED | OUTPATIENT
Start: 2022-01-01 | End: 2022-01-01

## 2022-01-01 RX ORDER — ACETAMINOPHEN 325 MG/1
650 TABLET ORAL EVERY 6 HOURS PRN
Status: DISCONTINUED | OUTPATIENT
Start: 2022-01-01 | End: 2022-01-01 | Stop reason: HOSPADM

## 2022-01-01 RX ORDER — LIDOCAINE HYDROCHLORIDE 10 MG/ML
10 INJECTION, SOLUTION EPIDURAL; INFILTRATION; INTRACAUDAL; PERINEURAL ONCE
Status: COMPLETED | OUTPATIENT
Start: 2022-01-01 | End: 2022-01-01

## 2022-01-01 RX ORDER — NALOXONE HYDROCHLORIDE 0.4 MG/ML
0.4 INJECTION, SOLUTION INTRAMUSCULAR; INTRAVENOUS; SUBCUTANEOUS
Status: DISCONTINUED | OUTPATIENT
Start: 2022-01-01 | End: 2022-01-01 | Stop reason: HOSPADM

## 2022-01-01 RX ORDER — ONDANSETRON 2 MG/ML
4 INJECTION INTRAMUSCULAR; INTRAVENOUS EVERY 6 HOURS PRN
Status: DISCONTINUED | OUTPATIENT
Start: 2022-01-01 | End: 2022-01-01 | Stop reason: HOSPADM

## 2022-01-01 RX ORDER — CEFAZOLIN SODIUM/WATER 3 G/30 ML
3 SYRINGE (ML) INTRAVENOUS
Status: COMPLETED | OUTPATIENT
Start: 2022-01-01 | End: 2022-01-01

## 2022-01-01 RX ORDER — AMOXICILLIN 250 MG
2 CAPSULE ORAL 2 TIMES DAILY PRN
Status: CANCELLED | OUTPATIENT
Start: 2022-01-01

## 2022-01-01 RX ORDER — MEPERIDINE HYDROCHLORIDE 25 MG/ML
12.5 INJECTION INTRAMUSCULAR; INTRAVENOUS; SUBCUTANEOUS EVERY 5 MIN PRN
Status: DISCONTINUED | OUTPATIENT
Start: 2022-01-01 | End: 2022-01-01 | Stop reason: HOSPADM

## 2022-01-01 RX ORDER — DILTIAZEM HYDROCHLORIDE 30 MG/1
60 TABLET, FILM COATED ORAL EVERY 8 HOURS SCHEDULED
Status: COMPLETED | OUTPATIENT
Start: 2022-01-01 | End: 2022-01-01

## 2022-01-01 RX ORDER — KETOROLAC TROMETHAMINE 15 MG/ML
15 INJECTION, SOLUTION INTRAMUSCULAR; INTRAVENOUS ONCE
Status: COMPLETED | OUTPATIENT
Start: 2022-01-01 | End: 2022-01-01

## 2022-01-01 RX ORDER — NALOXONE HYDROCHLORIDE 0.4 MG/ML
0.2 INJECTION, SOLUTION INTRAMUSCULAR; INTRAVENOUS; SUBCUTANEOUS
Status: DISCONTINUED | OUTPATIENT
Start: 2022-01-01 | End: 2022-01-01 | Stop reason: HOSPADM

## 2022-01-01 RX ORDER — PROPOFOL 10 MG/ML
INJECTION, EMULSION INTRAVENOUS PRN
Status: DISCONTINUED | OUTPATIENT
Start: 2022-01-01 | End: 2022-01-01

## 2022-01-01 RX ORDER — ONDANSETRON 2 MG/ML
INJECTION INTRAMUSCULAR; INTRAVENOUS PRN
Status: DISCONTINUED | OUTPATIENT
Start: 2022-01-01 | End: 2022-01-01

## 2022-01-01 RX ORDER — LORAZEPAM 2 MG/ML
0.5 INJECTION INTRAMUSCULAR EVERY 4 HOURS PRN
Status: CANCELLED | OUTPATIENT
Start: 2022-01-01

## 2022-01-01 RX ORDER — LORAZEPAM 2 MG/ML
.5-1 CONCENTRATE ORAL
Status: DISCONTINUED | OUTPATIENT
Start: 2022-01-01 | End: 2022-10-11 | Stop reason: HOSPADM

## 2022-01-01 RX ORDER — NALOXONE HYDROCHLORIDE 0.4 MG/ML
0.1 INJECTION, SOLUTION INTRAMUSCULAR; INTRAVENOUS; SUBCUTANEOUS
Status: DISCONTINUED | OUTPATIENT
Start: 2022-01-01 | End: 2022-10-11 | Stop reason: HOSPADM

## 2022-01-01 RX ORDER — ZINC GLUCONATE 50 MG
250 TABLET ORAL DAILY
COMMUNITY
End: 2022-01-01

## 2022-01-01 RX ORDER — MORPHINE SULFATE 15 MG/1
30 TABLET ORAL EVERY 4 HOURS PRN
Status: DISCONTINUED | OUTPATIENT
Start: 2022-01-01 | End: 2022-01-01

## 2022-01-01 RX ORDER — ONDANSETRON 2 MG/ML
4 INJECTION INTRAMUSCULAR; INTRAVENOUS EVERY 6 HOURS PRN
Status: DISCONTINUED | OUTPATIENT
Start: 2022-01-01 | End: 2022-10-11 | Stop reason: HOSPADM

## 2022-01-01 RX ORDER — ONDANSETRON 2 MG/ML
4 INJECTION INTRAMUSCULAR; INTRAVENOUS EVERY 30 MIN PRN
Status: DISCONTINUED | OUTPATIENT
Start: 2022-01-01 | End: 2022-01-01 | Stop reason: HOSPADM

## 2022-01-01 RX ORDER — LACTOBACILLUS RHAMNOSUS GG 10B CELL
1 CAPSULE ORAL DAILY
COMMUNITY

## 2022-01-01 RX ORDER — OXYCODONE HYDROCHLORIDE 5 MG/1
5-10 TABLET ORAL
Qty: 40 TABLET | Refills: 0 | Status: SHIPPED | OUTPATIENT
Start: 2022-01-01 | End: 2022-01-01

## 2022-01-01 RX ORDER — MEPERIDINE HYDROCHLORIDE 25 MG/ML
25 INJECTION INTRAMUSCULAR; INTRAVENOUS; SUBCUTANEOUS EVERY 30 MIN PRN
Status: CANCELLED | OUTPATIENT
Start: 2022-01-01

## 2022-01-01 RX ORDER — HEPARIN SODIUM (PORCINE) LOCK FLUSH IV SOLN 100 UNIT/ML 100 UNIT/ML
5 SOLUTION INTRAVENOUS EVERY 8 HOURS
Status: DISCONTINUED | OUTPATIENT
Start: 2022-01-01 | End: 2022-01-01 | Stop reason: HOSPADM

## 2022-01-01 RX ORDER — LORAZEPAM 0.5 MG/1
0.25 TABLET ORAL AT BEDTIME
Status: CANCELLED | OUTPATIENT
Start: 2022-01-01

## 2022-01-01 RX ORDER — ACETAMINOPHEN 650 MG/1
650 SUPPOSITORY RECTAL EVERY 4 HOURS PRN
Status: DISCONTINUED | OUTPATIENT
Start: 2022-01-01 | End: 2022-01-01 | Stop reason: HOSPADM

## 2022-01-01 RX ORDER — HALOPERIDOL 2 MG/ML
.5-1 SOLUTION ORAL EVERY 6 HOURS PRN
Status: DISCONTINUED | OUTPATIENT
Start: 2022-01-01 | End: 2022-01-01 | Stop reason: HOSPADM

## 2022-01-01 RX ORDER — ONDANSETRON 2 MG/ML
8 INJECTION INTRAMUSCULAR; INTRAVENOUS ONCE
Status: CANCELLED | OUTPATIENT
Start: 2022-01-01

## 2022-01-01 RX ORDER — PROPOFOL 10 MG/ML
INJECTION, EMULSION INTRAVENOUS CONTINUOUS PRN
Status: DISCONTINUED | OUTPATIENT
Start: 2022-01-01 | End: 2022-01-01

## 2022-01-01 RX ORDER — SODIUM CHLORIDE 9 MG/ML
INJECTION, SOLUTION INTRAVENOUS CONTINUOUS
Status: DISCONTINUED | OUTPATIENT
Start: 2022-01-01 | End: 2022-01-01 | Stop reason: CLARIF

## 2022-01-01 RX ORDER — HYDROMORPHONE HCL IN WATER/PF 6 MG/30 ML
0.4 PATIENT CONTROLLED ANALGESIA SYRINGE INTRAVENOUS EVERY 5 MIN PRN
Status: DISCONTINUED | OUTPATIENT
Start: 2022-01-01 | End: 2022-01-01 | Stop reason: HOSPADM

## 2022-01-01 RX ORDER — ONDANSETRON 2 MG/ML
8 INJECTION INTRAMUSCULAR; INTRAVENOUS ONCE
Status: COMPLETED | OUTPATIENT
Start: 2022-01-01 | End: 2022-01-01

## 2022-01-01 RX ORDER — ACETAMINOPHEN 650 MG/1
650 SUPPOSITORY RECTAL EVERY 4 HOURS PRN
Status: DISCONTINUED | OUTPATIENT
Start: 2022-01-01 | End: 2022-01-01

## 2022-01-01 RX ORDER — GLYCOPYRROLATE 0.2 MG/ML
0.2 INJECTION, SOLUTION INTRAMUSCULAR; INTRAVENOUS EVERY 4 HOURS PRN
Status: CANCELLED | OUTPATIENT
Start: 2022-01-01

## 2022-01-01 RX ORDER — ONDANSETRON 2 MG/ML
4 INJECTION INTRAMUSCULAR; INTRAVENOUS EVERY 6 HOURS PRN
Status: CANCELLED | OUTPATIENT
Start: 2022-01-01

## 2022-01-01 RX ORDER — AMOXICILLIN 250 MG
1 CAPSULE ORAL 2 TIMES DAILY PRN
Status: DISCONTINUED | OUTPATIENT
Start: 2022-01-01 | End: 2022-01-01 | Stop reason: HOSPADM

## 2022-01-01 RX ORDER — MAGNESIUM HYDROXIDE 1200 MG/15ML
LIQUID ORAL PRN
Status: DISCONTINUED | OUTPATIENT
Start: 2022-01-01 | End: 2022-01-01 | Stop reason: HOSPADM

## 2022-01-01 RX ORDER — AMOXICILLIN 250 MG
2 CAPSULE ORAL 2 TIMES DAILY PRN
Status: DISCONTINUED | OUTPATIENT
Start: 2022-01-01 | End: 2022-01-01 | Stop reason: HOSPADM

## 2022-01-01 RX ORDER — DIPHENHYDRAMINE HYDROCHLORIDE AND LIDOCAINE HYDROCHLORIDE AND ALUMINUM HYDROXIDE AND MAGNESIUM HYDRO
10 KIT EVERY 6 HOURS PRN
Status: CANCELLED | OUTPATIENT
Start: 2022-01-01

## 2022-01-01 RX ORDER — ACETAMINOPHEN 325 MG/1
650 TABLET ORAL
Status: DISCONTINUED | OUTPATIENT
Start: 2022-01-01 | End: 2022-01-01 | Stop reason: HOSPADM

## 2022-01-01 RX ORDER — TRANEXAMIC ACID 650 MG/1
1950 TABLET ORAL ONCE
Status: COMPLETED | OUTPATIENT
Start: 2022-01-01 | End: 2022-01-01

## 2022-01-01 RX ORDER — FENTANYL CITRATE 50 UG/ML
25-50 INJECTION, SOLUTION INTRAMUSCULAR; INTRAVENOUS EVERY 5 MIN PRN
Status: DISCONTINUED | OUTPATIENT
Start: 2022-01-01 | End: 2022-01-01

## 2022-01-01 RX ORDER — FENTANYL CITRATE 50 UG/ML
INJECTION, SOLUTION INTRAMUSCULAR; INTRAVENOUS PRN
Status: DISCONTINUED | OUTPATIENT
Start: 2022-01-01 | End: 2022-01-01

## 2022-01-01 RX ORDER — LORAZEPAM 0.5 MG/1
0.25 TABLET ORAL AT BEDTIME
Status: DISCONTINUED | OUTPATIENT
Start: 2022-01-01 | End: 2022-01-01 | Stop reason: HOSPADM

## 2022-01-01 RX ORDER — PACLITAXEL 100 MG/20ML
125 INJECTION, POWDER, LYOPHILIZED, FOR SUSPENSION INTRAVENOUS ONCE
Status: CANCELLED | OUTPATIENT
Start: 2022-01-01

## 2022-01-01 RX ORDER — NALOXONE HYDROCHLORIDE 0.4 MG/ML
0.2 INJECTION, SOLUTION INTRAMUSCULAR; INTRAVENOUS; SUBCUTANEOUS
Status: CANCELLED | OUTPATIENT
Start: 2022-01-01

## 2022-01-01 RX ORDER — FENTANYL CITRATE 0.05 MG/ML
50 INJECTION, SOLUTION INTRAMUSCULAR; INTRAVENOUS
Status: DISCONTINUED | OUTPATIENT
Start: 2022-01-01 | End: 2022-01-01 | Stop reason: HOSPADM

## 2022-01-01 RX ORDER — IOPAMIDOL 755 MG/ML
83 INJECTION, SOLUTION INTRAVASCULAR ONCE
Status: COMPLETED | OUTPATIENT
Start: 2022-01-01 | End: 2022-01-01

## 2022-01-01 RX ORDER — MORPHINE SULFATE 2 MG/ML
4 INJECTION, SOLUTION INTRAMUSCULAR; INTRAVENOUS
Status: DISCONTINUED | OUTPATIENT
Start: 2022-01-01 | End: 2022-10-11 | Stop reason: HOSPADM

## 2022-01-01 RX ORDER — HEPARIN SODIUM,PORCINE 10 UNIT/ML
5 VIAL (ML) INTRAVENOUS
Status: DISCONTINUED | OUTPATIENT
Start: 2022-01-01 | End: 2022-01-01 | Stop reason: HOSPADM

## 2022-01-01 RX ORDER — HYDROMORPHONE HCL IN WATER/PF 6 MG/30 ML
0.2 PATIENT CONTROLLED ANALGESIA SYRINGE INTRAVENOUS
Status: DISCONTINUED | OUTPATIENT
Start: 2022-01-01 | End: 2022-01-01 | Stop reason: HOSPADM

## 2022-01-01 RX ORDER — AMOXICILLIN 250 MG
2 CAPSULE ORAL 2 TIMES DAILY PRN
Status: DISCONTINUED | OUTPATIENT
Start: 2022-01-01 | End: 2022-10-11 | Stop reason: HOSPADM

## 2022-01-01 RX ORDER — PROCHLORPERAZINE MALEATE 5 MG
5 TABLET ORAL EVERY 6 HOURS PRN
Status: DISCONTINUED | OUTPATIENT
Start: 2022-01-01 | End: 2022-01-01 | Stop reason: HOSPADM

## 2022-01-01 RX ORDER — FENTANYL CITRATE 50 UG/ML
25-50 INJECTION, SOLUTION INTRAMUSCULAR; INTRAVENOUS EVERY 5 MIN PRN
Status: CANCELLED | OUTPATIENT
Start: 2022-01-01

## 2022-01-01 RX ORDER — NALOXONE HYDROCHLORIDE 0.4 MG/ML
0.2 INJECTION, SOLUTION INTRAMUSCULAR; INTRAVENOUS; SUBCUTANEOUS
Status: DISCONTINUED | OUTPATIENT
Start: 2022-01-01 | End: 2022-01-01

## 2022-01-01 RX ORDER — CEFAZOLIN SODIUM IN 0.9 % NACL 3 G/100 ML
3 INTRAVENOUS SOLUTION, PIGGYBACK (ML) INTRAVENOUS
Status: COMPLETED | OUTPATIENT
Start: 2022-01-01 | End: 2022-01-01

## 2022-01-01 RX ORDER — POLYETHYLENE GLYCOL 3350 17 G/17G
17 POWDER, FOR SOLUTION ORAL 2 TIMES DAILY PRN
Status: DISCONTINUED | OUTPATIENT
Start: 2022-01-01 | End: 2022-10-11 | Stop reason: HOSPADM

## 2022-01-01 RX ORDER — ACETAMINOPHEN 325 MG/1
650 TABLET ORAL
Status: CANCELLED | OUTPATIENT
Start: 2022-01-01

## 2022-01-01 RX ORDER — ALBUTEROL SULFATE 0.83 MG/ML
2.5 SOLUTION RESPIRATORY (INHALATION)
Status: CANCELLED | OUTPATIENT
Start: 2022-01-01

## 2022-01-01 RX ORDER — POLYETHYLENE GLYCOL 3350 17 G/17G
17 POWDER, FOR SOLUTION ORAL DAILY PRN
Status: DISCONTINUED | OUTPATIENT
Start: 2022-01-01 | End: 2022-01-01 | Stop reason: HOSPADM

## 2022-01-01 RX ORDER — GLYCOPYRROLATE 0.2 MG/ML
0.2 INJECTION, SOLUTION INTRAMUSCULAR; INTRAVENOUS EVERY 4 HOURS PRN
Status: DISCONTINUED | OUTPATIENT
Start: 2022-01-01 | End: 2022-10-11 | Stop reason: HOSPADM

## 2022-01-01 RX ORDER — APIXABAN 5 MG (74)
KIT ORAL
Qty: 37 EACH | Refills: 0 | Status: ON HOLD | OUTPATIENT
Start: 2022-01-01 | End: 2022-01-01

## 2022-01-01 RX ORDER — POLYETHYLENE GLYCOL 3350 17 G/17G
17 POWDER, FOR SOLUTION ORAL DAILY
Status: DISCONTINUED | OUTPATIENT
Start: 2022-01-01 | End: 2022-01-01 | Stop reason: HOSPADM

## 2022-01-01 RX ORDER — DILTIAZEM HYDROCHLORIDE 180 MG/1
180 CAPSULE, COATED, EXTENDED RELEASE ORAL DAILY
Qty: 30 CAPSULE | Refills: 0 | Status: CANCELLED | OUTPATIENT
Start: 2022-01-01 | End: 2022-10-23

## 2022-01-01 RX ORDER — CELECOXIB 100 MG/1
100 CAPSULE ORAL 2 TIMES DAILY
Status: DISCONTINUED | OUTPATIENT
Start: 2022-01-01 | End: 2022-01-01 | Stop reason: HOSPADM

## 2022-01-01 RX ORDER — ACETAMINOPHEN 325 MG/1
975 TABLET ORAL EVERY 8 HOURS
Status: DISCONTINUED | OUTPATIENT
Start: 2022-01-01 | End: 2022-01-01 | Stop reason: HOSPADM

## 2022-01-01 RX ORDER — SIMETHICONE 40MG/0.6ML
40 SUSPENSION, DROPS(FINAL DOSAGE FORM)(ML) ORAL EVERY 6 HOURS PRN
Status: DISCONTINUED | OUTPATIENT
Start: 2022-01-01 | End: 2022-01-01 | Stop reason: HOSPADM

## 2022-01-01 RX ORDER — SIMETHICONE 80 MG
80 TABLET,CHEWABLE ORAL EVERY 6 HOURS PRN
Status: DISCONTINUED | OUTPATIENT
Start: 2022-01-01 | End: 2022-01-01

## 2022-01-01 RX ORDER — IOPAMIDOL 755 MG/ML
135 INJECTION, SOLUTION INTRAVASCULAR ONCE
Status: COMPLETED | OUTPATIENT
Start: 2022-01-01 | End: 2022-01-01

## 2022-01-01 RX ORDER — POLYETHYLENE GLYCOL 3350 17 G/17G
17 POWDER, FOR SOLUTION ORAL 2 TIMES DAILY PRN
Status: CANCELLED | OUTPATIENT
Start: 2022-01-01

## 2022-01-01 RX ORDER — HYDROMORPHONE HYDROCHLORIDE 1 MG/ML
0.5 INJECTION, SOLUTION INTRAMUSCULAR; INTRAVENOUS; SUBCUTANEOUS
Status: DISCONTINUED | OUTPATIENT
Start: 2022-01-01 | End: 2022-01-01

## 2022-01-01 RX ORDER — HALOPERIDOL 2 MG/ML
.5-1 SOLUTION ORAL EVERY 6 HOURS PRN
Status: DISCONTINUED | OUTPATIENT
Start: 2022-01-01 | End: 2022-10-11 | Stop reason: HOSPADM

## 2022-01-01 RX ORDER — DILTIAZEM HYDROCHLORIDE 180 MG/1
180 CAPSULE, COATED, EXTENDED RELEASE ORAL DAILY
Status: DISCONTINUED | OUTPATIENT
Start: 2022-01-01 | End: 2022-01-01 | Stop reason: HOSPADM

## 2022-01-01 RX ORDER — ATROPINE SULFATE 10 MG/ML
2 SOLUTION/ DROPS OPHTHALMIC EVERY 4 HOURS PRN
Status: DISCONTINUED | OUTPATIENT
Start: 2022-01-01 | End: 2022-01-01 | Stop reason: HOSPADM

## 2022-01-01 RX ORDER — SODIUM CHLORIDE, SODIUM LACTATE, POTASSIUM CHLORIDE, CALCIUM CHLORIDE 600; 310; 30; 20 MG/100ML; MG/100ML; MG/100ML; MG/100ML
INJECTION, SOLUTION INTRAVENOUS CONTINUOUS
Status: DISCONTINUED | OUTPATIENT
Start: 2022-01-01 | End: 2022-01-01 | Stop reason: HOSPADM

## 2022-01-01 RX ORDER — MORPHINE SULFATE 100 MG/5ML
30-45 SOLUTION ORAL
Status: DISCONTINUED | OUTPATIENT
Start: 2022-01-01 | End: 2022-10-11 | Stop reason: HOSPADM

## 2022-01-01 RX ORDER — DIPHENHYDRAMINE HYDROCHLORIDE AND LIDOCAINE HYDROCHLORIDE AND ALUMINUM HYDROXIDE AND MAGNESIUM HYDRO
10 KIT EVERY 6 HOURS PRN
Status: DISCONTINUED | OUTPATIENT
Start: 2022-01-01 | End: 2022-10-11 | Stop reason: HOSPADM

## 2022-01-01 RX ORDER — LORAZEPAM 2 MG/ML
0.5 INJECTION INTRAMUSCULAR EVERY 4 HOURS PRN
Status: CANCELLED | OUTPATIENT
Start: 2022-10-14

## 2022-01-01 RX ORDER — AMOXICILLIN 250 MG
1 CAPSULE ORAL 2 TIMES DAILY PRN
Status: CANCELLED | OUTPATIENT
Start: 2022-01-01

## 2022-01-01 RX ORDER — LIDOCAINE 40 MG/G
CREAM TOPICAL
Status: DISCONTINUED | OUTPATIENT
Start: 2022-01-01 | End: 2022-01-01

## 2022-01-01 RX ORDER — TAMSULOSIN HYDROCHLORIDE 0.4 MG/1
0.4 CAPSULE ORAL ONCE
Status: COMPLETED | OUTPATIENT
Start: 2022-01-01 | End: 2022-01-01

## 2022-01-01 RX ORDER — MORPHINE SULFATE 20 MG/ML
30-45 SOLUTION ORAL
Status: DISCONTINUED | OUTPATIENT
Start: 2022-01-01 | End: 2022-01-01 | Stop reason: HOSPADM

## 2022-01-01 RX ORDER — PANTOPRAZOLE SODIUM 40 MG/1
40 TABLET, DELAYED RELEASE ORAL
Status: DISCONTINUED | OUTPATIENT
Start: 2022-01-01 | End: 2022-01-01 | Stop reason: HOSPADM

## 2022-01-01 RX ORDER — HALOPERIDOL 2 MG/ML
.5-1 SOLUTION ORAL EVERY 6 HOURS PRN
Status: CANCELLED | OUTPATIENT
Start: 2022-01-01

## 2022-01-01 RX ORDER — VANCOMYCIN HYDROCHLORIDE 1 G/20ML
INJECTION, POWDER, LYOPHILIZED, FOR SOLUTION INTRAVENOUS PRN
Status: DISCONTINUED | OUTPATIENT
Start: 2022-01-01 | End: 2022-01-01 | Stop reason: HOSPADM

## 2022-01-01 RX ORDER — LORAZEPAM 0.5 MG/1
0.5 TABLET ORAL
Status: DISCONTINUED | OUTPATIENT
Start: 2022-01-01 | End: 2022-01-01 | Stop reason: HOSPADM

## 2022-01-01 RX ORDER — LACTOBACILLUS RHAMNOSUS GG 10B CELL
1 CAPSULE ORAL DAILY
Status: DISCONTINUED | OUTPATIENT
Start: 2022-01-01 | End: 2022-01-01 | Stop reason: HOSPADM

## 2022-01-01 RX ORDER — ONDANSETRON 4 MG/1
4 TABLET, ORALLY DISINTEGRATING ORAL EVERY 6 HOURS PRN
Status: DISCONTINUED | OUTPATIENT
Start: 2022-01-01 | End: 2022-01-01

## 2022-01-01 RX ORDER — MORPHINE SULFATE 20 MG/ML
30-45 SOLUTION ORAL
Status: CANCELLED | OUTPATIENT
Start: 2022-01-01

## 2022-01-01 RX ORDER — MORPHINE SULFATE 15 MG/1
15 TABLET ORAL EVERY 4 HOURS PRN
Status: DISCONTINUED | OUTPATIENT
Start: 2022-01-01 | End: 2022-01-01 | Stop reason: HOSPADM

## 2022-01-01 RX ORDER — ALBUMIN (HUMAN) 12.5 G/50ML
12.5 SOLUTION INTRAVENOUS ONCE
Status: DISCONTINUED | OUTPATIENT
Start: 2022-01-01 | End: 2022-01-01

## 2022-01-01 RX ORDER — ONDANSETRON 4 MG/1
4 TABLET, ORALLY DISINTEGRATING ORAL EVERY 8 HOURS PRN
Qty: 10 TABLET | Refills: 0 | Status: ON HOLD | OUTPATIENT
Start: 2022-01-01 | End: 2022-01-01

## 2022-01-01 RX ORDER — PROCHLORPERAZINE MALEATE 5 MG
5 TABLET ORAL EVERY 6 HOURS PRN
Status: DISCONTINUED | OUTPATIENT
Start: 2022-01-01 | End: 2022-10-11 | Stop reason: HOSPADM

## 2022-01-01 RX ORDER — OXYCODONE HYDROCHLORIDE 5 MG/1
5-10 TABLET ORAL
Qty: 50 TABLET | Refills: 0 | Status: SHIPPED | OUTPATIENT
Start: 2022-01-01 | End: 2022-01-01

## 2022-01-01 RX ORDER — BISACODYL 10 MG
10 SUPPOSITORY, RECTAL RECTAL DAILY PRN
Status: CANCELLED | OUTPATIENT
Start: 2022-01-01

## 2022-01-01 RX ORDER — BISACODYL 10 MG
10 SUPPOSITORY, RECTAL RECTAL DAILY PRN
Status: DISCONTINUED | OUTPATIENT
Start: 2022-01-01 | End: 2022-01-01 | Stop reason: HOSPADM

## 2022-01-01 RX ORDER — GLYCOPYRROLATE 0.2 MG/ML
0.2 INJECTION, SOLUTION INTRAMUSCULAR; INTRAVENOUS EVERY 4 HOURS PRN
Status: DISCONTINUED | OUTPATIENT
Start: 2022-01-01 | End: 2022-01-01 | Stop reason: HOSPADM

## 2022-01-01 RX ORDER — ONDANSETRON 2 MG/ML
8 INJECTION INTRAMUSCULAR; INTRAVENOUS ONCE
Status: CANCELLED | OUTPATIENT
Start: 2022-10-14

## 2022-01-01 RX ORDER — HYDROMORPHONE HCL IN WATER/PF 6 MG/30 ML
0.4 PATIENT CONTROLLED ANALGESIA SYRINGE INTRAVENOUS
Status: DISCONTINUED | OUTPATIENT
Start: 2022-01-01 | End: 2022-01-01 | Stop reason: HOSPADM

## 2022-01-01 RX ORDER — NALOXONE HYDROCHLORIDE 0.4 MG/ML
0.2 INJECTION, SOLUTION INTRAMUSCULAR; INTRAVENOUS; SUBCUTANEOUS
Status: DISCONTINUED | OUTPATIENT
Start: 2022-01-01 | End: 2022-10-11 | Stop reason: HOSPADM

## 2022-01-01 RX ORDER — NALOXONE HYDROCHLORIDE 0.4 MG/ML
0.4 INJECTION, SOLUTION INTRAMUSCULAR; INTRAVENOUS; SUBCUTANEOUS
Status: DISCONTINUED | OUTPATIENT
Start: 2022-01-01 | End: 2022-01-01

## 2022-01-01 RX ORDER — HYDROXYZINE HYDROCHLORIDE 25 MG/1
25 TABLET, FILM COATED ORAL 3 TIMES DAILY PRN
Qty: 30 TABLET | Refills: 0 | Status: SHIPPED | OUTPATIENT
Start: 2022-01-01 | End: 2022-01-01

## 2022-01-01 RX ORDER — CEFAZOLIN SODIUM IN 0.9 % NACL 3 G/100 ML
3 INTRAVENOUS SOLUTION, PIGGYBACK (ML) INTRAVENOUS
Status: CANCELLED | OUTPATIENT
Start: 2022-01-01

## 2022-01-01 RX ORDER — SENNOSIDES 8.6 MG
1 TABLET ORAL DAILY
COMMUNITY

## 2022-01-01 RX ORDER — EPHEDRINE SULFATE 50 MG/ML
INJECTION, SOLUTION INTRAMUSCULAR; INTRAVENOUS; SUBCUTANEOUS PRN
Status: DISCONTINUED | OUTPATIENT
Start: 2022-01-01 | End: 2022-01-01

## 2022-01-01 RX ORDER — LIDOCAINE HYDROCHLORIDE AND EPINEPHRINE 10; 10 MG/ML; UG/ML
1-30 INJECTION, SOLUTION INFILTRATION; PERINEURAL
Status: DISCONTINUED | OUTPATIENT
Start: 2022-01-01 | End: 2022-01-01

## 2022-01-01 RX ORDER — DILTIAZEM HYDROCHLORIDE 5 MG/ML
15 INJECTION INTRAVENOUS ONCE
Status: COMPLETED | OUTPATIENT
Start: 2022-01-01 | End: 2022-01-01

## 2022-01-01 RX ORDER — ONDANSETRON 2 MG/ML
4 INJECTION INTRAMUSCULAR; INTRAVENOUS EVERY 6 HOURS PRN
Status: DISCONTINUED | OUTPATIENT
Start: 2022-01-01 | End: 2022-01-01

## 2022-01-01 RX ORDER — HEPARIN SODIUM (PORCINE) LOCK FLUSH IV SOLN 100 UNIT/ML 100 UNIT/ML
5 SOLUTION INTRAVENOUS
Status: CANCELLED | OUTPATIENT
Start: 2022-10-14

## 2022-01-01 RX ORDER — OXYCODONE HYDROCHLORIDE 5 MG/1
10 TABLET ORAL EVERY 4 HOURS PRN
Status: DISCONTINUED | OUTPATIENT
Start: 2022-01-01 | End: 2022-01-01 | Stop reason: HOSPADM

## 2022-01-01 RX ORDER — EPINEPHRINE 1 MG/ML
0.3 INJECTION, SOLUTION INTRAMUSCULAR; SUBCUTANEOUS EVERY 5 MIN PRN
Status: CANCELLED | OUTPATIENT
Start: 2022-01-01

## 2022-01-01 RX ORDER — CEFAZOLIN SODIUM IN 0.9 % NACL 3 G/100 ML
3 INTRAVENOUS SOLUTION, PIGGYBACK (ML) INTRAVENOUS SEE ADMIN INSTRUCTIONS
Status: DISCONTINUED | OUTPATIENT
Start: 2022-01-01 | End: 2022-01-01 | Stop reason: HOSPADM

## 2022-01-01 RX ORDER — AMOXICILLIN 250 MG
1-2 CAPSULE ORAL 2 TIMES DAILY
Qty: 30 TABLET | Refills: 0 | Status: SHIPPED | OUTPATIENT
Start: 2022-01-01 | End: 2022-01-01

## 2022-01-01 RX ORDER — HEPARIN SODIUM 10000 [USP'U]/100ML
0-5000 INJECTION, SOLUTION INTRAVENOUS CONTINUOUS
Status: DISCONTINUED | OUTPATIENT
Start: 2022-01-01 | End: 2022-01-01

## 2022-01-01 RX ORDER — METHYLPREDNISOLONE SODIUM SUCCINATE 125 MG/2ML
125 INJECTION, POWDER, LYOPHILIZED, FOR SOLUTION INTRAMUSCULAR; INTRAVENOUS
Status: CANCELLED
Start: 2022-01-01

## 2022-01-01 RX ORDER — ONDANSETRON 2 MG/ML
4 INJECTION INTRAMUSCULAR; INTRAVENOUS ONCE
Status: DISCONTINUED | OUTPATIENT
Start: 2022-01-01 | End: 2022-01-01 | Stop reason: HOSPADM

## 2022-01-01 RX ORDER — CHLORPROMAZINE HYDROCHLORIDE 25 MG/1
25 TABLET, FILM COATED ORAL 3 TIMES DAILY PRN
Status: DISCONTINUED | OUTPATIENT
Start: 2022-01-01 | End: 2022-01-01

## 2022-01-01 RX ORDER — LORAZEPAM 2 MG/ML
.5-1 CONCENTRATE ORAL
Status: CANCELLED | OUTPATIENT
Start: 2022-01-01

## 2022-01-01 RX ORDER — ACETAMINOPHEN 325 MG/1
650 TABLET ORAL EVERY 6 HOURS PRN
Status: DISCONTINUED | OUTPATIENT
Start: 2022-01-01 | End: 2022-10-11 | Stop reason: HOSPADM

## 2022-01-01 RX ORDER — ATROPINE SULFATE 10 MG/ML
2 SOLUTION/ DROPS OPHTHALMIC EVERY 4 HOURS PRN
Status: DISCONTINUED | OUTPATIENT
Start: 2022-01-01 | End: 2022-10-11 | Stop reason: HOSPADM

## 2022-01-01 RX ORDER — ONDANSETRON 4 MG/1
4 TABLET, ORALLY DISINTEGRATING ORAL EVERY 6 HOURS PRN
Status: DISCONTINUED | OUTPATIENT
Start: 2022-01-01 | End: 2022-01-01 | Stop reason: HOSPADM

## 2022-01-01 RX ORDER — MORPHINE SULFATE 15 MG/1
15 TABLET ORAL EVERY 4 HOURS PRN
Status: DISCONTINUED | OUTPATIENT
Start: 2022-01-01 | End: 2022-01-01

## 2022-01-01 RX ORDER — AMOXICILLIN 250 MG
1 CAPSULE ORAL 2 TIMES DAILY
Status: DISCONTINUED | OUTPATIENT
Start: 2022-01-01 | End: 2022-01-01 | Stop reason: HOSPADM

## 2022-01-01 RX ORDER — NALOXONE HYDROCHLORIDE 0.4 MG/ML
0.4 INJECTION, SOLUTION INTRAMUSCULAR; INTRAVENOUS; SUBCUTANEOUS
Status: CANCELLED | OUTPATIENT
Start: 2022-01-01

## 2022-01-01 RX ORDER — PROCHLORPERAZINE 25 MG
12.5 SUPPOSITORY, RECTAL RECTAL EVERY 12 HOURS PRN
Status: CANCELLED | OUTPATIENT
Start: 2022-01-01

## 2022-01-01 RX ORDER — SENNOSIDES 8.6 MG
1 TABLET ORAL DAILY
Status: DISCONTINUED | OUTPATIENT
Start: 2022-01-01 | End: 2022-10-11 | Stop reason: HOSPADM

## 2022-01-01 RX ORDER — MORPHINE SULFATE 15 MG/1
15 TABLET ORAL EVERY 4 HOURS PRN
Qty: 60 TABLET | Refills: 0 | Status: SHIPPED | OUTPATIENT
Start: 2022-01-01

## 2022-01-01 RX ORDER — DIPHENHYDRAMINE HYDROCHLORIDE 50 MG/ML
50 INJECTION INTRAMUSCULAR; INTRAVENOUS
Status: CANCELLED
Start: 2022-01-01

## 2022-01-01 RX ORDER — ATROPINE SULFATE 10 MG/ML
2 SOLUTION/ DROPS OPHTHALMIC EVERY 4 HOURS PRN
Status: CANCELLED | OUTPATIENT
Start: 2022-01-01

## 2022-01-01 RX ORDER — ONDANSETRON 4 MG/1
4 TABLET, ORALLY DISINTEGRATING ORAL EVERY 30 MIN PRN
Status: DISCONTINUED | OUTPATIENT
Start: 2022-01-01 | End: 2022-01-01 | Stop reason: HOSPADM

## 2022-01-01 RX ORDER — NALOXONE HYDROCHLORIDE 0.4 MG/ML
0.1 INJECTION, SOLUTION INTRAMUSCULAR; INTRAVENOUS; SUBCUTANEOUS
Status: CANCELLED | OUTPATIENT
Start: 2022-01-01

## 2022-01-01 RX ORDER — LIDOCAINE 40 MG/G
CREAM TOPICAL
Status: DISCONTINUED | OUTPATIENT
Start: 2022-01-01 | End: 2022-01-01 | Stop reason: HOSPADM

## 2022-01-01 RX ORDER — NALOXONE HYDROCHLORIDE 0.4 MG/ML
0.1 INJECTION, SOLUTION INTRAMUSCULAR; INTRAVENOUS; SUBCUTANEOUS
Status: DISCONTINUED | OUTPATIENT
Start: 2022-01-01 | End: 2022-01-01 | Stop reason: HOSPADM

## 2022-01-01 RX ORDER — PROCHLORPERAZINE 25 MG
12.5 SUPPOSITORY, RECTAL RECTAL EVERY 12 HOURS PRN
Status: DISCONTINUED | OUTPATIENT
Start: 2022-01-01 | End: 2022-10-11 | Stop reason: HOSPADM

## 2022-01-01 RX ORDER — HYDROCODONE BITARTRATE AND ACETAMINOPHEN 5; 325 MG/1; MG/1
1 TABLET ORAL EVERY 6 HOURS PRN
Qty: 18 TABLET | Refills: 0 | Status: SHIPPED | OUTPATIENT
Start: 2022-01-01 | End: 2022-01-01

## 2022-01-01 RX ORDER — ACETAMINOPHEN 650 MG/1
650 SUPPOSITORY RECTAL EVERY 6 HOURS PRN
Status: CANCELLED | OUTPATIENT
Start: 2022-01-01

## 2022-01-01 RX ORDER — ACETAMINOPHEN 325 MG/1
650 TABLET ORAL EVERY 6 HOURS PRN
Status: CANCELLED | OUTPATIENT
Start: 2022-01-01

## 2022-01-01 RX ORDER — METHYLPREDNISOLONE SODIUM SUCCINATE 125 MG/2ML
125 INJECTION, POWDER, LYOPHILIZED, FOR SOLUTION INTRAMUSCULAR; INTRAVENOUS
Status: CANCELLED
Start: 2022-10-14

## 2022-01-01 RX ORDER — ACETAMINOPHEN 650 MG/1
650 SUPPOSITORY RECTAL EVERY 6 HOURS PRN
Status: DISCONTINUED | OUTPATIENT
Start: 2022-01-01 | End: 2022-01-01 | Stop reason: HOSPADM

## 2022-01-01 RX ORDER — HYDROXYZINE HYDROCHLORIDE 10 MG/1
10 TABLET, FILM COATED ORAL EVERY 6 HOURS PRN
Status: DISCONTINUED | OUTPATIENT
Start: 2022-01-01 | End: 2022-01-01

## 2022-01-01 RX ORDER — POLYETHYLENE GLYCOL 3350 17 G/17G
17 POWDER, FOR SOLUTION ORAL 2 TIMES DAILY PRN
Status: DISCONTINUED | OUTPATIENT
Start: 2022-01-01 | End: 2022-01-01 | Stop reason: HOSPADM

## 2022-01-01 RX ORDER — HEPARIN SODIUM (PORCINE) LOCK FLUSH IV SOLN 100 UNIT/ML 100 UNIT/ML
5 SOLUTION INTRAVENOUS
Status: CANCELLED | OUTPATIENT
Start: 2022-01-01

## 2022-01-01 RX ORDER — ACETAMINOPHEN 325 MG/1
650 TABLET ORAL EVERY 4 HOURS PRN
Status: DISCONTINUED | OUTPATIENT
Start: 2022-01-01 | End: 2022-01-01

## 2022-01-01 RX ORDER — LIDOCAINE 4 G/G
1 PATCH TOPICAL
Status: DISCONTINUED | OUTPATIENT
Start: 2022-01-01 | End: 2022-10-11 | Stop reason: HOSPADM

## 2022-01-01 RX ORDER — LIDOCAINE 40 MG/G
CREAM TOPICAL
Status: CANCELLED | OUTPATIENT
Start: 2022-01-01

## 2022-01-01 RX ORDER — OXYCODONE HYDROCHLORIDE 5 MG/1
5 TABLET ORAL EVERY 4 HOURS PRN
Status: DISCONTINUED | OUTPATIENT
Start: 2022-01-01 | End: 2022-01-01 | Stop reason: HOSPADM

## 2022-01-01 RX ORDER — MULTIVIT WITH MINERALS/LUTEIN
1 TABLET ORAL DAILY
COMMUNITY
End: 2022-01-01

## 2022-01-01 RX ORDER — LORAZEPAM 2 MG/ML
0.5 INJECTION INTRAMUSCULAR
Status: DISCONTINUED | OUTPATIENT
Start: 2022-01-01 | End: 2022-01-01 | Stop reason: HOSPADM

## 2022-01-01 RX ORDER — HEPARIN SODIUM,PORCINE 10 UNIT/ML
5 VIAL (ML) INTRAVENOUS
Status: CANCELLED | OUTPATIENT
Start: 2022-10-14

## 2022-01-01 RX ORDER — ONDANSETRON 2 MG/ML
4 INJECTION INTRAMUSCULAR; INTRAVENOUS EVERY 6 HOURS
Status: DISCONTINUED | OUTPATIENT
Start: 2022-01-01 | End: 2022-01-01 | Stop reason: HOSPADM

## 2022-01-01 RX ORDER — PANTOPRAZOLE SODIUM 40 MG/1
40 TABLET, DELAYED RELEASE ORAL
Status: DISCONTINUED | OUTPATIENT
Start: 2022-01-01 | End: 2022-01-01

## 2022-01-01 RX ORDER — HEPARIN SODIUM,PORCINE 10 UNIT/ML
5 VIAL (ML) INTRAVENOUS
Status: CANCELLED | OUTPATIENT
Start: 2022-01-01

## 2022-01-01 RX ORDER — METOCLOPRAMIDE HYDROCHLORIDE 5 MG/ML
5 INJECTION INTRAMUSCULAR; INTRAVENOUS EVERY 6 HOURS PRN
Status: DISCONTINUED | OUTPATIENT
Start: 2022-01-01 | End: 2022-01-01

## 2022-01-01 RX ORDER — PROCHLORPERAZINE 25 MG
12.5 SUPPOSITORY, RECTAL RECTAL EVERY 12 HOURS PRN
Status: DISCONTINUED | OUTPATIENT
Start: 2022-01-01 | End: 2022-01-01 | Stop reason: HOSPADM

## 2022-01-01 RX ORDER — SENNOSIDES 8.6 MG
1 TABLET ORAL DAILY
Status: CANCELLED | OUTPATIENT
Start: 2022-01-01

## 2022-01-01 RX ORDER — FLUMAZENIL 0.1 MG/ML
0.2 INJECTION, SOLUTION INTRAVENOUS
Status: DISCONTINUED | OUTPATIENT
Start: 2022-01-01 | End: 2022-01-01 | Stop reason: HOSPADM

## 2022-01-01 RX ORDER — LIDOCAINE HYDROCHLORIDE 10 MG/ML
5 INJECTION, SOLUTION EPIDURAL; INFILTRATION; INTRACAUDAL; PERINEURAL ONCE
Status: COMPLETED | OUTPATIENT
Start: 2022-01-01 | End: 2022-01-01

## 2022-01-01 RX ORDER — ALBUTEROL SULFATE 90 UG/1
1-2 AEROSOL, METERED RESPIRATORY (INHALATION)
Status: CANCELLED
Start: 2022-01-01

## 2022-01-01 RX ORDER — OLANZAPINE 5 MG/1
5 TABLET, ORALLY DISINTEGRATING ORAL EVERY 6 HOURS PRN
Status: CANCELLED | OUTPATIENT
Start: 2022-01-01

## 2022-01-01 RX ORDER — OLANZAPINE 5 MG/1
5 TABLET, ORALLY DISINTEGRATING ORAL EVERY 6 HOURS PRN
Status: DISCONTINUED | OUTPATIENT
Start: 2022-01-01 | End: 2022-10-11 | Stop reason: HOSPADM

## 2022-01-01 RX ORDER — ONDANSETRON 4 MG/1
4 TABLET, ORALLY DISINTEGRATING ORAL EVERY 6 HOURS PRN
Status: CANCELLED | OUTPATIENT
Start: 2022-01-01

## 2022-01-01 RX ORDER — CEFAZOLIN SODIUM IN 0.9 % NACL 3 G/100 ML
3 INTRAVENOUS SOLUTION, PIGGYBACK (ML) INTRAVENOUS
Status: DISCONTINUED | OUTPATIENT
Start: 2022-01-01 | End: 2022-01-01

## 2022-01-01 RX ORDER — TAMSULOSIN HYDROCHLORIDE 0.4 MG/1
0.4 CAPSULE ORAL DAILY
Status: DISCONTINUED | OUTPATIENT
Start: 2022-01-01 | End: 2022-01-01 | Stop reason: HOSPADM

## 2022-01-01 RX ORDER — DEXAMETHASONE SODIUM PHOSPHATE 4 MG/ML
INJECTION, SOLUTION INTRA-ARTICULAR; INTRALESIONAL; INTRAMUSCULAR; INTRAVENOUS; SOFT TISSUE PRN
Status: DISCONTINUED | OUTPATIENT
Start: 2022-01-01 | End: 2022-01-01

## 2022-01-01 RX ORDER — KETOROLAC TROMETHAMINE 15 MG/ML
15 INJECTION, SOLUTION INTRAMUSCULAR; INTRAVENOUS
Status: DISCONTINUED | OUTPATIENT
Start: 2022-01-01 | End: 2022-01-01 | Stop reason: HOSPADM

## 2022-01-01 RX ORDER — ALBUTEROL SULFATE 90 UG/1
1-2 AEROSOL, METERED RESPIRATORY (INHALATION)
Status: CANCELLED
Start: 2022-10-14

## 2022-01-01 RX ORDER — LIDOCAINE 4 G/G
1 PATCH TOPICAL
Status: CANCELLED | OUTPATIENT
Start: 2022-01-01

## 2022-01-01 RX ORDER — EPINEPHRINE 1 MG/ML
0.3 INJECTION, SOLUTION INTRAMUSCULAR; SUBCUTANEOUS EVERY 5 MIN PRN
Status: CANCELLED | OUTPATIENT
Start: 2022-10-14

## 2022-01-01 RX ORDER — HYDROXYZINE HYDROCHLORIDE 25 MG/1
25 TABLET, FILM COATED ORAL 3 TIMES DAILY PRN
Status: DISCONTINUED | OUTPATIENT
Start: 2022-01-01 | End: 2022-01-01 | Stop reason: HOSPADM

## 2022-01-01 RX ORDER — DIPHENHYDRAMINE HYDROCHLORIDE 50 MG/ML
50 INJECTION INTRAMUSCULAR; INTRAVENOUS
Status: CANCELLED
Start: 2022-10-14

## 2022-01-01 RX ORDER — METOCLOPRAMIDE HYDROCHLORIDE 5 MG/ML
5 INJECTION INTRAMUSCULAR; INTRAVENOUS EVERY 6 HOURS PRN
Status: DISCONTINUED | OUTPATIENT
Start: 2022-01-01 | End: 2022-01-01 | Stop reason: HOSPADM

## 2022-01-01 RX ORDER — METOCLOPRAMIDE HYDROCHLORIDE 5 MG/ML
5 INJECTION INTRAMUSCULAR; INTRAVENOUS EVERY 6 HOURS PRN
Status: CANCELLED | OUTPATIENT
Start: 2022-01-01

## 2022-01-01 RX ORDER — PACLITAXEL 100 MG/20ML
125 INJECTION, POWDER, LYOPHILIZED, FOR SUSPENSION INTRAVENOUS ONCE
Status: COMPLETED | OUTPATIENT
Start: 2022-01-01 | End: 2022-01-01

## 2022-01-01 RX ORDER — FAMOTIDINE 20 MG/1
20 TABLET, FILM COATED ORAL 2 TIMES DAILY
Status: DISCONTINUED | OUTPATIENT
Start: 2022-01-01 | End: 2022-01-01 | Stop reason: HOSPADM

## 2022-01-01 RX ORDER — SENNOSIDES 8.6 MG
1 TABLET ORAL DAILY
Status: DISCONTINUED | OUTPATIENT
Start: 2022-01-01 | End: 2022-01-01 | Stop reason: HOSPADM

## 2022-01-01 RX ORDER — SIMETHICONE 80 MG
80 TABLET,CHEWABLE ORAL EVERY 6 HOURS PRN
Status: DISCONTINUED | OUTPATIENT
Start: 2022-01-01 | End: 2022-01-01 | Stop reason: HOSPADM

## 2022-01-01 RX ORDER — PROCHLORPERAZINE MALEATE 10 MG
10 TABLET ORAL EVERY 6 HOURS PRN
Qty: 30 TABLET | Refills: 2 | Status: SHIPPED | OUTPATIENT
Start: 2022-01-01

## 2022-01-01 RX ORDER — PROCHLORPERAZINE MALEATE 5 MG
5 TABLET ORAL EVERY 6 HOURS PRN
Status: CANCELLED | OUTPATIENT
Start: 2022-01-01

## 2022-01-01 RX ORDER — ALBUTEROL SULFATE 0.83 MG/ML
2.5 SOLUTION RESPIRATORY (INHALATION)
Status: CANCELLED | OUTPATIENT
Start: 2022-10-14

## 2022-01-01 RX ORDER — SODIUM CHLORIDE 9 MG/ML
INJECTION, SOLUTION INTRAVENOUS CONTINUOUS
Status: DISCONTINUED | OUTPATIENT
Start: 2022-01-01 | End: 2022-01-01 | Stop reason: HOSPADM

## 2022-01-01 RX ORDER — AMOXICILLIN 250 MG
1 CAPSULE ORAL 2 TIMES DAILY PRN
Status: DISCONTINUED | OUTPATIENT
Start: 2022-01-01 | End: 2022-10-11 | Stop reason: HOSPADM

## 2022-01-01 RX ORDER — LORAZEPAM 0.5 MG/1
0.25 TABLET ORAL AT BEDTIME
Status: DISCONTINUED | OUTPATIENT
Start: 2022-01-01 | End: 2022-10-11 | Stop reason: HOSPADM

## 2022-01-01 RX ORDER — MAGNESIUM HYDROXIDE/ALUMINUM HYDROXICE/SIMETHICONE 120; 1200; 1200 MG/30ML; MG/30ML; MG/30ML
30 SUSPENSION ORAL EVERY 4 HOURS PRN
Status: DISCONTINUED | OUTPATIENT
Start: 2022-01-01 | End: 2022-01-01 | Stop reason: HOSPADM

## 2022-01-01 RX ORDER — PACLITAXEL 100 MG/20ML
125 INJECTION, POWDER, LYOPHILIZED, FOR SUSPENSION INTRAVENOUS ONCE
Status: CANCELLED | OUTPATIENT
Start: 2022-10-14

## 2022-01-01 RX ORDER — FLUMAZENIL 0.1 MG/ML
0.2 INJECTION, SOLUTION INTRAVENOUS
Status: CANCELLED | OUTPATIENT
Start: 2022-01-01

## 2022-01-01 RX ORDER — LORAZEPAM 2 MG/ML
.5-1 CONCENTRATE ORAL
Status: DISCONTINUED | OUTPATIENT
Start: 2022-01-01 | End: 2022-01-01 | Stop reason: HOSPADM

## 2022-01-01 RX ORDER — HEPARIN SODIUM (PORCINE) LOCK FLUSH IV SOLN 100 UNIT/ML 100 UNIT/ML
500 SOLUTION INTRAVENOUS
Status: COMPLETED | OUTPATIENT
Start: 2022-01-01 | End: 2022-01-01

## 2022-01-01 RX ORDER — SODIUM CHLORIDE 9 MG/ML
INJECTION, SOLUTION INTRAVENOUS CONTINUOUS
Status: DISCONTINUED | OUTPATIENT
Start: 2022-01-01 | End: 2022-01-01

## 2022-01-01 RX ORDER — MEPERIDINE HYDROCHLORIDE 25 MG/ML
25 INJECTION INTRAMUSCULAR; INTRAVENOUS; SUBCUTANEOUS EVERY 30 MIN PRN
Status: CANCELLED | OUTPATIENT
Start: 2022-10-14

## 2022-01-01 RX ORDER — ALLOPURINOL 300 MG/1
300 TABLET ORAL DAILY
Status: DISCONTINUED | OUTPATIENT
Start: 2022-01-01 | End: 2022-01-01

## 2022-01-01 RX ORDER — BUPIVACAINE HYDROCHLORIDE 7.5 MG/ML
INJECTION, SOLUTION INTRASPINAL
Status: COMPLETED | OUTPATIENT
Start: 2022-01-01 | End: 2022-01-01

## 2022-01-01 RX ORDER — DIPHENHYDRAMINE HYDROCHLORIDE AND LIDOCAINE HYDROCHLORIDE AND ALUMINUM HYDROXIDE AND MAGNESIUM HYDRO
10 KIT EVERY 6 HOURS PRN
Status: DISCONTINUED | OUTPATIENT
Start: 2022-01-01 | End: 2022-01-01 | Stop reason: HOSPADM

## 2022-01-01 RX ORDER — ACETAMINOPHEN 325 MG/1
650 TABLET ORAL EVERY 4 HOURS PRN
Qty: 100 TABLET | Refills: 0 | Status: SHIPPED | OUTPATIENT
Start: 2022-01-01 | End: 2022-01-01

## 2022-01-01 RX ORDER — LABETALOL HYDROCHLORIDE 5 MG/ML
10 INJECTION, SOLUTION INTRAVENOUS
Status: DISCONTINUED | OUTPATIENT
Start: 2022-01-01 | End: 2022-01-01 | Stop reason: HOSPADM

## 2022-01-01 RX ORDER — CELECOXIB 200 MG/1
400 CAPSULE ORAL ONCE
Status: COMPLETED | OUTPATIENT
Start: 2022-01-01 | End: 2022-01-01

## 2022-01-01 RX ORDER — LACTOBACILLUS RHAMNOSUS GG 10B CELL
1 CAPSULE ORAL DAILY
Status: DISCONTINUED | OUTPATIENT
Start: 2022-01-01 | End: 2022-01-01 | Stop reason: CLARIF

## 2022-01-01 RX ORDER — FENTANYL CITRATE 50 UG/ML
25-50 INJECTION, SOLUTION INTRAMUSCULAR; INTRAVENOUS EVERY 5 MIN PRN
Status: DISCONTINUED | OUTPATIENT
Start: 2022-01-01 | End: 2022-01-01 | Stop reason: HOSPADM

## 2022-01-01 RX ORDER — OLANZAPINE 5 MG/1
5 TABLET, ORALLY DISINTEGRATING ORAL EVERY 6 HOURS PRN
Status: DISCONTINUED | OUTPATIENT
Start: 2022-01-01 | End: 2022-01-01 | Stop reason: HOSPADM

## 2022-01-01 RX ORDER — CEFAZOLIN SODIUM 2 G/100ML
2 INJECTION, SOLUTION INTRAVENOUS EVERY 8 HOURS
Status: COMPLETED | OUTPATIENT
Start: 2022-01-01 | End: 2022-01-01

## 2022-01-01 RX ORDER — DILTIAZEM HYDROCHLORIDE 180 MG/1
180 CAPSULE, COATED, EXTENDED RELEASE ORAL DAILY
Qty: 30 CAPSULE | Refills: 0 | Status: SHIPPED | OUTPATIENT
Start: 2022-01-01 | End: 2022-10-24

## 2022-01-01 RX ORDER — ONDANSETRON 4 MG/1
4 TABLET, ORALLY DISINTEGRATING ORAL EVERY 6 HOURS PRN
Status: DISCONTINUED | OUTPATIENT
Start: 2022-01-01 | End: 2022-10-11 | Stop reason: HOSPADM

## 2022-01-01 RX ORDER — PROCHLORPERAZINE MALEATE 5 MG
10 TABLET ORAL EVERY 6 HOURS PRN
Status: DISCONTINUED | OUTPATIENT
Start: 2022-01-01 | End: 2022-01-01

## 2022-01-01 RX ORDER — SODIUM CHLORIDE, SODIUM LACTATE, POTASSIUM CHLORIDE, CALCIUM CHLORIDE 600; 310; 30; 20 MG/100ML; MG/100ML; MG/100ML; MG/100ML
125 INJECTION, SOLUTION INTRAVENOUS CONTINUOUS
Status: DISCONTINUED | OUTPATIENT
Start: 2022-01-01 | End: 2022-01-01

## 2022-01-01 RX ORDER — METOCLOPRAMIDE HYDROCHLORIDE 5 MG/ML
5 INJECTION INTRAMUSCULAR; INTRAVENOUS EVERY 6 HOURS PRN
Status: DISCONTINUED | OUTPATIENT
Start: 2022-01-01 | End: 2022-10-11 | Stop reason: HOSPADM

## 2022-01-01 RX ORDER — HEPARIN SODIUM (PORCINE) LOCK FLUSH IV SOLN 100 UNIT/ML 100 UNIT/ML
5 SOLUTION INTRAVENOUS
Status: DISCONTINUED | OUTPATIENT
Start: 2022-01-01 | End: 2022-01-01 | Stop reason: HOSPADM

## 2022-01-01 RX ORDER — FENTANYL CITRATE 0.05 MG/ML
50 INJECTION, SOLUTION INTRAMUSCULAR; INTRAVENOUS EVERY 5 MIN PRN
Status: DISCONTINUED | OUTPATIENT
Start: 2022-01-01 | End: 2022-01-01 | Stop reason: HOSPADM

## 2022-01-01 RX ORDER — ONDANSETRON 2 MG/ML
4 INJECTION INTRAMUSCULAR; INTRAVENOUS
Status: DISCONTINUED | OUTPATIENT
Start: 2022-01-01 | End: 2022-01-01

## 2022-01-01 RX ORDER — FENTANYL CITRATE 50 UG/ML
INJECTION, SOLUTION INTRAMUSCULAR; INTRAVENOUS
Status: DISCONTINUED | OUTPATIENT
Start: 2022-01-01 | End: 2022-01-01 | Stop reason: HOSPADM

## 2022-01-01 RX ORDER — LIDOCAINE 4 G/G
1 PATCH TOPICAL
Status: DISCONTINUED | OUTPATIENT
Start: 2022-01-01 | End: 2022-01-01 | Stop reason: HOSPADM

## 2022-01-01 RX ORDER — PANTOPRAZOLE SODIUM 40 MG/1
40 TABLET, DELAYED RELEASE ORAL
Qty: 30 TABLET | Refills: 0 | Status: SHIPPED | OUTPATIENT
Start: 2022-01-01

## 2022-01-01 RX ORDER — ACETAMINOPHEN 325 MG/1
975 TABLET ORAL ONCE
Status: COMPLETED | OUTPATIENT
Start: 2022-01-01 | End: 2022-01-01

## 2022-01-01 RX ORDER — SIMETHICONE 40MG/0.6ML
40 SUSPENSION, DROPS(FINAL DOSAGE FORM)(ML) ORAL EVERY 6 HOURS PRN
Status: DISCONTINUED | OUTPATIENT
Start: 2022-01-01 | End: 2022-01-01

## 2022-01-01 RX ORDER — POLYETHYLENE GLYCOL 3350 17 G/17G
17 POWDER, FOR SOLUTION ORAL 2 TIMES DAILY
Status: DISCONTINUED | OUTPATIENT
Start: 2022-01-01 | End: 2022-01-01

## 2022-01-01 RX ORDER — ACETAMINOPHEN 325 MG/1
650 TABLET ORAL EVERY 4 HOURS PRN
Status: DISCONTINUED | OUTPATIENT
Start: 2022-01-01 | End: 2022-01-01 | Stop reason: HOSPADM

## 2022-01-01 RX ORDER — POTASSIUM CHLORIDE 1500 MG/1
20 TABLET, EXTENDED RELEASE ORAL
Status: DISCONTINUED | OUTPATIENT
Start: 2022-01-01 | End: 2022-01-01 | Stop reason: HOSPADM

## 2022-01-01 RX ORDER — BISACODYL 10 MG
10 SUPPOSITORY, RECTAL RECTAL DAILY PRN
Status: DISCONTINUED | OUTPATIENT
Start: 2022-01-01 | End: 2022-10-11 | Stop reason: HOSPADM

## 2022-01-01 RX ORDER — DILTIAZEM HYDROCHLORIDE 180 MG/1
180 CAPSULE, COATED, EXTENDED RELEASE ORAL DAILY
Status: DISCONTINUED | OUTPATIENT
Start: 2022-01-01 | End: 2022-01-01

## 2022-01-01 RX ORDER — ACETAMINOPHEN 650 MG/1
650 SUPPOSITORY RECTAL EVERY 6 HOURS PRN
Status: DISCONTINUED | OUTPATIENT
Start: 2022-01-01 | End: 2022-10-11 | Stop reason: HOSPADM

## 2022-01-01 RX ADMIN — PANTOPRAZOLE SODIUM 40 MG: 40 TABLET, DELAYED RELEASE ORAL at 09:52

## 2022-01-01 RX ADMIN — SODIUM CHLORIDE: 9 INJECTION, SOLUTION INTRAVENOUS at 11:44

## 2022-01-01 RX ADMIN — HEPARIN SODIUM 1800 UNITS/HR: 10000 INJECTION, SOLUTION INTRAVENOUS at 03:06

## 2022-01-01 RX ADMIN — SODIUM CHLORIDE 500 ML: 9 INJECTION, SOLUTION INTRAVENOUS at 19:16

## 2022-01-01 RX ADMIN — POLYETHYLENE GLYCOL 3350 17 G: 17 POWDER, FOR SOLUTION ORAL at 20:41

## 2022-01-01 RX ADMIN — DILTIAZEM HYDROCHLORIDE 180 MG: 180 CAPSULE, COATED, EXTENDED RELEASE ORAL at 10:29

## 2022-01-01 RX ADMIN — DILTIAZEM HYDROCHLORIDE 60 MG: 30 TABLET, FILM COATED ORAL at 21:32

## 2022-01-01 RX ADMIN — FENTANYL CITRATE 50 MCG: 50 INJECTION, SOLUTION INTRAMUSCULAR; INTRAVENOUS at 10:43

## 2022-01-01 RX ADMIN — ACETAMINOPHEN 650 MG: 325 TABLET, FILM COATED ORAL at 09:26

## 2022-01-01 RX ADMIN — HYDROMORPHONE HYDROCHLORIDE 0.5 MG: 1 INJECTION, SOLUTION INTRAMUSCULAR; INTRAVENOUS; SUBCUTANEOUS at 21:51

## 2022-01-01 RX ADMIN — ALLOPURINOL 300 MG: 300 TABLET ORAL at 08:30

## 2022-01-01 RX ADMIN — HEPARIN SODIUM 1800 UNITS/HR: 10000 INJECTION, SOLUTION INTRAVENOUS at 17:11

## 2022-01-01 RX ADMIN — PANTOPRAZOLE SODIUM 40 MG: 40 TABLET, DELAYED RELEASE ORAL at 09:12

## 2022-01-01 RX ADMIN — MORPHINE SULFATE 4 MG: 2 INJECTION, SOLUTION INTRAMUSCULAR; INTRAVENOUS at 06:06

## 2022-01-01 RX ADMIN — POLYETHYLENE GLYCOL 3350 17 G: 17 POWDER, FOR SOLUTION ORAL at 19:28

## 2022-01-01 RX ADMIN — IOPAMIDOL 135 ML: 755 INJECTION, SOLUTION INTRAVENOUS at 17:55

## 2022-01-01 RX ADMIN — PANTOPRAZOLE SODIUM 40 MG: 40 TABLET, DELAYED RELEASE ORAL at 07:26

## 2022-01-01 RX ADMIN — MICONAZOLE NITRATE: 2 POWDER TOPICAL at 20:44

## 2022-01-01 RX ADMIN — Medication 1 CAPSULE: at 08:38

## 2022-01-01 RX ADMIN — DOCUSATE SODIUM 50 MG AND SENNOSIDES 8.6 MG 1 TABLET: 8.6; 5 TABLET, FILM COATED ORAL at 21:00

## 2022-01-01 RX ADMIN — HYDROXYZINE HYDROCHLORIDE 25 MG: 25 TABLET ORAL at 09:02

## 2022-01-01 RX ADMIN — LIDOCAINE HYDROCHLORIDE 7 ML: 10 INJECTION, SOLUTION INFILTRATION; PERINEURAL at 12:09

## 2022-01-01 RX ADMIN — SODIUM CHLORIDE 1000 ML: 9 INJECTION, SOLUTION INTRAVENOUS at 14:33

## 2022-01-01 RX ADMIN — MORPHINE SULFATE 30 MG: 15 TABLET ORAL at 10:58

## 2022-01-01 RX ADMIN — HEPARIN SODIUM 1200 UNITS/HR: 10000 INJECTION, SOLUTION INTRAVENOUS at 16:26

## 2022-01-01 RX ADMIN — PHENYLEPHRINE HYDROCHLORIDE 100 MCG: 10 INJECTION INTRAVENOUS at 11:17

## 2022-01-01 RX ADMIN — SENNOSIDES 1 TABLET: 8.6 TABLET, FILM COATED ORAL at 08:45

## 2022-01-01 RX ADMIN — SENNOSIDES 1 TABLET: 8.6 TABLET, FILM COATED ORAL at 08:43

## 2022-01-01 RX ADMIN — FENTANYL CITRATE 50 MCG: 50 INJECTION, SOLUTION INTRAMUSCULAR; INTRAVENOUS at 13:39

## 2022-01-01 RX ADMIN — LORAZEPAM 1 MG: 2 LIQUID ORAL at 22:11

## 2022-01-01 RX ADMIN — LORAZEPAM 1 MG: 2 LIQUID ORAL at 16:15

## 2022-01-01 RX ADMIN — Medication 500 UNITS: at 12:14

## 2022-01-01 RX ADMIN — FENTANYL CITRATE 50 MCG: 50 INJECTION, SOLUTION INTRAMUSCULAR; INTRAVENOUS at 13:32

## 2022-01-01 RX ADMIN — Medication 1 MG: at 22:51

## 2022-01-01 RX ADMIN — MICONAZOLE NITRATE: 2 POWDER TOPICAL at 10:29

## 2022-01-01 RX ADMIN — ACETAMINOPHEN 650 MG: 325 TABLET, FILM COATED ORAL at 15:45

## 2022-01-01 RX ADMIN — SODIUM CHLORIDE: 9 INJECTION, SOLUTION INTRAVENOUS at 08:22

## 2022-01-01 RX ADMIN — ONDANSETRON 4 MG: 4 TABLET, ORALLY DISINTEGRATING ORAL at 00:05

## 2022-01-01 RX ADMIN — LIDOCAINE HYDROCHLORIDE 6 ML: 10 INJECTION, SOLUTION INFILTRATION; PERINEURAL at 12:26

## 2022-01-01 RX ADMIN — HEPARIN SODIUM 2550 UNITS/HR: 10000 INJECTION, SOLUTION INTRAVENOUS at 07:50

## 2022-01-01 RX ADMIN — DOCUSATE SODIUM 50 MG AND SENNOSIDES 8.6 MG 2 TABLET: 8.6; 5 TABLET, FILM COATED ORAL at 09:26

## 2022-01-01 RX ADMIN — ONDANSETRON 8 MG: 2 INJECTION INTRAMUSCULAR; INTRAVENOUS at 09:02

## 2022-01-01 RX ADMIN — LIDOCAINE HYDROCHLORIDE 10 ML: 10; .005 INJECTION, SOLUTION EPIDURAL; INFILTRATION; INTRACAUDAL; PERINEURAL at 12:11

## 2022-01-01 RX ADMIN — MORPHINE SULFATE 30 MG: 15 TABLET ORAL at 10:59

## 2022-01-01 RX ADMIN — ONDANSETRON 4 MG: 2 INJECTION INTRAMUSCULAR; INTRAVENOUS at 21:11

## 2022-01-01 RX ADMIN — ONDANSETRON 4 MG: 4 TABLET, ORALLY DISINTEGRATING ORAL at 22:37

## 2022-01-01 RX ADMIN — Medication 1 CAPSULE: at 09:12

## 2022-01-01 RX ADMIN — Medication 1 CAPSULE: at 08:44

## 2022-01-01 RX ADMIN — SENNOSIDES AND DOCUSATE SODIUM 1 TABLET: 50; 8.6 TABLET ORAL at 21:08

## 2022-01-01 RX ADMIN — PROPOFOL 125 MCG/KG/MIN: 10 INJECTION, EMULSION INTRAVENOUS at 10:48

## 2022-01-01 RX ADMIN — CEFAZOLIN SODIUM 2 G: 2 INJECTION, SOLUTION INTRAVENOUS at 18:08

## 2022-01-01 RX ADMIN — MORPHINE SULFATE 4 MG: 2 INJECTION, SOLUTION INTRAMUSCULAR; INTRAVENOUS at 16:15

## 2022-01-01 RX ADMIN — HEPARIN SODIUM 2550 UNITS/HR: 10000 INJECTION, SOLUTION INTRAVENOUS at 17:47

## 2022-01-01 RX ADMIN — HEPARIN SODIUM 2100 UNITS/HR: 10000 INJECTION, SOLUTION INTRAVENOUS at 00:56

## 2022-01-01 RX ADMIN — LORAZEPAM 0.25 MG: 0.5 TABLET ORAL at 21:40

## 2022-01-01 RX ADMIN — LORAZEPAM 0.25 MG: 0.5 TABLET ORAL at 23:31

## 2022-01-01 RX ADMIN — MORPHINE SULFATE 15 MG: 15 TABLET ORAL at 10:01

## 2022-01-01 RX ADMIN — PHENYLEPHRINE HYDROCHLORIDE 100 MCG: 10 INJECTION INTRAVENOUS at 11:10

## 2022-01-01 RX ADMIN — SENNOSIDES 1 TABLET: 8.6 TABLET, FILM COATED ORAL at 09:12

## 2022-01-01 RX ADMIN — TAMSULOSIN HYDROCHLORIDE 0.4 MG: 0.4 CAPSULE ORAL at 17:03

## 2022-01-01 RX ADMIN — SODIUM ZIRCONIUM CYCLOSILICATE 5 G: 5 POWDER, FOR SUSPENSION ORAL at 08:16

## 2022-01-01 RX ADMIN — MIDAZOLAM HYDROCHLORIDE 0.5 MG: 1 INJECTION, SOLUTION INTRAMUSCULAR; INTRAVENOUS at 14:22

## 2022-01-01 RX ADMIN — ACETAMINOPHEN 650 MG: 325 TABLET, FILM COATED ORAL at 03:27

## 2022-01-01 RX ADMIN — PHENYLEPHRINE HYDROCHLORIDE 50 MCG: 10 INJECTION INTRAVENOUS at 11:27

## 2022-01-01 RX ADMIN — HEPARIN SODIUM 2250 UNITS/HR: 10000 INJECTION, SOLUTION INTRAVENOUS at 07:19

## 2022-01-01 RX ADMIN — DILTIAZEM HYDROCHLORIDE 15 MG: 5 INJECTION INTRAVENOUS at 16:38

## 2022-01-01 RX ADMIN — MORPHINE SULFATE 4 MG: 2 INJECTION, SOLUTION INTRAMUSCULAR; INTRAVENOUS at 22:01

## 2022-01-01 RX ADMIN — APIXABAN 5 MG: 5 TABLET, FILM COATED ORAL at 08:38

## 2022-01-01 RX ADMIN — DOCUSATE SODIUM 50 MG AND SENNOSIDES 8.6 MG 1 TABLET: 8.6; 5 TABLET, FILM COATED ORAL at 10:47

## 2022-01-01 RX ADMIN — SODIUM ZIRCONIUM CYCLOSILICATE 10 G: 5 POWDER, FOR SUSPENSION ORAL at 17:23

## 2022-01-01 RX ADMIN — CELECOXIB 100 MG: 100 CAPSULE ORAL at 21:08

## 2022-01-01 RX ADMIN — DILTIAZEM HYDROCHLORIDE 180 MG: 180 CAPSULE, COATED, EXTENDED RELEASE ORAL at 08:45

## 2022-01-01 RX ADMIN — LORAZEPAM 1 MG: 2 LIQUID ORAL at 06:06

## 2022-01-01 RX ADMIN — HYDROMORPHONE HYDROCHLORIDE 0.5 MG: 1 INJECTION, SOLUTION INTRAMUSCULAR; INTRAVENOUS; SUBCUTANEOUS at 14:33

## 2022-01-01 RX ADMIN — MORPHINE SULFATE 15 MG: 15 TABLET ORAL at 19:29

## 2022-01-01 RX ADMIN — Medication 5 MG: at 11:41

## 2022-01-01 RX ADMIN — MORPHINE SULFATE 30 MG: 15 TABLET ORAL at 17:11

## 2022-01-01 RX ADMIN — ALLOPURINOL 300 MG: 300 TABLET ORAL at 08:15

## 2022-01-01 RX ADMIN — DOCUSATE SODIUM 50 MG AND SENNOSIDES 8.6 MG 1 TABLET: 8.6; 5 TABLET, FILM COATED ORAL at 20:40

## 2022-01-01 RX ADMIN — PANTOPRAZOLE SODIUM 40 MG: 40 TABLET, DELAYED RELEASE ORAL at 08:32

## 2022-01-01 RX ADMIN — MORPHINE SULFATE 4 MG: 2 INJECTION, SOLUTION INTRAMUSCULAR; INTRAVENOUS at 15:32

## 2022-01-01 RX ADMIN — LORAZEPAM 0.25 MG: 0.5 TABLET ORAL at 22:39

## 2022-01-01 RX ADMIN — OXYCODONE HYDROCHLORIDE 10 MG: 5 TABLET ORAL at 21:04

## 2022-01-01 RX ADMIN — SODIUM ZIRCONIUM CYCLOSILICATE 5 G: 5 POWDER, FOR SUSPENSION ORAL at 17:41

## 2022-01-01 RX ADMIN — LIDOCAINE HYDROCHLORIDE 10 ML: 10 INJECTION, SOLUTION EPIDURAL; INFILTRATION; INTRACAUDAL; PERINEURAL at 14:17

## 2022-01-01 RX ADMIN — FAMOTIDINE 20 MG: 20 TABLET ORAL at 09:03

## 2022-01-01 RX ADMIN — DILTIAZEM HYDROCHLORIDE 60 MG: 30 TABLET, FILM COATED ORAL at 14:44

## 2022-01-01 RX ADMIN — HEPARIN SODIUM 2250 UNITS/HR: 10000 INJECTION, SOLUTION INTRAVENOUS at 07:59

## 2022-01-01 RX ADMIN — ONDANSETRON 4 MG: 2 INJECTION INTRAMUSCULAR; INTRAVENOUS at 12:02

## 2022-01-01 RX ADMIN — ONDANSETRON 4 MG: 4 TABLET, ORALLY DISINTEGRATING ORAL at 18:21

## 2022-01-01 RX ADMIN — ACETAMINOPHEN 650 MG: 325 TABLET, FILM COATED ORAL at 21:00

## 2022-01-01 RX ADMIN — DILTIAZEM HYDROCHLORIDE 180 MG: 180 CAPSULE, COATED, EXTENDED RELEASE ORAL at 15:26

## 2022-01-01 RX ADMIN — ALUMINUM HYDROXIDE, MAGNESIUM HYDROXIDE, AND SIMETHICONE 30 ML: 200; 200; 20 SUSPENSION ORAL at 18:18

## 2022-01-01 RX ADMIN — CELECOXIB 400 MG: 200 CAPSULE ORAL at 09:35

## 2022-01-01 RX ADMIN — MICONAZOLE NITRATE: 2 POWDER TOPICAL at 09:50

## 2022-01-01 RX ADMIN — Medication 5 MG: at 11:18

## 2022-01-01 RX ADMIN — POLYETHYLENE GLYCOL 3350 17 G: 17 POWDER, FOR SOLUTION ORAL at 22:13

## 2022-01-01 RX ADMIN — MIDAZOLAM 1 MG: 1 INJECTION INTRAMUSCULAR; INTRAVENOUS at 10:37

## 2022-01-01 RX ADMIN — FENTANYL CITRATE 50 MCG: 50 INJECTION, SOLUTION INTRAMUSCULAR; INTRAVENOUS at 11:44

## 2022-01-01 RX ADMIN — ACETAMINOPHEN 650 MG: 325 TABLET, FILM COATED ORAL at 16:00

## 2022-01-01 RX ADMIN — MORPHINE SULFATE 15 MG: 15 TABLET ORAL at 06:49

## 2022-01-01 RX ADMIN — LIDOCAINE HYDROCHLORIDE 30 ML: 20 SOLUTION ORAL; TOPICAL at 19:14

## 2022-01-01 RX ADMIN — MICONAZOLE NITRATE: 2 POWDER TOPICAL at 04:29

## 2022-01-01 RX ADMIN — ALLOPURINOL 300 MG: 300 TABLET ORAL at 09:52

## 2022-01-01 RX ADMIN — LORAZEPAM 1 MG: 2 LIQUID ORAL at 21:50

## 2022-01-01 RX ADMIN — MICONAZOLE NITRATE: 2 POWDER TOPICAL at 20:51

## 2022-01-01 RX ADMIN — LIDOCAINE HYDROCHLORIDE 30 ML: 20 SOLUTION ORAL; TOPICAL at 04:07

## 2022-01-01 RX ADMIN — MICONAZOLE NITRATE: 2 POWDER TOPICAL at 13:00

## 2022-01-01 RX ADMIN — PROPOFOL 30 MG: 10 INJECTION, EMULSION INTRAVENOUS at 10:51

## 2022-01-01 RX ADMIN — HEPARIN SODIUM 1700 UNITS/HR: 10000 INJECTION, SOLUTION INTRAVENOUS at 20:31

## 2022-01-01 RX ADMIN — PHENYLEPHRINE HYDROCHLORIDE 150 MCG: 10 INJECTION INTRAVENOUS at 11:35

## 2022-01-01 RX ADMIN — ACETAMINOPHEN: 500 TABLET, FILM COATED ORAL at 21:43

## 2022-01-01 RX ADMIN — IOPAMIDOL 83 ML: 755 INJECTION, SOLUTION INTRAVENOUS at 20:59

## 2022-01-01 RX ADMIN — FILGRASTIM 480 MCG: 480 INJECTION, SOLUTION INTRAVENOUS; SUBCUTANEOUS at 22:08

## 2022-01-01 RX ADMIN — ONDANSETRON 4 MG: 2 INJECTION INTRAMUSCULAR; INTRAVENOUS at 10:04

## 2022-01-01 RX ADMIN — APIXABAN 5 MG: 5 TABLET, FILM COATED ORAL at 20:47

## 2022-01-01 RX ADMIN — ACETAMINOPHEN 650 MG: 325 TABLET, FILM COATED ORAL at 22:15

## 2022-01-01 RX ADMIN — SODIUM CHLORIDE 79 ML: 9 INJECTION, SOLUTION INTRAVENOUS at 14:21

## 2022-01-01 RX ADMIN — MICONAZOLE NITRATE: 2 POWDER TOPICAL at 21:52

## 2022-01-01 RX ADMIN — LORAZEPAM 0.25 MG: 0.5 TABLET ORAL at 22:08

## 2022-01-01 RX ADMIN — HEPARIN SODIUM 2550 UNITS/HR: 10000 INJECTION, SOLUTION INTRAVENOUS at 21:21

## 2022-01-01 RX ADMIN — ACETAMINOPHEN 650 MG: 325 TABLET, FILM COATED ORAL at 13:46

## 2022-01-01 RX ADMIN — MORPHINE SULFATE 30 MG: 100 SOLUTION ORAL at 22:38

## 2022-01-01 RX ADMIN — ALLOPURINOL 300 MG: 300 TABLET ORAL at 15:22

## 2022-01-01 RX ADMIN — ONDANSETRON 4 MG: 4 TABLET, ORALLY DISINTEGRATING ORAL at 05:54

## 2022-01-01 RX ADMIN — PANTOPRAZOLE SODIUM 40 MG: 40 TABLET, DELAYED RELEASE ORAL at 10:31

## 2022-01-01 RX ADMIN — Medication 5 ML: at 14:11

## 2022-01-01 RX ADMIN — IOPAMIDOL 135 ML: 755 INJECTION, SOLUTION INTRAVENOUS at 14:21

## 2022-01-01 RX ADMIN — SODIUM CHLORIDE 6 MG: 9 INJECTION, SOLUTION INTRAVENOUS at 17:38

## 2022-01-01 RX ADMIN — SODIUM CHLORIDE 79 ML: 900 INJECTION INTRAVENOUS at 17:56

## 2022-01-01 RX ADMIN — SENNOSIDES 1 TABLET: 8.6 TABLET, FILM COATED ORAL at 08:41

## 2022-01-01 RX ADMIN — MORPHINE SULFATE 30 MG: 15 TABLET ORAL at 21:40

## 2022-01-01 RX ADMIN — MORPHINE SULFATE 30 MG: 100 SOLUTION ORAL at 11:35

## 2022-01-01 RX ADMIN — OXYCODONE HYDROCHLORIDE 10 MG: 5 TABLET ORAL at 17:08

## 2022-01-01 RX ADMIN — Medication 1 MG: at 21:51

## 2022-01-01 RX ADMIN — OXYCODONE HYDROCHLORIDE 10 MG: 5 TABLET ORAL at 00:57

## 2022-01-01 RX ADMIN — OXYCODONE HYDROCHLORIDE 10 MG: 5 TABLET ORAL at 10:40

## 2022-01-01 RX ADMIN — ACETAMINOPHEN 650 MG: 325 TABLET, FILM COATED ORAL at 05:50

## 2022-01-01 RX ADMIN — FENTANYL CITRATE 50 MCG: 50 INJECTION, SOLUTION INTRAMUSCULAR; INTRAVENOUS at 12:00

## 2022-01-01 RX ADMIN — PHENYLEPHRINE HYDROCHLORIDE 100 MCG: 10 INJECTION INTRAVENOUS at 11:41

## 2022-01-01 RX ADMIN — HYDROMORPHONE HYDROCHLORIDE 0.4 MG: 0.2 INJECTION, SOLUTION INTRAMUSCULAR; INTRAVENOUS; SUBCUTANEOUS at 14:25

## 2022-01-01 RX ADMIN — MICONAZOLE NITRATE: 2 POWDER TOPICAL at 20:35

## 2022-01-01 RX ADMIN — LIDOCAINE HYDROCHLORIDE 10 ML: 10 INJECTION, SOLUTION EPIDURAL; INFILTRATION; INTRACAUDAL; PERINEURAL at 14:16

## 2022-01-01 RX ADMIN — ONDANSETRON 4 MG: 2 INJECTION INTRAMUSCULAR; INTRAVENOUS at 17:13

## 2022-01-01 RX ADMIN — SENNOSIDES 1 TABLET: 8.6 TABLET, FILM COATED ORAL at 08:15

## 2022-01-01 RX ADMIN — PHENYLEPHRINE HYDROCHLORIDE 0.5 MCG/KG/MIN: 10 INJECTION INTRAVENOUS at 11:53

## 2022-01-01 RX ADMIN — PROCHLORPERAZINE EDISYLATE 5 MG: 5 INJECTION INTRAMUSCULAR; INTRAVENOUS at 06:29

## 2022-01-01 RX ADMIN — DILTIAZEM HYDROCHLORIDE 60 MG: 30 TABLET, FILM COATED ORAL at 05:57

## 2022-01-01 RX ADMIN — SODIUM CHLORIDE, POTASSIUM CHLORIDE, SODIUM LACTATE AND CALCIUM CHLORIDE: 600; 310; 30; 20 INJECTION, SOLUTION INTRAVENOUS at 18:08

## 2022-01-01 RX ADMIN — LIDOCAINE 1 PATCH: 560 PATCH PERCUTANEOUS; TOPICAL; TRANSDERMAL at 09:50

## 2022-01-01 RX ADMIN — Medication 3 G: at 11:21

## 2022-01-01 RX ADMIN — ACETAMINOPHEN 650 MG: 325 TABLET, FILM COATED ORAL at 01:04

## 2022-01-01 RX ADMIN — BUPIVACAINE HYDROCHLORIDE IN DEXTROSE 1.6 ML: 7.5 INJECTION, SOLUTION SUBARACHNOID at 10:45

## 2022-01-01 RX ADMIN — MORPHINE SULFATE 4 MG: 2 INJECTION, SOLUTION INTRAMUSCULAR; INTRAVENOUS at 17:14

## 2022-01-01 RX ADMIN — SODIUM CHLORIDE: 9 INJECTION, SOLUTION INTRAVENOUS at 13:32

## 2022-01-01 RX ADMIN — APIXABAN 5 MG: 5 TABLET, FILM COATED ORAL at 08:45

## 2022-01-01 RX ADMIN — SODIUM CHLORIDE 500 ML: 9 INJECTION, SOLUTION INTRAVENOUS at 17:03

## 2022-01-01 RX ADMIN — MORPHINE SULFATE 30 MG: 100 SOLUTION ORAL at 15:22

## 2022-01-01 RX ADMIN — GEMCITABINE 2600 MG: 38 INJECTION, SOLUTION INTRAVENOUS at 10:23

## 2022-01-01 RX ADMIN — APIXABAN 5 MG: 5 TABLET, FILM COATED ORAL at 21:33

## 2022-01-01 RX ADMIN — ACETAMINOPHEN 650 MG: 325 TABLET, FILM COATED ORAL at 08:43

## 2022-01-01 RX ADMIN — SENNOSIDES 1 TABLET: 8.6 TABLET, FILM COATED ORAL at 10:29

## 2022-01-01 RX ADMIN — HEPARIN SODIUM 1700 UNITS/HR: 10000 INJECTION, SOLUTION INTRAVENOUS at 00:11

## 2022-01-01 RX ADMIN — SODIUM CHLORIDE 79 ML: 900 INJECTION INTRAVENOUS at 12:07

## 2022-01-01 RX ADMIN — CELECOXIB 100 MG: 100 CAPSULE ORAL at 09:03

## 2022-01-01 RX ADMIN — DEXTROSE AND SODIUM CHLORIDE: 5; .9 INJECTION, SOLUTION INTRAVENOUS at 11:37

## 2022-01-01 RX ADMIN — MICONAZOLE NITRATE: 2 POWDER TOPICAL at 21:41

## 2022-01-01 RX ADMIN — LORAZEPAM 1 MG: 2 LIQUID ORAL at 06:16

## 2022-01-01 RX ADMIN — BUPIVACAINE HYDROCHLORIDE 15 ML: 5 INJECTION, SOLUTION PERINEURAL at 10:27

## 2022-01-01 RX ADMIN — LORAZEPAM 0.25 MG: 0.5 TABLET ORAL at 23:01

## 2022-01-01 RX ADMIN — MORPHINE SULFATE 15 MG: 15 TABLET ORAL at 07:30

## 2022-01-01 RX ADMIN — METOCLOPRAMIDE HYDROCHLORIDE 5 MG: 5 INJECTION INTRAMUSCULAR; INTRAVENOUS at 13:23

## 2022-01-01 RX ADMIN — SENNOSIDES 1 TABLET: 8.6 TABLET, FILM COATED ORAL at 08:33

## 2022-01-01 RX ADMIN — MORPHINE SULFATE 30 MG: 100 SOLUTION ORAL at 08:43

## 2022-01-01 RX ADMIN — HYDROXYZINE HYDROCHLORIDE 25 MG: 25 TABLET ORAL at 15:50

## 2022-01-01 RX ADMIN — ACETAMINOPHEN 975 MG: 325 TABLET, FILM COATED ORAL at 10:32

## 2022-01-01 RX ADMIN — MORPHINE SULFATE 30 MG: 100 SOLUTION ORAL at 20:32

## 2022-01-01 RX ADMIN — SENNOSIDES 1 TABLET: 8.6 TABLET, FILM COATED ORAL at 09:52

## 2022-01-01 RX ADMIN — DILTIAZEM HYDROCHLORIDE 180 MG: 180 CAPSULE, COATED, EXTENDED RELEASE ORAL at 09:52

## 2022-01-01 RX ADMIN — ACETAMINOPHEN 650 MG: 325 TABLET, FILM COATED ORAL at 22:19

## 2022-01-01 RX ADMIN — DEXAMETHASONE SODIUM PHOSPHATE 4 MG: 4 INJECTION, SOLUTION INTRA-ARTICULAR; INTRALESIONAL; INTRAMUSCULAR; INTRAVENOUS; SOFT TISSUE at 11:41

## 2022-01-01 RX ADMIN — MICONAZOLE NITRATE: 2 POWDER TOPICAL at 21:50

## 2022-01-01 RX ADMIN — MORPHINE SULFATE 4 MG: 2 INJECTION, SOLUTION INTRAMUSCULAR; INTRAVENOUS at 13:13

## 2022-01-01 RX ADMIN — LIDOCAINE HYDROCHLORIDE 30 ML: 20 SOLUTION ORAL; TOPICAL at 17:30

## 2022-01-01 RX ADMIN — SODIUM CHLORIDE 1000 ML: 9 INJECTION, SOLUTION INTRAVENOUS at 11:15

## 2022-01-01 RX ADMIN — PANTOPRAZOLE SODIUM 40 MG: 40 TABLET, DELAYED RELEASE ORAL at 07:46

## 2022-01-01 RX ADMIN — SENNOSIDES AND DOCUSATE SODIUM 1 TABLET: 50; 8.6 TABLET ORAL at 05:51

## 2022-01-01 RX ADMIN — SODIUM CHLORIDE, POTASSIUM CHLORIDE, SODIUM LACTATE AND CALCIUM CHLORIDE 1000 ML: 600; 310; 30; 20 INJECTION, SOLUTION INTRAVENOUS at 17:10

## 2022-01-01 RX ADMIN — SENNOSIDES AND DOCUSATE SODIUM 2 TABLET: 50; 8.6 TABLET ORAL at 18:17

## 2022-01-01 RX ADMIN — DILTIAZEM HYDROCHLORIDE 60 MG: 30 TABLET, FILM COATED ORAL at 21:06

## 2022-01-01 RX ADMIN — SODIUM ZIRCONIUM CYCLOSILICATE 10 G: 5 POWDER, FOR SUSPENSION ORAL at 11:57

## 2022-01-01 RX ADMIN — DILTIAZEM HYDROCHLORIDE 180 MG: 180 CAPSULE, COATED, EXTENDED RELEASE ORAL at 08:36

## 2022-01-01 RX ADMIN — SODIUM CHLORIDE: 9 INJECTION, SOLUTION INTRAVENOUS at 22:45

## 2022-01-01 RX ADMIN — SODIUM CHLORIDE 80 ML: 9 INJECTION, SOLUTION INTRAVENOUS at 17:43

## 2022-01-01 RX ADMIN — SODIUM CHLORIDE: 9 INJECTION, SOLUTION INTRAVENOUS at 04:29

## 2022-01-01 RX ADMIN — LIDOCAINE 1 PATCH: 560 PATCH PERCUTANEOUS; TOPICAL; TRANSDERMAL at 13:06

## 2022-01-01 RX ADMIN — HEPARIN SODIUM 1700 UNITS/HR: 10000 INJECTION, SOLUTION INTRAVENOUS at 20:37

## 2022-01-01 RX ADMIN — IOPAMIDOL 135 ML: 755 INJECTION, SOLUTION INTRAVENOUS at 17:43

## 2022-01-01 RX ADMIN — DILTIAZEM HYDROCHLORIDE 180 MG: 180 CAPSULE, COATED, EXTENDED RELEASE ORAL at 08:15

## 2022-01-01 RX ADMIN — ONDANSETRON 4 MG: 4 TABLET, ORALLY DISINTEGRATING ORAL at 15:48

## 2022-01-01 RX ADMIN — DILTIAZEM HYDROCHLORIDE 60 MG: 30 TABLET, FILM COATED ORAL at 14:12

## 2022-01-01 RX ADMIN — SODIUM CHLORIDE, POTASSIUM CHLORIDE, SODIUM LACTATE AND CALCIUM CHLORIDE: 600; 310; 30; 20 INJECTION, SOLUTION INTRAVENOUS at 09:51

## 2022-01-01 RX ADMIN — APIXABAN 10 MG: 5 TABLET, FILM COATED ORAL at 23:37

## 2022-01-01 RX ADMIN — MORPHINE SULFATE 15 MG: 15 TABLET ORAL at 01:29

## 2022-01-01 RX ADMIN — DILTIAZEM HYDROCHLORIDE 5 MG/HR: 5 INJECTION, SOLUTION INTRAVENOUS at 16:49

## 2022-01-01 RX ADMIN — SODIUM CHLORIDE 1000 ML: 9 INJECTION, SOLUTION INTRAVENOUS at 08:35

## 2022-01-01 RX ADMIN — ONDANSETRON 4 MG: 2 INJECTION INTRAMUSCULAR; INTRAVENOUS at 12:44

## 2022-01-01 RX ADMIN — ACETAMINOPHEN 650 MG: 325 TABLET, FILM COATED ORAL at 05:19

## 2022-01-01 RX ADMIN — MIDAZOLAM 2 MG: 1 INJECTION INTRAMUSCULAR; INTRAVENOUS at 10:27

## 2022-01-01 RX ADMIN — MICONAZOLE NITRATE: 2 POWDER TOPICAL at 22:13

## 2022-01-01 RX ADMIN — PANTOPRAZOLE SODIUM 40 MG: 40 TABLET, DELAYED RELEASE ORAL at 08:45

## 2022-01-01 RX ADMIN — MIDAZOLAM HYDROCHLORIDE 1 MG: 1 INJECTION, SOLUTION INTRAMUSCULAR; INTRAVENOUS at 11:43

## 2022-01-01 RX ADMIN — ALLOPURINOL 300 MG: 300 TABLET ORAL at 08:36

## 2022-01-01 RX ADMIN — POLYETHYLENE GLYCOL 3350 17 G: 17 POWDER, FOR SOLUTION ORAL at 10:28

## 2022-01-01 RX ADMIN — HEPARIN SODIUM 2100 UNITS/HR: 10000 INJECTION, SOLUTION INTRAVENOUS at 12:58

## 2022-01-01 RX ADMIN — Medication 1 MG: at 20:40

## 2022-01-01 RX ADMIN — HYDROMORPHONE HYDROCHLORIDE 0.4 MG: 0.2 INJECTION, SOLUTION INTRAMUSCULAR; INTRAVENOUS; SUBCUTANEOUS at 14:09

## 2022-01-01 RX ADMIN — CEFAZOLIN SODIUM 2 G: 2 INJECTION, SOLUTION INTRAVENOUS at 03:11

## 2022-01-01 RX ADMIN — MORPHINE SULFATE 4 MG: 2 INJECTION, SOLUTION INTRAMUSCULAR; INTRAVENOUS at 10:54

## 2022-01-01 RX ADMIN — OXYCODONE HYDROCHLORIDE 10 MG: 5 TABLET ORAL at 06:06

## 2022-01-01 RX ADMIN — POLYETHYLENE GLYCOL 3350 17 G: 17 POWDER, FOR SOLUTION ORAL at 20:35

## 2022-01-01 RX ADMIN — IOPAMIDOL 135 ML: 755 INJECTION, SOLUTION INTRAVENOUS at 12:06

## 2022-01-01 RX ADMIN — PHENYLEPHRINE HYDROCHLORIDE 100 MCG: 10 INJECTION INTRAVENOUS at 11:05

## 2022-01-01 RX ADMIN — DOCUSATE SODIUM 50 MG AND SENNOSIDES 8.6 MG 1 TABLET: 8.6; 5 TABLET, FILM COATED ORAL at 21:51

## 2022-01-01 RX ADMIN — POLYETHYLENE GLYCOL 3350 17 G: 17 POWDER, FOR SOLUTION ORAL at 08:14

## 2022-01-01 RX ADMIN — LORAZEPAM 1 MG: 2 LIQUID ORAL at 12:00

## 2022-01-01 RX ADMIN — ONDANSETRON 4 MG: 4 TABLET, ORALLY DISINTEGRATING ORAL at 09:35

## 2022-01-01 RX ADMIN — HEPARIN SODIUM 2550 UNITS/HR: 10000 INJECTION, SOLUTION INTRAVENOUS at 04:15

## 2022-01-01 RX ADMIN — HYDROMORPHONE HYDROCHLORIDE 0.4 MG: 0.2 INJECTION, SOLUTION INTRAMUSCULAR; INTRAVENOUS; SUBCUTANEOUS at 15:49

## 2022-01-01 RX ADMIN — LIDOCAINE 1 PATCH: 560 PATCH PERCUTANEOUS; TOPICAL; TRANSDERMAL at 08:43

## 2022-01-01 RX ADMIN — POLYETHYLENE GLYCOL 3350 17 G: 17 POWDER, FOR SOLUTION ORAL at 08:33

## 2022-01-01 RX ADMIN — ACETAMINOPHEN 650 MG: 325 TABLET, FILM COATED ORAL at 20:41

## 2022-01-01 RX ADMIN — TRANEXAMIC ACID 1950 MG: 650 TABLET ORAL at 09:35

## 2022-01-01 RX ADMIN — HYDROXYZINE HYDROCHLORIDE 25 MG: 25 TABLET ORAL at 23:08

## 2022-01-01 RX ADMIN — HEPARIN SODIUM 1800 UNITS/HR: 10000 INJECTION, SOLUTION INTRAVENOUS at 10:28

## 2022-01-01 RX ADMIN — MICONAZOLE NITRATE: 2 POWDER TOPICAL at 09:53

## 2022-01-01 RX ADMIN — PACLITAXEL 350 MG: 100 INJECTION, POWDER, LYOPHILIZED, FOR SUSPENSION INTRAVENOUS at 09:43

## 2022-01-01 RX ADMIN — CHLORPROMAZINE HYDROCHLORIDE 25 MG: 25 TABLET, FILM COATED ORAL at 16:46

## 2022-01-01 RX ADMIN — LIDOCAINE HYDROCHLORIDE 10 ML: 10 INJECTION, SOLUTION EPIDURAL; INFILTRATION; INTRACAUDAL; PERINEURAL at 14:52

## 2022-01-01 RX ADMIN — LIDOCAINE HYDROCHLORIDE 10 ML: 10 INJECTION, SOLUTION EPIDURAL; INFILTRATION; INTRACAUDAL; PERINEURAL at 15:27

## 2022-01-01 RX ADMIN — FENTANYL CITRATE 25 MCG: 50 INJECTION, SOLUTION INTRAMUSCULAR; INTRAVENOUS at 14:23

## 2022-01-01 RX ADMIN — MICONAZOLE NITRATE: 2 POWDER TOPICAL at 08:25

## 2022-01-01 RX ADMIN — ENOXAPARIN SODIUM 40 MG: 40 INJECTION SUBCUTANEOUS at 10:40

## 2022-01-01 RX ADMIN — DILTIAZEM HYDROCHLORIDE 180 MG: 180 CAPSULE, COATED, EXTENDED RELEASE ORAL at 08:35

## 2022-01-01 RX ADMIN — DILTIAZEM HYDROCHLORIDE 5 MG/HR: 5 INJECTION, SOLUTION INTRAVENOUS at 04:32

## 2022-01-01 RX ADMIN — ACETAMINOPHEN 975 MG: 325 TABLET, FILM COATED ORAL at 09:02

## 2022-01-01 RX ADMIN — Medication 1 MG: at 19:29

## 2022-01-01 RX ADMIN — Medication 3 G: at 10:51

## 2022-01-01 RX ADMIN — LIDOCAINE HYDROCHLORIDE 4 ML: 10 INJECTION, SOLUTION EPIDURAL; INFILTRATION; INTRACAUDAL; PERINEURAL at 14:22

## 2022-01-01 RX ADMIN — APIXABAN 10 MG: 5 TABLET, FILM COATED ORAL at 08:57

## 2022-01-01 RX ADMIN — PANTOPRAZOLE SODIUM 40 MG: 40 TABLET, DELAYED RELEASE ORAL at 08:38

## 2022-01-01 RX ADMIN — PANTOPRAZOLE SODIUM 40 MG: 40 TABLET, DELAYED RELEASE ORAL at 10:42

## 2022-01-01 RX ADMIN — PANTOPRAZOLE SODIUM 40 MG: 40 TABLET, DELAYED RELEASE ORAL at 08:15

## 2022-01-01 RX ADMIN — SENNOSIDES AND DOCUSATE SODIUM 1 TABLET: 50; 8.6 TABLET ORAL at 09:03

## 2022-01-01 RX ADMIN — LIDOCAINE 1 PATCH: 560 PATCH PERCUTANEOUS; TOPICAL; TRANSDERMAL at 10:54

## 2022-01-01 RX ADMIN — LORAZEPAM 0.25 MG: 0.5 TABLET ORAL at 20:35

## 2022-01-01 RX ADMIN — LORAZEPAM 0.5 MG: 2 INJECTION INTRAMUSCULAR; INTRAVENOUS at 02:35

## 2022-01-01 RX ADMIN — SODIUM CHLORIDE: 9 INJECTION, SOLUTION INTRAVENOUS at 17:04

## 2022-01-01 RX ADMIN — SODIUM CHLORIDE, POTASSIUM CHLORIDE, SODIUM LACTATE AND CALCIUM CHLORIDE: 600; 310; 30; 20 INJECTION, SOLUTION INTRAVENOUS at 11:45

## 2022-01-01 RX ADMIN — LIDOCAINE HYDROCHLORIDE 10 ML: 10 INJECTION, SOLUTION EPIDURAL; INFILTRATION; INTRACAUDAL; PERINEURAL at 16:07

## 2022-01-01 RX ADMIN — SODIUM CHLORIDE 100 ML: 9 INJECTION, SOLUTION INTRAVENOUS at 21:00

## 2022-01-01 RX ADMIN — ONDANSETRON 4 MG: 2 INJECTION INTRAMUSCULAR; INTRAVENOUS at 08:57

## 2022-01-01 RX ADMIN — KETOROLAC TROMETHAMINE 15 MG: 15 INJECTION, SOLUTION INTRAMUSCULAR; INTRAVENOUS at 11:32

## 2022-01-01 RX ADMIN — APIXABAN 5 MG: 5 TABLET, FILM COATED ORAL at 10:54

## 2022-01-01 RX ADMIN — TAMSULOSIN HYDROCHLORIDE 0.4 MG: 0.4 CAPSULE ORAL at 09:03

## 2022-01-01 RX ADMIN — PANTOPRAZOLE SODIUM 40 MG: 40 TABLET, DELAYED RELEASE ORAL at 09:27

## 2022-01-01 RX ADMIN — ACETAMINOPHEN: 500 TABLET, FILM COATED ORAL at 20:47

## 2022-01-01 RX ADMIN — ACETAMINOPHEN 975 MG: 325 TABLET, FILM COATED ORAL at 15:49

## 2022-01-01 RX ADMIN — SENNOSIDES 5 ML: 8.8 LIQUID ORAL at 11:35

## 2022-01-01 RX ADMIN — MICONAZOLE NITRATE: 2 POWDER TOPICAL at 19:29

## 2022-01-01 RX ADMIN — Medication 5 ML: at 08:09

## 2022-01-01 RX ADMIN — ACETAMINOPHEN 650 MG: 325 TABLET, FILM COATED ORAL at 21:52

## 2022-01-01 RX ADMIN — ACETAMINOPHEN 975 MG: 325 TABLET, FILM COATED ORAL at 00:55

## 2022-01-01 RX ADMIN — HEPARIN SODIUM 1700 UNITS/HR: 10000 INJECTION, SOLUTION INTRAVENOUS at 09:22

## 2022-01-01 RX ADMIN — SODIUM ZIRCONIUM CYCLOSILICATE 10 G: 5 POWDER, FOR SUSPENSION ORAL at 22:27

## 2022-01-01 RX ADMIN — PANTOPRAZOLE SODIUM 40 MG: 40 TABLET, DELAYED RELEASE ORAL at 15:26

## 2022-01-01 RX ADMIN — ATROPINE SULFATE 2 DROP: 10 SOLUTION/ DROPS OPHTHALMIC at 16:55

## 2022-01-01 RX ADMIN — HEPARIN SODIUM 1700 UNITS/HR: 10000 INJECTION, SOLUTION INTRAVENOUS at 14:47

## 2022-01-01 RX ADMIN — ONDANSETRON 4 MG: 2 INJECTION INTRAMUSCULAR; INTRAVENOUS at 14:33

## 2022-01-01 RX ADMIN — FAMOTIDINE 20 MG: 20 TABLET ORAL at 21:12

## 2022-01-01 RX ADMIN — HEPARIN SODIUM 20100 UNITS/HR: 10000 INJECTION, SOLUTION INTRAVENOUS at 01:35

## 2022-01-01 RX ADMIN — MICONAZOLE NITRATE: 2 POWDER TOPICAL at 09:00

## 2022-01-01 RX ADMIN — ACETAMINOPHEN 650 MG: 325 TABLET, FILM COATED ORAL at 07:27

## 2022-01-01 RX ADMIN — MICONAZOLE NITRATE: 2 POWDER TOPICAL at 10:05

## 2022-01-01 ASSESSMENT — ACTIVITIES OF DAILY LIVING (ADL)
WALKING_OR_CLIMBING_STAIRS_DIFFICULTY: NO
ADLS_ACUITY_SCORE: 20
ADLS_ACUITY_SCORE: 35
ADLS_ACUITY_SCORE: 30
ADLS_ACUITY_SCORE: 35
ADLS_ACUITY_SCORE: 35
ADLS_ACUITY_SCORE: 30
ADLS_ACUITY_SCORE: 24
ADLS_ACUITY_SCORE: 21
ADLS_ACUITY_SCORE: 24
ADLS_ACUITY_SCORE: 30
DIFFICULTY_EATING/SWALLOWING: NO
ADLS_ACUITY_SCORE: 20
ADLS_ACUITY_SCORE: 30
ADLS_ACUITY_SCORE: 24
ADLS_ACUITY_SCORE: 24
ADLS_ACUITY_SCORE: 33
ADLS_ACUITY_SCORE: 20
ADLS_ACUITY_SCORE: 20
DOING_ERRANDS_INDEPENDENTLY_DIFFICULTY: NO
ADLS_ACUITY_SCORE: 24
ADLS_ACUITY_SCORE: 30
ADLS_ACUITY_SCORE: 30
ADLS_ACUITY_SCORE: 21
ADLS_ACUITY_SCORE: 21
ADLS_ACUITY_SCORE: 20
ADLS_ACUITY_SCORE: 22
ADLS_ACUITY_SCORE: 20
ADLS_ACUITY_SCORE: 30
ADLS_ACUITY_SCORE: 35
ADLS_ACUITY_SCORE: 34
ADLS_ACUITY_SCORE: 20
ADLS_ACUITY_SCORE: 20
ADLS_ACUITY_SCORE: 30
ADLS_ACUITY_SCORE: 21
ADLS_ACUITY_SCORE: 35
ADLS_ACUITY_SCORE: 20
ADLS_ACUITY_SCORE: 35
ADLS_ACUITY_SCORE: 20
TOILETING_ISSUES: NO
ADLS_ACUITY_SCORE: 21
ADLS_ACUITY_SCORE: 21
ADLS_ACUITY_SCORE: 35
ADLS_ACUITY_SCORE: 27
ADLS_ACUITY_SCORE: 21
ADLS_ACUITY_SCORE: 20
ADLS_ACUITY_SCORE: 47
ADLS_ACUITY_SCORE: 35
ADLS_ACUITY_SCORE: 21
ADLS_ACUITY_SCORE: 35
ADLS_ACUITY_SCORE: 30
ADLS_ACUITY_SCORE: 35
ADLS_ACUITY_SCORE: 21
ADLS_ACUITY_SCORE: 28
DEPENDENT_IADLS:: INDEPENDENT;CLEANING;COOKING;LAUNDRY;SHOPPING;MEAL PREPARATION;MEDICATION MANAGEMENT;MONEY MANAGEMENT;TRANSPORTATION
ADLS_ACUITY_SCORE: 24
ADLS_ACUITY_SCORE: 27
ADLS_ACUITY_SCORE: 24
ADLS_ACUITY_SCORE: 35
DEPENDENT_IADLS:: INDEPENDENT
ADLS_ACUITY_SCORE: 20
ADLS_ACUITY_SCORE: 27
ADLS_ACUITY_SCORE: 22
ADLS_ACUITY_SCORE: 21
ADLS_ACUITY_SCORE: 24
ADLS_ACUITY_SCORE: 35
ADLS_ACUITY_SCORE: 20
ADLS_ACUITY_SCORE: 24
ADLS_ACUITY_SCORE: 34
ADLS_ACUITY_SCORE: 34
ADLS_ACUITY_SCORE: 24
ADLS_ACUITY_SCORE: 22
ADLS_ACUITY_SCORE: 43
ADLS_ACUITY_SCORE: 30
ADLS_ACUITY_SCORE: 35
ADLS_ACUITY_SCORE: 20
ADLS_ACUITY_SCORE: 20
ADLS_ACUITY_SCORE: 24
ADLS_ACUITY_SCORE: 30
ADLS_ACUITY_SCORE: 20
ADLS_ACUITY_SCORE: 24
ADLS_ACUITY_SCORE: 49
ADLS_ACUITY_SCORE: 30
ADLS_ACUITY_SCORE: 22
ADLS_ACUITY_SCORE: 35
ADLS_ACUITY_SCORE: 20
ADLS_ACUITY_SCORE: 22
ADLS_ACUITY_SCORE: 47
ADLS_ACUITY_SCORE: 20
ADLS_ACUITY_SCORE: 22
ADLS_ACUITY_SCORE: 49
ADLS_ACUITY_SCORE: 34
ADLS_ACUITY_SCORE: 20
ADLS_ACUITY_SCORE: 35
ADLS_ACUITY_SCORE: 35
ADLS_ACUITY_SCORE: 28
ADLS_ACUITY_SCORE: 27
ADLS_ACUITY_SCORE: 30
VISION_MANAGEMENT: READING GLASSES
FALL_HISTORY_WITHIN_LAST_SIX_MONTHS: NO
EQUIPMENT_CURRENTLY_USED_AT_HOME: SHOWER CHAIR;GRAB BAR, TOILET;GRAB BAR, TUB/SHOWER
ADLS_ACUITY_SCORE: 43
ADLS_ACUITY_SCORE: 21
ADLS_ACUITY_SCORE: 35
ADLS_ACUITY_SCORE: 47
ADLS_ACUITY_SCORE: 24
ADLS_ACUITY_SCORE: 24
ADLS_ACUITY_SCORE: 35
WEAR_GLASSES_OR_BLIND: YES
ADLS_ACUITY_SCORE: 43
ADLS_ACUITY_SCORE: 24
ADLS_ACUITY_SCORE: 35
ADLS_ACUITY_SCORE: 47
ADLS_ACUITY_SCORE: 49
ADLS_ACUITY_SCORE: 35
ADLS_ACUITY_SCORE: 30
ADLS_ACUITY_SCORE: 35
ADLS_ACUITY_SCORE: 47
ADLS_ACUITY_SCORE: 21
ADLS_ACUITY_SCORE: 43
ADLS_ACUITY_SCORE: 33
ADLS_ACUITY_SCORE: 49
ADLS_ACUITY_SCORE: 24
ADLS_ACUITY_SCORE: 30
ADLS_ACUITY_SCORE: 47
ADLS_ACUITY_SCORE: 49
ADLS_ACUITY_SCORE: 35
ADLS_ACUITY_SCORE: 30
ADLS_ACUITY_SCORE: 24
ADLS_ACUITY_SCORE: 21
ADLS_ACUITY_SCORE: 21
ADLS_ACUITY_SCORE: 22
ADLS_ACUITY_SCORE: 22
ADLS_ACUITY_SCORE: 24
ADLS_ACUITY_SCORE: 35
ADLS_ACUITY_SCORE: 27
ADLS_ACUITY_SCORE: 20
ADLS_ACUITY_SCORE: 24
CONCENTRATING,_REMEMBERING_OR_MAKING_DECISIONS_DIFFICULTY: NO
ADLS_ACUITY_SCORE: 35
ADLS_ACUITY_SCORE: 22
ADLS_ACUITY_SCORE: 43
ADLS_ACUITY_SCORE: 21
ADLS_ACUITY_SCORE: 28
ADLS_ACUITY_SCORE: 34
ADLS_ACUITY_SCORE: 28
ADLS_ACUITY_SCORE: 21
ADLS_ACUITY_SCORE: 21
ADLS_ACUITY_SCORE: 27
ADLS_ACUITY_SCORE: 35
ADLS_ACUITY_SCORE: 20
ADLS_ACUITY_SCORE: 24
ADLS_ACUITY_SCORE: 35
ADLS_ACUITY_SCORE: 20
ADLS_ACUITY_SCORE: 20
ADLS_ACUITY_SCORE: 35
ADLS_ACUITY_SCORE: 34
ADLS_ACUITY_SCORE: 22
ADLS_ACUITY_SCORE: 35
ADLS_ACUITY_SCORE: 49
ADLS_ACUITY_SCORE: 22
ADLS_ACUITY_SCORE: 21
ADLS_ACUITY_SCORE: 28
ADLS_ACUITY_SCORE: 21
DRESSING/BATHING_DIFFICULTY: NO
ADLS_ACUITY_SCORE: 21
ADLS_ACUITY_SCORE: 47
ADLS_ACUITY_SCORE: 21
ADLS_ACUITY_SCORE: 24
ADLS_ACUITY_SCORE: 28
ADLS_ACUITY_SCORE: 30
ADLS_ACUITY_SCORE: 35
ADLS_ACUITY_SCORE: 47
ADLS_ACUITY_SCORE: 20
ADLS_ACUITY_SCORE: 47
ADLS_ACUITY_SCORE: 20
ADLS_ACUITY_SCORE: 28
ADLS_ACUITY_SCORE: 49
ADLS_ACUITY_SCORE: 34
ADLS_ACUITY_SCORE: 20
ADLS_ACUITY_SCORE: 28
ADLS_ACUITY_SCORE: 43
ADLS_ACUITY_SCORE: 20
ADLS_ACUITY_SCORE: 24
ADLS_ACUITY_SCORE: 43
ADLS_ACUITY_SCORE: 35
ADLS_ACUITY_SCORE: 28
ADLS_ACUITY_SCORE: 20
ADLS_ACUITY_SCORE: 35
ADLS_ACUITY_SCORE: 24
ADLS_ACUITY_SCORE: 35
ADLS_ACUITY_SCORE: 21
ADLS_ACUITY_SCORE: 30
ADLS_ACUITY_SCORE: 28
ADLS_ACUITY_SCORE: 30
ADLS_ACUITY_SCORE: 24
ADLS_ACUITY_SCORE: 30
ADLS_ACUITY_SCORE: 30
ADLS_ACUITY_SCORE: 24
ADLS_ACUITY_SCORE: 20
ADLS_ACUITY_SCORE: 30
ADLS_ACUITY_SCORE: 20
ADLS_ACUITY_SCORE: 24
ADLS_ACUITY_SCORE: 24
ADLS_ACUITY_SCORE: 20
ADLS_ACUITY_SCORE: 30
ADLS_ACUITY_SCORE: 20
ADLS_ACUITY_SCORE: 28
ADLS_ACUITY_SCORE: 47
ADLS_ACUITY_SCORE: 47
ADLS_ACUITY_SCORE: 35
ADLS_ACUITY_SCORE: 20
ADLS_ACUITY_SCORE: 22
ADLS_ACUITY_SCORE: 20
ADLS_ACUITY_SCORE: 34
ADLS_ACUITY_SCORE: 30
CHANGE_IN_FUNCTIONAL_STATUS_SINCE_ONSET_OF_CURRENT_ILLNESS/INJURY: NO
ADLS_ACUITY_SCORE: 21

## 2022-01-01 ASSESSMENT — ENCOUNTER SYMPTOMS
DYSURIA: 0
ABDOMINAL DISTENTION: 1
ABDOMINAL PAIN: 0
ABDOMINAL PAIN: 1
ABDOMINAL DISTENTION: 1
ARTHRALGIAS: 1
ABDOMINAL DISTENTION: 1
VOMITING: 1
FATIGUE: 1
SHORTNESS OF BREATH: 1
CHILLS: 0
VOMITING: 0
SHORTNESS OF BREATH: 1
NAUSEA: 1
ABDOMINAL PAIN: 1
FEVER: 0
NAUSEA: 1
SHORTNESS OF BREATH: 1
FREQUENCY: 0

## 2022-01-01 ASSESSMENT — PAIN SCALES - GENERAL
PAINLEVEL: SEVERE PAIN (6)
PAINLEVEL: SEVERE PAIN (7)

## 2022-01-01 ASSESSMENT — MIFFLIN-ST. JEOR: SCORE: 2080.75

## 2022-02-03 NOTE — PROGRESS NOTES
[employer] Patient Screening    1. Do you have a ride available to come to the hospital the day after your surgery by 8am with anticipated discharge of 11am? Yes  2. What is the name of this person? Tiffanie(spouse)  3. Do you have a  set up after surgery? Yes  4. Will your  be the same person that gives you a ride home after surgery? Yes  5. Have you received the Joint Replacement Guidebook? Yes  6. Do you have any questions about your guidebook?  No  7. Have you activated you [employer] account? Yes  8. Have you signed up for MY Chart access? Yes

## 2022-02-04 PROBLEM — Z96.651 S/P REVISION OF TOTAL KNEE, RIGHT: Status: ACTIVE | Noted: 2022-01-01

## 2022-02-04 NOTE — PLAN OF CARE
[facility]      OUTPATIENT PHYSICAL THERAPY EVALUATION  PLAN OF TREATMENT FOR OUTPATIENT REHABILITATION  (COMPLETE FOR INITIAL CLAIMS ONLY)  Patient's Last Name, First Name, M.I.  YOB: 1950  Cameron Barnard                        Provider's Name  [facility] Medical Record No.  [id number]                               Onset Date:  02/04/22   Start of Care Date:  02/04/22      Type:     _X_PT   ___OT   ___SLP Medical Diagnosis:  s/p R TKA                         PT Diagnosis:  Impaired gait   Visits from SOC:  1   _________________________________________________________________________________  Plan of Treatment/Functional Goals    Planned Interventions: balance training,bed mobility training,cryotherapy,gait training,home exercise program,patient/family education,stair training,strengthening,transfer training     Goals: See Physical Therapy Goals on Care Plan in [employer] electronic health record.    Therapy Frequency: 2x/day  Predicted Duration of Therapy Intervention: 3 days  _________________________________________________________________________________    I CERTIFY THE NEED FOR THESE SERVICES FURNISHED UNDER        THIS PLAN OF TREATMENT AND WHILE UNDER MY CARE     (Physician co-signature of this document indicates review and certification of the therapy plan).              Certification date from: 02/04/22, Certification date to: 02/11/22    Referring Physician: Gigi De Oliveira MD            Initial Assessment        See Physical Therapy evaluation dated 02/04/22 in Epic electronic health record.

## 2022-02-04 NOTE — BRIEF OP NOTE
[facility]    Brief Operative Note    Pre-operative diagnosis: Osteoarthritis of right knee, unspecified osteoarthritis type [M17.11]  Post-operative diagnosis Same as pre-operative diagnosis    Procedure: Procedure(s):  RIGHT TOTAL KNEE ARTHROPLASTY  Surgeon: Surgeon(s) and Role:     * Gigi De Oliveira MD - Primary     * Shayna Bergman PA-C - Assisting  Anesthesia: Choice   Estimated Blood Loss: Less than 10 ml    Drains: None  Specimens:   ID Type Source Tests Collected by Time Destination   A : RIGHT KNEE BONE/TISSUE DISCARDED IN OR PER EXCEPTION LIST Other Other OR DOCUMENTATION ONLY Gigi De Oliveira MD 2/4/2022 12:12 PM      Findings:   None.  Complications: None.  Implants:   Implant Name Type Inv. Item Serial No.  Lot No. LRB No. Used Action   BONE CEMENT STRK SIMPLEX P SPEEDSET 6192-1-001 - [id number] Cement, Bone BONE CEMENT STRK SIMPLEX P SPEEDSET 6192-1-001  [employer] [id number] Right 1 Implanted   IBALANCE PATELLA IMPLANT DOME VIT E     [employer] [id number] Right 1 Implanted   IBALANCE TKA, TIBIAL TRAY, MODULAR SIZE 8    [employer] [id number] Right 1 Implanted   IBALANCE TKA, FEMORAL IMPLANT, PS, CEMENTED SIZE 8 RIGHT    [employer] [id number] Right 1 Implanted   IBALANCE TKA, TIBIAL BEARING IMPLANT, PS SIZE 8 (16MM THICKNESS)    [employer] [id number] Right 1 Implanted

## 2022-02-04 NOTE — ANESTHESIA PROCEDURE NOTES
Intrathecal injection Procedure Note    Pre-Procedure   Staff -        Anesthesiologist:  Syed Salcido DO       Performed By: anesthesiologist       Location: OR       Pre-Anesthestic Checklist: patient identified, IV checked, risks and benefits discussed, informed consent, monitors and equipment checked, pre-op evaluation, at physician/surgeon's request and post-op pain management  Timeout:       Correct Patient: Yes        Correct Procedure: Yes        Correct Site: Yes        Correct Position: Yes   Procedure Documentation  Procedure: intrathecal injection       Patient Position: sitting       Patient Prep/Sterile Barriers: sterile gloves, mask, patient draped       Skin prep: Betadine       Insertion Site: L2-3. (midline approach).       Needle Gauge: 24.        Needle Length (Inches): 5        Spinal Needle Type: Inna-Jorge       Introducer used       # of attempts: 1 and  # of redirects:  1    Assessment/Narrative         Paresthesias: No.       CSF fluid: clear.    Medication(s) Administered   0.75% Hyperbaric Bupivacaine (Intrathecal), 1.6 mL  Medication Administration Time: 2/4/2022 10:45 AM     Comments:  Pt mildly sedated but communicative throughout. Tolerated the procedure well. No s/s of IV injection. No paresthesias. Patient returned to supine position. No obvious complications.

## 2022-02-04 NOTE — OP NOTE
Procedure Date: 02/04/2022    PREOPERATIVE DIAGNOSIS:  Primary varus osteoarthritis of right knee.    POSTOPERATIVE DIAGNOSIS:  Primary varus osteoarthritis of right knee.    PROCEDURE:  Right total knee arthroplasty using Arthrex iBalance knee system.    SURGEON:  Gigi De Oliveira MD    FIRST ASSISTANT:  Shayna Bergman PA-C    INDICATIONS FOR PROCEDURE:  Mr. Barnard is a very pleasant 71-year-old gentleman who has had ongoing right knee pain for some time now, failed conservative management with oral analgesics, activity modifications and injections, and now wished to proceed with operative intervention.  We had a long discussion of risks, benefits, and alternatives of total knee arthroplasty.  He understands these and wished to proceed with surgery.    NARRATIVE EVENTS:  After thorough evaluation and proper identification of the patient to be operated on, Mr. Barnard was taken to the operating room.  He underwent a spinal anesthetic as well as femoral nerve block, placed supine on the operating table.  right leg was prepped and draped in the usual sterile manner.  After appropriate surgical pause confirming the patient's extremity to be operated on, 10 minutes after received 3 grams of Ancef, his right leg was exsanguinated and tourniquet was raised to 300 mmHg on his right upper thigh. We approached the right knee through a midline incision centered over the patella.  Skin and soft tissue were sharply dissected down to the patella where a median parapatellar arthrotomy was performed.  I performed a mild medial release according to our preoperative templating, significantly released according to our preoperative templating, everted the patella, removed the infrapatellar fat pad, flexed the knee, removed the osteophytes from the distal femur, drilled and placed an intramedullary guide for distal femoral resection.  We resected the distal femur at 5 degrees physiologic valgus to the femur, resecting 9 mm distal femoral  bone.  Once this was done, we then sized the distal femur, sized to fit a size 8 Arthrex iBalance femoral component.  We pinned our 4-in-1 cutting block in 3 degrees external rotation of the posterior condyles, in line with epicondylar axis perpendicular to Whitesides line.  We made our anterior, posterior and chamfer resections.  We then proceeded to the tibia.  Here, we resected this perpendicular to the long axis of the tibia using extramedullary guides.  Once this was done, we then removed the excess soft tissue from the knee, mainly both cruciate ligaments and both meniscus.  Once this was done, we then made our box cut for our posterior stabilized femoral component and placed our trial implants into position.  A size 8 femur, 8 tibia and a 16 mm thick PS polyethylene insert gave us good stability and full range of motion and appropriate leg length and soft tissue tension.  At this point in time, we then marked our tibial rotation. The patella tracked nicely and we then paid our attention to the patella, which measured 23 mm thickness prior to resection.  We resected this down to 14 mm, placed a 34 mm thick x 9 mm diameter round tri-peg patellar button in position.  With patellar button in position, this measured 24.5 mm and tracked quite nicely.      At this point, we marked our tibial rotation, punched for our tibial keel, thoroughly irrigated the knee, prepared to cement in our final components.  Using 1 batch of Simplex cement with tobramycin, we cemented in a size 8 femur, 8 tibia and a 34 round tri-peg patellar button.  Once cement had cured, retrialed our polyethylene inserts, found that the 16 mm PS insert gave the best stability and full range of motion.  This was then impacted into position.  We removed all the excess cement from the knee, thoroughly irrigated the knee with dilute Betadine solution followed by saline and closed the arthrotomy with #1 Vicryl sutures and a running #1 Stratafix suture.   We placed 1 gram of powdered vancomycin deep to the arthrotomy, closed the skin and soft tissue with absorbable suture and Exofin skin closure.  The patient was placed in a well-padded postop dressing, taken to recovery room in stable condition, tolerated the procedure without difficulty.    Gigi De Oliveira MD        D: 2022   T: 2022   MT: PAKMT    Name:     CATHERINE NORIEGA  MRN:      [id number]        Account:        [id number]   :      1950           Procedure Date: 2022     Document: [id number]

## 2022-02-04 NOTE — PLAN OF CARE
Patient vital signs are at baseline: No,  Reason:  slightly elevated.  Patient able to ambulate as they were prior to admission or with assist devices provided by therapies during their stay:  Yes  Patient MUST void prior to discharge:  No,  Reason: DTV  Patient able to tolerate oral intake:  Yes  Pain has adequate pain control using Oral analgesics:  Yes    A&O. Dressing is CDI. Pain managed with IV dilaudid, oxy, and atarax.  Up with 1 and walker. Voiding in urinal.

## 2022-02-04 NOTE — ANESTHESIA CARE TRANSFER NOTE
Patient: Cameron Barnard    Procedure: Procedure(s):  RIGHT TOTAL KNEE ARTHROPLASTY       Diagnosis: Osteoarthritis of right knee, unspecified osteoarthritis type [M17.11]  Diagnosis Additional Information: No value filed.    Anesthesia Type:   Spinal     Note:    Oropharynx: oropharynx clear of all foreign objects  Level of Consciousness: drowsy  Oxygen Supplementation: face mask    Independent Airway: airway patency satisfactory and stable  Dentition: dentition unchanged  Vital Signs Stable: post-procedure vital signs reviewed and stable  Report to RN Given: handoff report given  Patient transferred to: PACU    Handoff Report: Identifed the Patient, Identified the Reponsible Provider, Reviewed the pertinent medical history, Discussed the surgical course, Reviewed Intra-OP anesthesia mangement and issues during anesthesia, Set expectations for post-procedure period and Allowed opportunity for questions and acknowledgement of understanding      Vitals:  Vitals Value Taken Time   BP     Temp     Pulse     Resp     SpO2         Electronically Signed By: RON Garcia CRNA  February 4, 2022  1:03 PM

## 2022-02-04 NOTE — ANESTHESIA PREPROCEDURE EVALUATION
Anesthesia Pre-Procedure Evaluation    Patient: Cameron Barnard   MRN: [id number] : 1950        Preoperative Diagnosis: Osteoarthritis of right knee, unspecified osteoarthritis type [M17.11]    Procedure : Procedure(s):  RIGHT TOTAL KNEE ARTHROPLASTY          Past Medical History:   Diagnosis Date     Allergic state      DVT (deep venous thrombosis) (H)     chronic left femoral DVT     HLD (hyperlipidemia)      Hypertension     white coat syndrome     Multiple subsegmental pulmonary emboli without acute cor pulmonale (H) 2017     Perforated bowel (H) 2020     Personal history of deep vein thrombosis     DISTAL VEIN  LLE     Primary osteoarthritis of both knees      Pulmonary embolism (H)      Pulmonary embolus (H)       Past Surgical History:   Procedure Laterality Date     ARTHROPLASTY KNEE Left 2/10/2021    Procedure: Left total knee arthroplasty;  Surgeon: Gigi De Oliveira MD;  Location:  OR     COLONOSCOPY N/A 2020    Procedure: COLONOSCOPY;  Surgeon: Lakshmi Santana MD;  Location:  OR     ESOPHAGOSCOPY, GASTROSCOPY, DUODENOSCOPY (EGD), COMBINED N/A 2020    Procedure: ESOPHAGOGASTRODUODENOSCOPY (EGD);  Surgeon: Lakshmi Santana MD;  Location:  OR     HERNIORRHAPHY INCISIONAL (LOCATION) N/A 2021    Procedure: OPEN INCISIONAL HERNIA REPAIR;  Surgeon: Christ Parekh MD;  Location:  OR     INJECT STEROID (LOCATION) Right 2/10/2021    Procedure: Right knee intra-articular injection of corticosteroid, 80 mg of Depo-Medrol;  Surgeon: Gigi De Oliveira MD;  Location:  OR     LAPAROSCOPIC APPENDECTOMY N/A 2020    Procedure: Laparoscopic appendectomy;  Surgeon: Christ Parekh MD;  Location:  OR     LAPAROSCOPY DIAGNOSTIC (GENERAL) N/A 2020    Procedure: LAPAROSCOPIC  SMALL BOWEL RESCECTION;  Surgeon: Christ Parekh MD;  Location:  OR     LAPAROTOMY EXPLORATORY N/A 5/10/2021    Procedure: EXPLORATORY LAPAROTOMY AND  EVACUATION OF HEMATOMA;  Surgeon: Hari Ceballos MD;  Location: [facility] OR      Allergies   Allergen Reactions     Seasonal Allergies Other (See Comments)     Runny nose, watery eyes      Social History     Tobacco Use     Smoking status: Never Smoker     Smokeless tobacco: Never Used   Substance Use Topics     Alcohol use: Yes      Wt Readings from Last 1 Encounters:   02/04/22 129.3 kg (285 lb)        Anesthesia Evaluation   Pt has had prior anesthetic. Type: General.        ROS/MED HX  ENT/Pulmonary:       Neurologic:  - neg neurologic ROS     Cardiovascular:     (+) hypertension-----    METS/Exercise Tolerance:     Hematologic:     (+) History of blood clots, pt is not anticoagulated,     Musculoskeletal:   (+) arthritis,     GI/Hepatic:  - neg GI/hepatic ROS     Renal/Genitourinary:  - neg Renal ROS     Endo:  - neg endo ROS     Psychiatric/Substance Use:  - neg psychiatric ROS     Infectious Disease:  - neg infectious disease ROS     Malignancy:  - neg malignancy ROS     Other:  - neg other ROS          Physical Exam    Airway        Mallampati: III   TM distance: > 3 FB   Neck ROM: full   Mouth opening: < 3 cm    Respiratory Devices and Support         Dental  no notable dental history         Cardiovascular   cardiovascular exam normal          Pulmonary   pulmonary exam normal                OUTSIDE LABS:  CBC:   Lab Results   Component Value Date    WBC 6.6 05/13/2021    WBC 7.2 05/12/2021    HGB 10.1 (L) 05/13/2021    HGB 10.0 (L) 05/12/2021    HCT 31.0 (L) 05/13/2021    HCT 31.2 (L) 05/12/2021     05/13/2021     05/12/2021     BMP:   Lab Results   Component Value Date     05/13/2021     05/12/2021    POTASSIUM 4.0 05/13/2021    POTASSIUM 4.1 05/12/2021    CHLORIDE 106 05/13/2021    CHLORIDE 105 05/12/2021    CO2 29 05/13/2021    CO2 29 05/12/2021    BUN 11 05/13/2021    BUN 13 05/12/2021    CR 1.1 08/24/2021    CR 0.98 05/13/2021    GLC 98 05/13/2021    GLC 89 05/12/2021     COAGS:    Lab Results   Component Value Date    INR 1.39 (H) 05/10/2021     POC:   Lab Results   Component Value Date    BGM 83 05/12/2021     HEPATIC:   Lab Results   Component Value Date    ALBUMIN 2.6 (L) 05/13/2021    PROTTOTAL 6.0 (L) 05/13/2021    ALT 17 05/13/2021    AST 16 05/13/2021    ALKPHOS 51 05/13/2021    BILITOTAL 0.7 05/13/2021     OTHER:   Lab Results   Component Value Date    LACT 2.2 (H) 05/10/2021    PETER 8.1 (L) 05/13/2021    MAG 2.4 (H) 09/18/2020    LIPASE 70 (L) 09/26/2020    TSH 0.38 (L) 05/12/2021    T4 1.26 05/12/2021       Anesthesia Plan    ASA Status:  2      Anesthesia Type: Spinal.              Consents    Anesthesia Plan(s) and associated risks, benefits, and realistic alternatives discussed. Questions answered and patient/representative(s) expressed understanding.    - Discussed:     - Discussed with:  Patient      - Extended Intubation/Ventilatory Support Discussed: No.      - Patient is DNR/DNI Status: No    Use of blood products discussed: Yes.     - Discussed with: Patient.     - Consented: consented to blood products            Reason for refusal: other.     Postoperative Care    Pain management: IV analgesics.   PONV prophylaxis: Ondansetron (or other 5HT-3), Dexamethasone or Solumedrol     Comments:    Other Comments: Adductor canal block            Syed Salcido DO

## 2022-02-04 NOTE — ANESTHESIA PROCEDURE NOTES
Adductor canal Procedure Note    Pre-Procedure   Staff -        Anesthesiologist:  Juan Alonso MD       Performed By: Anesthesiologist       Location: pre-op       Pre-Anesthestic Checklist: patient identified, IV checked, site marked, risks and benefits discussed, informed consent, monitors and equipment checked, pre-op evaluation, at physician/surgeon's request and post-op pain management  Timeout:       Correct Patient: Yes        Correct Procedure: Yes        Correct Site: Yes        Correct Position: Yes        Correct Laterality: Yes        Site Marked: Yes  Procedure Documentation  Procedure: Adductor canal       Laterality: right       Patient Position: supine       Patient Prep/Sterile Barriers: sterile gloves, mask       Skin prep: Chloraprep      Local skin infiltrated with mL of 1% lidocaine.        Needle Type: insulated       Needle Gauge: 22.        Needle Length (Inches): 3.13        Ultrasound guided       1. Ultrasound was used to identify targeted nerve, plexus, vascular marker, or fascial plane and place a needle adjacent to it in real-time.       2. Ultrasound was used to visualize the spread of anesthetic in close proximity to the above referenced structure.       3. A permanent image is entered into the patient's record.       4. The visualized anatomic structures appeared normal.       5. There were no apparent abnormal pathologic findings.    Assessment/Narrative         The placement was negative for: blood aspirated, painful injection and site bleeding       Paresthesias: No.     Bolus given via needle..        Secured via.        Insertion/Infusion Method: Single Shot    Medication(s) Administered   Bupivacaine 0.5% w/ 1:400K Epi (Injection), 15 mL  Medication Administration Time: 2/4/2022 10:27 AM     Comments:  Femoral artery clearly identified deep to the sartorius muscle via ultrasound imaging.  After appropriate timeout procedure, needle was advanced under direct ultrasound  guidance.  15 ml of 0.5% bupivacaine with 1:400,000 epinephrine was incrementally injected with negative aspiration every 5 ml.  Patient tolerated procedure well.    Ultrasound Interpretation, peripheral nerve block    1.  As noted above, under ultrasound guidance, the needle was inserted and placed in close proximity to the saphenous nerve.  2. Ultrasound was also used to visualize the spread of the anesthetic in close proximity to the nerve being blocked.  3. The nerve appeared anatomically normal.  4. There were no apparent abnormal pathological findings.  5. A permanent ultrasound image was saved n the patient's record.    Juan Alonso MD   10:30 AM

## 2022-02-04 NOTE — PROGRESS NOTES
"   02/04/22 1510   Quick Adds   Quick Adds Certification   Type of Visit Initial PT Evaluation       Present no   Living Environment   People in home spouse   Current Living Arrangements house   Home Accessibility stairs to enter home;stairs within home   Number of Stairs, Main Entrance 3   Stair Railings, Main Entrance railings on both sides of stairs   Number of Stairs, Within Home, Primary 7   Stair Railings, Within Home, Primary railings on both sides of stairs   Transportation Anticipated family or friend will provide   Living Environment Comments Pt reports living in a house with his spouse. Pt reports needing to negotiate stairs to enter and within the home. Pt reports his spouse will pick him up upon discharge and provide 24/7 assist as needed.   Self-Care   Usual Activity Tolerance moderate   Current Activity Tolerance fair   Regular Exercise No   Equipment Currently Used at Home shower chair;grab bar, toilet;grab bar, tub/shower   Activity/Exercise/Self-Care Comment Pt reports being IND at baseline with all ADLs. Pt reports ambulating at baseline without an AD and has access to a FWW if needed. Pt reports stairs are not difficult for him. Pt reports being able to ambulate ~200' w/o an AD before needing a rest break. Pt drives and pt's spouse can assist with errand management.   Disability/Function   Hearing Difficulty or Deaf no   Wear Glasses or Blind yes   Vision Management reading glasses   Concentrating, Remembering or Making Decisions Difficulty no   Difficulty Communicating no   Walking or Climbing Stairs Difficulty no   Doing Errands Independently Difficulty (such as shopping) no   Fall history within last six months no   Change in Functional Status Since Onset of Current Illness/Injury no   General Information   Onset of Illness/Injury or Date of Surgery 02/04/22   Referring Physician Gigi De Oliveira MD   Patient/Family Therapy Goals Statement (PT) \"To get stronger " "and to go home\"   Pertinent History of Current Problem (include personal factors and/or comorbidities that impact the POC) s/p R TKA POD#0   Existing Precautions/Restrictions fall   Weight-Bearing Status - LLE full weight-bearing   Weight-Bearing Status - RLE weight-bearing as tolerated   Cognition   Orientation Status (Cognition) oriented x 3   Pain Assessment   Patient Currently in Pain No   Integumentary/Edema   Integumentary/Edema Comments Incision on R knee with dressing intact   Posture    Posture Forward head position;Protracted shoulders   Range of Motion (ROM)   ROM Quick Adds ROM WFL;ROM deficits secondary to surgical procedure;ROM deficits secondary to pain;ROM deficits secondary to weakness;ROM deficits secondary to swelling   ROM Comment L LE ROM WFL   Strength   Manual Muscle Testing Quick Adds Able to perform L SLR;Able to perform R SLR   Bed Mobility   Comment (Bed Mobility) Supine>sit w/ CGA   Transfers   Transfer Safety Comments Sit>stand w/ FWW and CGA   Gait/Stairs (Locomotion)   Colden Level (Gait) contact guard   Assistive Device (Gait) walker, front-wheeled   Distance in Feet (Required for LE Total Joints) 100'  (10' eval)   Comment (Gait/Stairs) Pt ambulated ~10' w/ FWW and CGA for eval. Pt was steady throughout and had no LOB.   Balance   Balance Comments Pt able to sit at EOB unsupported without LOB. Pt ambulates using a FWW for added stability and support.   Sensory Examination   Sensory Perception patient reports no sensory changes   Clinical Impression   Criteria for Skilled Therapeutic Intervention yes, treatment indicated   PT Diagnosis (PT) Impaired gait   Influenced by the following impairments Decreased activity tolerance; decreased balance; decreased strength   Functional limitations due to impairments Impaired functional mobility   Clinical Presentation Stable/Uncomplicated   Clinical Presentation Rationale Clinical Judgement   Clinical Decision Making (Complexity) low " complexity   Therapy Frequency (PT) 2x/day   Predicted Duration of Therapy Intervention (days/wks) 3 days   Planned Therapy Interventions (PT) balance training;bed mobility training;cryotherapy;gait training;home exercise program;patient/family education;stair training;strengthening;transfer training   Risk & Benefits of therapy have been explained evaluation/treatment results reviewed;care plan/treatment goals reviewed;risks/benefits reviewed;current/potential barriers reviewed;participants voiced agreement with care plan;participants included;patient   PT Discharge Planning    PT Rationale for DC Rec Pt is below baseline but is moving well. Anticipate at time of discharge pt will be SBA for bed mobility, functional transfers, gait w/ FWW and min A x 1 for stairs. Pt will have 24/7 assist upon discharge as needed.   PT Brief overview of current status  Supine>sit w/ CGA; sit>stand w/ FWW and CGA; gait w/ FWW and CGA   Therapy Certification   Start of care date 02/04/22   Certification date from 02/04/22   Certification date to 02/11/22   Medical Diagnosis s/p R TKA    Total Evaluation Time   Total Evaluation Time (Minutes) 10   Skin WDL   Skin Inspection Procedural focused assessment (identify areas inspected)   Skin WDL X   Skin Color erinn  (L lower shin)   Skin Temperature warm   Skin Integrity/Characteristics other (see comments)  (Incision)

## 2022-02-04 NOTE — CONSULTS
[facility]  BRIEF HOSPITALIST CONSULT NOTE- Hospitalist Service     Date of Admission:  2/4/2022  Consult Requested by: Dr. De Oliveira  Reason for Consult: Post-op co-medical management.    PRIMARY CARE PROVIDER:    Gary Bey    Assessment & Plan   Cameron Barnard is a 71 year old male admitted on 2/4/2022.    Past medical history significant for OA and History of PE/DVT who underwent an elective right TKA.      EMR was reviewed that included pre-op H&P and PTA medications.  Appears patient was recently taken off of warfarin by Hematology and appears the plan is to start Xarelto post-op for 30 days.  Vital signs reviewed and noted to have BP of 168/92.  Discussed with nursing staff (Luis Blanton RN) who indicated patient is in pain otherwise no concerns.  Please see outlined plan below.  PTA (Home) medications reconciled for admission and discharge.  Hospitalist will sign off.  Please call or reconsult if any questions or concerns arise.      OA with degenerative changes of the right knee s/p right TKA  POD# 0.   - Orthopedic Service is managing.   --Defer analgesic management, DVT prophylaxis, PT/OT.    - HGB check in the morning.    - Encourage utilization of incentive spirometer.     Obesity  Body mass index is 38.65 kg/m .   Increase in all-cause morbidity and mortality.   - Encourage weight loss.  - Follow up with PCP regarding ongoing management.      History of PE/DVT  - Per Ortho.     --Currently on subcutaneous Lovenox.  Per Pre-op H&P plan to be on Xarelto for 30 days at discharge but Eliquis 2.5 mg BId has been ordered for discharge.      Non-essential medications (supplements) were not restarted on admission but will be resumed at discharge.      Clinically Significant Risk Factors Present on Admission                 # Obesity: Estimated body mass index is 38.65 kg/m  as calculated from the following:    Height as of this encounter: 1.829 m (6').    Weight as of this  encounter: 129.3 kg (285 lb).        Diet: Advance Diet as Tolerated: Regular Diet Adult  Regular Diet Adult     DVT Prophylaxis: Enoxaparin (Lovenox) SQ and Defer to primary service   Altman Catheter: Not present  Code Status: Full Code     Disposition Plan    Per Ortho.       Entered: Thony Riley PA-C 02/04/2022, 3:19 PM          Thony Riley PA-C  [facility]  Securely message with the [employer] Web Console (learn more here)  Text page via [employer] Paging/Directory

## 2022-02-04 NOTE — ANESTHESIA POSTPROCEDURE EVALUATION
Patient: Cameron Barnard    Procedure: Procedure(s):  RIGHT TOTAL KNEE ARTHROPLASTY       Diagnosis:Osteoarthritis of right knee, unspecified osteoarthritis type [M17.11]  Diagnosis Additional Information: No value filed.    Anesthesia Type:  Spinal    Note:  Disposition: Inpatient   Postop Pain Control: Uneventful            Sign Out: Well controlled pain   PONV: No   Neuro/Psych: Uneventful            Sign Out: Acceptable/Baseline neuro status   Airway/Respiratory: Uneventful            Sign Out: Acceptable/Baseline resp. status   CV/Hemodynamics: Uneventful            Sign Out: Acceptable CV status; No obvious hypovolemia; No obvious fluid overload   Other NRE: NONE   DID A NON-ROUTINE EVENT OCCUR? No           Last vitals:  Vitals Value Taken Time   /86 02/04/22 1430   Temp 36.1  C (96.9  F) 02/04/22 1350   Pulse 52 02/04/22 1430   Resp 16 02/04/22 1430   SpO2 98 % 02/04/22 1437   Vitals shown include unvalidated device data.    Electronically Signed By: Syed Salcido DO  February 4, 2022  3:45 PM

## 2022-02-05 NOTE — PLAN OF CARE
Patient vital signs are at baseline: Yes on RA.  Patient able to ambulate as they were prior to admission or with assist devices provided by therapies during their stay:  Yes  Patient MUST void prior to discharge:  Yes, Voiding in BR.  Patient able to tolerate oral intake:  Yes  Pain has adequate pain control using Oral analgesics:  Yes     CMS intact. Dressing CDI wrapped with ace wrap. PIV saline locked.

## 2022-02-05 NOTE — PLAN OF CARE
OT: Orders received. Chart reviewed and discussed with care team. Met with patient briefly who reports he had a L TKA last year. Patient has adaptive equipment at home including toilet safety frame, shower chair, and grab bars in shower and by toilet. Patient has been practicing with equipment at home and also has assist from his wife. Educated pt briefly on tub transfer bench if desired as walk-in shower is on lower level. Pt with no concerns about completing ADLs at home.  No acute OT needs identified.  Defer discharge recommendations to medical team.  Will complete orders.

## 2022-02-05 NOTE — PLAN OF CARE
Patient vital signs are at baseline: Yes  Patient able to ambulate as they were prior to admission or with assist devices provided by therapies during their stay:  Yes  Patient MUST void prior to discharge:  Yes  Patient able to tolerate oral intake:  Yes  Pain has adequate pain control using Oral analgesics:  Yes     A&Ox 4. VSS on RA, Dressing CDI. Up with one assist with walker/ gait belt  to bathroom. Voiding adequate amount. Pain managed with scheduled tylenol and PRN Oxy. Discharging home today.Discharge instruction went over with patient and family verbalized understanding.

## 2022-02-05 NOTE — PROGRESS NOTES
Orthopedic Surgery  2/5/2022    S: Patient voices no complaints today.     O: Blood pressure 115/70, pulse 63, temperature 97.5  F (36.4  C), temperature source Oral, resp. rate 16, height 1.829 m (6'), weight 129.3 kg (285 lb), SpO2 92 %.  Lab Results   Component Value Date    HGB 10.1 05/13/2021     Lab Results   Component Value Date    INR 1.39 05/10/2021        Neurovascularly intact.  Calves are negative bilaterally, both soft and nontender.  The wound is C/D/I.  The wound looks good with minimal erythema of the surrounding skin.    A: Mr. Barnard is doing well status post Procedure(s):  RIGHT TOTAL KNEE ARTHROPLASTY.    P: Continue physical therapy.   Anticoagulation lovenox in hospital, home on xarelto  Pain management, oxycodone and naproxsyn  Discharge planning    Gigi De Oliveira MD  [phone]

## 2022-02-05 NOTE — PROGRESS NOTES
2/4/[id number]    Patient vital signs are at baseline: No,  Reason:  slightly elevated.  Patient able to ambulate as they were prior to admission or with assist devices provided by therapies during their stay:  Yes  Patient MUST void prior to discharge:  yes, in urinal  Patient able to tolerate oral intake:  Yes  Pain has adequate pain control using Oral analgesics:  no    R Knee covered with ace wrap, FABIAN incision. CMS intact. Pain 7/10 not controlled with scheduled Tylenol/Oxycodone; gave Atarax pending assessment and reported off to oncoming RN.

## 2022-02-17 PROBLEM — M96.89: Status: ACTIVE | Noted: 2021-05-10

## 2022-03-24 NOTE — TELEPHONE ENCOUNTER
Procedure: Open incisional hernia repair    Date: 05/05/2021    Surgeon: Iglesia    Attempted to call patient back to discuss.  No answer.    Left patient a message informing him that maintaining a healthy weight and lifestyle, as well as avoiding overly strenuous/heavy lifting are ways to help reduce risk of hernia recurrent. However, there are not any set in stone restrictions long term after hernia repair    Call back number provided for any additional questions or concerns    Shayna Rios RN-BSN

## 2022-03-24 NOTE — TELEPHONE ENCOUNTER
Patient had a hernia repair with [facility]G 5/5/21 and is just wanting some advice on exercise precautions as to not cause another hernia.  Please call    Phone: [phone]  Message ok

## 2022-09-09 PROBLEM — D73.5 SPLENIC INFARCT: Status: ACTIVE | Noted: 2022-01-01

## 2022-09-09 PROBLEM — C25.9 MALIGNANT NEOPLASM OF PANCREAS, UNSPECIFIED LOCATION OF MALIGNANCY (H): Status: ACTIVE | Noted: 2022-01-01

## 2022-09-09 PROBLEM — I26.99 PULMONARY EMBOLISM (H): Status: ACTIVE | Noted: 2017-05-01

## 2022-09-09 PROBLEM — R10.84 ABDOMINAL PAIN, GENERALIZED: Status: ACTIVE | Noted: 2022-01-01

## 2022-09-09 PROBLEM — I26.99 OTHER ACUTE PULMONARY EMBOLISM, UNSPECIFIED WHETHER ACUTE COR PULMONALE PRESENT (H): Status: ACTIVE | Noted: 2022-01-01

## 2022-09-09 NOTE — ED PROVIDER NOTES
"  History   Chief Complaint:  Abdominal Pain     HPI   History supplemented by electronic chart review    Cameron Barnard is a 72 year old male with history of small bowel obstruction and resection, terminal ileum perforation several years ago, followed by antral hernia repair that was itself complicated by hematoma requiring surgical evacuation several years ago who presents emergency department with many weeks of gradually progressive abdominal symptoms including early satiety, upper abdominal pain and more recently some swelling to his abdomen, with nausea and decreased bowel output but no vomiting.  No fevers.  No respiratory symptoms.  No history of cancers.  He used to drink alcohol sparingly though ultimately quit entirely because he says he got tired of medical providers asking him specifically how much alcohol he drinks, and he stated \"it was not worth the trouble!\".  No urinary symptoms.  No recent trauma.  He is not on blood thinners.    Review of Systems  All other systems are reviewed negative except as above in HPI    Allergies:  Seasonal Allergies  No known drug allergies    Medications:  Doxylamine  Senna- S  Zinc gluconate     Past Medical History:     Postprocedural hematoma of abdominal wall   Bowel resection  Osteoarthritis of both knees   Anticoagulation monitoring   Acute deep vein thrombosis of popliteal vein of left lower extremity  Pulmonary embolism   Ascending aorta dilation   Diverticular disease of colon   Gout   Lymphedema of left lower extremity   Ventral hernia without obstruction or gangrene   Abdominal mass  Hiatal hernia     Past Surgical History:    Appendectomy   Knee replacement   Exploratory laparotomy   Herniorrhaphy incisional     Family History:    Mother- blood disease     Social History:  Patient presents via EMS  PCP: Gary Bey     Physical Exam     Patient Vitals for the past 24 hrs:   BP Temp Temp src Pulse Resp SpO2 Height Weight   09/09/22 1730 114/84 -- -- 91 19 " "92 % -- --   09/09/22 1720 121/84 -- -- 92 13 92 % -- --   09/09/22 1540 (!) 144/89 -- -- -- -- 93 % -- --   09/09/22 1530 (!) 139/96 -- -- 93 -- 93 % -- --   09/09/22 1515 128/81 -- -- 84 -- 91 % -- --   09/09/22 1012 (!) 157/100 97.6  F (36.4  C) Tympanic 92 12 97 % 1.854 m (6' 1\") 129.3 kg (285 lb)     Physical Exam  General: Male sitting upright in room 1, wife at bedside  HENT: mucous membranes moist   CV: rate as above, mild symmetric LE edema  Resp: normal effort, speaks in full phrases, no stridor, no cough observed  GI: Abdomen soft, protuberant with particular fullness in the upper half of his abdomen though no discrete masses are palpable, negative Lobo sign, no guarding, no palpable fluid wave  MSK: no bony tenderness, no CVAT  Skin: appropriately warm and dry, no jaundice, central abdominal scar well-healed without abdominal rash or flank rash visible  Neuro: alert, clear speech, oriented   Psych: cooperative, pleasant, no evident hallucinations    Emergency Department Course     Imaging:  US Paracentesis without Albumin   Preliminary Result   IMPRESSION: Technically successful paracentesis without immediate   complications.       CT Abdomen Pelvis w Contrast   Final Result   IMPRESSION:    1.  New large mass in the pancreatic head and neck suspicious for   pancreatic adenocarcinoma.   2.  Several new hypodense lesions of the liver suspicious for hepatic   metastases.   3.  Findings concerning for peritoneal carcinomatosis with moderate   ascites, nodular omental and peritoneal thickening and a few small   peritoneal deposits.   4.  Nonocclusive small filling defect in the main portal vein,   consistent with a nonocclusive thrombus. The left portal vein is   occluded with mildly enhancing soft tissue, suspicious for tumor   thrombus.   5.  Mildly enlarged peripancreatic lymph nodes, likely metastatic.   6.  Possible small filling defect in a right lower lobe segmental   pulmonary artery, not well " assessed on this phase of contrast.   Recommend a CT pulmonary angiogram for complete assessment.   7.  Subtle lucent lesion in the right fifth rib is indeterminate.   8.  Cholelithiasis.   9.  Small hypodense focus in the inferior spleen could represent a   splenic lesion or small splenic infarct. Continued attention on   follow-up.      Findings were discussed with Dr. Jimenez at 3:00 PM.      GIOVANNY GILBERT MD            SYSTEM ID:  [id number]      CT Chest Pulmonary Embolism w Contrast    (Results Pending)     Report per radiology    Laboratory:  Labs Ordered and Resulted from Time of ED Arrival to Time of ED Departure   BASIC METABOLIC PANEL - Abnormal       Result Value    Sodium 140      Potassium 4.2      Chloride 106      Carbon Dioxide (CO2) 27      Anion Gap 7      Urea Nitrogen 10      Creatinine 1.02      Calcium 8.7      Glucose 112 (*)     GFR Estimate 78     HEPATIC FUNCTION PANEL - Abnormal    Bilirubin Total 0.8      Bilirubin Direct 0.2      Protein Total 6.8      Albumin 3.0 (*)     Alkaline Phosphatase 86      AST 25      ALT 18     LIPASE - Abnormal    Lipase 49 (*)    CELL COUNT BODY FLUID - Abnormal    Color Yellow      Clarity Cloudy (*)     Total Nucleated Cells 2,721      Cell Count Fluid Source Abdomen     ALBUMIN LEVEL - Abnormal    Albumin 3.2 (*)    COVID-19 VIRUS (CORONAVIRUS) BY PCR - Normal    SARS CoV2 PCR Negative     NT PROBNP INPATIENT - Normal    N terminal Pro BNP Inpatient 100     CBC WITH PLATELETS AND DIFFERENTIAL    WBC Count 10.1      RBC Count 5.34      Hemoglobin 15.8      Hematocrit 48.6      MCV 91      MCH 29.6      MCHC 32.5      RDW 13.4      Platelet Count 229      % Neutrophils 74      % Lymphocytes 12      % Monocytes 11      % Eosinophils 3      % Basophils 0      % Immature Granulocytes 0      NRBCs per 100 WBC 0      Absolute Neutrophils 7.3      Absolute Lymphocytes 1.2      Absolute Monocytes 1.1      Absolute Eosinophils 0.3      Absolute Basophils 0.0       Absolute Immature Granulocytes 0.0      Absolute NRBCs 0.0     ALBUMIN FLUID    Albumin Fluid Source Abdomen      Albumin fluid 2.3     PROTEIN FLUID    Protein Fluid Source Abdomen      Protein Total Fluid 3.8     ROUTINE UA WITH MICROSCOPIC   DIFERENTIAL BODY FLUID   TROPONIN POCT   NON-GYNECOLOGIC CYTOLOGY   NON-GYNECOLOGIC CYTOLOGY   AEROBIC BACTERIAL CULTURE ROUTINE   CELL COUNT WITH DIFFERENTIAL FLUID     Emergency Department Course:     Reviewed:  I reviewed nursing notes, vitals, past medical history and Care Everywhere    Assessments:  1417 I obtained history and examined the patient as noted above.   1438 I rechecked the patient and explained findings.  1542 I rechecked the patient and explained findings     Consults:  1542  I spoke with Dr. Mancilla, hospitalist, who accepts admission     Interventions:  1433 Ondansetron 4mg IV  1433 NS 1,000mL IV  1433 Hydromorphone 0.5mg  IV  1607 Lidocaine 10mL Other  Disposition:  The patient was admitted to the hospital under the care of Dr. Mancilla.     Impression & Plan   Medical Decision Making:  Unfortunately, his work-up including CT reveals findings highly suspicious for new diagnosis of metastatic pancreatic cancer causing ascites as well as suspected splenic infarcts and pulmonary embolism, for which heparin was initiated after discussing with accepting hospitalist.  The patient reported significant pain progressing recently though felt improved with treatments provided.  The nature of this worrisome suspected diagnosis was reviewed with the patient, along with the need to await definitive pathology studies for confirmation and to more specifically guide next steps in treatment.  He was sent for a diagnostic and therapeutic paracentesis from the emergency department, results will be followed up by the accepting hospitalist.  I think he requires hospitalization in light of these findings to determine an optimal plan for anticoagulation, as well as to closely  coordinate with the oncology service regarding expedited work-up and treatment.  He was admitted to the hospital service for further multidisciplinary care.  Accepting hospitalist did order CT of the chest to more specifically evaluate extent of any possible pulmonary embolism, and this was discussed specifically with the ED nurse who will clear him for the study given the probability of prolonged emergency department boarding in light of recent hospital emergency department conditions here at [facility].    Diagnosis:    ICD-10-CM    1. Abdominal pain, generalized  R10.84    2. Malignant neoplasm of pancreas, unspecified location of malignancy (H)  C25.9     suspected   3. Splenic infarct  D73.5    4. Other acute pulmonary embolism, unspecified whether acute cor pulmonale present (H)  I26.99     possible     Scribe Disclosure:  I, Yoko Gonzalez, am serving as a scribe at 2:17 PM on 9/9/2022 to document services personally performed by Jamar Magallon,  based on my observations and the provider's statements to me.            Jamar Magallon MD  09/09/22 1912

## 2022-09-09 NOTE — H&P
[facility]    History and Physical - Hospitalist Service       Date of Admission:  9/9/2022    Assessment & Plan      Cameron Barnard is a 70 year old male with a history of remote PE/DVT, small bowel obstruction in 2020, ventral hernia repair on 5/5/21, right TKA in February 2022, hyperlipidemia, prior hematuria, and dilated ascending aorta who presented to the ER with abdominal pain.     Bilateral Pulmonay Embolus  Portal Vein Thrombosis  Has history of PE about 5 years ago. Was treated with warfarin and then transitioned to Eliquis which he tolerated much better. Has been off eliquis since earlier this year. CT abdomen showed pulmonary embolus and portal vein thrombosis.  -consult [facility]  -chest CT PE protocol to determine extent of clot  -check BNP and troponin to eval for right heart strain and if elevated can add on echo tomorrow, no indication of ischemia so no need to follow serial troponins if elevated  -continue heparin drip, plan transition to Eliquis once no biopsies/procedures planned    Probable metastatic Pancreatic cancer (new diagnosis)  Ascites (likely malignant)  New diagnosis with mass in pancreatic head and masses throughout liver suggesting metastases. 3.7 liters removed from paracentesis.  -add on cytology to the cell count, albumin and total protein that had been ordered for paracentesis fluid  -consult [facility] to assist with planning for biopsy in setting of anticoagulation  -will need pathology, staging, stabilization from his PE, and then discussion about palliative treatment options  -tylenol, oxycodone, and IV dilaudid PRN pain  -bowel regimen PRN while on narcotics  -Patient's sister had a bad experience with chemotherapy and is reluctant to pursue treatment if cancer not easily treatable.  He can continue to discuss these details with the oncologist and his wife.    Dilated ascending aorta  -Follows with cardiology  "through [facility] as an outpatient, continue outpatient follow-up as previously recommended     Diet: NPO for Medical/Clinical Reasons Except for: Meds    DVT Prophylaxis: Heparin drip for PE  Altman Catheter: Not present  Central Lines: None  Cardiac Monitoring: None  Code Status:   Full code    Clinically Significant Risk Factors Present on Admission             # Hypoalbuminemia: Albumin = 3.0 g/dL (Ref range: 3.4 - 5.0 g/dL) on admission, will monitor as appropriate        # Obesity: Estimated body mass index is 37.6 kg/m  as calculated from the following:    Height as of this encounter: 1.854 m (6' 1\").    Weight as of this encounter: 129.3 kg (285 lb).        Disposition Plan      Expected Discharge Date: 09/12/2022                The patient's care was discussed with the Patient and Patient's Family.    Lux Mancilla MD  Hospitalist Service  [facility]  Securely message with the Vocera Web Console (learn more here)  Text page via [employer] Paging/Directory     ______________________________________________________________________    Chief Complaint   Abdominal pain    History is obtained from the patient, electronic health record and emergency department physician    History of Present Illness   Cameron Barnard is a 70 year old male with a history of small bowel obstruction in 2020, remote PE/DVT, hyperlipidemia, and dilated ascending aorta who presented to the ER with abdominal pain.  Patient notes he has had some chronic abdominal pains since his surgery back in 2020.  About 2 months ago he had some chiggers on his right leg and had to take some antibiotics which caused him to be more sick to his stomach.  He saw his primary care physician who thought he may have developed dairy sensitivity in his stomach and asked him to stop taking dairy.  He said he felt the best he had ever felt for the 3 days after stopping dairy but then had some coleslaw which made things worse again. He has been " having constipation fluctuating with diarrhea for the last few weeks and saw his primary care physician last week. They did some blood and stool labs that were normal and he was planning to follow-up with his primary care physician later this week with consideration of a CT scan to be done at that time. Then today he noted that he has not had a bowel movement for a week with continued abdominal pain so decided to come into the emergency department.  He denies any fevers or weight loss.  He does note some fullness in his abdomen.  He also notes leg edema slightly worse on the right that has been there for a long time but was worse after the chiggers and cellulitis. We discussed that this is most likely cancer and the complexity of getting biopsy diagnoses while on blood thinners for his blood clots.  He notes that his sister had a 2-year mcdermott with breast cancer and was very sick from chemotherapy so he wants to limit how sick he could be.  Encouraged him to find out what kind of cancer it is and discuss options with oncology.  His wife is at the bedside and we discussed this in detail as well.    Evaluation in the emergency department by CT scan showed a new pancreatic mass with small masses throughout the liver consistent with metastases, ascites, portal vein thrombosis, and filling defects consistent with pulmonary emboli in the lower lungs.  He had a paracentesis with 3.7 L of fluid removed.    Review of Systems    The 10 point Review of Systems is negative other than noted in the HPI or here. Chronic leg edema worse on the right with chigger bites a few months ago on right leg.    Past Medical History    I have reviewed this patient's medical history and updated it with pertinent information if needed.   Past Medical History:   Diagnosis Date     Allergic state      DVT (deep venous thrombosis) (H)     chronic left femoral DVT     HLD (hyperlipidemia)      Hypertension     white coat syndrome     Multiple  subsegmental pulmonary emboli without acute cor pulmonale (H) 2017     Perforated bowel (H) 09/2020     Personal history of deep vein thrombosis     DISTAL VEIN  LLE     Primary osteoarthritis of both knees      Pulmonary embolism (H)      Pulmonary embolus (H)        Past Surgical History   I have reviewed this patient's surgical history and updated it with pertinent information if needed.  Past Surgical History:   Procedure Laterality Date     ARTHROPLASTY KNEE Left 2/10/2021    Procedure: Left total knee arthroplasty;  Surgeon: Gigi De Oliveira MD;  Location: [facility] OR     ARTHROPLASTY KNEE Right 2/4/2022    Procedure: RIGHT TOTAL KNEE ARTHROPLASTY;  Surgeon: Gigi De Oliveira MD;  Location:  OR     COLONOSCOPY N/A 9/19/2020    Procedure: COLONOSCOPY;  Surgeon: Lakshmi Santana MD;  Location:  OR     ESOPHAGOSCOPY, GASTROSCOPY, DUODENOSCOPY (EGD), COMBINED N/A 9/19/2020    Procedure: ESOPHAGOGASTRODUODENOSCOPY (EGD);  Surgeon: Lakshmi Santana MD;  Location:  OR     HERNIORRHAPHY INCISIONAL (LOCATION) N/A 5/5/2021    Procedure: OPEN INCISIONAL HERNIA REPAIR;  Surgeon: Christ Parekh MD;  Location:  OR     INJECT STEROID (LOCATION) Right 2/10/2021    Procedure: Right knee intra-articular injection of corticosteroid, 80 mg of Depo-Medrol;  Surgeon: Gigi De Oliveira MD;  Location:  OR     LAPAROSCOPIC APPENDECTOMY N/A 9/27/2020    Procedure: Laparoscopic appendectomy;  Surgeon: Christ Parekh MD;  Location:  OR     LAPAROSCOPY DIAGNOSTIC (GENERAL) N/A 9/27/2020    Procedure: LAPAROSCOPIC  SMALL BOWEL RESCECTION;  Surgeon: Christ Parekh MD;  Location:  OR     LAPAROTOMY EXPLORATORY N/A 5/10/2021    Procedure: EXPLORATORY LAPAROTOMY AND EVACUATION OF HEMATOMA;  Surgeon: Hari Ceballos MD;  Location:  OR       Social History   I have reviewed this patient's social history and updated it with pertinent information if needed.  Social History      Tobacco Use     Smoking status: Never Smoker     Smokeless tobacco: Never Used   Vaping Use     Vaping Use: Never used   Substance Use Topics     Alcohol use: Yes     Drug use: Never       Family History   I have reviewed this patient's family history and updated it with pertinent information if needed.  Family History   Problem Relation Age of Onset     Breast Cancer Sister        Prior to Admission Medications   Prior to Admission Medications   Prescriptions Last Dose Informant Patient Reported? Taking?   Cholecalciferol (VITAMIN D3) 50 MCG (2000 UT) CAPS 9/8/2022 at Unknown time Self Yes Yes   Sig: Take 2,000 Units by mouth every morning    Multiple Vitamins-Minerals (MULTIVITAMIN ADULTS 50+) TABS 9/8/2022 at Unknown time Self Yes Yes   Sig: Take 1 tablet by mouth every morning    acetaminophen (TYLENOL) 325 MG tablet  at PRN Self No Yes   Sig: Take 2 tablets (650 mg) by mouth every 4 hours as needed for other (mild pain)   lactobacillus rhamnosus (GG) (CULTURELL) capsule 9/8/2022 at Unknown time Self Yes Yes   Sig: Take 1 capsule by mouth daily   vitamin C (ASCORBIC ACID) 1000 MG TABS 9/8/2022 at Unknown time Self Yes Yes   Sig: Take 1 tablet by mouth daily   zinc gluconate 50 MG tablet 9/8/2022 at Unknown time Self Yes Yes   Sig: Take 250 mg by mouth daily      Facility-Administered Medications: None     Allergies   Allergies   Allergen Reactions     Seasonal Allergies Other (See Comments)     Runny nose, watery eyes       Physical Exam   Vital Signs: Temp: 97.6  F (36.4  C) Temp src: Tympanic BP: (!) 144/89 Pulse: 93   Resp: 12 SpO2: 93 %      Weight: 285 lbs 0 oz  Constitutional: Awake, alert, cooperative, no apparent distress.  Eyes: Conjunctiva and pupils examined and normal.  HEENT: Moist mucous membranes, normal dentition.  Respiratory: Clear to auscultation bilaterally, no crackles or wheezing.  Cardiovascular: Regular rate and rhythm, normal S1 and S2, and no murmur noted.  GI: Soft, distended,  non-tender, normal bowel sounds.  Lymph/Hematologic: No anterior cervical or supraclavicular adenopathy.  Skin: No rashes, no cyanosis, 2+ pitting edema in legs.  Musculoskeletal: No joint swelling, erythema or tenderness.  Neurologic: Cranial nerves 2-12 intact, normal strength and sensation.  Psychiatric: Alert, oriented to person, place and time, no obvious anxiety or depression.    Data   Data reviewed today: I reviewed all medications, new labs and imaging results over the last 24 hours. I personally reviewed no images or EKG's today.    Recent Labs   Lab 09/09/22  1019   WBC 10.1   HGB 15.8   MCV 91         POTASSIUM 4.2   CHLORIDE 106   CO2 27   BUN 10   CR 1.02   ANIONGAP 7   PETER 8.7   *   ALBUMIN 3.2*  3.0*   PROTTOTAL 6.8   BILITOTAL 0.8   ALKPHOS 86   ALT 18   AST 25   LIPASE 49*     Recent Results (from the past 24 hour(s))   CT Abdomen Pelvis w Contrast    Narrative    CT ABDOMEN PELVIS W CONTRAST 9/9/2022 2:34 PM    CLINICAL HISTORY: abdominal pain, ho bowel obstruction and resection    TECHNIQUE: CT scan of the abdomen and pelvis was performed following  injection of IV contrast. Multiplanar reformats were obtained. Dose  reduction techniques were used.  CONTRAST: 135mL [id number]    COMPARISON: 6/22/2021    FINDINGS:   LOWER CHEST: Possible small filling defect in the right lower lobe  subsegmental pulmonary artery although evaluation is limited on this  phase of contrast (series 2, image 19-21).    HEPATOBILIARY: Several new hypodense lesions in the liver are new and  suspicious for metastatic disease. For example, there is a new 2.2 cm  lesion in the right hepatic lobe near the hepatic vein-IVC confluence,  new 2.0 cm lesion in the inferior right hepatic lobe (series 2, image  88) and 1.0 cm lesion in the left hepatic lobe (image 52).  Cholelithiasis. No biliary ductal dilatation.     Nonocclusive filling defect in the main portal vein (series 2, image  88) measuring 2 cm in  length. The left portal vein is occluded with  mildly enhancing soft tissue within it, which may be tumor thrombus  (series 2, image 65).    PANCREAS: New large mass in the pancreatic head and neck measuring 5.4  x 5.0 cm. Associated atrophy of the pancreatic body and tail with  ductal dilatation in the pancreatic body and tail. The mass abuts  about 180 degrees circumference of the common hepatic artery and  encases the splenic artery. It does not involve the superior  mesenteric artery. Occluded splenic vein with multiple collaterals in  the left upper quadrant and around the splenic hilum and stomach.    SPLEEN: Small hypodense area in the inferior spleen is indeterminate  (series 2, image 90) could represent a mass or small area of splenic  infarct.    ADRENAL GLANDS: Normal.    KIDNEYS/BLADDER: Normal.    BOWEL: Small duodenal ampullary diverticulum. No small bowel or  colonic obstruction. Colonic diverticulosis. Small bowel resection and  anastomosis in the right lower quadrant. Nothing for appendicitis.    PELVIC ORGANS: No pelvic masses.    ADDITIONAL FINDINGS: Moderate ascites with linear and nodular omental  and peritoneal thickening. Few small subcapsular soft tissue deposits  along the liver. For example, there is a 1.5 cm deposit posterior to  the right hepatic lobe (series 5, image 62). Findings are suspicious  for peritoneal carcinomatosis. Mildly enlarged peripancreatic lymph  nodes. For example, there is a 1.4 cm portacaval lymph node (series 2,  image 81).     Anterior abdominal hernia mesh repair with small residual chronic  seroma anterior to the mesh measuring 3.6 x 3.7 cm (series 2, image  181). No abdominal aortic aneurysm.    MUSCULOSKELETAL: Lucent lesion in the lateral right fifth rib, partly  visualized (series 2, image 1). Mild degenerative changes of the  spine.      Impression    IMPRESSION:   1.  New large mass in the pancreatic head and neck suspicious for  pancreatic  adenocarcinoma.  2.  Several new hypodense lesions of the liver suspicious for hepatic  metastases.  3.  Findings concerning for peritoneal carcinomatosis with moderate  ascites, nodular omental and peritoneal thickening and a few small  peritoneal deposits.  4.  Nonocclusive small filling defect in the main portal vein,  consistent with a nonocclusive thrombus. The left portal vein is  occluded with mildly enhancing soft tissue, suspicious for tumor  thrombus.  5.  Mildly enlarged peripancreatic lymph nodes, likely metastatic.  6.  Possible small filling defect in a right lower lobe segmental  pulmonary artery, not well assessed on this phase of contrast.  Recommend a CT pulmonary angiogram for complete assessment.  7.  Subtle lucent lesion in the right fifth rib is indeterminate.  8.  Cholelithiasis.  9.  Small hypodense focus in the inferior spleen could represent a  splenic lesion or small splenic infarct. Continued attention on  follow-up.    Findings were discussed with Dr. Jimenez at 3:00 PM.    GIOVANNY GILBERT MD         SYSTEM ID:  [id number]   US Paracentesis without Albumin    Narrative    US PARACENTESIS WITHOUT ALBUMIN 9/9/2022 4:26 PM     HISTORY: High volume.    PROCEDURE: Ultrasound was used to evaluate for the presence and best  approach for paracentesis. Written and oral informed consent was  obtained. A pause for the cause procedure to verify the correct  patient and correct procedure. The skin overlying the right lower  quadrant was prepped and draped in the usual sterile fashion. The  subcutaneous tissues were anesthetized with 10 mL 1% lidocaine. A  catheter was advanced into the peritoneal space and 3.75 L of  straw  colored fluid was drained. There were no immediate complications.  Ultrasound images were permanently stored.  Patient left the  ultrasound suite in satisfactory condition.      Impression    IMPRESSION: Technically successful paracentesis without immediate  complications.

## 2022-09-09 NOTE — ED NOTES
[facility]  ED Nurse Handoff Report    ED Chief complaint: Abdominal Pain      ED Diagnosis:   Final diagnoses:   None       Code Status: Full Code    Allergies:   Allergies   Allergen Reactions     Seasonal Allergies Other (See Comments)     Runny nose, watery eyes       Patient Story: abd swelling   Focused Assessment:  Pt was due to have a CT on Wednesday however his abdomen has increasingly descended; he c/o of occasional nausea; unfortunately the CT shows concerns of cancer  Labs Ordered and Resulted from Time of ED Arrival to Time of ED Departure   BASIC METABOLIC PANEL - Abnormal       Result Value    Sodium 140      Potassium 4.2      Chloride 106      Carbon Dioxide (CO2) 27      Anion Gap 7      Urea Nitrogen 10      Creatinine 1.02      Calcium 8.7      Glucose 112 (*)     GFR Estimate 78     HEPATIC FUNCTION PANEL - Abnormal    Bilirubin Total 0.8      Bilirubin Direct 0.2      Protein Total 6.8      Albumin 3.0 (*)     Alkaline Phosphatase 86      AST 25      ALT 18     LIPASE - Abnormal    Lipase 49 (*)    CBC WITH PLATELETS AND DIFFERENTIAL    WBC Count 10.1      RBC Count 5.34      Hemoglobin 15.8      Hematocrit 48.6      MCV 91      MCH 29.6      MCHC 32.5      RDW 13.4      Platelet Count 229      % Neutrophils 74      % Lymphocytes 12      % Monocytes 11      % Eosinophils 3      % Basophils 0      % Immature Granulocytes 0      NRBCs per 100 WBC 0      Absolute Neutrophils 7.3      Absolute Lymphocytes 1.2      Absolute Monocytes 1.1      Absolute Eosinophils 0.3      Absolute Basophils 0.0      Absolute Immature Granulocytes 0.0      Absolute NRBCs 0.0     ROUTINE UA WITH MICROSCOPIC   COVID-19 VIRUS (CORONAVIRUS) BY PCR       Treatments and/or interventions provided: pain management   Patient's response to treatments and/or interventions: well    To be done/followed up on inpatient unit:  gi, oncology consult     Does this patient have any cognitive concerns?:  "alert    Activity level - Baseline/Home:  Independent  Activity Level - Current:   Independent    Patient's Preferred language: English   Needed?: No    Isolation: None  Infection: Not Applicable  Patient tested for COVID 19 prior to admission: YES  Bariatric?: No    Vital Signs:   Vitals:    09/09/22 1012 09/09/22 1515   BP: (!) 157/100 128/81   Pulse: 92 84   Resp: 12    Temp: 97.6  F (36.4  C)    TempSrc: Tympanic    SpO2: 97% 91%   Weight: 129.3 kg (285 lb)    Height: 1.854 m (6' 1\")        Cardiac Rhythm:     Was the PSS-3 completed:   Yes  What interventions are required if any?               Family Comments: family at bedside   OBS brochure/video discussed/provided to patient/family: No              Name of person given brochure if not patient:               Relationship to patient:     For the majority of the shift this patient's behavior was Green.   Behavioral interventions performed were .    ED NURSE PHONE NUMBER: [phone]         "

## 2022-09-09 NOTE — ED TRIAGE NOTES
Patient has complaints of distended bowels and unable to have a good bowel movement. Patient has a history of bowel surgery for a perforated bowel. Patients pain is 8 out of 10. Patient has taken tylenol last night. Patient was seen at urgent care and given antibiotics. Patient has a scheduled CT scan in a few days     Triage Assessment     Row Name 09/09/22 1011       Triage Assessment (Adult)    Airway WDL WDL       Respiratory WDL    Respiratory WDL WDL       Skin Circulation/Temperature WDL    Skin Circulation/Temperature WDL WDL       Cardiac WDL    Cardiac WDL WDL       Peripheral/Neurovascular WDL    Peripheral Neurovascular WDL WDL       Cognitive/Neuro/Behavioral WDL    Cognitive/Neuro/Behavioral WDL WDL

## 2022-09-09 NOTE — PHARMACY-ADMISSION MEDICATION HISTORY
Pharmacy Medication History  Admission medication history interview status for the 9/9/2022  admission is complete. See EPIC admission navigator for prior to admission medications     Location of Interview: Patient room  Medication history sources: Patient    Significant changes made to the medication list:    Removed zyrtec, unisom, xarelto, tylenol duplicate   Added probiotic     In the past week, patient estimated taking medication this percent of the time: greater than 90%    Additional medication history information:     Patient is a good historian. He reports he does not take any prescription medication.     Medication reconciliation completed by provider prior to medication history? No    Time spent in this activity: 10 minutes     Prior to Admission medications    Medication Sig Last Dose Taking? Auth Provider Long Term End Date   acetaminophen (TYLENOL) 325 MG tablet Take 2 tablets (650 mg) by mouth every 4 hours as needed for other (mild pain)  at PRN Yes Gigi De Oliveira MD     Cholecalciferol (VITAMIN D3) 50 MCG (2000 UT) CAPS Take 2,000 Units by mouth every morning  9/8/2022 at Unknown time Yes Reported, Patient     lactobacillus rhamnosus (GG) (CULTURELL) capsule Take 1 capsule by mouth daily 9/8/2022 at Unknown time Yes Unknown, Entered By History     Multiple Vitamins-Minerals (MULTIVITAMIN ADULTS 50+) TABS Take 1 tablet by mouth every morning  9/8/2022 at Unknown time Yes Reported, Patient     vitamin C (ASCORBIC ACID) 1000 MG TABS Take 1 tablet by mouth daily 9/8/2022 at Unknown time Yes Reported, Patient     zinc gluconate 50 MG tablet Take 250 mg by mouth daily 9/8/2022 at Unknown time Yes Reported, Patient         The information provided in this note is only as accurate as the sources available at the time of update(s)   Carolynn RuggieroD

## 2022-09-10 NOTE — PROGRESS NOTES
RECEIVING UNIT ED HANDOFF REVIEW    ED Nurse Handoff Report was reviewed by: Paulina Oropeza RN on September 10, 2022 at 11:09 AM

## 2022-09-10 NOTE — ED NOTES
Pt transported to the floor by ER tech with heparin running in IV. All belongings sent to floor with pt. Wife up to floor with pt.

## 2022-09-10 NOTE — ED NOTES
[facility]  ED Nurse Handoff Report    ED Chief complaint: Abdominal Pain      ED Diagnosis:   Final diagnoses:   Abdominal pain, generalized   Malignant neoplasm of pancreas, unspecified location of malignancy (H) - suspected   Splenic infarct   Other acute pulmonary embolism, unspecified whether acute cor pulmonale present (H) - possible       Code Status: per admitting    Allergies:   Allergies   Allergen Reactions     Seasonal Allergies Other (See Comments)     Runny nose, watery eyes       Patient Story: Patient has complaints of distended bowels and unable to have a good bowel movement. Patient has a history of bowel surgery for a perforated bowel. Patients pain is 8 out of 10. Patient has taken tylenol last night. Patient was seen at urgent care and given antibiotics. Patient has a scheduled CT scan in a few days             Triage Assessment            Row Name 09/09/22 1011             Triage Assessment (Adult)      Airway WDL WDL             Respiratory WDL      Respiratory WDL WDL             Skin Circulation/Temperature WDL      Skin Circulation/Temperature WDL WDL             Cardiac WDL      Cardiac WDL WDL             Peripheral/Neurovascular WDL      Peripheral Neurovascular WDL WDL             Cognitive/Neuro/Behavioral WDL      Cognitive/Neuro/Behavioral WDL WDL               Focused Assessment:  GI, respiratory    Treatments and/or interventions provided: medication, monitor, heparin  Patient's response to treatments and/or interventions: improved    To be done/followed up on inpatient unit:  same as above    Does this patient have any cognitive concerns?: n/a    Activity level - Baseline/Home:  Independent  Activity Level - Current:   Unknown    Patient's Preferred language: English   Needed?: No    Isolation: None  Infection: Not Applicable  Patient tested for COVID 19 prior to admission: YES  Bariatric?: Yes    Vital Signs:   Vitals:    09/09/22 2215 09/09/22 2230  09/09/22 2245 09/09/22 2300   BP:    (!) 144/101   Pulse: 86 85 81 83   Resp: 13 18 22 20   Temp:       TempSrc:       SpO2: 93% 92% 93% 90%   Weight:       Height:           Cardiac Rhythm:     Was the PSS-3 completed:   Yes  What interventions are required if any?               Family Comments: n/a  OBS brochure/video discussed/provided to patient/family: N/A              Name of person given brochure if not patient:               Relationship to patient:     For the majority of the shift this patient's behavior was Green.   Behavioral interventions performed were n/a.    ED NURSE PHONE NUMBER: [phone]

## 2022-09-10 NOTE — PROGRESS NOTES
[facility]    Hospitalist Progress Note    Assessment & Plan   Cameron Barnard is a 70 year old male with a history of remote PE/DVT, small bowel obstruction in 2020, ventral hernia repair on 5/5/21, right TKA in February 2022, hyperlipidemia, prior hematuria, and dilated ascending aorta who presented to the ER with abdominal pain.     Bilateral Pulmonay Embolus  Portal Vein Thrombosis  Has history of PE about 5 years ago. Was treated with warfarin and then transitioned to Eliquis which he tolerated much better. Has been off eliquis since earlier this year. CT abdomen showed pulmonary embolus and portal vein thrombosis.  --Pending [facility] consult  -chest CT PE protocol indicates tiny right subsegmental PE, no right heart strain noted, can defer echo due to presence of tiny PE.  -continue heparin drip, plan transition to Eliquis once no biopsies/procedures planned.     Probable metastatic Pancreatic cancer (new diagnosis)  Ascites (likely malignant)  New diagnosis with mass in pancreatic head and masses throughout liver suggesting metastases. 3.7 liters removed from paracentesis.  -add on cytology to the cell count, albumin and total protein that had been ordered for paracentesis fluid, [facility] input pending, patient had seen Dr. Duncan in the past when he decided to stop anticoagulation for his past DVT.  -on review of his chart from 2020 when he presented with small bowel obstruction there was some mention of duodenal diverticulitis or questionable concerning area near the pancreas.  He did undergo EGD and colonoscopy at that time not sure whether he had EUS or biopsy of this..  -tylenol, oxycodone, and IV dilaudid PRN pain  -bowel regimen PRN while on narcotics  -Patient's sister had a bad experience with chemotherapy and is reluctant to pursue treatment if cancer not easily treatable.  He can continue to discuss these details with the oncologist and his  "wife.     Dilated ascending aorta  -Follows with cardiology through [facility] as an outpatient, continue outpatient follow-up as previously recommended      DVT Prophylaxis: Heparin drip  Code Status: Full Code     Disposition: Expected discharge in 1 to 2 days depending on biopsy plans  Clinically Significant Risk Factors Present on Admission             # Hypoalbuminemia: Albumin = 3.0 g/dL (Ref range: 3.4 - 5.0 g/dL); 3.2 g/dL (Ref range: 3.4 - 5.0 g/dL) on admission, will monitor as appropriate        # Obesity: Estimated body mass index is 37.6 kg/m  as calculated from the following:    Height as of this encounter: 1.854 m (6' 1\").    Weight as of this encounter: 129.3 kg (285 lb).        Karissa Liu MD  Text Page   (7am to 6pm)    Interval History   Patient came up to the floor by noon, he was requesting to have diet started, denies any abdominal pain, he mentions ongoing abdominal fullness.  -Data reviewed today: I reviewed all new labs and imaging results over the last 24 hours.  Physical Exam   Temp: 97.7  F (36.5  C) Temp src: Oral BP: (!) 163/90 Pulse: 82   Resp: 18 SpO2: 96 % O2 Device: Nasal cannula Oxygen Delivery: 1 LPM  Vitals:    09/09/22 1012   Weight: 129.3 kg (285 lb)     Vital Signs with Ranges  Temp:  [97.7  F (36.5  C)] 97.7  F (36.5  C)  Pulse:  [68-96] 82  Resp:  [9-26] 18  BP: (114-163)/() 163/90  SpO2:  [90 %-96 %] 96 %  No intake/output data recorded.    Constitutional: Awake, alert, cooperative, no apparent distress  Respiratory: Breath sounds reduced bilateral bases  Cardiovascular: Regular rate and rhythm, normal S1 and S2, and no murmur noted  GI: Abdomen distended, bowel sounds present  Skin/Integumen: No rashes, no cyanosis, plus edema noted bilateral lower extremity  Neuro : moving all 4 extremities, no focal deficit noted     Medications     heparin 1,800 Units/hr (09/10/22 0722)       sodium chloride (PF)  3 mL Intracatheter Q8H       Data   Recent Labs   Lab 09/10/22  0633 " 09/09/22  1019   WBC 8.9 10.1   HGB 14.6 15.8   MCV 90 91    229    140   POTASSIUM 4.1 4.2   CHLORIDE 108 106   CO2 25 27   BUN 10 10   CR 0.92 1.02   ANIONGAP 5 7   PETER 8.2* 8.7   * 112*   ALBUMIN  --  3.2*  3.0*   PROTTOTAL  --  6.8   BILITOTAL  --  0.8   ALKPHOS  --  86   ALT  --  18   AST  --  25   LIPASE  --  49*     Recent Labs   Lab 09/10/22  0633 09/09/22  1019   * 112*       Imaging:   Recent Results (from the past 24 hour(s))   CT Abdomen Pelvis w Contrast    Narrative    CT ABDOMEN PELVIS W CONTRAST 9/9/2022 2:34 PM    CLINICAL HISTORY: abdominal pain, ho bowel obstruction and resection    TECHNIQUE: CT scan of the abdomen and pelvis was performed following  injection of IV contrast. Multiplanar reformats were obtained. Dose  reduction techniques were used.  CONTRAST: 135mL [id number]-[id number]    COMPARISON: 6/22/2021    FINDINGS:   LOWER CHEST: Possible small filling defect in the right lower lobe  subsegmental pulmonary artery although evaluation is limited on this  phase of contrast (series 2, image 19-21).    HEPATOBILIARY: Several new hypodense lesions in the liver are new and  suspicious for metastatic disease. For example, there is a new 2.2 cm  lesion in the right hepatic lobe near the hepatic vein-IVC confluence,  new 2.0 cm lesion in the inferior right hepatic lobe (series 2, image  88) and 1.0 cm lesion in the left hepatic lobe (image 52).  Cholelithiasis. No biliary ductal dilatation.     Nonocclusive filling defect in the main portal vein (series 2, image  88) measuring 2 cm in length. The left portal vein is occluded with  mildly enhancing soft tissue within it, which may be tumor thrombus  (series 2, image 65).    PANCREAS: New large mass in the pancreatic head and neck measuring 5.4  x 5.0 cm. Associated atrophy of the pancreatic body and tail with  ductal dilatation in the pancreatic body and tail. The mass abuts  about 180 degrees circumference of the common hepatic  artery and  encases the splenic artery. It does not involve the superior  mesenteric artery. Occluded splenic vein with multiple collaterals in  the left upper quadrant and around the splenic hilum and stomach.    SPLEEN: Small hypodense area in the inferior spleen is indeterminate  (series 2, image 90) could represent a mass or small area of splenic  infarct.    ADRENAL GLANDS: Normal.    KIDNEYS/BLADDER: Normal.    BOWEL: Small duodenal ampullary diverticulum. No small bowel or  colonic obstruction. Colonic diverticulosis. Small bowel resection and  anastomosis in the right lower quadrant. Nothing for appendicitis.    PELVIC ORGANS: No pelvic masses.    ADDITIONAL FINDINGS: Moderate ascites with linear and nodular omental  and peritoneal thickening. Few small subcapsular soft tissue deposits  along the liver. For example, there is a 1.5 cm deposit posterior to  the right hepatic lobe (series 5, image 62). Findings are suspicious  for peritoneal carcinomatosis. Mildly enlarged peripancreatic lymph  nodes. For example, there is a 1.4 cm portacaval lymph node (series 2,  image 81).     Anterior abdominal hernia mesh repair with small residual chronic  seroma anterior to the mesh measuring 3.6 x 3.7 cm (series 2, image  181). No abdominal aortic aneurysm.    MUSCULOSKELETAL: Lucent lesion in the lateral right fifth rib, partly  visualized (series 2, image 1). Mild degenerative changes of the  spine.      Impression    IMPRESSION:   1.  New large mass in the pancreatic head and neck suspicious for  pancreatic adenocarcinoma.  2.  Several new hypodense lesions of the liver suspicious for hepatic  metastases.  3.  Findings concerning for peritoneal carcinomatosis with moderate  ascites, nodular omental and peritoneal thickening and a few small  peritoneal deposits.  4.  Nonocclusive small filling defect in the main portal vein,  consistent with a nonocclusive thrombus. The left portal vein is  occluded with mildly  enhancing soft tissue, suspicious for tumor  thrombus.  5.  Mildly enlarged peripancreatic lymph nodes, likely metastatic.  6.  Possible small filling defect in a right lower lobe segmental  pulmonary artery, not well assessed on this phase of contrast.  Recommend a CT pulmonary angiogram for complete assessment.  7.  Subtle lucent lesion in the right fifth rib is indeterminate.  8.  Cholelithiasis.  9.  Small hypodense focus in the inferior spleen could represent a  splenic lesion or small splenic infarct. Continued attention on  follow-up.    Findings were discussed with Dr. Jimenez at 3:00 PM.    GIOVANNY GILBERT MD         SYSTEM ID:  [id number]   US Paracentesis without Albumin    Narrative    US PARACENTESIS WITHOUT ALBUMIN 9/9/2022 4:26 PM     HISTORY: High volume.    PROCEDURE: Ultrasound was used to evaluate for the presence and best  approach for paracentesis. Written and oral informed consent was  obtained. A pause for the cause procedure to verify the correct  patient and correct procedure. The skin overlying the right lower  quadrant was prepped and draped in the usual sterile fashion. The  subcutaneous tissues were anesthetized with 10 mL 1% lidocaine. A  catheter was advanced into the peritoneal space and 3.75 L of  straw  colored fluid was drained. There were no immediate complications.  Ultrasound images were permanently stored.  Patient left the  ultrasound suite in satisfactory condition.      Impression    IMPRESSION: Technically successful paracentesis without immediate  complications.    CT Chest Pulmonary Embolism w Contrast   Result Value    Radiologist flags Pulmonary emboli (AA)    Narrative    EXAM: CT CHEST PULMONARY EMBOLISM W CONTRAST  LOCATION: [facility]  DATE/TIME: 9/9/2022 9:17 PM    INDICATION: new pancreatic cancer on abdominal CT that also showed likely PE, please confirm and determine extent of PE  COMPARISON: CT abdomen pelvis 09/09/2022  TECHNIQUE: CT chest  pulmonary angiogram during arterial phase injection of IV contrast. Multiplanar reformats and MIP reconstructions were performed. Dose reduction techniques were used.   CONTRAST: 83 mL Isovue 370    FINDINGS:  ANGIOGRAM CHEST: In the subsegmental right lower lobe pulmonary artery posteriorly there is a tiny filling defect which persists compared to today's earlier CT of the abdomen and pelvis consistent with a small PE. No other PE identified. The pulmonary   arteries are normal in caliber. There is no evidence for significant right heart strain. No thoracic aortic dissection.     LUNGS AND PLEURA: Benign calcified granuloma in the left lung.    MEDIASTINUM/AXILLAE: Normal.    CORONARY ARTERY CALCIFICATION: Mild.    UPPER ABDOMEN: Changes of neoplasm, please refer to 09/09/2022 CT reported abdomen/pelvis.    MUSCULOSKELETAL: Moderate multilevel degenerative changes in the spine.      Impression    IMPRESSION:  1.  Tiny PE in a subsegmental right lower lobe pulmonary artery posteriorly. No enlargement of the pulmonary arteries or findings of right heart strain.    2.  Changes of neoplasm the abdomen, please refer to dedicated CT of the abdomen/pelvis report 09/09/2022.    3.  Benign calcified granuloma left lung.    4.  Mild coronary artery calcification.      [Critical Result: Pulmonary emboli]    Finding was identified on 9/9/2022 9:20 PM.     Nurse Savi was contacted by me on 9/9/2022 9:35 PM and verbalized understanding of the critical result.

## 2022-09-10 NOTE — PROGRESS NOTES
"SPIRITUAL HEALTH SERVICES Progress Note  [place] [address]    Referral Source: Pt requested follow up after being moved to     Pt Cameron shared briefly that he was \"feeling better after a nap, food, and good information.\" Pt expressed gratitude for the care of the whole team. No other needs expressed at this time.    SHS remains available to Pt and family through duration of hospitalization.    Romelia Leroy MDiv  Chaplain Resident  Pager: [phone]   "

## 2022-09-10 NOTE — PROGRESS NOTES
"SPIRITUAL HEALTH SERVICES Progress Note  [facility] ED    Saw pt Cameron Barnard per staff consult request.  Pt present. Introduced SHS, assessed for SH needs, and offered emotional support.     Illness Narrative & Distress - Pt engaged in brief reflective conversation about his current moment of \"waiting to see how bad the cancer is.\" Pt discussed his recent knee surgeries and expressed disappointment that he \"might only get a couple more months of use out of them.\" Pt expressed hope that he would be out of the hospital by Monday or at least move out of the ED to another bed.    Coping - Pt's spouse was with him last night and will return this morning. Pt shared that he plans to pass the waiting and testing time watching football and reading the paper with his spouse when she returns.    Meaning-Making - Pt mentioned that his dog  about a month ago \"from cancer.\"    SHS remains available to Pt and family as needed/requested through duration of Pt's hospitalization.    Romelia Leroy MDiv  Chaplain Resident  Pager: [phone]     "

## 2022-09-11 NOTE — PLAN OF CARE
ED admission. Patient arrived to station 88 at about 1130. Hypertensive this AM, all other VSS on room air this afternoon. Denies pain. Up independently. Heparin drip infusing at 17mL/hr. Next hep10A check at 2130. Hem/Onc consulted. Discharge home pending oncology workup.

## 2022-09-11 NOTE — PLAN OF CARE
No acute changes. C/o acid reflux this evening. PRN maalox and scheduled protonix ordered but upon med arrival patient declined due to improvement in symptoms. Continues on heparin drip at 17mL/hr. Next hep10A check tomorrow at 0600. Discharge home once transitioned to [facility] and oncology workup complete. Hem/Onc following. Awaiting cytology results from paracentesis fluid. May or may not proceed with biopsy pending those results.

## 2022-09-11 NOTE — PROGRESS NOTES
[facility]  Hospitalist Progress Note  [facility]        Date of Service (when I saw the patient): 09/11/2022        Interval History:      Patient states he is doing fine, updated on plan of care, verbalized understanding.         Assessment and Plan:        Cameron Barnard is a 70 year old male with a history of remote PE/DVT, small bowel obstruction in 2020, ventral hernia repair on 5/5/21, right TKA in February 2022, hyperlipidemia, prior hematuria, and dilated ascending aorta who presented to the ER with abdominal pain.     Bilateral Pulmonay Embolus Portal Vein Thrombosis Has history of PE about 5 years ago. Was treated with warfarin and then transitioned to Eliquis which he tolerated much better. Has been off eliquis since earlier this year. CT abdomen showed pulmonary embolus and portal vein thrombosis. chest CT PE protocol indicates tiny right subsegmental PE, no right heart strain noted, can defer echo due to presence of tiny PE.  - [facility] consult  - continue heparin drip, plan transition to Eliquis once no biopsies/procedures planned.     Probable metastatic Pancreatic cancer Ascites New diagnosis with mass in pancreatic head and masses throughout liver suggesting metastases. 3.7 liters removed from paracentesis. on review of his chart from 2020 when he presented with small bowel obstruction there was some mention of duodenal diverticulitis or questionable concerning area near the pancreas.  He did undergo EGD and colonoscopy at that time not sure whether he had EUS or biopsy of this.  - cytology pending.     Dilated ascending aorta  - Follows with cardiology through [facility] as an outpatient, continue outpatient follow-up as previously recommended     DVT Prophylaxis: Heparin drip    Code Status: Full Code      Disposition: Expected discharge in 1 to 2 days depending on biopsy plans    Diet: Orders Placed This Encounter      Regular Diet  "Adult      Code: Full Code    Length of Stay:  LOS: 2 days /LOS: 2    Dr. Hima Salcido DO, IBAN, A  [facility] Hospitalist  Pager [phone]    DO NICOLE Burgess [facility]  Securely message with the Vocera Web Console (learn more here)  Text page via [employer] Paging/Directory      Clinically Significant Risk Factors Present on Admission               # Obesity: Estimated body mass index is 37.6 kg/m  as calculated from the following:    Height as of this encounter: 1.854 m (6' 1\").    Weight as of this encounter: 129.3 kg (285 lb).                         Physical Exam:           Blood pressure (!) 146/84, pulse 69, temperature 97.7  F (36.5  C), temperature source Oral, resp. rate 16, height 1.854 m (6' 1\"), weight 129.3 kg (285 lb), SpO2 94 %.    Vital Sign Ranges  Temperature Temp  Av.7  F (36.5  C)  Min: 97.6  F (36.4  C)  Max: 97.9  F (36.6  C)   Blood pressure Systolic (24hrs), Av , Min:114 , Max:163        Diastolic (24hrs), Av, Min:72, Max:90      Pulse Pulse  Av  Min: 69  Max: 82   Respirations Resp  Av.3  Min: 15  Max: 19   Pulse oximetry SpO2  Av.3 %  Min: 92 %  Max: 96 %     Vital Signs with Ranges  Temp:  [97.6  F (36.4  C)-97.9  F (36.6  C)] 97.7  F (36.5  C)  Pulse:  [69-82] 69  Resp:  [15-19] 16  BP: (114-163)/(72-90) 146/84  SpO2:  [92 %-96 %] 94 %  Temp:  [97.6  F (36.4  C)-97.9  F (36.6  C)] 97.7  F (36.5  C)  Pulse:  [69-82] 69  Resp:  [15-19] 16  BP: (114-163)/(72-90) 146/84  SpO2:  [92 %-96 %] 94 %    I/O Last 3 Shifts:   I/O last 3 completed shifts:  In: 717 [P.O.:480; I.V.:237]  Out: -     I/O past 24 hours:     Intake/Output Summary (Last 24 hours) at 2022  Last data filed at 9/10/2022 1930  Gross per 24 hour   Intake 717 ml   Output --   Net 717 ml       Oxygen  Temp: 97.7  F (36.5  C) Temp src: Oral BP: (!) 146/84 Pulse: 69   Resp: 16 SpO2: 94 % O2 Device: None (Room air) Oxygen Delivery: 1 LPM  Vitals: "    09/09/22 1012   Weight: 129.3 kg (285 lb)       Lines  Peripheral IV 09/09/22 Anterior;Right (Active)   Site Assessment WDL 09/10/22 2034   Line Status Infusing 09/10/22 2034   Phlebitis Scale 0-->no symptoms 09/10/22 2034   Infiltration Scale 0 09/10/22 2034   Infiltration Site Treatment Method  None 09/10/22 2034   Number of days: 2     GENERAL: NAD. Conversational, appropriate.   HEENT: Normocephalic. Nares normal.   LUNGS: Clear to auscultation. No dyspnea at rest.   HEART: Regular rate. Extremities perfused.   ABDOMEN: Soft, nontender, and distended. Positive bowel sounds.   EXTREMITIES: LE edema noted.   PSYCH: Alert and oriented to person, place and time.   NEUROLOGIC: Moves extremities x4, follows commands.          Prior to Admission Medications:        Medications Prior to Admission   Medication Sig Dispense Refill Last Dose     acetaminophen (TYLENOL) 325 MG tablet Take 2 tablets (650 mg) by mouth every 4 hours as needed for other (mild pain) 100 tablet 0  at PRN     Cholecalciferol (VITAMIN D3) 50 MCG (2000 UT) CAPS Take 2,000 Units by mouth every morning    9/8/2022 at Unknown time     lactobacillus rhamnosus (GG) (CULTURELL) capsule Take 1 capsule by mouth daily   9/8/2022 at Unknown time     Multiple Vitamins-Minerals (MULTIVITAMIN ADULTS 50+) TABS Take 1 tablet by mouth every morning    9/8/2022 at Unknown time     vitamin C (ASCORBIC ACID) 1000 MG TABS Take 1 tablet by mouth daily   9/8/2022 at Unknown time     zinc gluconate 50 MG tablet Take 250 mg by mouth daily   9/8/2022 at Unknown time            Medications:        Current Facility-Administered Medications   Medication Last Rate     heparin 1,700 Units/hr (09/10/22 2031)     Current Facility-Administered Medications   Medication Dose Route Frequency     sodium chloride (PF)  3 mL Intracatheter Q8H     Current Facility-Administered Medications   Medication Dose Route Frequency     acetaminophen  650 mg Oral Q6H PRN    Or     acetaminophen   650 mg Rectal Q6H PRN     bisacodyl  10 mg Rectal Daily PRN     HYDROmorphone  0.2 mg Intravenous Q2H PRN     lidocaine 4%   Topical Q1H PRN     lidocaine (buffered or not buffered)  0.1-1 mL Other Q1H PRN     melatonin  1 mg Oral At Bedtime PRN     ondansetron  4 mg Oral Q6H PRN    Or     ondansetron  4 mg Intravenous Q6H PRN     oxyCODONE  5 mg Oral Q4H PRN     polyethylene glycol  17 g Oral Daily PRN     senna-docusate  1 tablet Oral BID PRN    Or     senna-docusate  2 tablet Oral BID PRN     sodium chloride (PF)  3 mL Intracatheter q1 min prn     ___________________________________________________________________________  Medications     heparin 1,700 Units/hr (09/10/22 2031)       sodium chloride (PF)  3 mL Intracatheter Q8H          Data:      Lab data reviewed.     Data   Recent Labs   Lab 09/11/22  0500 09/10/22  0633 09/09/22  1019   WBC  --  8.9 10.1   HGB  --  14.6 15.8   MCV  --  90 91   PLT  --  191 229   NA  --  138 140   POTASSIUM 4.2 4.1 4.2   CHLORIDE  --  108 106   CO2  --  25 27   BUN  --  10 10   CR  --  0.92 1.02   ANIONGAP  --  5 7   PETER  --  8.2* 8.7   GLC  --  121* 112*   ALBUMIN  --   --  3.2*  3.0*   PROTTOTAL  --   --  6.8   BILITOTAL  --   --  0.8   ALKPHOS  --   --  86   ALT  --   --  18   AST  --   --  25   LIPASE  --   --  49*           Imaging:      Imaging data reviewed.     No results found for this or any previous visit (from the past 24 hour(s)).    Dr. Hima Salcido DO, IBAN, [facility]  Pager [phone]

## 2022-09-11 NOTE — PLAN OF CARE
1900 - 7830    A&Ox4. VSS on RA. Denies pain, N/V. Regular diet. Up independent. Hep drip running @ 1700 units/hr. Hep 10a recheck 0.44, 0.55. Next hep 10a recheck 9/12 @ 0600. R abdominal para site - CDI. Hem/Onc following. Discharge pending treatment plan.

## 2022-09-12 NOTE — CONSULTS
Care Management Initial Consult    General Information  Assessment completed with: Patient,    Type of CM/SW Visit: Initial Assessment    Primary Care Provider verified and updated as needed: Yes   Readmission within the last 30 days:           Advance Care Planning: Advance Care Planning Reviewed: no concerns identified          Communication Assessment  Patient's communication style: spoken language (English or Bilingual)    Hearing Difficulty or Deaf: no   Wear Glasses or Blind: yes    Cognitive  Cognitive/Neuro/Behavioral: WDL  Level of Consciousness: alert     Orientation: oriented x 4  Mood/Behavior: calm, cooperative          Living Environment:   People in home: spouse  Tiffanie  Current living Arrangements: house      Able to return to prior arrangements: yes       Family/Social Support:  Care provided by: self  Provides care for: no one  Marital Status:   Wife  Tiffanie       Description of Support System: Involved, Supportive         Current Resources:   Patient receiving home care services: No     Community Resources: None  Equipment currently used at home: none (Has 2 wheeled walker and cane from previous knee surger)  Supplies currently used at home: None    Employment/Financial:  Employment Status: retired        Financial Concerns:             Lifestyle & Psychosocial Needs:  Social Determinants of Health     Tobacco Use: Low Risk      Smoking Tobacco Use: Never Smoker     Smokeless Tobacco Use: Never Used   Alcohol Use: Not on file   Financial Resource Strain: Not on file   Food Insecurity: Not on file   Transportation Needs: Not on file   Physical Activity: Not on file   Stress: Not on file   Social Connections: Not on file   Intimate Partner Violence: Not on file   Depression: Not on file   Housing Stability: Not on file       Functional Status:  Prior to admission patient needed assistance:   Dependent ADLs:: Independent, Ambulation-no assistive device  Dependent IADLs:: Independent,  Cleaning, Cooking, Laundry, Shopping, Meal Preparation, Medication Management, Money Management, Transportation  Assesssment of Functional Status: At functional baseline    Mental Health Status:  Mental Health Status: No Current Concerns       Chemical Dependency Status:  Chemical Dependency Status: No Current Concerns             Values/Beliefs:  Spiritual, Cultural Beliefs, Yarsani Practices, Values that affect care:                 Additional Information:  Patient alert and oriented x 4. Admitted for small bowel obstruction with resection; large mass suspicious for pancreatic adenocarcinoma with suspected metastases. Patient aware of diagnosis which was discussed with physician here. Patient pleased with doctors and care that he is receiving, patient has a positive attitude regarding prognosis. Patient lives with spouse and is independent with ADLs at home. Patient drives and handles financials matters. Patient's son lives in [place]. Patient plans to follow up with [facility] and Dr. Quiñones, primary care physician. Will continue to monitor as needed.    Lux Beck RN

## 2022-09-12 NOTE — CONSULTS
Interventional Radiology - Progress Note  Inpatient - [facility]  9/12/2022    IR Brief Note    Consult for US guided biopsy received. Discussed with Dr Copeland who has reviewed imaging. Procedure to be scheduled in ultrasound tomorrow. Heparin will need to be held prior to procedure.    Gary Lopez PA-C  Interventional Radiology  [phone] (IR)  [phone] (NIC Office)

## 2022-09-12 NOTE — PROGRESS NOTES
Patient scheduled for Biopsy tomorrow at 1:30 pm. INR to be drawn at 0600. Heparin infusion to stop at 0600. Patient alert and stable. No distress noted. ABC's intact.

## 2022-09-12 NOTE — PROGRESS NOTES
Service Date: 09/12/2022    SUBJECTIVE:  Mr. Barnard is a gentleman with thrombosis and likely metastatic pancreatic cancer.  The patient presented to the Emergency Room on 09/09/2022 with abdominal pain.  Multiple investigations were done.  -Normal CBC.  -Normal CMP, except mildly low albumin.  -UA did not reveal an infection.  -CT abdomen and pelvis revealed a large mass in the pancreatic head and neck.  There were multiple hypodense lesions in the liver.  There is peritoneal carcinomatosis with moderate ascites.  There is nonocclusive filling defect in the main portal vein, consistent with nonocclusive thrombus.  The left portal vein is occluded with mildly enhancing soft tissue, suspicious for tumor thrombus.  There was a possible small filling defect in the right lower lobe.  -CT chest angiogram revealed tiny pulmonary embolism in the subsegmental right lower lobe pulmonary artery.  -Ultrasound-guided paracentesis of 3.75 liters of straw-colored fluid was done.  Cytology is pending.    The patient is on heparin drip.  He is tolerating it well.  No bleeding complication.  The patient denies any history of malignancy.    Overall, he feels better.  No headache or dizziness.  No chest pain or shortness of breath.  Abdominal pain is better.  No recurrent vomiting.  Appetite is fairly good.  No bleeding.    PHYSICAL EXAMINATION:    GENERAL:  He is alert, oriented x3.  Not in distress.  VITAL SIGNS:  Reviewed.  Rest of systems not examined.    LABS:  Reviewed.    ASSESSMENT:    1.  A 72-year-old gentleman with pancreatic mass, liver metastasis and peritoneal carcinomatosis.  This is clinically consistent with metastatic pancreatic cancer.  2.  Pulmonary embolism and portal vein thrombus from hypercoagulability of malignancy.    PLAN:    1.  I had a long discussion with the patient.  CT scan was reviewed.  I explained to him it is highly suspicious for metastatic pancreatic cancer.    He has ascites.  Paracentesis was  done.  Cytology is pending.      I discussed with him regarding biopsy of either liver lesion or peritoneal nodule.  I explained to the patient that biopsy will be beneficial.  We will get enough sample to send it for NGS panel.  He is agreeable for it.  We will get a biopsy of either liver lesion or peritoneal nodule.    2.  I explained to the patient that clinically it is consistent with pancreatic cancer.  This is stage IV disease.  Treatment will be palliative chemotherapy.  Further discussion after pathology is back.    3.  For thrombosis, he is on heparin drip. After his biopsy, he can be transitioned to DOAC.  He will need indefinite anticoagulation.    4.  He had a few questions, which were all answered.  Oncology will continue to follow.  Case discussed with Dr. Salcido.    TOTAL TIME SPENT:  35 minute, time spent in today's visit, review of chart/investigations today, communicating with other providers and documentation.    Lesley Abdalla MD        D: 2022   T: 2022   MT: [id number]    Name:     LEANN CATHERINE J.  MRN:      [id number]        Account:      [id number]   :      1950           Service Date: 2022       Document: [id number]

## 2022-09-12 NOTE — PROGRESS NOTES
[facility]  Hospitalist Progress Note  [facility]        Date of Service (when I saw the patient): 09/12/2022        Interval History:      Patient awaiting pathology report, updated on plan of care, verbalized understanding.         Assessment and Plan:        Cameron Barnard is a 70 year old male with a history of remote PE/DVT, small bowel obstruction in 2020, ventral hernia repair on 5/5/21, right TKA in February 2022, hyperlipidemia, prior hematuria, and dilated ascending aorta who presented to the ER with abdominal pain.     Bilateral Pulmonay Embolus Portal Vein Thrombosis Has history of PE about 5 years ago. Was treated with warfarin and then transitioned to Eliquis which he tolerated much better. Has been off eliquis since earlier this year. CT abdomen showed pulmonary embolus and portal vein thrombosis. chest CT PE protocol indicates tiny right subsegmental PE, no right heart strain noted, can defer echo due to presence of tiny PE.  - [facility] consult  - continue heparin drip, plan transition to Eliquis once no biopsies/procedures planned.     Probable metastatic Pancreatic cancer Ascites New diagnosis with mass in pancreatic head and masses throughout liver suggesting metastases. 3.7 liters removed from paracentesis. on review of his chart from 2020 when he presented with small bowel obstruction there was some mention of duodenal diverticulitis or questionable concerning area near the pancreas.  He did undergo EGD and colonoscopy at that time not sure whether he had EUS or biopsy of this.  - cytology pending.     Dilated ascending aorta  - Follows with cardiology through [facility] as an outpatient, continue outpatient follow-up as previously recommended     DVT Prophylaxis: Heparin drip     Code Status: Full Code      Disposition: Expected discharge in 1 to 2 days depending on cytology results from ascites fluid.    Diet: Orders Placed This Encounter    "   Regular Diet Adult      Code: Full Code    Length of Stay:  LOS: 3 days /LOS: 3    Dr. Hima Salcido DO, IBAN, A  [facility] Hospitalist  Pager [phone]    DO NICOLE Burgess [facility]  Securely message with the Vocera Web Console (learn more here)  Text page via [employer] Paging/Directory      Clinically Significant Risk Factors Present on Admission               # Obesity: Estimated body mass index is 37.6 kg/m  as calculated from the following:    Height as of this encounter: 1.854 m (6' 1\").    Weight as of this encounter: 129.3 kg (285 lb).                         Physical Exam:           Blood pressure (!) 160/93, pulse 82, temperature 97.2  F (36.2  C), temperature source Axillary, resp. rate 18, height 1.854 m (6' 1\"), weight 129.3 kg (285 lb), SpO2 97 %.    Vital Sign Ranges  Temperature Temp  Av.8  F (36.6  C)  Min: 97.2  F (36.2  C)  Max: 98.5  F (36.9  C)   Blood pressure Systolic (24hrs), Av , Min:129 , Max:160        Diastolic (24hrs), Av, Min:75, Max:93      Pulse Pulse  Av.3  Min: 75  Max: 82   Respirations Resp  Av.5  Min: 16  Max: 18   Pulse oximetry SpO2  Av.3 %  Min: 93 %  Max: 97 %     Vital Signs with Ranges  Temp:  [97.2  F (36.2  C)-98.5  F (36.9  C)] 97.2  F (36.2  C)  Pulse:  [75-82] 82  Resp:  [16-18] 18  BP: (129-160)/(75-93) 160/93  SpO2:  [93 %-97 %] 97 %  Temp:  [97.2  F (36.2  C)-98.5  F (36.9  C)] 97.2  F (36.2  C)  Pulse:  [75-82] 82  Resp:  [16-18] 18  BP: (129-160)/(75-93) 160/93  SpO2:  [93 %-97 %] 97 %    I/O Last 3 Shifts:   I/O last 3 completed shifts:  In: 950 [P.O.:560; I.V.:390]  Out: -     I/O past 24 hours:     Intake/Output Summary (Last 24 hours) at 2022 0738  Last data filed at 2022 1931  Gross per 24 hour   Intake 950 ml   Output --   Net 950 ml       Oxygen  Temp: 97.2  F (36.2  C) Temp src: Axillary BP: (!) 160/93 Pulse: 82   Resp: 18 SpO2: 97 % O2 Device: None (Room air)  "   Vitals:    09/09/22 1012   Weight: 129.3 kg (285 lb)       Lines  Peripheral IV 09/09/22 Anterior;Right (Active)   Site Assessment WDL 09/12/22 0141   Line Status Infusing 09/12/22 0141   Phlebitis Scale 0-->no symptoms 09/12/22 0141   Infiltration Scale 0 09/12/22 0141   Infiltration Site Treatment Method  None 09/12/22 0141   Number of days: 3     GENERAL: NAD. Conversational, appropriate.  Pleasant.  HEENT: Normocephalic. Nares normal.   LUNGS: Clear to auscultation. No dyspnea at rest.   HEART: Regular rate. Extremities perfused.   ABDOMEN: Soft, nontender, and distended. Positive bowel sounds.   EXTREMITIES: LE edema noted.   PSYCH: Alert and oriented to person, place and time.   NEUROLOGIC: Moves extremities x4, follows commands.          Prior to Admission Medications:        Medications Prior to Admission   Medication Sig Dispense Refill Last Dose     acetaminophen (TYLENOL) 325 MG tablet Take 2 tablets (650 mg) by mouth every 4 hours as needed for other (mild pain) 100 tablet 0  at PRN     Cholecalciferol (VITAMIN D3) 50 MCG (2000 UT) CAPS Take 2,000 Units by mouth every morning    9/8/2022 at Unknown time     lactobacillus rhamnosus (GG) (CULTURELL) capsule Take 1 capsule by mouth daily   9/8/2022 at Unknown time     Multiple Vitamins-Minerals (MULTIVITAMIN ADULTS 50+) TABS Take 1 tablet by mouth every morning    9/8/2022 at Unknown time     vitamin C (ASCORBIC ACID) 1000 MG TABS Take 1 tablet by mouth daily   9/8/2022 at Unknown time     zinc gluconate 50 MG tablet Take 250 mg by mouth daily   9/8/2022 at Unknown time            Medications:        Current Facility-Administered Medications   Medication Last Rate     heparin 1,700 Units/hr (09/12/22 0011)     Current Facility-Administered Medications   Medication Dose Route Frequency     pantoprazole  40 mg Oral QAM AC     sodium chloride (PF)  3 mL Intracatheter Q8H     Current Facility-Administered Medications   Medication Dose Route Frequency      acetaminophen  650 mg Oral Q6H PRN    Or     acetaminophen  650 mg Rectal Q6H PRN     bisacodyl  10 mg Rectal Daily PRN     HYDROmorphone  0.2 mg Intravenous Q2H PRN     lidocaine 4%   Topical Q1H PRN     lidocaine viscous 2% 15 mL and maalox/mylanta w/ simethicone 15 mL (GI COCKTAIL)  30 mL Oral TID PRN     lidocaine (buffered or not buffered)  0.1-1 mL Other Q1H PRN     melatonin  1 mg Oral At Bedtime PRN     naloxone  0.2 mg Intravenous Q2 Min PRN    Or     naloxone  0.4 mg Intravenous Q2 Min PRN    Or     naloxone  0.2 mg Intramuscular Q2 Min PRN    Or     naloxone  0.4 mg Intramuscular Q2 Min PRN     ondansetron  4 mg Oral Q6H PRN    Or     ondansetron  4 mg Intravenous Q6H PRN     oxyCODONE  5 mg Oral Q4H PRN     polyethylene glycol  17 g Oral Daily PRN     senna-docusate  1 tablet Oral BID PRN    Or     senna-docusate  2 tablet Oral BID PRN     sodium chloride (PF)  3 mL Intracatheter q1 min prn     ___________________________________________________________________________  Medications     heparin 1,700 Units/hr (09/12/22 0011)       pantoprazole  40 mg Oral QAM AC     sodium chloride (PF)  3 mL Intracatheter Q8H          Data:      Lab data reviewed.     Data   Recent Labs   Lab 09/12/22  0651 09/11/22  0500 09/10/22  0633 09/09/22  1019   WBC 8.5  --  8.9 10.1   HGB 13.6  --  14.6 15.8   MCV 91  --  90 91     --  191 229   NA  --   --  138 140   POTASSIUM 4.1 4.2 4.1 4.2   CHLORIDE  --   --  108 106   CO2  --   --  25 27   BUN  --   --  10 10   CR  --   --  0.92 1.02   ANIONGAP  --   --  5 7   PETER  --   --  8.2* 8.7   GLC  --   --  121* 112*   ALBUMIN  --   --   --  3.2*  3.0*   PROTTOTAL  --   --   --  6.8   BILITOTAL  --   --   --  0.8   ALKPHOS  --   --   --  86   ALT  --   --   --  18   AST  --   --   --  25   LIPASE  --   --   --  49*           Imaging:      Imaging data reviewed.     No results found for this or any previous visit (from the past 24 hour(s)).    Dr. Hima Salcido DO, IBAN,  CHACE  [facility] Hospitalist  Pager [phone]

## 2022-09-12 NOTE — PLAN OF CARE
1900 - 0700    A&Ox4. VSS on RA. C/o heartburn, PRN maalox given x2 w/some relief. Continent of bowel and bladder, no BM for 2 days. C/o abdominal and gas pain, PRN senna, tylenol, and heat packs given w/ some relief. C/o nausea, PRN zofran given with relief. Up independent, walked hallway x3. Regular diet. On heparin drip, 1700 units/hr. Hep 10a recheck today @ 0600. Abdominal paracentesis site in lower R quadrant - CDI. Hem/onc following. Discharge back home pending paracentesis cytology results.

## 2022-09-13 NOTE — PROGRESS NOTES
Performed conscious sedation during liver biopsy and monitored vitals during procedure. Per MD order gave 0.5 mg of versed and 25 mcg of fentanyl (see MAR documentation). MD took 5 samples from the liver. Patient tolerated procedure well. RASS score 0 entire time. No pain or discomfort reported. VSS entire procedure. See sedation intraprocedure  flowsheet for exact numbers. Report called to station [id number] and handed back off to Shirley BANKS.

## 2022-09-13 NOTE — PROGRESS NOTES
Service Date: 2022    SUBJECTIVE:  Mr. Noriega is a 72-year-old gentleman with abnormal CT scan highly suggestive of metastatic pancreatic cancer.  He also has a small pulmonary embolism and portal vein thrombosis secondary to malignancy.    He has ascites.  Paracentesis was done.  Cytology is pending.  He is scheduled for an ultrasound-guided biopsy of liver/peritoneal lesion today.    Overall, condition is stable.  No chest pain or shortness of breath.  Abdominal pain is better.  No nausea or vomiting.  No bleeding.    PHYSICAL EXAMINATION:    GENERAL:  He is alert and oriented x 3.  Not in distress.  VITAL SIGNS:  Reviewed.  Rest of systems not examined.    LABS:  Reviewed.    ASSESSMENT:    1.  A 72-year-old gentleman with pancreatic mass, liver metastasis, and ascites.  This is clinically consistent with metastatic pancreatic cancer.  2.  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    PLAN:    1.  For thrombosis he is on heparin drip, which will be continued. After biopsy can be transitioned to Eliquis.  2.  Based on the CT scan, he has metastatic pancreatic cancer.  His CA 19-9 is also elevated.  We will wait for the pathology.  Once pancreatic cancer is confirmed, I will discuss with him regarding palliative chemotherapy.    3.  He had few questions, which were all answered.    Lesley Abdalla MD        D: 2022   T: 2022   MT: [id number]    Name:     CATHERINE NORIEGA  MRN:      [id number]        Account:      [id number]   :      1950           Service Date: 2022       Document: [id number]

## 2022-09-13 NOTE — PROGRESS NOTES
[facility]  Hospitalist Progress Note  [facility]        Date of Service (when I saw the patient): 09/13/2022        Interval History:      Patient eager for discharge.  Biopsy planned for later today.         Assessment and Plan:        Cameron Barnard is a 70 year old male with a history of remote PE/DVT, small bowel obstruction in 2020, ventral hernia repair on 5/5/21, right TKA in February 2022, hyperlipidemia, prior hematuria, and dilated ascending aorta who presented to the ER with abdominal pain.     Bilateral Pulmonay Embolus Portal Vein Thrombosis Has history of PE about 5 years ago. Was treated with warfarin and then transitioned to Eliquis which he tolerated much better. Has been off eliquis since earlier this year. CT abdomen showed pulmonary embolus and portal vein thrombosis. chest CT PE protocol indicates tiny right subsegmental PE, no right heart strain noted, can defer echo due to presence of tiny PE.  -[facility] consult  -continue heparin drip, plan transition to Eliquis once no biopsies/procedures planned.  -Plan for liver lesion core biopsy later today.     Probable metastatic Pancreatic cancer Ascites New diagnosis with mass in pancreatic head and masses throughout liver suggesting metastases. 3.7 liters removed from paracentesis. on review of his chart from 2020 when he presented with small bowel obstruction there was some mention of duodenal diverticulitis or questionable concerning area near the pancreas.  He did undergo EGD and colonoscopy at that time not sure whether he had EUS or biopsy of this.  - cytology pending.     Dilated ascending aorta  - Follows with cardiology through [facility] as an outpatient, continue outpatient follow-up as previously recommended     DVT Prophylaxis: Heparin drip, plan to transition to NOAC/oral anticoagulation on discharge.      Code Status: Full Code      Disposition: Discharge pending biopsy, either  "today or tomorrow, likely / if stable with no signs of bleeding.    Diet: Orders Placed This Encounter      Regular Diet Adult      Code: Full Code    Length of Stay:  LOS: 4 days /LOS: 4    Dr. Hima Salcido DO, IBAN, A  [facility] Hospitalist  Pager [phone]    DO NICOLE Burgess [facility]  Securely message with the Vocera Web Console (learn more here)  Text page via [employer] Paging/Directory      Clinically Significant Risk Factors Present on Admission               # Obesity: Estimated body mass index is 37.6 kg/m  as calculated from the following:    Height as of this encounter: 1.854 m (6' 1\").    Weight as of this encounter: 129.3 kg (285 lb).                         Physical Exam:           Blood pressure 131/76, pulse 68, temperature 98  F (36.7  C), temperature source Oral, resp. rate 18, height 1.854 m (6' 1\"), weight 129.3 kg (285 lb), SpO2 94 %.    Vital Sign Ranges  Temperature Temp  Av.1  F (36.7  C)  Min: 98  F (36.7  C)  Max: 98.2  F (36.8  C)   Blood pressure Systolic (24hrs), Av , Min:131 , Max:141        Diastolic (24hrs), Av, Min:76, Max:81      Pulse Pulse  Av.3  Min: 63  Max: 74   Respirations Resp  Av.3  Min: 16  Max: 18   Pulse oximetry SpO2  Av %  Min: 94 %  Max: 96 %     Vital Signs with Ranges  Temp:  [98  F (36.7  C)-98.2  F (36.8  C)] 98  F (36.7  C)  Pulse:  [63-74] 68  Resp:  [16-18] 18  BP: (131-141)/(76-81) 131/76  SpO2:  [94 %-96 %] 94 %  Temp:  [98  F (36.7  C)-98.2  F (36.8  C)] 98  F (36.7  C)  Pulse:  [63-74] 68  Resp:  [16-18] 18  BP: (131-141)/(76-81) 131/76  SpO2:  [94 %-96 %] 94 %    I/O Last 3 Shifts:   No intake/output data recorded.    I/O past 24 hours:   No intake or output data in the 24 hours ending 22 0841    Oxygen  Temp: 98  F (36.7  C) Temp src: Oral BP: 131/76 Pulse: 68   Resp: 18 SpO2: 94 % O2 Device: None (Room air)    Vitals:    22 1012   Weight: 129.3 kg (285 lb) "       Lines  Peripheral IV 09/09/22 Anterior;Right (Active)   Site Assessment WDL 09/12/22 2000   Line Status Infusing 09/12/22 2000   Phlebitis Scale 0-->no symptoms 09/12/22 2000   Infiltration Scale 0 09/12/22 2000   Infiltration Site Treatment Method  None 09/12/22 2000   Number of days: 4     GENERAL: NAD. Conversational, appropriate.  HEENT: Normocephalic. Nares normal.   LUNGS: Clear to auscultation. No dyspnea at rest.   HEART: Regular rate. Extremities perfused.   ABDOMEN: Soft, nontender, and distended. Positive bowel sounds.   EXTREMITIES: LE edema noted.   PSYCH: Alert and oriented to person, place and time.   NEUROLOGIC: Moves extremities x4, follows commands.          Prior to Admission Medications:        Medications Prior to Admission   Medication Sig Dispense Refill Last Dose     acetaminophen (TYLENOL) 325 MG tablet Take 2 tablets (650 mg) by mouth every 4 hours as needed for other (mild pain) 100 tablet 0  at PRN     Cholecalciferol (VITAMIN D3) 50 MCG (2000 UT) CAPS Take 2,000 Units by mouth every morning    9/8/2022 at Unknown time     lactobacillus rhamnosus (GG) (CULTURELL) capsule Take 1 capsule by mouth daily   9/8/2022 at Unknown time     Multiple Vitamins-Minerals (MULTIVITAMIN ADULTS 50+) TABS Take 1 tablet by mouth every morning    9/8/2022 at Unknown time     vitamin C (ASCORBIC ACID) 1000 MG TABS Take 1 tablet by mouth daily   9/8/2022 at Unknown time     zinc gluconate 50 MG tablet Take 250 mg by mouth daily   9/8/2022 at Unknown time            Medications:        Current Facility-Administered Medications   Medication Last Rate     heparin Stopped (09/13/22 0519)     Current Facility-Administered Medications   Medication Dose Route Frequency     pantoprazole  40 mg Oral QAM AC     sodium chloride (PF)  3 mL Intracatheter Q8H     Current Facility-Administered Medications   Medication Dose Route Frequency     acetaminophen  650 mg Oral Q6H PRN    Or     acetaminophen  650 mg Rectal Q6H  PRN     bisacodyl  10 mg Rectal Daily PRN     HYDROmorphone  0.2 mg Intravenous Q2H PRN     lidocaine 4%   Topical Q1H PRN     lidocaine viscous 2% 15 mL and maalox/mylanta w/ simethicone 15 mL (GI COCKTAIL)  30 mL Oral TID PRN     lidocaine (buffered or not buffered)  0.1-1 mL Other Q1H PRN     melatonin  1 mg Oral At Bedtime PRN     naloxone  0.2 mg Intravenous Q2 Min PRN    Or     naloxone  0.4 mg Intravenous Q2 Min PRN    Or     naloxone  0.2 mg Intramuscular Q2 Min PRN    Or     naloxone  0.4 mg Intramuscular Q2 Min PRN     ondansetron  4 mg Oral Q6H PRN    Or     ondansetron  4 mg Intravenous Q6H PRN     oxyCODONE  5 mg Oral Q4H PRN     polyethylene glycol  17 g Oral Daily PRN     senna-docusate  1 tablet Oral BID PRN    Or     senna-docusate  2 tablet Oral BID PRN     sodium chloride (PF)  3 mL Intracatheter q1 min prn     ___________________________________________________________________________  Medications     heparin Stopped (09/13/22 0519)       pantoprazole  40 mg Oral QAM AC     sodium chloride (PF)  3 mL Intracatheter Q8H          Data:      Lab data reviewed.     Data   Recent Labs   Lab 09/12/22  0651 09/11/22  0500 09/10/22  0633 09/09/22  1019   WBC 8.5  --  8.9 10.1   HGB 13.6  --  14.6 15.8   MCV 91  --  90 91     --  191 229   NA  --   --  138 140   POTASSIUM 4.1 4.2 4.1 4.2   CHLORIDE  --   --  108 106   CO2  --   --  25 27   BUN  --   --  10 10   CR  --   --  0.92 1.02   ANIONGAP  --   --  5 7   PETER  --   --  8.2* 8.7   GLC  --   --  121* 112*   ALBUMIN  --   --   --  3.2*  3.0*   PROTTOTAL  --   --   --  6.8   BILITOTAL  --   --   --  0.8   ALKPHOS  --   --   --  86   ALT  --   --   --  18   AST  --   --   --  25   LIPASE  --   --   --  49*           Imaging:      Imaging data reviewed.     No results found for this or any previous visit (from the past 24 hour(s)).    Dr. Hima Salcido DO, IBAN, [facility]  Pager [phone]

## 2022-09-13 NOTE — PROGRESS NOTES
PT AAOX4. NO ACUTE EVENTS THROUGHOUT THE NIGHT. HEPARIN DRIP STOPPED FOR PROCEDURE AT 1330. BED IN LOWEST POSITION, CALL BELL IN REACH, BED ALARM ON. REPORT GIVEN TO ONCOMING NURSE.

## 2022-09-13 NOTE — PROGRESS NOTES
Patient returned from Biopsy.Patient tolerated procedure per report from Clarisse BANKS. Patient in bed resting. No acute distress noted. Vitals stable. ABC's intact. Will continue to monitor.   "Keep us informed"

## 2022-09-13 NOTE — PROCEDURES
[facility]    Procedure: IR Procedure Note    Date/Time: 9/13/2022 2:45 PM  Performed by: Precious May MD  Authorized by: Precious May MD       UNIVERSAL PROTOCOL   Site Marked: NA  Prior Images Obtained and Reviewed:  Yes  Required items: Required blood products, implants, devices and special equipment available    Patient identity confirmed:  Verbally with patient, arm band, provided demographic data and hospital-assigned identification number  Patient was reevaluated immediately before administering moderate or deep sedation or anesthesia  Confirmation Checklist:  Patient's identity using two indicators, relevant allergies, procedure was appropriate and matched the consent or emergent situation and correct equipment/implants were available  Time out: Immediately prior to the procedure a time out was called    Universal Protocol: the Joint Commission Universal Protocol was followed    Preparation: Patient was prepped and draped in usual sterile fashion       ANESTHESIA    Anesthesia: Local infiltration  Local Anesthetic:  Lidocaine 1% without epinephrine      SEDATION  Patient Sedated: Yes    Sedation Type:  Moderate (conscious) sedation  Sedation:  Fentanyl and midazolam  Vital signs: Vital signs monitored during sedation    See dictated procedure note for full details.  Findings: Liver lesion core biopsy.    Resume heparin drip after 6  Hours (8:30 pm) if no signs of bleeding.    Specimens: core needle biopsy specimens sent for pathological analysis    Complications: None    Condition: Stable      PROCEDURE    Patient Tolerance:  Patient tolerated the procedure well with no immediate complications  Length of time physician/provider present for 1:1 monitoring during sedation: 10

## 2022-09-14 NOTE — DISCHARGE SUMMARY
Patient discharged home accompanied by wife. INT removed. INT site clean and not bleeding. Dressing applied. No acute distress noted. Discharge instruction given to patient. Patient verbalized understanding. Patient left via wheelchair in no distress.

## 2022-09-14 NOTE — PROGRESS NOTES
Patient diet order changed to NPO by MD. Patient enquiring reason for NPO order. MD paged by RN. Charge notified. Charge nurse paged MD. No response yet per the dictation of this note.Oncomimg RN made aware and will follow up. Patient in no acute distress. ABC's intact.

## 2022-09-14 NOTE — DISCHARGE SUMMARY
[facility]  Hospitalist Discharge Summary      Date of Admission:  9/9/2022  Date of Discharge:  9/14/2022  Discharging Provider: Albaro Gutierrez MD  Discharge Service: Hospitalist Service    Discharge Diagnoses   1. Bilateral pulmonay emboli and portal vein thrombosis in the setting of malignancy  2. Metastatic adenocarcinoma of the pancreas  3. Malignant ascites    Follow-ups Needed After Discharge   Follow-up Appointments     Follow-up and recommended labs and tests       Follow up with primary care provider, Gary Bey, within 7 days for   hospital follow- up.  The following labs/tests are recommended: cbc/cmp.    Follow up with Oncology as directed.             Unresulted Labs Ordered in the Past 30 Days of this Admission     Date and Time Order Name Status Description    9/13/2022  2:33 PM Surgical Pathology Exam In process       These results will be followed up by Dr. Abdalla    Discharge Disposition   Discharged to home  Condition at discharge: Stable    Hospital Course   Cameron Barnard is a 70 year old male with a history of remote PE/DVT, small bowel obstruction in 2020, ventral hernia repair on 5/5/21, right TKA in February 2022, hyperlipidemia, prior hematuria, and dilated ascending aorta who presented to the ER with abdominal pain and was found to have a mass in the head of the pancreas as well as masses in the liver.  He also had ascites.  In addition he was found to have bilateral pulmonary emboli and portal vein thrombosis.     Bilateral pulmonary emboli and portal vein thrombosis in the setting of malignancy  Has history of PE about 5 years ago. Was treated with warfarin and then transitioned to Eliquis which he tolerated much better. Has been off eliquis since earlier this year. CT abdomen showed pulmonary embolus and portal vein thrombosis. chest CT PE protocol indicates tiny right subsegmental PE, no right heart strain noted, can defer echo due to presence of  tiny PE.  He has been doing well on IV heparin and was started on apixaban today.    Discharged home on apixaban     Metastatic adenocarcinoma of the pancreas  Malignant ascites  There is a mass in pancreatic head and masses throughout liver suggesting metastases. 3.7 liters removed from paracentesis.   He underwent an ultrasound-guided biopsy by interventional radiology.  Initial cytology shows adenocarcinoma the pancreas.    He was seen by Dr. Abdalla and will follow up in the oncology clinic next week     Dilated ascending aorta    Follows with cardiology through [facility] as an outpatient, continue outpatient follow-up as previously recommended    Consultations This Hospital Stay   PHARMACY IP CONSULT  PHARMACY IP CONSULT  HEMATOLOGY & ONCOLOGY IP CONSULT  SPIRITUAL HEALTH SERVICES IP CONSULT  INTERVENTIONAL RADIOLOGY ADULT/PEDS IP CONSULT  CARE MANAGEMENT / SOCIAL WORK IP CONSULT    Code Status   Full Code    Time Spent on this Encounter   I, Albaro Gutierrez MD, personally saw the patient today and spent less than or equal to 30 minutes discharging this patient.       Albaro Gutierrez MD  [place] [address] [facility]  [address] [place] [address], [address]  [facility] [address]  Phone: [phone]  ______________________________________________________________________    Physical Exam   Vital Signs: Temp: 97.1  F (36.2  C) Temp src: Oral BP: 133/83 Pulse: 75   Resp: 18 SpO2: 95 % O2 Device: None (Room air) Oxygen Delivery: 2 LPM  Weight: 285 lbs 0 oz  Constitutional: awake, alert, cooperative, no apparent distress  GI: normal bowel sounds, soft, non-distended, non-tender  Neuropsychiatric: General: normal, calm and normal eye contact    Primary Care Physician   Gary Bey    Discharge Orders      Reason for your hospital stay    Abdominal discomfort     Follow-up and recommended labs and tests     Follow up with primary care provider, Gary Bey, within 7 days for hospital follow- up.  The  following labs/tests are recommended: cbc/cmp.    Follow up with Oncology as directed.     Activity    Your activity upon discharge: activity as tolerated     Diet    Follow this diet upon discharge: Orders Placed This Encounter      Regular Diet Adult       Significant Results and Procedures   Most Recent 3 CBC's:Recent Labs   Lab Test 09/14/22  0751 09/12/22  0651 09/10/22  0633   WBC 8.7 8.5 8.9   HGB 15.3 13.6 14.6   MCV 92 91 90    163 191     Most Recent 3 BMP's:Recent Labs   Lab Test 09/14/22  0751 09/12/22  0651 09/11/22  0500 09/10/22  0633 09/09/22  1019   *  --   --  138 140   POTASSIUM 4.2 4.1 4.2 4.1 4.2   CHLORIDE 103  --   --  108 106   CO2 23  --   --  25 27   BUN 10  --   --  10 10   CR 0.82  --   --  0.92 1.02   ANIONGAP 6  --   --  5 7   PETER 8.3*  --   --  8.2* 8.7   *  --   --  121* 112*   ,   Results for orders placed or performed during the hospital encounter of 09/09/22   CT Abdomen Pelvis w Contrast    Narrative    CT ABDOMEN PELVIS W CONTRAST 9/9/2022 2:34 PM    CLINICAL HISTORY: abdominal pain, ho bowel obstruction and resection    TECHNIQUE: CT scan of the abdomen and pelvis was performed following  injection of IV contrast. Multiplanar reformats were obtained. Dose  reduction techniques were used.  CONTRAST: 135mL ISOVUE-370    COMPARISON: 6/22/2021    FINDINGS:   LOWER CHEST: Possible small filling defect in the right lower lobe  subsegmental pulmonary artery although evaluation is limited on this  phase of contrast (series 2, image 19-21).    HEPATOBILIARY: Several new hypodense lesions in the liver are new and  suspicious for metastatic disease. For example, there is a new 2.2 cm  lesion in the right hepatic lobe near the hepatic vein-IVC confluence,  new 2.0 cm lesion in the inferior right hepatic lobe (series 2, image  88) and 1.0 cm lesion in the left hepatic lobe (image 52).  Cholelithiasis. No biliary ductal dilatation.     Nonocclusive filling defect in the  main portal vein (series 2, image  88) measuring 2 cm in length. The left portal vein is occluded with  mildly enhancing soft tissue within it, which may be tumor thrombus  (series 2, image 65).    PANCREAS: New large mass in the pancreatic head and neck measuring 5.4  x 5.0 cm. Associated atrophy of the pancreatic body and tail with  ductal dilatation in the pancreatic body and tail. The mass abuts  about 180 degrees circumference of the common hepatic artery and  encases the splenic artery. It does not involve the superior  mesenteric artery. Occluded splenic vein with multiple collaterals in  the left upper quadrant and around the splenic hilum and stomach.    SPLEEN: Small hypodense area in the inferior spleen is indeterminate  (series 2, image 90) could represent a mass or small area of splenic  infarct.    ADRENAL GLANDS: Normal.    KIDNEYS/BLADDER: Normal.    BOWEL: Small duodenal ampullary diverticulum. No small bowel or  colonic obstruction. Colonic diverticulosis. Small bowel resection and  anastomosis in the right lower quadrant. Nothing for appendicitis.    PELVIC ORGANS: No pelvic masses.    ADDITIONAL FINDINGS: Moderate ascites with linear and nodular omental  and peritoneal thickening. Few small subcapsular soft tissue deposits  along the liver. For example, there is a 1.5 cm deposit posterior to  the right hepatic lobe (series 5, image 62). Findings are suspicious  for peritoneal carcinomatosis. Mildly enlarged peripancreatic lymph  nodes. For example, there is a 1.4 cm portacaval lymph node (series 2,  image 81).     Anterior abdominal hernia mesh repair with small residual chronic  seroma anterior to the mesh measuring 3.6 x 3.7 cm (series 2, image  181). No abdominal aortic aneurysm.    MUSCULOSKELETAL: Lucent lesion in the lateral right fifth rib, partly  visualized (series 2, image 1). Mild degenerative changes of the  spine.      Impression    IMPRESSION:   1.  New large mass in the pancreatic  head and neck suspicious for  pancreatic adenocarcinoma.  2.  Several new hypodense lesions of the liver suspicious for hepatic  metastases.  3.  Findings concerning for peritoneal carcinomatosis with moderate  ascites, nodular omental and peritoneal thickening and a few small  peritoneal deposits.  4.  Nonocclusive small filling defect in the main portal vein,  consistent with a nonocclusive thrombus. The left portal vein is  occluded with mildly enhancing soft tissue, suspicious for tumor  thrombus.  5.  Mildly enlarged peripancreatic lymph nodes, likely metastatic.  6.  Possible small filling defect in a right lower lobe segmental  pulmonary artery, not well assessed on this phase of contrast.  Recommend a CT pulmonary angiogram for complete assessment.  7.  Subtle lucent lesion in the right fifth rib is indeterminate.  8.  Cholelithiasis.  9.  Small hypodense focus in the inferior spleen could represent a  splenic lesion or small splenic infarct. Continued attention on  follow-up.    Findings were discussed with Dr. Jimenez at 3:00 PM.    GIOVANNY GILBERT MD         SYSTEM ID:  [id number]   US Paracentesis without Albumin    Narrative    US PARACENTESIS WITHOUT ALBUMIN 9/9/2022 4:26 PM     HISTORY: High volume.    PROCEDURE: Ultrasound was used to evaluate for the presence and best  approach for paracentesis. Written and oral informed consent was  obtained. A pause for the cause procedure to verify the correct  patient and correct procedure. The skin overlying the right lower  quadrant was prepped and draped in the usual sterile fashion. The  subcutaneous tissues were anesthetized with 10 mL 1% lidocaine. A  catheter was advanced into the peritoneal space and 3.75 L of  straw  colored fluid was drained. There were no immediate complications.  Ultrasound images were permanently stored.  Patient left the  ultrasound suite in satisfactory condition.      Impression    IMPRESSION: Technically successful paracentesis without  immediate  complications.     JAY BREAUX MD         SYSTEM ID:  [id number]   CT Chest Pulmonary Embolism w Contrast     Value    Radiologist flags Pulmonary emboli (AA)    Narrative    EXAM: CT CHEST PULMONARY EMBOLISM W CONTRAST  LOCATION: [facility]  DATE/TIME: 9/9/2022 9:17 PM    INDICATION: new pancreatic cancer on abdominal CT that also showed likely PE, please confirm and determine extent of PE  COMPARISON: CT abdomen pelvis 09/09/2022  TECHNIQUE: CT chest pulmonary angiogram during arterial phase injection of IV contrast. Multiplanar reformats and MIP reconstructions were performed. Dose reduction techniques were used.   CONTRAST: 83 mL Isovue 370    FINDINGS:  ANGIOGRAM CHEST: In the subsegmental right lower lobe pulmonary artery posteriorly there is a tiny filling defect which persists compared to today's earlier CT of the abdomen and pelvis consistent with a small PE. No other PE identified. The pulmonary   arteries are normal in caliber. There is no evidence for significant right heart strain. No thoracic aortic dissection.     LUNGS AND PLEURA: Benign calcified granuloma in the left lung.    MEDIASTINUM/AXILLAE: Normal.    CORONARY ARTERY CALCIFICATION: Mild.    UPPER ABDOMEN: Changes of neoplasm, please refer to 09/09/2022 CT reported abdomen/pelvis.    MUSCULOSKELETAL: Moderate multilevel degenerative changes in the spine.      Impression    IMPRESSION:  1.  Tiny PE in a subsegmental right lower lobe pulmonary artery posteriorly. No enlargement of the pulmonary arteries or findings of right heart strain.    2.  Changes of neoplasm the abdomen, please refer to dedicated CT of the abdomen/pelvis report 09/09/2022.    3.  Benign calcified granuloma left lung.    4.  Mild coronary artery calcification.      [Critical Result: Pulmonary emboli]    Finding was identified on 9/9/2022 9:20 PM.     Nurse Hou was contacted by me on 9/9/2022 9:35 PM and verbalized understanding of  the critical result.    US Biopsy Liver    Narrative     US BIOPSY LIVER  9/13/2022 2:50 PM     HISTORY: Pancreatic mass. Peritoneal carcinomatosis. Liver lesions.    COMPARISON: 9/9/2022     FINDINGS: Written and oral informed consent was obtained. Preprocedure  pause performed. The procedure was performed using 1% lidocaine for  local anesthesia, sterile technique and direct ultrasound  visualization. An 18-gauge core biopsy device was advanced into a  lesion in the inferior right hepatic lobe and 5 core biopsy specimens  were obtained. Samples were submitted for histopathology. Sterile  dressing applied. No immediate complications.    The procedure was performed under my direct supervision using moderate  sedation for 10 minutes.      Impression    IMPRESSION: Technically successful ultrasound-guided liver lesion core  biopsy.    GIOVANNY GILBERT MD         SYSTEM ID:  [id number]       Discharge Medications   Current Discharge Medication List      START taking these medications    Details   Apixaban Starter Pack (ELIQUIS DVT/PE STARTER PACK) 5 MG TBPK Take 10 mg by mouth 2 times daily for 7 days, THEN 5 mg 2 times daily for 23 days.  Qty: 37 each, Refills: 0    Associated Diagnoses: Other acute pulmonary embolism, unspecified whether acute cor pulmonale present (H)      pantoprazole (PROTONIX) 40 MG EC tablet Take 1 tablet (40 mg) by mouth every morning (before breakfast)  Qty: 30 tablet, Refills: 0    Associated Diagnoses: Other acute pulmonary embolism, unspecified whether acute cor pulmonale present (H)         CONTINUE these medications which have NOT CHANGED    Details   acetaminophen (TYLENOL) 325 MG tablet Take 2 tablets (650 mg) by mouth every 4 hours as needed for other (mild pain)  Qty: 100 tablet, Refills: 0    Associated Diagnoses: S/P total knee arthroplasty, right      Cholecalciferol (VITAMIN D3) 50 MCG (2000 UT) CAPS Take 2,000 Units by mouth every morning       lactobacillus rhamnosus (GG) (CULTURELL)  capsule Take 1 capsule by mouth daily      Multiple Vitamins-Minerals (MULTIVITAMIN ADULTS 50+) TABS Take 1 tablet by mouth every morning       vitamin C (ASCORBIC ACID) 1000 MG TABS Take 1 tablet by mouth daily      zinc gluconate 50 MG tablet Take 250 mg by mouth daily           Allergies   Allergies   Allergen Reactions     Seasonal Allergies Other (See Comments)     Runny nose, watery eyes

## 2022-09-14 NOTE — PROGRESS NOTES
Service Date: 2022    SUBJECTIVE:  Mr. Noriega is a 72-year-old gentleman with newly diagnosed metastatic pancreatic cancer.  CT scan had revealed a pancreatic mass, liver metastasis, peritoneal carcinomatosis, andlymphadenopathy.  There is also pulmonary embolism and portal vein thrombus.    The patient had paracentesis done.  Cytology is positive for malignancy.  His -9 is also elevated.  This is all consistent with metastatic pancreatic cancer.  He had liver biopsy done, which is pending.    The patient is currently on Eliquis.  He was on heparin.  He has been tolerating it well.  No bleeding complication.    Overall, his condition is stable.  No severe abdominal pain.  No nausea or vomiting.  He has fatigue.  No fevers or chills.    PHYSICAL EXAMINATION:    GENERAL:  Alert, oriented x 3.  Not in distress.  Rest of systems not examined.    ASSESSMENT:    1.  A 72-year-old gentleman with newly diagnosed metastatic pancreatic cancer.  2.  Pulmonary embolism and portal vein thrombus secondary to malignancy.    PLAN:    1.  Cytology report was discussed with the patient. He has metastatic pancreatic cancer.    Briefly discussed regarding pancreatic cancer.  I explained to him that unfortunately this is an incurable situation.  He has stage IV disease.  Treatment will be mainly palliative chemotherapy.    I will see him in the clinic next week to discuss regarding details of chemotherapy.  For thrombosis, he will be on lifelong anticoagulation.    2.  He had few questions, which were all answered.  Case discussed with Dr. Gutierrez.    Lesley Abdalla MD        D: 2022   T: 2022   MT: BRIT    Name:     CATHERINE NORIEGA  MRN:      [id number]        Account:      [id number]   :      1950           Service Date: 2022       Document: [id number]

## 2022-09-16 NOTE — PROGRESS NOTES
Connected Care Resource Center Contact  [facility]/Voicemail     Clinical Data: Transitional Care Management Outreach     Outreach attempted x 2.  Left message on patient's voicemail, providing [facility]'s 24/7 scheduling and nurse triage phone number [phone] ([phone]) for questions/concerns and/or to schedule an appt with an [facility] provider, if they do not have a PCP.      Plan:  [facility] will do no further outreaches at this time.       Jenni Diamond  Connected Care Resource Center, [facility]    *Connected Care Resource Team does NOT follow patient ongoing. Referrals are identified based on internal discharge reports and the outreach is to ensure patient has an understanding of their discharge instructions.

## 2022-09-17 NOTE — ED NOTES
Pt denied pain or nausea. Discharge instructions provided. Pt and wife requesting RX for nausea. MD notified

## 2022-09-17 NOTE — ED NOTES
AOX4, VSS. Denied CP/SOB.  Wife at the bedside. He rates abd and back pain at 4/10 post IV Toradol.

## 2022-09-17 NOTE — ED PROVIDER NOTES
History     Chief Complaint:  Back Pain      HPI   Cameron Barnard is a 72 year old male who has a history of DVT, pancreatic cancer, on Eliquis, and presents with back pain. The patient was admitted on 09/09/2022 for pancreatic cancer diagnosis. He had 4.3 liters removed from his abdomen due to ascites. He was discharged on Wednesday. He started to develop pain in his back yesterday. The pain radiates to his chest. He also states that his abdomen is starting to become distended again, but less than when he originally first came to the ED. He was constipated and took milk of magnesia last night. He has had nausea and diarrhea since then. He has taken tylenol for pain management, with no improvement of symptoms.    Review of Systems  10 point review of systems performed and is negative except as above and in HPI.    Allergies:  Seasonal Allergies    Medications:    Apixaban Starter Pack   pantoprazole     Past Medical History:    DVT  Hyperlipidemia  Hypertension  Multiple subsegmental PE without acute cor pulmonale  Perforated bowel  Splenic infarct  Pancreatic cancer  Ventral hernia     Past Surgical History:    Knee arthroplasty  colonoscopy  EGD  Herniorrhaphy  Inject steroid  appendectomy  Small bowel resection  Laparotomy and evacuation of hematoma    Family History:    Sister: breast cancer    Social History:  Presents with SO  Physical Exam     Patient Vitals for the past 24 hrs:   BP Temp Temp src Pulse Resp SpO2   09/17/22 1335 115/80 -- -- 80 (!) 32 95 %   09/17/22 1224 103/64 98.1  F (36.7  C) Oral 78 16 97 %   09/17/22 1200 102/71 -- -- 84 20 95 %   09/17/22 1139 98/70 -- -- 81 20 97 %   09/17/22 1130 98/70 -- -- 87 20 97 %   09/17/22 1048 -- 97.8  F (36.6  C) Oral -- -- --   09/17/22 1047 139/70 -- -- -- -- --   09/17/22 1044 135/75 -- -- 89 24 97 %       Physical Exam  General: Resting on the gurney  Head:  The scalp, face, and head appear normal  Mouth/Throat: Mucus membranes are moist  CV:  Regular  rate    Normal S1 and S2  No pathological murmur   Resp:  Breath sounds clear and equal bilaterally    Non-labored, no retractions or accessory muscle use    No coarseness    No wheezing     Lungs clear to ascultation in all fields  GI:  Slight abdominal distention. No guarding, no rebound. Abdomen is non tender to   palpation throughout.  MS:  Normal motor assessment of all extremities.    Good capillary refill noted.    Skin:   No rash or lesions noted.  Neuro:   Speech is normal and fluent. No apparent deficit.  Psych: Awake. Alert.  Normal affect.      Appropriate interactions.  Emergency Department Course   ECG:  ECG taken at 1055, ECG read at 1055  Normal sinus rhythm  Left anterior fascicular block  possible anterolateral infarct, age undetermined  abnormal ECG   Rate 92 bpm. TX interval 188 ms. QRS duration 98 ms. QT/QTc 368/455 ms. P-R-T axes 28 -52 26.     Imaging:  CT Abdomen Pelvis w Contrast   Final Result   IMPRESSION:    1.  No evidence of hematoma or other complication following liver biopsy.      2.  Primary pancreatic mass, liver metastases, metastatic upper abdominal lymph nodes, and peritoneal carcinomatosis, not significantly changed.      3.  Unchanged nonocclusive thrombus in the main portal vein and complete occlusion of the left portal vein.        Report per radiology    Laboratory:  Labs Ordered and Resulted from Time of ED Arrival to Time of ED Departure   COMPREHENSIVE METABOLIC PANEL - Abnormal       Result Value    Sodium 138      Potassium 4.7      Chloride 104      Carbon Dioxide (CO2) 28      Anion Gap 6      Urea Nitrogen 19      Creatinine 0.99      Calcium 8.3 (*)     Glucose 145 (*)     Alkaline Phosphatase 77      AST 17      ALT 19      Protein Total 6.3 (*)     Albumin 2.7 (*)     Bilirubin Total 0.7      GFR Estimate 80     LACTIC ACID WHOLE BLOOD - Abnormal    Lactic Acid 2.4 (*)    CBC WITH PLATELETS AND DIFFERENTIAL - Abnormal    WBC Count 17.9 (*)     RBC Count 5.30       Hemoglobin 15.9      Hematocrit 48.3      MCV 91      MCH 30.0      MCHC 32.9      RDW 13.8      Platelet Count 323      % Neutrophils 79      % Lymphocytes 7      % Monocytes 13      % Eosinophils 1      % Basophils 0      % Immature Granulocytes 0      NRBCs per 100 WBC 0      Absolute Neutrophils 14.0 (*)     Absolute Lymphocytes 1.2      Absolute Monocytes 2.3 (*)     Absolute Eosinophils 0.2      Absolute Basophils 0.1      Absolute Immature Granulocytes 0.1      Absolute NRBCs 0.0     ISTAT GASES ELECTROLYTES VENOUS POCT - Abnormal    CPB Applied Yes      Hematocrit POCT 44      Bicarbonate Venous POCT 26      Hemoglobin POCT 15.0      Potassium POCT 5.0      Sodium POCT 136      pCO2 Venous POCT 35 (*)     pH Venous POCT 7.48 (*)     pO2 Venous POCT 58 (*)     O2 Sat, Venous POCT 92 (*)    ISTAT GASES LACTATE VENOUS POCT - Abnormal    Lactic Acid POCT 1.6      Bicarbonate Venous POCT 26      O2 Sat, Venous POCT 92 (*)     pCO2V Venous POCT 36 (*)     pH Venous POCT 7.47 (*)     pO2 Venous POCT 60 (*)    LIPASE - Normal    Lipase 97         Emergency Department Course:    Reviewed:  I reviewed nursing notes, vitals, past medical history and Care Everywhere    Assessments:  1050 I obtained history and examined the patient as noted above.   1324 I rechecked the patient and explained findings.     Interventions:  1115 NS 1000 mL IV  1132 Ketorolac 15 mg IV    Disposition:  The patient was discharged to home.     Impression & Plan      Medical Decision Making:  Cameron Barnard is a 72 year old male who presents to the emergency room with abdominal and back pain.  Patient has a recent diagnosis of pancreatic cancer with metastatic disease.  He is anticoagulated and had a recent liver biopsy.  As his pain does radiate to the shoulder I was concerned for potential diaphragmatic irritation and obtained a CT of the abdomen.  This was unremarkable.  He also has known PEs, which could be contributing to his pain.  He states  he feels significantly better in the emergency department.  He has had poor oral intake and had nausea and diarrhea and does feel somewhat dehydrated.  His original lactic acid was elevated, however, after 1 L of fluid resuscitation it has normalized.  He strongly prefers discharge to home with close follow-up as already scheduled with his oncologist on Monday.  I believe this is reasonable and have given him strict return precautions to follow-up for any localizing symptoms specifically including abdominal pain, shortness of breath, or fever.  He is comfortable with this plan and is discharged per his request.  Of note he originally did not want narcotic pain medications and is discharged from the hospital but has been not sleeping very well and requests antiemetic and pain medications at this time.  These were provided.          Diagnosis:    ICD-10-CM    1. Cancer associated pain  G89.3        Discharge Medications:  New Prescriptions    HYDROCODONE-ACETAMINOPHEN (NORCO) 5-325 MG TABLET    Take 1 tablet by mouth every 6 hours as needed for pain    ONDANSETRON (ZOFRAN ODT) 4 MG ODT TAB    Take 1 tablet (4 mg) by mouth every 8 hours as needed         Scribe Disclosure:  Evelin DIAMOND Hired, am serving as a scribe at 10:52 AM on 9/17/2022 to document services personally performed by Chelsey Nuno MD based on my observations and the provider's statements to me.      Chelsey Nuno MD  09/17/22 0370

## 2022-09-17 NOTE — ED TRIAGE NOTES
Mid to upper back pain that radiates to his chest. Discharged from hospital Wednesday with new diagnosis of pancreatic cancer- starting chemo Monday. Pt reports generalized unwell feeling with nausea and diarrhea. Denies SOB. Patient states he had fluid drained out of his abdomen when he was hospitalized.     Triage Assessment     Row Name 09/17/22 1046       Triage Assessment (Adult)    Airway WDL WDL       Respiratory WDL    Respiratory WDL WDL  denies SOB, respirations even and unlabored       Skin Circulation/Temperature WDL    Skin Circulation/Temperature WDL WDL       Cardiac WDL    Cardiac WDL WDL       Peripheral/Neurovascular WDL    Peripheral Neurovascular WDL WDL       Cognitive/Neuro/Behavioral WDL    Cognitive/Neuro/Behavioral WDL WDL

## 2022-09-17 NOTE — DISCHARGE INSTRUCTIONS
Return for fevers, worsened pain, or any other symptoms which are worrisome to you.    If you feel your condition has changed or worsened, please call your doctor or return to the emergency department right away.

## 2022-09-19 PROBLEM — C25.0 MALIGNANT NEOPLASM OF HEAD OF PANCREAS (H): Status: ACTIVE | Noted: 2022-01-01

## 2022-09-19 NOTE — ED TRIAGE NOTES
Patient has abdominal swelling.  Cancer doctor sent him here for paracentesis.  Patient has increased RR and SOB as well.  Last week they drained 4.3L.      Triage Assessment     Row Name 09/19/22 1419       Respiratory WDL    Respiratory WDL --  increased RR. SOb       Cardiac WDL    Cardiac WDL --  no CP        Peripheral/Neurovascular WDL    Peripheral Neurovascular WDL WDL       Cognitive/Neuro/Behavioral WDL    Cognitive/Neuro/Behavioral WDL WDL

## 2022-09-19 NOTE — PROGRESS NOTES
"Oncology Rooming Note    September 19, 2022 11:51 AM   Cameron Barnard is a 72 year old male who presents for:    Chief Complaint   Patient presents with     Oncology Clinic Visit     Initial Vitals: There were no vitals taken for this visit. Estimated body mass index is 37.6 kg/m  as calculated from the following:    Height as of 9/9/22: 1.854 m (6' 1\").    Weight as of 9/9/22: 129.3 kg (285 lb). There is no height or weight on file to calculate BSA.  Data Unavailable Comment: Data Unavailable   No LMP for male patient.  Allergies reviewed: Yes  Medications reviewed: Yes    Medications: Medication refills not needed today.  Pharmacy name entered into [employer]:    [facility][id number] - [place], [place] - [address] [facility] - [facility] [address] [place] [address] DRUG STORE #[id number] - [place] - [address] AT [address] [place] [address]    Clinical concerns:  doctor was notified.      Ashley Ibarra MA            "

## 2022-09-19 NOTE — ED PROVIDER NOTES
History   Chief Complaint:  Abdominal Pain and Shortness of Breath       The history is provided by the patient.      Cameron Barnard is a 72 year old male, anticoagulated on Eliquis, with history of DVT/PE and ascending aorta dilation who presents with abdominal distention and shortness of breath. The patient had an abdominal paracentesis performed, where they took off 4.8 L of fluid. Following the procedure, he feels fatigued, but has relief of abdominal pain.    History from 9/19 in Triage:  Cameron Barnard is a 72 year old male, anticoagulated on Eliquis, who presents with abdominal pain and shortness of breath. The patient notes that he was here three days for paracentesis, at which time they removed 4.3 L. Following the procedure, he started experiencing abdominal distention and pain soon after, accompanied with shortness of breath. Of note, he does have a pancreatic cancer diagnosis.     Review of Systems   Constitutional: Positive for fatigue.   Respiratory: Positive for shortness of breath (resolved).    Gastrointestinal: Positive for abdominal distention (resolved) and abdominal pain (resolved).   All other systems reviewed and are negative.      Allergies:  The patient has no known allergies.     Medications:  Cholecalciferol  Senna  Eliquis  Morphine  Malcolm  Zofran    Past Medical History:     Obesity  DVT  PE  Bowel resection  Osteoarthritis  Ascending aorta dilation  Diverticular disease of colon  Gout  Dyslipidemia  Hiatal hernia  Bowel obstruction  Lymphedema of LLE  Ventral hernia  Splenic infarct  Malignant neoplasm of pancreas     Past Surgical History:    Appendectomy  Bilateral TKR  Exploratory laparotomy  Colonoscopy  EGD  Incisional herniorrhaphy  Steroid injection     Family History:    Breast cancer - sister    Social History:  Patient came from home.  Patient is accompanied in the ED by his wife.  PCP: Gary Bey     Physical Exam     Patient Vitals for the past 24 hrs:   BP Temp Pulse Resp  SpO2   09/19/22 1420 (!) 176/89 -- -- -- --   09/19/22 1418 -- 98  F (36.7  C) 108 (!) 45 94 %       Physical Exam    General: Alert, interactive in mild distress  Head:  Scalp is atraumatic  Eyes:  The pupils are equal, round, and reactive to light    EOM's intact    No scleral icterus  ENT:      Nose:  The external nose is normal  Ears:  External ears are normal     Neck:  Normal range of motion.      There is no rigidity.    Trachea is in the midline         CV:  Regular rate and rhythm    No murmur   Resp:  Breath sounds are clear bilaterally    Non-labored, no retractions or accessory muscle use      GI:  Abdomen is soft. Abdominal distention.      MS:  Normal strength in all 4 extremities  Skin:  Warm and dry, No rash or lesions noted.  Neuro: Strength 5/5 x4.  Sensation intact  In all 4 extremities.        Psych:  Awake. Alert.  Normal affect.      Appropriate interactions.    Emergency Department Course     Imaging:  US Paracentesis without Albumin   Final Result   IMPRESSION:   1.  Status post ultrasound-guided paracentesis.      CARMEN WILKINSON MD            SYSTEM ID:  [id number]        Report per radiology    Laboratory:  Labs Ordered and Resulted from Time of ED Arrival to Time of ED Departure   INR - Abnormal       Result Value    INR 2.49 (*)    CBC WITH PLATELETS AND DIFFERENTIAL - Abnormal    WBC Count 17.6 (*)     RBC Count 5.00      Hemoglobin 15.1      Hematocrit 46.2      MCV 92      MCH 30.2      MCHC 32.7      RDW 14.1      Platelet Count 332      % Neutrophils 82      % Lymphocytes 6      % Monocytes 11      % Eosinophils 0      % Basophils 0      % Immature Granulocytes 1      NRBCs per 100 WBC 0      Absolute Neutrophils 14.5 (*)     Absolute Lymphocytes 1.0      Absolute Monocytes 1.8 (*)     Absolute Eosinophils 0.1      Absolute Basophils 0.0      Absolute Immature Granulocytes 0.2      Absolute NRBCs 0.0     CELL COUNT BODY FLUID - Abnormal    Color Brown (*)     Clarity Cloudy (*)      Total Nucleated Cells 2,313      Cell Count Fluid Source Abdomen     PROTEIN FLUID    Protein Fluid Source Abdomen      Protein Total Fluid 3.5     GLUCOSE FLUID    Glucose Fluid Source Abdomen      Glucose fluid 143     DIFERENTIAL BODY FLUID    % Neutrophils 6      % Lymphocytes 26      % Monocyte/Macrophages 51      % Eosinophils 4      % Basophils 1      % Lining Cells 5      % Other Cells 7     AEROBIC BACTERIAL CULTURE ROUTINE   CELL COUNT WITH DIFFERENTIAL FLUID      Emergency Department Course:       Reviewed:  I reviewed nursing notes, vitals, past medical history and Care Everywhere    Assessments:  1825 I obtained history and examined the patient as noted above. At this time, the patient was deemed safe to return home, and they agreed to the plan of care.    Disposition:  The patient was discharged to home.     Impression & Plan     Medical Decision Making:  Following presentation history and physical examination were performed, the above work-up was undertaken.  Findings are consistent with ascites secondary to his pancreatic cancer.  After therapeutic paracentesis he was feeling much improved and was requesting discharge and I think this is reasonable.  There is no signs of acute sepsis syndrome or more concerning illness.  I doubt SBP.  We will follow-up with his oncologist and return if new symptoms develop.    Diagnosis:    ICD-10-CM    1. Malignant ascites  R18.0        Discharge Medications:  New Prescriptions    No medications on file       Scribe Disclosure:  I, Cora Castro, am serving as a scribe at 6:00 PM on 9/19/2022 to document services personally performed by Luis Eduardo Christie MD based on my observations and the provider's statements to me.        Luis Eduardo Christie MD  09/19/22 1955

## 2022-09-19 NOTE — ED NOTES
Rapid Assessment Note    History:   Cameron Barnard is a 72 year old male, anticoagulated on Eliquis, who presents with abdominal pain and shortness of breath. The patient notes that he was here three days for paracentesis, at which time they removed 4.3 L. Following the procedure, he started experiencing abdominal distention and pain soon after, accompanied with shortness of breath. Of note, he does have a pancreatic cancer diagnosis.     Exam:     General:  Alert, interactive  Cardiovascular:  Well perfused  Lungs:  No respiratory distress, no accessory muscle use  Neuro:  Moving all 4 extremities  Skin:  Warm, dry  Psych:  Normal affect  GI: Abdominal distention    Plan of Care:   I evaluated the patient and developed an initial plan of care. I discussed this plan and explained that I, or one of my partners, would be returning to complete the evaluation.     I, Cora Castro, am serving as a scribe to document services personally performed by Luis Eduardo Christie MD, based on my observations and the provider's statements to me.    09/19/2022  [employer]    Portions of this medical record were completed by a scribe. UPON MY REVIEW AND AUTHENTICATION BY ELECTRONIC SIGNATURE, this confirms (a) I performed the applicable clinical services, and (b) the record is accurate.      Luis Eduardo Christie MD  09/19/22 9253

## 2022-09-19 NOTE — PATIENT INSTRUCTIONS
Paracentesis today and weekly.  Port-placement by IR soon.  Morphine as needed.  Palliative consult.  Start chemo soon.  See Castro or me when he comes to start chemotherapy.  Send biopsy for [facility].

## 2022-09-19 NOTE — LETTER
"    9/19/2022         RE: Cameron Barnard  [address]  [place] [address]        Dear Colleague,    Thank you for referring your patient, Cameron Barnard, to the [place] [facility]. Please see a copy of my visit note below.    Oncology Rooming Note    September 19, 2022 11:51 AM   Cameron Barnard is a 72 year old male who presents for:    Chief Complaint   Patient presents with     Oncology Clinic Visit     Initial Vitals: There were no vitals taken for this visit. Estimated body mass index is 37.6 kg/m  as calculated from the following:    Height as of 9/9/22: 1.854 m (6' 1\").    Weight as of 9/9/22: 129.3 kg (285 lb). There is no height or weight on file to calculate BSA.  Data Unavailable Comment: Data Unavailable   No LMP for male patient.  Allergies reviewed: Yes  Medications reviewed: Yes    Medications: Medication refills not needed today.  Pharmacy name entered into [employer]:    [facility] #[id number] - [place], [place] - [address] [facility] [address] [facility] #[id number] - [place] - [address] AT [address] [place] [address]    Clinical concerns:  doctor was notified.      Ashley Ibarra MA              ONCOLOGIC HISTORY:  Mr. Barnard is a gentleman with metastatic pancreatic cancer.  1. CT abdomen and pelvis on 09/09/2022, revealed pancreatic head and neck mass, liver metastasis and peritoneal carcinomatosis.  There is also main portal vein nonocclusive thrombus and tumor thrombus in left portal vein.  -CT chest angiogram on 09/09/2022 revealed a tiny pulmonary embolism in subsegmental right lower lobe pulmonary artery.  2. Paracentesis was done on 09/09/2022. Cytology is positive for malignancy.  -An ultrasound-guided liver biopsy on 09/13/2022 is positive for adenocarcinoma.  -CA 19-9 on 09/11/2022 is elevated at 1724.     SUBJECTIVE:  Mr. Barnard is a 72-year-old gentleman with a newly diagnosed pancreatic adenocarcinoma.  The " patient has malignant ascites.  Ascites has increased.  He is symptomatic from it.  He feels weak.  He has abdominal discomfort and shortness of breath. He is not able to lay flat because of ascites, which is causing shortness of breath.     He feels weak.  No headache.  Some dizziness.  No chest pain.  Some nausea.  Appetite is decreased.  No fever.  No bleeding.     PHYSICAL EXAMINATION:    GENERAL:  He is alert, oriented x 3.  He looked weak.  VITAL SIGNS:  Reviewed.  ABDOMEN:  Nontender.  Tense ascites.   Rest of systems not examined.     ASSESSMENT:    1.  A 72-year-old gentleman with newly diagnosed metastatic pancreatic adenocarcinoma.  2.  Malignant ascites.  3.  Pulmonary embolism secondary to hypercoagulability of malignancy.  4.  Portal vein thrombosis.  5.  Cancer-associated pain.     PLAN:   1.  The patient has symptomatic ascites.  He needs paracentesis.  We called the Radiology Department.  They will not be able to do paracentesis until tomorrow.     Advised the patient he can go to the Emergency Room.  The patient at this time does not want to go to Emergency Room.  He may decide to go if his symptoms get worse; otherwise, he will get paracentesis done tomorrow.     2.  Discussed regarding metastatic pancreatic cancer with the patient and his wife.  I explained to him that unfortunately this is an incurable disease.  Main treatment is going to be palliative chemotherapy.  This is not a surgical case.     I discussed regarding palliative chemotherapy, which will help to control disease, prolong life and palliate symptoms.  We discussed regarding different regimens including FOLFIRINOX and gemcitabine with Abraxane.     The patient is weak.  He is symptomatic with ascites.  I will favor starting him on gemcitabine and Abraxane.  Regimen was discussed.  Side effects of chemotherapy discussed.  He is agreeable for it.  We will plan on starting it in the next few days.     3.  We will send his biopsy  specimen for [facility].    4.  Discussed regarding Port-A-Cath placement.  He is agreeable for it.  That will be scheduled.    5.  He has abdominal pain.  This is secondary to malignancy.  We will start him on morphine as needed.    6.  He has thrombosis from malignancy.  He will continue on Eliquis.    7.  We will set him up to see Palliative Care.    8.  He and his wife had multiple questions, which were all answered.  He will be seen when he comes to start chemotherapy.     Total visit time of 45 minutes.  Time spent in today's visit, review of chart/investigations today, discussion regarding chemotherapy and documentation today.       This office note has been dictated.        Again, thank you for allowing me to participate in the care of your patient.        Sincerely,        Lesley Abdalla MD

## 2022-09-20 PROBLEM — I48.91 ATRIAL FIBRILLATION WITH RAPID VENTRICULAR RESPONSE (H): Status: ACTIVE | Noted: 2022-01-01

## 2022-09-20 PROBLEM — I31.39 PERICARDIAL EFFUSION: Status: ACTIVE | Noted: 2022-01-01

## 2022-09-20 NOTE — ED PROVIDER NOTES
History   Chief Complaint:  Chest Pain and Shortness of Breath       The history is provided by the EMS personnel and the patient.      Cameron Barnard is a 72 year old male, anticoagulated on Eliquis, with history of pancreatic cancer, DVT/PE, and ascending aorta dilation who presents for evaluation of chest pain and shortness of breath. EMS reports that the patient called from home due to shortness of breath and left-sided chest pain beginning today. He has associated nausea and vomiting. EMS reports new onset atrial fibrillation with heart rate between 100 and 220 bpm. They report blood pressure of 104/70. EMS gave 4 mg Zofran en route, which revolved nausea. The patient reports current chest pain at 4/10 in severity. He also reports abdominal distention and notes left shoulder pain. He denies abdominal pain. The patient does not have a chest port, but notes plans to do this in the future.     Of note, I saw the patient here in the emergency department yesterday for abdominal distention and shortness of breath. He had an abdominal paracentesis, with resolution of his symptoms.    Review of Systems   Respiratory: Positive for shortness of breath.    Cardiovascular: Positive for chest pain.   Gastrointestinal: Positive for abdominal distention, nausea and vomiting. Negative for abdominal pain.   Musculoskeletal: Positive for arthralgias.   All other systems reviewed and are negative.    Allergies:  Seasonal Allergies     Medications:  Cholecalciferol  Senna  Eliquis  Morphine  [place]  Zofran     Past Medical History:     Obesity  DVT  PE  Bowel resection  Osteoarthritis  Ascending aorta dilation  Diverticular disease of colon  Gout  Dyslipidemia  Hiatal hernia  Bowel obstruction  Lymphedema of LLE  Ventral hernia  Splenic infarct  Malignant neoplasm of pancreas      Past Surgical History:    Appendectomy  Bilateral TKR  Exploratory laparotomy  Colonoscopy  EGD  Incisional herniorrhaphy  Steroid injection      Family  "History:    Breast cancer     Social History:  The patient presents to the ED via EMS  PCP: Gary Bey    Physical Exam     Patient Vitals for the past 24 hrs:   BP Temp Temp src Pulse Resp SpO2 Height Weight   09/20/22 1730 120/69 -- -- 111 17 91 % -- --   09/20/22 1720 109/74 -- -- 105 24 93 % -- --   09/20/22 1710 104/73 -- -- 113 15 92 % -- --   09/20/22 1700 116/77 -- -- 117 30 92 % -- --   09/20/22 1650 115/78 -- -- 111 25 92 % -- --   09/20/22 1646 115/78 -- -- 98 26 92 % -- --   09/20/22 1645 -- -- -- 100 26 92 % -- --   09/20/22 1640 (!) 139/108 -- -- 115 25 93 % -- --   09/20/22 1635 124/84 -- -- (!) 128 24 93 % -- --   09/20/22 1630 123/81 -- -- (!) 129 (!) 31 94 % -- --   09/20/22 1627 123/81 98.4  F (36.9  C) Oral (!) 144 28 95 % 1.854 m (6' 1\") 137.9 kg (304 lb 0.2 oz)       Physical Exam  General: Alert, interactive in mild distress  Head:  Scalp is atraumatic  Eyes:  The pupils are equal, round, and reactive to light    EOM's intact    No scleral icterus  ENT:      Nose:  The external nose is normal  Ears:  External ears are normal  Mouth/Throat: The oropharynx is normal    Mucus membranes are dry      Neck:  Normal range of motion.      There is no rigidity.    Trachea is in the midline         CV:  Irregularly irregular tachycardia      Resp:  Breath sounds are coarse bilaterally    Non-labored, no retractions or accessory muscle use      GI:  Abdomen is distended, nontender.       MS:  1+ edema bilateral lower extremities. Normal strength in all 4 extremities  Skin:  Warm and dry, No rash or lesions noted.  Neuro: Strength 5/5 x4.  Sensation intact  In all 4 extremities.      GCS: 15  Psych:  Awake. Alert.  Normal affect.      Appropriate interactions.      Emergency Department Course   ECG  ECG taken at 1629, ECG read at 1630  Atrial fibrillation with rapid ventricular response   Inferior infarct, age undetermined  Anterolateral infarct, age undetermined  Abnormal ECG   Atrial fibrillation " new as compared to prior, dated 2/2/2022.  Rate 139 bpm. SD interval * ms. QRS duration 94 ms. QT/QTc 296/450 ms. P-R-T axes * -29 -8.     Imaging:  CT Chest (PE) Abdomen Pelvis w Contrast   Final Result   IMPRESSION:   1.  No pulmonary embolism.      2.  New moderate-sized pericardial effusion. Mild pericardial thickening suspicious for pericarditis.      3.  Trace bilateral pleural effusions and bibasilar atelectasis.      4.  No acute findings in the abdomen or pelvis. Redemonstrated pancreatic mass and metastatic disease.      5.  Unchanged nonocclusive thrombus in the main portal vein and complete occlusion of the left portal vein.      Echocardiogram Complete    (Results Pending)   Report per radiology    Laboratory:  Labs Ordered and Resulted from Time of ED Arrival to Time of ED Departure   COMPREHENSIVE METABOLIC PANEL - Abnormal       Result Value    Sodium 132 (*)     Potassium 5.4 (*)     Chloride 101      Carbon Dioxide (CO2) 23      Anion Gap 8      Urea Nitrogen 30      Creatinine 1.25      Calcium 8.1 (*)     Glucose 165 (*)     Alkaline Phosphatase 112      AST 49 (*)     ALT 24      Protein Total 6.8      Albumin 2.0 (*)     Bilirubin Total 0.9      GFR Estimate 61     MAGNESIUM - Abnormal    Magnesium 3.2 (*)    ROUTINE UA WITH MICROSCOPIC REFLEX TO CULTURE - Abnormal    Color Urine Yellow      Appearance Urine Clear      Glucose Urine Negative      Bilirubin Urine Negative      Ketones Urine Negative      Specific Gravity Urine 1.010      Blood Urine Negative      pH Urine 6.0      Protein Albumin Urine 30  (*)     Urobilinogen Urine Normal      Nitrite Urine Negative      Leukocyte Esterase Urine Negative      Mucus Urine Present (*)     RBC Urine 4 (*)     WBC Urine 1     CBC WITH PLATELETS AND DIFFERENTIAL - Abnormal    WBC Count 15.3 (*)     RBC Count 5.16      Hemoglobin 15.2      Hematocrit 47.7      MCV 92      MCH 29.5      MCHC 31.9      RDW 13.9      Platelet Count 376      %  Neutrophils 86      % Lymphocytes 4      % Monocytes 9      % Eosinophils 0      % Basophils 0      % Immature Granulocytes 1      NRBCs per 100 WBC 0      Absolute Neutrophils 13.2 (*)     Absolute Lymphocytes 0.6 (*)     Absolute Monocytes 1.4 (*)     Absolute Eosinophils 0.1      Absolute Basophils 0.0      Absolute Immature Granulocytes 0.1      Absolute NRBCs 0.0     ISTAT GASES LACTATE VENOUS POCT - Abnormal    Lactic Acid POCT 1.6      Bicarbonate Venous POCT 24      O2 Sat, Venous POCT 57 (*)     pCO2V Venous POCT 40      pH Venous POCT 7.40      pO2 Venous POCT 30     TROPONIN I - Normal    Troponin I High Sensitivity 16     COVID-19 VIRUS (CORONAVIRUS) BY PCR - Normal    SARS CoV2 PCR Negative     TYPE AND SCREEN, ADULT    ABO/RH(D) A POS      Antibody Screen Negative      SPECIMEN EXPIRATION DATE [id number]     ABO/RH TYPE AND SCREEN        Emergency Department Course:     Assessments/Consults:  1423 I obtained history and examined the patient as noted above.     1628  EKG taken, I reviewed current and prior EKG.     1635 Patient was given diltiazem at this time.     1640 I explained initial findings and plan of care.     2000 I rechecked the patient and explained findings.     Interventions:  Medications   lidocaine 1 % 0.1-1 mL (has no administration in time range)   lidocaine (LMX4) cream (has no administration in time range)   sodium chloride (PF) 0.9% PF flush 3 mL (has no administration in time range)   sodium chloride (PF) 0.9% PF flush 3 mL (has no administration in time range)   diltiazem (CARDIZEM) 125 mg in sodium chloride 0.9 % 125 mL infusion (5 mg/hr Intravenous New Bag 9/20/22 1649)   lactated ringers BOLUS 1,000 mL (1,000 mLs Intravenous New Bag 9/20/22 1710)     Followed by   lactated ringers infusion (has no administration in time range)   diltiazem (CARDIZEM) injection 15 mg (15 mg Intravenous Given 9/20/22 1638)   iopamidol (ISOVUE-370) solution 135 mL (135 mLs Intravenous Given  9/20/22 1743)   Saline Flush (80 mLs Intravenous Given 9/20/22 1743)      Disposition:  The patient was admitted to the hospital under the care of Dr. Taveras.     Impression & Plan     Medical Decision Making:  Following presentation history and physical examination were performed, patient's well-known to me as I saw him yesterday for ascites and he underwent abdominal paracentesis.  Yesterday he was noted to be hemodynamically stable, today he developed palpitations, dyspnea, and was noted to be hypotensive in route.  Our work-up here demonstrates atrial fibrillation with rapid ventricular response.  He was noted to have a pericardial effusion via EMS on ultrasound therefore CT imaging was undertaken again demonstrating a moderate pericardial effusion likely malignant in nature.  He is demonstrating no signs of tamponade at present and an echocardiogram is pending.  There is no signs of acute infectious etiology.  Rate control was established after administration of diltiazem and utilization of a diltiazem drip.  He is anticoagulated there is no signs of pulmonary embolism.  There is no signs of sepsis or more concerning etiology.  I discussed case with the hospitalist service who is in agreement with admission for further evaluation and treatment.    Diagnosis:    ICD-10-CM    1. Atrial fibrillation with rapid ventricular response (H)  I48.91    2. Pericardial effusion  I31.3    3. Malignant neoplasm of pancreas, unspecified location of malignancy (H)  C25.9        Scribe Disclosure:  I, Alba Villalobos, am serving as a scribe at 4:24 PM on 9/20/2022 to document services personally performed by Luis Eduardo Christie MD based on my observations and the provider's statements to me.        Luis Eduardo Christie MD  09/20/22 2121

## 2022-09-20 NOTE — ED NOTES
Bed: ST  Expected date: 9/20/22  Expected time: 4:13 PM  Means of arrival: Ambulance  Comments:  436 72m effusion, afib, rvr

## 2022-09-20 NOTE — ED NOTES
[facility]  ED Nurse Handoff Report    ED Chief complaint: Chest Pain and Shortness of Breath      ED Diagnosis:   Final diagnoses:   None       Code Status: Hospitalist to address    Allergies:   Allergies   Allergen Reactions     Seasonal Allergies Other (See Comments)     Runny nose, watery eyes       Patient Story: Presents via EMS from home. Reports left side chest pain and shortness of breath started today. Recent dx pericardial effusion. Nausea and vomit. EMS gave Zofran 4mg IV    Focused Assessment: A&Ox4. Diaphoretic skin. Afib RVR on arrival. Afib rate  on cardizem gtt. Otherwise stable vitals. Diaphoresis improved. Independent in cares. 2 IV's. 2-3+BLE pitting edema    Treatments and/or interventions provided: labs, ct scans, cardizem 15mg IVP, Cardizem 5mg/hr gtt.   Patient's response to treatments and/or interventions: stable    To be done/followed up on inpatient unit:  admission orders    Does this patient have any cognitive concerns?: none    Activity level - Baseline/Home:  Independent  Activity Level - Current:   Independent and bedrest    Patient's Preferred language: English   Needed?: No    Isolation: None  Infection: Not Applicable  Patient tested for COVID 19 prior to admission: YES  Bariatric?: No    Vital Signs:   Vitals:    09/20/22 1710 09/20/22 1720 09/20/22 1730 09/20/22 1804   BP: 104/73 109/74 120/69 (!) 148/70   Pulse: 113 105 111 101   Resp: 15 24 17 28   Temp:       TempSrc:       SpO2: 92% 93% 91% 95%   Weight:       Height:         Labs Ordered and Resulted from Time of ED Arrival to Time of ED Departure   COMPREHENSIVE METABOLIC PANEL - Abnormal       Result Value    Sodium 132 (*)     Potassium 5.4 (*)     Chloride 101      Carbon Dioxide (CO2) 23      Anion Gap 8      Urea Nitrogen 30      Creatinine 1.25      Calcium 8.1 (*)     Glucose 165 (*)     Alkaline Phosphatase 112      AST 49 (*)     ALT 24      Protein Total 6.8      Albumin 2.0 (*)      Bilirubin Total 0.9      GFR Estimate 61     MAGNESIUM - Abnormal    Magnesium 3.2 (*)    CBC WITH PLATELETS AND DIFFERENTIAL - Abnormal    WBC Count 15.3 (*)     RBC Count 5.16      Hemoglobin 15.2      Hematocrit 47.7      MCV 92      MCH 29.5      MCHC 31.9      RDW 13.9      Platelet Count 376      % Neutrophils 86      % Lymphocytes 4      % Monocytes 9      % Eosinophils 0      % Basophils 0      % Immature Granulocytes 1      NRBCs per 100 WBC 0      Absolute Neutrophils 13.2 (*)     Absolute Lymphocytes 0.6 (*)     Absolute Monocytes 1.4 (*)     Absolute Eosinophils 0.1      Absolute Basophils 0.0      Absolute Immature Granulocytes 0.1      Absolute NRBCs 0.0     ISTAT GASES LACTATE VENOUS POCT - Abnormal    Lactic Acid POCT 1.6      Bicarbonate Venous POCT 24      O2 Sat, Venous POCT 57 (*)     pCO2V Venous POCT 40      pH Venous POCT 7.40      pO2 Venous POCT 30     TROPONIN I - Normal    Troponin I High Sensitivity 16     ROUTINE UA WITH MICROSCOPIC REFLEX TO CULTURE   COVID-19 VIRUS (CORONAVIRUS) BY PCR   TYPE AND SCREEN, ADULT    ABO/RH(D) A POS      Antibody Screen Negative      SPECIMEN EXPIRATION DATE [id number]     ABO/RH TYPE AND SCREEN       Cardiac Rhythm:Cardiac Rhythm: Atrial fibrillation    Was the PSS-3 completed:   Yes  What interventions are required if any?               Family Comments: significant other at bedside  OBS brochure/video discussed/provided to patient/family: N/A              Name of person given brochure if not patient:               Relationship to patient:     For the majority of the shift this patient's behavior was Green.   Behavioral interventions performed were .    ED NURSE PHONE NUMBER: *[phone]

## 2022-09-20 NOTE — ED TRIAGE NOTES
Presents via EMS from home. Reports left side chest pain and shortness of breath started today. Recent dx pericardial effusion. Nausea and vomit. EMS gave Zofran 4mg IV

## 2022-09-21 NOTE — H&P
[facility]    History and Physical - Hospitalist Service       Date of Admission:  9/20/2022    Assessment & Plan      Cameron Barnard is a 72 year old male who was most recently admitted to [facility] from 9/9-9/14.  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  He was started on Eliquis with plans for outpatient oncology follow-up.  He underwent paracentesis again on 9/19.  He presented on 9/20 with generalized weakness, shortness of breath, diaphoresis and is found to be in new onset atrial fibrillation with RVR, rate up to 150.  Was mildly hypotensive initially but blood pressures have improved with rate control.  Currently on IV diltiazem infusion.     #.  Atrial fibrillation with RVR, new onset  -Rate in 150s on arrival  -Started on IV diltiazem infusion.  Rates now improved to 100-110  -Continue IV diltiazem infusion  -Consult cardiology  -Already on Eliquis, continue  -Check TSH.  High-sensitivity troponin is normal    #.  Moderate-sized pericardial effusion  #.  Probable pericarditis  -CT scan showed moderate sized pericardial effusion and mild pericardial thickening concerning for pericarditis  -Obtain cardiac echo  -Consult cardiology    #.  Lower extremity edema-likely multifactorial-secondary to hypoalbuminemia.  Could have acute CHF as well  -Blood pressures currently borderline due to A. fib with RVR.  Will need diuresis once blood pressures have improved  -Echo pending  -Check proBNP    #.  Hypervolemic hyponatremia, sodium 132  -Monitor  -Will likely improve with diuresis    #.  Mild hyperkalemia, potassium 5.4  #.  Hypomagnesemia, magnesium 3.2  -Monitor for now.  Recheck in morning    #.  Leukocytosis of 15  -Has been present for 1 week.  No clear evidence of infection.  Likely secondary to metastatic cancer, VTE  -Monitor    #.  Bilateral small PE  #.  Portal vein  "thrombosis  -Recent diagnosis on 9/9/2022  -This is secondary to metastatic pancreatic cancer  -On Eliquis, continue  CT PE on this admission did not show any pulmonary embolism    #.  Recent diagnosis of stage IV metastatic adenocarcinoma of pancreas with mets to liver and malignant ascites  -S/p paracentesis during previous admission and again on 9/19.  Cytology showed malignant cells.  -There is plan to start palliative chemotherapy as outpatient.  Patient is awaiting port placement    #.  Hypoalbuminemia, albumin 2         Diet:  Regular  DVT Prophylaxis: DOAC  Altman Catheter: Not present  Central Lines: None  Cardiac Monitoring: None  Code Status:  Full code    Clinically Significant Risk Factors Present on Admission        # Hyperkalemia: K = 5.4 mmol/L (Ref range: 3.4 - 5.3 mmol/L) on admission, will monitor as appropriate  # Hyponatremia: Na = 132 mmol/L (Ref range: 133 - 144 mmol/L) on admission, will monitor as appropriate     # Hypoalbuminemia: Albumin = 2.0 g/dL (Ref range: 3.4 - 5.0 g/dL) on admission, will monitor as appropriate   # Coagulation Defect: home medication list includes an anticoagulant medication      # Severe Obesity: Estimated body mass index is 40.11 kg/m  as calculated from the following:    Height as of this encounter: 1.854 m (6' 1\").    Weight as of this encounter: 137.9 kg (304 lb 0.2 oz).        Disposition Plan      Expected Discharge Date: 09/22/2022                The patient's care was discussed with the Patient.    Consuelo Taveras MD  Hospitalist Service  [facility]  Securely message with the Vocera Web Console (learn more here)  Text page via [employer] Paging/Directory         ______________________________________________________________________    Chief Complaint   Shortness of breath, weakness    History is obtained from the patient    History of Present Illness   Cameron Barnard is a 72 year old male who was most recently admitted to [facility]" from 9/9-9/14.  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  He was started on Eliquis with plans for outpatient oncology follow-up and discharged home on 9/14.    He presented to ER on 9/19 with increased shortness of breath due to ascites.  He underwent paracentesis in ER with removal of 4.8 L of fluid.  He immediately felt better after paracentesis and was able to discharge home.    He presents today, 9/20 with complaints of extreme fatigue, diaphoresis, shortness of breath.    On arrival, he was noted to be in A. fib with RVR, rate up to 150.  He was also hypotensive.  He was started on IV diltiazem infusion with which his heart rates have now improved to 100-110 and he reports significant improvement in his symptoms of fatigue and shortness of breath.    Labs showed elevated potassium of 5.4, elevated magnesium of 3.2, hypoalbuminemia of 2.    High-sensitivity troponin is normal at 16, lactate normal at 1.6.  LFTs stable.    Labs also showed leukocytosis of 15.3 which has been present for the past week.  Stable hemoglobin.    CT chest PE protocol/abdomen/pelvis was obtained that showed no PE, moderate pericardial effusion, mild pericardial thickening suspicious for pericarditis, trace bilateral pleural effusions, pancreatic mass with metastatic disease, portal vein thrombosis.    He denies any chest pain but reports left shoulder pain with deep breathing.  He denies any abdominal pain, nausea or vomiting.        Review of Systems    The 10 point Review of Systems is negative other than noted in the HPI or here.     Past Medical History    I have reviewed this patient's medical history and updated it with pertinent information if needed.   Past Medical History:   Diagnosis Date     Allergic state      DVT (deep venous thrombosis) (H)     chronic left femoral DVT     HLD (hyperlipidemia)       Hypertension     white coat syndrome     Multiple subsegmental pulmonary emboli without acute cor pulmonale (H) 2017     Perforated bowel (H) 09/2020     Personal history of deep vein thrombosis     DISTAL VEIN  LLE     Primary osteoarthritis of both knees      Pulmonary embolism (H)      Pulmonary embolus (H)        Past Surgical History   I have reviewed this patient's surgical history and updated it with pertinent information if needed.  Past Surgical History:   Procedure Laterality Date     ARTHROPLASTY KNEE Left 2/10/2021    Procedure: Left total knee arthroplasty;  Surgeon: Gigi De Oliveira MD;  Location: [facility] OR     ARTHROPLASTY KNEE Right 2/4/2022    Procedure: RIGHT TOTAL KNEE ARTHROPLASTY;  Surgeon: Gigi De Oliveira MD;  Location:  OR     COLONOSCOPY N/A 9/19/2020    Procedure: COLONOSCOPY;  Surgeon: Lakshmi Santana MD;  Location:  OR     ESOPHAGOSCOPY, GASTROSCOPY, DUODENOSCOPY (EGD), COMBINED N/A 9/19/2020    Procedure: ESOPHAGOGASTRODUODENOSCOPY (EGD);  Surgeon: Lakshmi Santana MD;  Location:  OR     HERNIORRHAPHY INCISIONAL (LOCATION) N/A 5/5/2021    Procedure: OPEN INCISIONAL HERNIA REPAIR;  Surgeon: Christ Parekh MD;  Location:  OR     INJECT STEROID (LOCATION) Right 2/10/2021    Procedure: Right knee intra-articular injection of corticosteroid, 80 mg of Depo-Medrol;  Surgeon: Gigi De Oliveira MD;  Location:  OR     LAPAROSCOPIC APPENDECTOMY N/A 9/27/2020    Procedure: Laparoscopic appendectomy;  Surgeon: Christ Parekh MD;  Location:  OR     LAPAROSCOPY DIAGNOSTIC (GENERAL) N/A 9/27/2020    Procedure: LAPAROSCOPIC  SMALL BOWEL RESCECTION;  Surgeon: Christ Parekh MD;  Location:  OR     LAPAROTOMY EXPLORATORY N/A 5/10/2021    Procedure: EXPLORATORY LAPAROTOMY AND EVACUATION OF HEMATOMA;  Surgeon: Hari Ceballos MD;  Location:  OR       Social History   I have reviewed this patient's social history and updated it with  pertinent information if needed.  Social History     Tobacco Use     Smoking status: Never Smoker     Smokeless tobacco: Never Used   Vaping Use     Vaping Use: Never used   Substance Use Topics     Alcohol use: Yes     Drug use: Never       Family History   I have reviewed this patient's family history and updated it with pertinent information if needed.  Family History   Problem Relation Age of Onset     Breast Cancer Sister        Prior to Admission Medications   Prior to Admission Medications   Prescriptions Last Dose Informant Patient Reported? Taking?   Apixaban Starter Pack (ELIQUIS DVT/PE STARTER PACK) 5 MG TBPK 9/20/2022 at am  No Yes   Sig: Take 10 mg by mouth 2 times daily for 7 days, THEN 5 mg 2 times daily for 23 days.   Patient taking differently: Take 10 mg by mouth 2 times daily for 7 days, THEN 5 mg 2 times daily for 23 days. Prescribed upon discharge 9/14/22.   lactobacillus rhamnosus (GG) (CULTURELL) capsule 9/20/2022 at am Self Yes Yes   Sig: Take 1 capsule by mouth daily   morphine (MSIR) 15 MG IR tablet 9/20/2022 at am  No Yes   Sig: Take 1 tablet (15 mg) by mouth every 4 hours as needed for severe pain   ondansetron (ZOFRAN ODT) 4 MG ODT tab 9/20/2022 at am  No Yes   Sig: Take 1 tablet (4 mg) by mouth every 8 hours as needed   pantoprazole (PROTONIX) 40 MG EC tablet 9/20/2022 at am  No Yes   Sig: Take 1 tablet (40 mg) by mouth every morning (before breakfast)   sennosides (SENOKOT) 8.6 MG tablet 9/20/2022 at Unknown time  Yes Yes   Sig: Take 1 tablet by mouth daily      Facility-Administered Medications: None     Allergies   Allergies   Allergen Reactions     Seasonal Allergies Other (See Comments)     Runny nose, watery eyes       Physical Exam   Vital Signs: Temp: 98.4  F (36.9  C) Temp src: Oral BP: 98/77 Pulse: (!) 122   Resp: 23 SpO2: 92 % O2 Device: None (Room air)    Weight: 304 lbs .23 oz    Constitutional - alert, resting in bed, appears comfortable  Head - normocephalic, atraumatic,  diaphoretic  ENT - normal eye lids and lashes, no conjunctival hyperemia, no icterus, extraocular movements are normal, normal nose, no discharge, moist oral mucosa, no ulcers or exudates, normal external ear  Neck - no thyromegaly or lymphadenopathy. Tracheal is midline  CV -irregular rhythm, mild tachycardia, no murmurs, 2+ edema  Pulmonary - lungs are clear to auscultation bilaterally, no wheezing or rhonchi  GI - abdomen is soft, non distended, non tender, bowel sounds are present, no organomegaly  Neurological - alert and oriented, normal speech, no focal deficits  Musculoskeletal - no joint erythema or swelling, ROM is ok  Integumentary-no rashes or ulcers        Data   Data reviewed today: I reviewed all medications, new labs and imaging results over the last 24 hours. I personally reviewed the EKG tracing showing A. fib with RVR, the chest CT image(s) showing Pericardial effusion and the abdominal CT image(s) showing Pancreatic mass.    Recent Labs   Lab 09/20/22  1636 09/19/22  1425 09/17/22  1352 09/17/22  1107 09/14/22  0751   WBC 15.3* 17.6*  --  17.9* 8.7   HGB 15.2 15.1 15.0 15.9 15.3   MCV 92 92  --  91 92    332  --  323 186   INR  --  2.49*  --   --   --    *  --  136 138 132*   POTASSIUM 5.4*  --  5.0 4.7 4.2   CHLORIDE 101  --   --  104 103   CO2 23  --   --  28 23   BUN 30  --   --  19 10   CR 1.25  --   --  0.99 0.82   ANIONGAP 8  --   --  6 6   PETER 8.1*  --   --  8.3* 8.3*   *  --   --  145* 113*   ALBUMIN 2.0*  --   --  2.7*  --    PROTTOTAL 6.8  --   --  6.3*  --    BILITOTAL 0.9  --   --  0.7  --    ALKPHOS 112  --   --  77  --    ALT 24  --   --  19  --    AST 49*  --   --  17  --    LIPASE  --   --   --  97  --      Recent Results (from the past 24 hour(s))   CT Chest (PE) Abdomen Pelvis w Contrast    Narrative    EXAM: CT CHEST PE ABDOMEN PELVIS W CONTRAST  LOCATION: [facility]  DATE/TIME: 9/20/2022 6:29 PM    INDICATION: New afib with RVR,  recently dx pancreatic cancer  COMPARISON: CT abdomen pelvis 9/17/2022, CT chest 9/9/2022  TECHNIQUE: CT chest pulmonary angiogram and routine CT abdomen pelvis with IV contrast. Arterial phase through the chest and venous phase through the abdomen and pelvis. Multiplanar reformats and MIP reconstructions were performed. Dose reduction   techniques were used.   CONTRAST: 135  mL isovue 370    FINDINGS:  ANGIOGRAM CHEST: Pulmonary arteries are normal caliber and negative for pulmonary emboli. Thoracic aorta is not well opacified and is  indeterminate for dissection. No CT evidence of right heart strain.     LUNGS AND PLEURA: Trace bilateral pleural effusions. Bibasilar atelectasis.    MEDIASTINUM/AXILLAE: New moderate size pericardial effusion. Mild pericardial thickening. No lymphadenopathy.    CORONARY ARTERY CALCIFICATION: Cannot evaluate.    HEPATOBILIARY: Unchanged scattered hypodense liver lesions. Cholelithiasis.    PANCREAS: Unchanged large infiltrative locally advanced mass in the neck and proximal body of the pancreas.    SPLEEN: Normal.    ADRENAL GLANDS: Normal.    KIDNEYS/BLADDER: Normal.    BOWEL: No bowel obstruction or inflammatory change.    LYMPH NODES: There is again extensive peritoneal nodularity and small volume ascites. Unchanged multiple borderline enlarged upper abdominal/peripancreatic lymph nodes.    VASCULATURE: Unchanged partially occlusive thrombus within the main portal vein and completely occluded left portal vein. There is again occlusion of the left splenic vein.    PELVIC ORGANS: Normal.    MUSCULOSKELETAL: No suspicious osseous lesions.      Impression    IMPRESSION:  1.  No pulmonary embolism.    2.  New moderate-sized pericardial effusion. Mild pericardial thickening suspicious for pericarditis.    3.  Trace bilateral pleural effusions and bibasilar atelectasis.    4.  No acute findings in the abdomen or pelvis. Redemonstrated pancreatic mass and metastatic disease.    5.   Unchanged nonocclusive thrombus in the main portal vein and complete occlusion of the left portal vein.

## 2022-09-21 NOTE — PROGRESS NOTES
"SPIRITUAL HEALTH SERVICES  SPIRITUAL ASSESSMENT Progress Note  [facility]     REFERRAL SOURCE: Consult for Health Care Directive    Responded to a consult order for Health Care Directive. Offered education to Cameron and his spouse regarding purpose and process for completing the document. Materials left with patient for consideration and completion. Cameron plans to complete the form while in the hospital.   Cameron named his intention to complete the form after having conversation with his spouse, during this hospitalization.   He shared that he is also interested in \"donating my body to science.\" Shared resource material regarding [employer] Program and online resources.  Cameron indicated he has no other needs at this time.    PLAN: Will follow up tomorrow to potentially arrange for notarization of the health care directive with [employer], if document has been completed.    Anu Puente  Associate   Pager [phone]  Phone [phone]    "

## 2022-09-21 NOTE — CONSULTS
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 Barnard.  I have reviewed his testing and history and vitals in detail.  Fortunately his rate is fairly controlled on the diltiazem.  We will start oral 60mg QID and wean off the IV.  He has been started on eliquis, which is appropriate.  The pericardial effusion is likely malignancy related. It does not require tapping at this point. I would repeat an echo, however, in 7-10 days to be certain that it is not increasing. Will determine long-acting diltiazem dose tomorrow.     Please call with any questions.    NORM Orellana MD               History:   Mr. Barnard is a 72 year old male who during his admission to [facility] from 9/9-9/14 was unfortunately found to have small bilateral PEs, portal vein thrombosis, and a new diagnosis of stage IV pancreatic adenocarcinoma with malignant ascites requiring paracentesis.  He was started on Eliquis with plans for outpatient oncology follow-up.He presented to ER on 9/19 with increased shortness of breath due to ascites.  He underwent paracentesis in ER with removal of 4.8 L of fluid. He immediately felt better after paracentesis and was able to discharge home.  He presents 9/20 with complaints of extreme fatigue, diaphoresis, shortness of breath and was found to be in afib with RVR.  He was started on diltiazem.  CT chest PE suggested a moderate pericardial effusion.  Echo showed mild-moderate with no signs of tamponade.  Cardiology was asked to see him for these reasons. He denies any chest pain but  reports left shoulder pain with deep breathing. No cardiac history.            Review of Systems       The 10 point Review of Systems is negative other than noted in the HPI or here.            Past Medical History       I have reviewed this patient's medical history and updated it with pertinent information if needed.        Past Medical History:   Diagnosis Date     Allergic state       DVT (deep venous thrombosis) (H)       chronic left femoral DVT     HLD (hyperlipidemia)       Hypertension       white coat syndrome     Multiple subsegmental pulmonary emboli without acute cor pulmonale (H) 2017     Perforated bowel (H) 09/2020     Personal history of deep vein thrombosis       DISTAL VEIN  LLE     Primary osteoarthritis of both knees       Pulmonary embolism (H)       Pulmonary embolus (H)                 Past Surgical History      I have reviewed this patient's surgical history and updated it with pertinent information if needed.        Past Surgical History:   Procedure Laterality Date     ARTHROPLASTY KNEE Left 2/10/2021     Procedure: Left total knee arthroplasty;  Surgeon: Gigi De Oliveira MD;  Location: [facility] OR     ARTHROPLASTY KNEE Right 2/4/2022     Procedure: RIGHT TOTAL KNEE ARTHROPLASTY;  Surgeon: Gigi De Oliveira MD;  Location:  OR     COLONOSCOPY N/A 9/19/2020     Procedure: COLONOSCOPY;  Surgeon: Lakshmi Santana MD;  Location:  OR     ESOPHAGOSCOPY, GASTROSCOPY, DUODENOSCOPY (EGD), COMBINED N/A 9/19/2020     Procedure: ESOPHAGOGASTRODUODENOSCOPY (EGD);  Surgeon: Lakshmi Santana MD;  Location:  OR     HERNIORRHAPHY INCISIONAL (LOCATION) N/A 5/5/2021     Procedure: OPEN INCISIONAL HERNIA REPAIR;  Surgeon: Christ Parekh MD;  Location:  OR     INJECT STEROID (LOCATION) Right 2/10/2021     Procedure: Right knee intra-articular injection of corticosteroid, 80 mg of Depo-Medrol;  Surgeon: Gigi De Oliveira MD;  Location:  OR     LAPAROSCOPIC  APPENDECTOMY N/A 9/27/2020     Procedure: Laparoscopic appendectomy;  Surgeon: Christ Parekh MD;  Location:  OR     LAPAROSCOPY DIAGNOSTIC (GENERAL) N/A 9/27/2020     Procedure: LAPAROSCOPIC  SMALL BOWEL RESCECTION;  Surgeon: Christ Parekh MD;  Location:  OR     LAPAROTOMY EXPLORATORY N/A 5/10/2021     Procedure: EXPLORATORY LAPAROTOMY AND EVACUATION OF HEMATOMA;  Surgeon: Hari Ceballos MD;  Location:  OR               Social History      I have reviewed this patient's social history and updated it with pertinent information if needed.  Social History           Tobacco Use     Smoking status: Never Smoker     Smokeless tobacco: Never Used   Vaping Use     Vaping Use: Never used   Substance Use Topics     Alcohol use: Yes     Drug use: Never               Family History      I have reviewed this patient's family history and updated it with pertinent information if needed.        Family History   Problem Relation Age of Onset     Breast Cancer Sister                 Prior to Admission Medications      Prior to Admission Medications   Prescriptions Last Dose Informant Patient Reported? Taking?   Apixaban Starter Pack (ELIQUIS DVT/PE STARTER PACK) 5 MG TBPK 9/20/2022 at am   No Yes   Sig: Take 10 mg by mouth 2 times daily for 7 days, THEN 5 mg 2 times daily for 23 days.   Patient taking differently: Take 10 mg by mouth 2 times daily for 7 days, THEN 5 mg 2 times daily for 23 days. Prescribed upon discharge 9/14/22.   lactobacillus rhamnosus (GG) (CULTURELL) capsule 9/20/2022 at am Self Yes Yes   Sig: Take 1 capsule by mouth daily   morphine (MSIR) 15 MG IR tablet 9/20/2022 at am   No Yes   Sig: Take 1 tablet (15 mg) by mouth every 4 hours as needed for severe pain   ondansetron (ZOFRAN ODT) 4 MG ODT tab 9/20/2022 at am   No Yes   Sig: Take 1 tablet (4 mg) by mouth every 8 hours as needed   pantoprazole (PROTONIX) 40 MG EC tablet 9/20/2022 at am   No Yes   Sig: Take 1 tablet (40 mg) by mouth  "every morning (before breakfast)   sennosides (SENOKOT) 8.6 MG tablet 2022 at Unknown time   Yes Yes   Sig: Take 1 tablet by mouth daily      Facility-Administered Medications: None            Allergies      Allergies   Allergen Reactions     Seasonal Allergies Other (See Comments)       Runny nose, watery eyes                         Medications:       apixaban ANTICOAGULANT  5 mg Oral BID     diltiazem  60 mg Oral Q8H PATRICIO     lactobacillus rhamnosus (GG)  1 capsule Oral Daily     pantoprazole  40 mg Oral QAM AC     sennosides  1 tablet Oral Daily     sodium chloride (PF)  3 mL Intracatheter Q8H     acetaminophen, acetaminophen, alum & mag hydroxide-simethicone, lidocaine 4%, lidocaine (buffered or not buffered), - MEDICATION INSTRUCTIONS -, morphine, naloxone **OR** naloxone **OR** naloxone **OR** naloxone, ondansetron **OR** ondansetron, - MEDICATION INSTRUCTIONS -, senna-docusate **OR** senna-docusate, simethicone **OR** simethicone, sodium chloride (PF)               Physical Exam:   Blood pressure 119/82, pulse 99, temperature 98  F (36.7  C), temperature source Oral, resp. rate 17, height 1.854 m (6' 1\"), weight 134.5 kg (296 lb 9.6 oz), SpO2 93 %.  Wt Readings from Last 4 Encounters:   22 134.5 kg (296 lb 9.6 oz)   22 138.8 kg (306 lb)   22 129.3 kg (285 lb)   22 129.3 kg (285 lb)         Vital Sign Ranges  Temperature Temp  Av.1  F (36.7  C)  Min: 98  F (36.7  C)  Max: 98.4  F (36.9  C)   Blood pressure Systolic (24hrs), Av , Min:89 , Max:148        Diastolic (24hrs), Av, Min:60, Max:108      Pulse Pulse  Av.6  Min: 74  Max: 144   Respirations Resp  Av.9  Min: 10  Max: 31   Pulse oximetry SpO2  Av.4 %  Min: 89 %  Max: 95 %       No intake or output data in the 24 hours ending 09/21/22 1323    Constitutional: Awake, alert, cooperative, no apparent distress   Lungs: Clear to auscultation bilaterally   Cardiovascular: Regular rate and rhythm, normal S1 " and S2.  No rub.    Abdomen: Normal bowel sounds, soft. + ascites   Skin: No rashes, no cyanosis, + edema   Other: HEENT no icterus          Data:     Recent Labs   Lab Test 09/20/22  1636 09/19/22  1425 09/17/22  1352 09/17/22  1107 09/14/22  0751 09/13/22  1527 09/13/22  0731   WBC 15.3* 17.6*  --  17.9*   < >  --   --    HGB 15.2 15.1 15.0 15.9   < >  --   --    MCV 92 92  --  91   < >  --   --     332  --  323   < >  --   --    INR  --  2.49*  --   --   --  1.02 1.05    < > = values in this interval not displayed.      Recent Labs   Lab Test 09/21/22  0740 09/20/22  1636 09/17/22  1352 09/17/22  1107   * 132* 136 138   POTASSIUM 4.8 5.4* 5.0 4.7   CHLORIDE 102 101  --  104   CO2 22 23  --  28   BUN 28 30  --  19   CR 1.08 1.25  --  0.99   ANIONGAP 8 8  --  6   PETER 8.0* 8.1*  --  8.3*   * 165*  --  145*         Jame Orellana MD

## 2022-09-21 NOTE — PROGRESS NOTES
Pt stable in Afib, on Dilt gtt at 5mg/hr all shift with HR predominately in 90's-low 100's.  BP's well maintained in 110's-120's. PO diltiazem initiated today and scheduled Q8hrs.  Plan for weaning drip .  On RA during day, but placed on low dose 02 post walk around unit for APONTE/comfort. LS clear, diminished at bases. If stable tomorrow on PO diltiazem, likely will discharge to home.

## 2022-09-21 NOTE — CONSULTS
Consult Date: 09/21/2022    MEDICAL ONCOLOGY CONSULTATION    REQUESTING PHYSICIAN:    This consult has been requested by Dr. Amie Swann for pancreatic cancer.    HISTORY OF PRESENT ILLNESS:    Mr. Barnard is a 72-year-old gentleman with newly diagnosed pancreatic adenocarcinoma.  CT chest, abdomen and pelvis on 09/09/2022 revealed large pancreatic mass with liver metastases and peritoneal carcinomatosis.  There is also a small right lung pulmonary embolism and portal vein thrombus.  The patient had paracentesis done.  Cytology is positive for adenocarcinoma.  Liver biopsy is also positive for pancreatic adenocarcinoma.    For metastatic pancreatic cancer, plan is to start him on palliative chemotherapy.  He is scheduled for port placement next week followed by chemotherapy.    The patient has recurrent malignant ascites.  He had paracentesis done on for symptomatic relief.  He felt better after the paracentesis.  Before that, he had abdominal tightness and shortness of breath.    The patient was brought to the emergency room on because of worsening weakness, some chest discomfort and shortness of breath.  As per the wife, he was more pale/gray.  Vitals in the emergency room revealed tachycardia.  EKG revealed atrial fibrillation with rapid ventricular response.  The patient had multiple other investigations done.  -CBC revealed leukocytosis.  Normal hemoglobin and platelet.  -CMP revealed low sodium and elevated potassium.  -Elevated BNP.  -Normal TSH.  -Normal troponin.  -COVID-19 negative.  -CT chest, abdomen and pelvis did not reveal any pulmonary embolism.  It revealed pericardial effusion.  It again revealed metastatic pancreatic cancer.    The patient was admitted to the hospital for further management.  He is on Cardizem drip.  His heart rate is better controlled.  He had echocardiogram done, which does not reveal any significant pericardial effusion.  There is a small to moderate pericardial  effusion.    Cardiology is following.  The patient will be switched to oral diltiazem.  He will continue on Eliquis. They have not recommended pericardiocentesis at this time.  They will follow him with a repeat echo.    REVIEW OF SYSTEMS:  The patient says that he feels little better.  His fatigue is less.  No chest pain.  Shortness of breath is better.    No headaches.  Some dizziness.  Some nausea.  Appetite is fair.  No bleeding.    ALLERGIES:  REVIEWED.    MEDICATIONS:  Reviewed.    PAST MEDICAL HISTORY:     1.  Newly diagnosed metastatic pancreatic cancer.  2.  Pulmonary embolism and portal vein thrombosis secondary to malignancy.  3.  Hypertension.  4.  Hyperlipidemia.  5.  History of left lower extremity deep venous thrombosis.  6.  Osteoarthritis.  7.  Left knee and right knee arthroplasty.  8.  Appendectomy.  9.  Laparotomy and resection of small bowel for perforation.  10.  Hernia repair.    SOCIAL HISTORY:    -No smoking.  -Occasional alcohol use.    PHYSICAL EXAMINATION:    GENERAL:  He is alert, oriented x 3.  Not in any respiratory distress.  VITALS:  Reviewed.  Rest of systems not examined.    LABORATORY DATA:  Reviewed.    ASSESSMENT:     1.  A 72-year-old gentleman with newly diagnosed metastatic pancreatic cancer.  2.  Right lung pulmonary embolism and portal vein thrombosis secondary to hypercoagulability of malignancy.  3.  Malignant ascites.  4.  Pericardial effusion, likely malignant.  5.  Atrial fibrillation with rapid ventricular rate.    RECOMMENDATIONS:     1.  The patient has metastatic pancreatic cancer.  He will be starting chemotherapy with gemcitabine and Abraxane in the next 1-2 weeks. The patient is scheduled for Port-A-Cath placement next week.  He would like to have it placed as inpatient if possible.  Discussed with Dr. Swann.  She will see if it can be done inpatient.  2.  The patient has pulmonary embolism and portal vein thrombosis.  He now has atrial fibrillation.  He is  on Eliquis, which will be continued.  3.  The patient has malignant ascites.  He will have therapeutic paracentesis for symptomatic relief.  He likely will need it weekly. I explained to him that as cancer gets controlled, frequency of paracentesis will decrease.    4.  The patient has atrial fibrillation.  Rate is better controlled with diltiazem.  Management as per Cardiology and Hospitalist team.  5.  The patient and his wife had a few questions, which were all answered.  Hematology/Oncology will continue to follow.  Case discussed with Dr. Amie Swann.    TOTAL TIME SPENT:  50 minutes.  Time was spent in today's visit, review of chart/investigations today, communicating with other providers and documentation today.    Lesley Abdalla MD        D: 2022   T: 2022   MT: ROLAND    Name:     CATHERINE NORIEGASonido  MRN:      [id number]        Account:      [id number]   :      1950           Consult Date: 2022     Document: [id number]

## 2022-09-21 NOTE — PHARMACY-ADMISSION MEDICATION HISTORY
Pharmacy Medication History  Admission medication history interview status for the 9/20/2022  admission is complete. See EPIC admission navigator for prior to admission medications     Location of Interview: Patient room  Medication history sources: Patient, Patient's family/friend (spouse), Surescripts and Care Everywhere    Significant changes made to the medication list:  Removed tylenol, cholecalciferol, multivitamin, vitamin C, zinc  Added Senna    In the past week, patient estimated taking medication this percent of the time: greater than 90%    Additional medication history information:   Patient reports that he is either on day 4 or 5 of apixaban starter pack, was last discharged on 9/14/22.    Medication reconciliation completed by provider prior to medication history? No    Time spent in this activity: 25 minutes    Prior to Admission medications    Medication Sig Last Dose Taking? Auth Provider Long Term End Date   Apixaban Starter Pack (ELIQUIS DVT/PE STARTER PACK) 5 MG TBPK Take 10 mg by mouth 2 times daily for 7 days, THEN 5 mg 2 times daily for 23 days.  Patient taking differently: Take 10 mg by mouth 2 times daily for 7 days, THEN 5 mg 2 times daily for 23 days. Prescribed upon discharge 9/14/22. 9/20/2022 at am Yes Hima Salcido, DO No 10/13/22   lactobacillus rhamnosus (GG) (CULTURELL) capsule Take 1 capsule by mouth daily 9/20/2022 at am Yes Unknown, Entered By History     morphine (MSIR) 15 MG IR tablet Take 1 tablet (15 mg) by mouth every 4 hours as needed for severe pain 9/20/2022 at am Yes Lesley Abdalla MD     ondansetron (ZOFRAN ODT) 4 MG ODT tab Take 1 tablet (4 mg) by mouth every 8 hours as needed 9/20/2022 at am Yes Chelsey Nuno MD  9/20/22   pantoprazole (PROTONIX) 40 MG EC tablet Take 1 tablet (40 mg) by mouth every morning (before breakfast) 9/20/2022 at am Yes Hima Salcido DO     sennosides (SENOKOT) 8.6 MG tablet Take 1 tablet by mouth daily 9/20/2022 at  Unknown time Yes Unknown, Entered By History         The information provided in this note is only as accurate as the sources available at the time of update(s)

## 2022-09-21 NOTE — PROGRESS NOTES
"/84   Pulse 104   Temp 98  F (36.7  C)   Resp 21   Ht 1.854 m (6' 1\")   Wt 134.5 kg (296 lb 9.6 oz)   SpO2 95%   BMI 39.13 kg/m      VSS; 2L NC overnight. A/Ox4. He c/o chest pressure in his left shoulder area; up with SBA, does have APONTE. 2-3+ BLE edema; Afib with controlled rates on a diltiazem gtt. Will consult with cards today.     Sunday Bardales RN    "

## 2022-09-21 NOTE — PROGRESS NOTES
RECEIVING UNIT ED HANDOFF REVIEW    ED Nurse Handoff Report was reviewed by: Sunday Bardales RN on September 21, 2022 at 5:15 AM

## 2022-09-21 NOTE — PROGRESS NOTES
[facility]    Medicine Progress Note - Hospitalist Service    Date of Admission:  9/20/2022    Assessment & Plan        Cameron Barnard is a 72 year old male who was most recently admitted to [facility] from 9/9-9/14.  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  He was started on Eliquis with plans for outpatient oncology follow-up.  He underwent paracentesis again on 9/19.  He presented on 9/20 with generalized weakness, shortness of breath, diaphoresis and is found to be in new onset atrial fibrillation with RVR, rate up to 150.  Was mildly hypotensive initially but blood pressures have improved with rate control.  Currently on IV diltiazem infusion.      Atrial fibrillation with RVR, new onset  - Rate reasonably controlled on dilt of 5. Will continue IV treatment for now pending plan below for effusion with cardiology input  - remains on PTA eliquis  - TSH normal     Moderate-sized pericardial effusion  Possible pericarditis  CT scan showed moderate sized pericardial effusion and mild pericardial thickening concerning for pericarditis  - echo without tamponade, showing posterior small to mod effusion  - cardiology consulted, appreciate their assistance in best treatment options with ongoing cancer and GUTIERREZ     Lower extremity edema-likely multifactorial-secondary to hypoalbuminemia  - monitor for now, may benefit from diuresis once HR is better controlled     Hypervolemic hyponatremia, sodium 132  - monitor, likely related to GUTIERREZ and low albumin     Mild hyperkalemia, potassium 5.4-resolved  Hypomagnesemia, magnesium 3.2  -Monitor for now.  Recheck in morning     Leukocytosis of 15  -Has been present for 1 week.  No clear evidence of infection.  Likely secondary to metastatic cancer, VTE  -Monitor     Bilateral small PE  Portal vein thrombosis  -Recent diagnosis on 9/9/2022  -This  "is secondary to metastatic pancreatic cancer  -On Eliquis, continue     Recent diagnosis of stage IV metastatic adenocarcinoma of pancreas with mets to liver and malignant ascites  -S/p paracentesis during previous admission and again on 9/19.  Cytology showed malignant cells.  -There is plan to start palliative chemotherapy as outpatient.  Patient is awaiting port placement     Hypoalbuminemia, albumin 2          Diet: Combination Diet Regular Diet Adult; No Caffeine Diet; No Caffeine for 24 hours (once tests completed, may have caffeine)    DVT Prophylaxis: DOAC  Altman Catheter: Not present  Central Lines: None  Cardiac Monitoring: ACTIVE order. Indication: Tachyarrhythmias, acute (48 hours)  Code Status: Full Code      Disposition Plan      Expected Discharge Date: 09/22/2022                The patient's care was discussed with the patient and his nurse    Amie Swann,   Hospitalist Service  [facility]  Securely message with the Vocera Web Console (learn more here)  Text page via [employer] Paging/Directory         Clinically Significant Risk Factors Present on Admission         # Hyponatremia: Na = 132 mmol/L (Ref range: 133 - 144 mmol/L) on admission, will monitor as appropriate     # Hypoalbuminemia: Albumin = 2.0 g/dL (Ref range: 3.4 - 5.0 g/dL) on admission, will monitor as appropriate   # Coagulation Defect: home medication list includes an anticoagulant medication      # Obesity: Estimated body mass index is 39.13 kg/m  as calculated from the following:    Height as of this encounter: 1.854 m (6' 1\").    Weight as of this encounter: 134.5 kg (296 lb 9.6 oz).        ______________________________________________________________________    Interval History   Patient tired. He did not sleep. He reports overall feeling much better. No chest pain or pressure however she does have L shoulder pain. No GI issues. +LE edema that is painful. Aware of his concerning findings    Data " reviewed today: I reviewed all medications, new labs and imaging results over the last 24 hours. I personally reviewed CT can without clots and cardiac effusion    Physical Exam   Vital Signs: Temp: 98  F (36.7  C) Temp src: Oral BP: 120/76 Pulse: 93   Resp: 17 SpO2: 93 % O2 Device: Nasal cannula Oxygen Delivery: 2 LPM  Weight: 296 lbs 9.6 oz     Constitutional: Awake, alert, cooperative, no apparent distress  Respiratory: Clear to auscultation bilaterally, no crackles or wheezing  Cardiovascular: irregular rate and rhythm, normal S1 and S2, and no murmur noted  GI: Normal bowel sounds, soft, non-distended, mildly distended but not painful  Skin/Integumen: +2-3 LE edema with tight skin and scattered abrasions and bruising throughout arms  Other:     Data   Recent Labs   Lab 09/21/22  0740 09/20/22  1636 09/19/22  1425 09/17/22  1352 09/17/22  1107   WBC  --  15.3* 17.6*  --  17.9*   HGB  --  15.2 15.1 15.0 15.9   MCV  --  92 92  --  91   PLT  --  376 332  --  323   INR  --   --  2.49*  --   --    * 132*  --  136 138   POTASSIUM 4.8 5.4*  --  5.0 4.7   CHLORIDE 102 101  --   --  104   CO2 22 23  --   --  28   BUN 28 30  --   --  19   CR 1.08 1.25  --   --  0.99   ANIONGAP 8 8  --   --  6   PETER 8.0* 8.1*  --   --  8.3*   * 165*  --   --  145*   ALBUMIN  --  2.0*  --   --  2.7*   PROTTOTAL  --  6.8  --   --  6.3*   BILITOTAL  --  0.9  --   --  0.7   ALKPHOS  --  112  --   --  77   ALT  --  24  --   --  19   AST  --  49*  --   --  17   LIPASE  --   --   --   --  97     Recent Results (from the past 24 hour(s))   CT Chest (PE) Abdomen Pelvis w Contrast    Narrative    EXAM: CT CHEST PE ABDOMEN PELVIS W CONTRAST  LOCATION: [facility]  DATE/TIME: 9/20/2022 6:29 PM    INDICATION: New afib with RVR, recently dx pancreatic cancer  COMPARISON: CT abdomen pelvis 9/17/2022, CT chest 9/9/2022  TECHNIQUE: CT chest pulmonary angiogram and routine CT abdomen pelvis with IV contrast. Arterial  phase through the chest and venous phase through the abdomen and pelvis. Multiplanar reformats and MIP reconstructions were performed. Dose reduction   techniques were used.   CONTRAST: 135  mL isovue 370    FINDINGS:  ANGIOGRAM CHEST: Pulmonary arteries are normal caliber and negative for pulmonary emboli. Thoracic aorta is not well opacified and is  indeterminate for dissection. No CT evidence of right heart strain.     LUNGS AND PLEURA: Trace bilateral pleural effusions. Bibasilar atelectasis.    MEDIASTINUM/AXILLAE: New moderate size pericardial effusion. Mild pericardial thickening. No lymphadenopathy.    CORONARY ARTERY CALCIFICATION: Cannot evaluate.    HEPATOBILIARY: Unchanged scattered hypodense liver lesions. Cholelithiasis.    PANCREAS: Unchanged large infiltrative locally advanced mass in the neck and proximal body of the pancreas.    SPLEEN: Normal.    ADRENAL GLANDS: Normal.    KIDNEYS/BLADDER: Normal.    BOWEL: No bowel obstruction or inflammatory change.    LYMPH NODES: There is again extensive peritoneal nodularity and small volume ascites. Unchanged multiple borderline enlarged upper abdominal/peripancreatic lymph nodes.    VASCULATURE: Unchanged partially occlusive thrombus within the main portal vein and completely occluded left portal vein. There is again occlusion of the left splenic vein.    PELVIC ORGANS: Normal.    MUSCULOSKELETAL: No suspicious osseous lesions.      Impression    IMPRESSION:  1.  No pulmonary embolism.    2.  New moderate-sized pericardial effusion. Mild pericardial thickening suspicious for pericarditis.    3.  Trace bilateral pleural effusions and bibasilar atelectasis.    4.  No acute findings in the abdomen or pelvis. Redemonstrated pancreatic mass and metastatic disease.    5.  Unchanged nonocclusive thrombus in the main portal vein and complete occlusion of the left portal vein.   Echocardiogram Complete   Result Value    LVEF  50-55%    Narrative     [id number]  [id number]  [id number]  [id number]^MELANIE^MAX^SOLO     [facility]  Echocardiography Laboratory  [address] [place] [address]     Name: CATHERINE NORIEGA  MRN: [id number]  : 1950  Study Date: 2022 08:01 PM  Age: 72 yrs  Gender: Male  Patient Location: [facility]  Reason For Study: Pericardial Effusion  Ordering Physician: MAX NAVARRO  Referring Physician: Gary Bey  Performed By: Myrna German     BSA: 2.6 m2  Height: 73 in  Weight: 304 lb  HR: 91  BP: 113/83 mmHg  ______________________________________________________________________________  Procedure  Complete Portable Echo Adult. Limited Portable Echo Adult.  ______________________________________________________________________________  Interpretation Summary     Small-moderate, pericardial effusion which is located posteriorly. No  echocardiographic evidence of tamponade. Effusion measures 1.5 cms around LV.  Minimal fluid anteriorly around RV in subcostal views.  Left ventricular systolic function is low normal. The visual ejection fraction  is 50-55%.  Grossly, normal RV size and systolic function.  Mild tricuspid regurgitation.  No prior study for comparison. Findings disscussed with ordering provider at  9:25 pm.  ______________________________________________________________________________  Left Ventricle  The left ventricle is normal in size. There is normal left ventricular wall  thickness. Left ventricular systolic function is low normal. The visual  ejection fraction is 50-55%. Diastolic function not assessed due to atrial  fibrillation. Regional wall motion abnormalities cannot be excluded due to  limited visualization.     Right Ventricle  The right ventricle is normal in size and function. The right ventricle is not  well visualized.     Atria  The left atrium is not well visualized. Right atrium not well visualized.     Mitral Valve  The mitral valve leaflets appear normal. There is no  evidence of stenosis,  fluttering, or prolapse. There is trace mitral regurgitation.     Tricuspid Valve  Normal tricuspid valve. There is mild (1+) tricuspid regurgitation. The right  ventricular systolic pressure is approximated at 23.8 mmHg plus the right  atrial pressure.     Aortic Valve  There is mild trileaflet aortic sclerosis. No aortic stenosis is present.     Pulmonic Valve  The pulmonic valve is not well visualized.     Vessels  Normal size aorta. IVC diameter <2.1 cm collapsing >50% with sniff suggests a  normal RA pressure of 3 mmHg.     Pericardium  Small posterior pericardial effusion.     Rhythm  The rhythm was atrial fibrillation.  ______________________________________________________________________________  MMode/2D Measurements & Calculations  IVSd: 1.5 cm     LVIDd: 5.0 cm  LVIDs: 4.1 cm  LVPWd: 1.4 cm  FS: 17.9 %  LV mass(C)d: 291.8 grams  LV mass(C)dI: 113.5 grams/m2  Ao root diam: 3.9 cm  LA dimension: 3.9 cm  asc Aorta Diam: 4.3 cm  LA/Ao: 1.0  LVOT diam: 2.3 cm  LVOT area: 4.2 cm2  RWT: 0.55     Doppler Measurements & Calculations  MV E max rick: 63.9 cm/sec  MV A max rick: 0.47 cm/sec  MV E/A: 137.4  MV dec slope: 340.5 cm/sec2  MV dec time: 0.19 sec  PA acc time: 0.08 sec  TR max rick: 244.0 cm/sec  TR max P.8 mmHg  E/E' av.7  Lateral E/e': 4.4  Medial E/e': 5.0     ______________________________________________________________________________  Report approved by: Kevin Ramon 2022 09:26 PM           Medications     dilTIAZem 5 mg/hr (22 0710)     lactated ringers       - MEDICATION INSTRUCTIONS -       - MEDICATION INSTRUCTIONS -         apixaban ANTICOAGULANT  5 mg Oral BID     diltiazem  60 mg Oral Q8H PATRICIO     lactobacillus rhamnosus (GG)  1 capsule Oral Daily     pantoprazole  40 mg Oral QAM AC     sennosides  1 tablet Oral Daily     sodium chloride (PF)  3 mL Intracatheter Q8H

## 2022-09-22 NOTE — PROGRESS NOTES
[facility]  Cardiology Progress Note  Date of Admission: 9/20/2022  Date of Service: 09/22/2022  Primary Cardiologist: New to cardilogy    Assessment & Plan   Cameron Barnard is a 72 year old male with past medical history significant for PE, portal vein thrombosis, new diagnosis of metastatic pancreatic cancer (recent admission at [facility] on 9/9-9/14). He was admitted on 9/20/2022 with generalized weakness, SOB, and diaphoresis. Cardiology was consulted for new onset of atrial fibrillation with RVR and pericardial effusion.    Interval History:  Patient is rate controlled on PO diltiazem     Assessment:   1. New atrial fibrillation with RVR     Initial rates 150s    Started on IV diltiazem and transitioned to PO 60 mg q 8 hour (9/21)    Continued on PTA Eliquis (see #3)     Rates per telemetry are in controlled at     2. Pericardial effusion without tamponade features and probable pericarditis    CT scan showed moderate sized pericardial effusion and mild pericardial thickening concerning for pericarditis    Echocardiogram (9/20/2022): LVEF 50-55% with small to moderate pericardial effusion without echo evidence of tamponade. Measures 1.5 cm around LV.     3. Bilateral PE/portal vein thrombosis    [PTA: Eliquis]    4. Recent diagnosis of stage IV metastatic adenocarcinoma of the pancreas with mets to liver and malignant ascities    S/p paracentesis with 4.8 L of fluid removed on 9/19    Follows with oncology as outpatient and plan to start chemo      Plan:  1. Continue PO dilt and transition to long acting tomorrow diltiazem 180 mg daily  2. Repeat echo in 7-10 days post initial echo (9/20/2022)      RON VARNER CNP  Pager:  [phone]     ATTESTATION:  Ms. Barnard was seen and examined. Agree with note and mutually determined plan of care. I have reviewed lab and vitals and meds. Switched to long acting diltiazem tomorrow. Echo in a week to follow up on effusion.   Please call  if you have any additional questions or concerns.   NORM Orellana MD       Physical Exam   Temp: 97.6  F (36.4  C) Temp src: Oral BP: 131/81 Pulse: 83   Resp: 18 SpO2: 95 % O2 Device: None (Room air) Oxygen Delivery: 2 LPM  Vitals:    09/20/22 1627 09/21/22 0520   Weight: 137.9 kg (304 lb 0.2 oz) 134.5 kg (296 lb 9.6 oz)       Constitutional:   NAD    Skin:   Warm and dry   Head:   Nontraumatic   Neck:   no JVD   Lungs:   symmetric, clear to auscultation   Cardiovascular:   IRR   Abdomen:   Benign    Extremities and Back:   No edema   Neurological:   Grossly nonfocal     Medications     - MEDICATION INSTRUCTIONS -       - MEDICATION INSTRUCTIONS -         apixaban ANTICOAGULANT  5 mg Oral BID     diltiazem  60 mg Oral Q8H PATRICIO     lactobacillus rhamnosus (GG)  1 capsule Oral Daily     pantoprazole  40 mg Oral QAM AC     sennosides  1 tablet Oral Daily     sodium chloride (PF)  3 mL Intracatheter Q8H       Code Status    Full Code    Allergies   Allergies   Allergen Reactions     Seasonal Allergies Other (See Comments)     Runny nose, watery eyes

## 2022-09-22 NOTE — PLAN OF CARE
VSS-rhythm remains atrial fib , APONTE, no c/o pain, c/o constipation and abd fullness--laxatives given with no results at this time, plan to change to PO dilt ER tomorrow and probable discharge, continue to monitor closely.

## 2022-09-22 NOTE — PROGRESS NOTES
"SPIRITUAL HEALTH SERVICES Progress Note  [facility]  [facility] [place]    Saw pt Cameron Barnard per follow up for notarization of health care directive. Document has been signed and forwarded onto [employer] for notarization and inclusion in Cameron's medical record.      Illness Narrative - \"Diagnosed with cancer 10 days ago and then ended up with fluid build up and Afib, but it's getting better.\"       Distress     Cameron shared his frustration of \"having two new knees put in over the last two years and I've been working hard to get to the point of being able to get back to the things I enjoy doing, like taking care of my garden. Now this happened.\"    Cameron had hoped to \"donate my body to science, but according to the website, 'I'm too fat.'\" He intends to call the [facility] to get more information and clarification. He stated his wish to donate his organs in the health care directive as an alternate way of \"helping in some way.\"      Coping     Cameron names his wife, Delroy, as his primary source of support. He is also in weekly contact with his son, who lives in [place].    Cameron enjoys gardening and is hopeful about being able to take care of his garden and yard this fall.    Cameron named his pragmatism as a way that he's coping. He hopes that he'll be able to move forward with the chemotherapy on Tuesday, recognizing that \"I may have 3 weeks to 3 years.      Meaning-Making - \"When it's time, it's time. Death is part of living.\"    I offered spiritual and emotional support through reflective listening that affirmed emotions, experience, and meaning.       Plan - [facility] remains available for support.    Anu Puente MDiv  Associate   [phone] (pager)    "

## 2022-09-22 NOTE — PROGRESS NOTES
[facility]    Medicine Progress Note - Hospitalist Service    Date of Admission:  9/20/2022    Assessment & Plan          Cameron Barnard is a 72 year old male who was most recently admitted to [facility] from 9/9-9/14.  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  He was started on Eliquis with plans for outpatient oncology follow-up.  He underwent paracentesis again on 9/19.  He presented on 9/20 with generalized weakness, shortness of breath, diaphoresis and is found to be in new onset atrial fibrillation with RVR, rate up to 150.  Was mildly hypotensive initially but blood pressures have improved with rate control.  Currently on IV diltiazem infusion.      Atrial fibrillation with RVR, new onset  - cardiology started oral dilt yesterday and patient now off the drip, titrate ongoing  - remains on PTA eliquis  - TSH normal     Moderate-sized pericardial effusion  Possible pericarditis  CT scan showed moderate sized pericardial effusion and mild pericardial thickening concerning for pericarditis  - echo without tamponade, showing posterior small to mod effusion  - cardiology consulted and do not recommend any aggressive treatment due to malignancy     Lower extremity edema-likely multifactorial-secondary to hypoalbuminemia  - monitor for now, may benefit from diuresis once HR is better controlled     Hypervolemic hyponatremia, sodium 132  - monitor, likely related to GUTIERREZ and low albumin     Mild hyperkalemia, potassium 5.4-resolved  Hypomagnesemia, magnesium 3.2  -Monitor for now.  Recheck in morning     Leukocytosis of 15  -Has been present for 1 week.  No clear evidence of infection.  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  Cytology showed malignant cells.  -There is plan to start palliative chemotherapy as outpatient.  Patient is awaiting port placement     Hypoalbuminemia, albumin 2          Diet: Regular Diet Adult    DVT Prophylaxis: DOAC  Altman Catheter: Not present  Central Lines: None  Cardiac Monitoring: ACTIVE order. Indication: Tachyarrhythmias, acute (48 hours)  Code Status: Full Code      Disposition Plan      Expected Discharge Date: 09/23/2022                The patient's care was discussed with the patient and his nurse    Amie Swann,   Hospitalist Service  [facility]  Securely message with the Vocera Web Console (learn more here)  Text page via [employer] Paging/Directory         Clinically Significant Risk Factors Present on Admission                # Obesity: Estimated body mass index is 39.13 kg/m  as calculated from the following:    Height as of this encounter: 1.854 m (6' 1\").    Weight as of this encounter: 134.5 kg (296 lb 9.6 oz).        ______________________________________________________________________    Interval History   Patient slept well last night. He is feeling good this morning. Off oxygen. Ready to eat. No cough. No chest pain. Edema in legs is persistent    Data reviewed today: I reviewed all medications, new labs and imaging results over the last 24 hours.     Physical Exam   Vital Signs: Temp: 97.9  F (36.6  C) Temp src: Oral BP: 131/81 Pulse: 89   Resp: 20 SpO2: 95 % O2 Device: None (Room air) Oxygen Delivery: 2 LPM  Weight: 296 lbs 9.6 oz     Constitutional: Awake, alert, cooperative, no apparent distress  Respiratory: Clear to auscultation bilaterally, no crackles or wheezing  Cardiovascular: irregular rate and rhythm, no murmur noted  GI: Normal bowel sounds, " soft, non-distended, mildly distended but not painful  Skin/Integumen: +2-3 LE edema with tight skin and scattered abrasions and bruising throughout arms, healing scab noted to anterior shin  Other:     Data   Recent Labs   Lab 09/22/22  0605 09/21/22  1748 09/21/22  0740 09/20/22  1636 09/19/22  1425 09/17/22  1352 09/17/22  1107   WBC  --   --   --  15.3* 17.6*  --  17.9*   HGB  --   --   --  15.2 15.1 15.0 15.9   MCV  --   --   --  92 92  --  91   PLT  --   --   --  376 332  --  323   INR  --   --   --   --  2.49*  --   --    *  --  132* 132*  --  136 138   POTASSIUM 4.8  --  4.8 5.4*  --  5.0 4.7   CHLORIDE 102  --  102 101  --   --  104   CO2 23  --  22 23  --   --  28   BUN 30  --  28 30  --   --  19   CR 1.07  --  1.08 1.25  --   --  0.99   ANIONGAP 7  --  8 8  --   --  6   PETER 7.9*  --  8.0* 8.1*  --   --  8.3*   * 144* 142* 165*  --   --  145*   ALBUMIN  --   --   --  2.0*  --   --  2.7*   PROTTOTAL  --   --   --  6.8  --   --  6.3*   BILITOTAL  --   --   --  0.9  --   --  0.7   ALKPHOS  --   --   --  112  --   --  77   ALT  --   --   --  24  --   --  19   AST  --   --   --  49*  --   --  17   LIPASE  --   --   --   --   --   --  97     Recent Results (from the past 24 hour(s))   XR Chest 2 Views    Narrative    CHEST TWO VIEWS 9/22/2022 10:08 AM     HISTORY: New hypoxia since admission.    COMPARISON: None.       Impression    IMPRESSION: Mild left base infiltrate. Question trace pleural fluid  bilaterally. The cardiac silhouette is not enlarged. Pulmonary  vasculature is unremarkable.    JAY BREAUX MD         SYSTEM ID:  [id number]     Medications     - MEDICATION INSTRUCTIONS -       - MEDICATION INSTRUCTIONS -         apixaban ANTICOAGULANT  5 mg Oral BID     diltiazem  60 mg Oral Q8H [facility]     [START ON 9/23/2022] diltiazem ER COATED BEADS  180 mg Oral Daily     lactobacillus rhamnosus (GG)  1 capsule Oral Daily     pantoprazole  40 mg Oral QAM AC     sennosides  1 tablet Oral Daily      sodium chloride (PF)  3 mL Intracatheter Q8H

## 2022-09-22 NOTE — PROGRESS NOTES
"Hematology-Oncology Follow-up Note  [facility]     Today's Date: 09/22/22  Date of Admission:  9/20/2022  Reason for Consult: Metastatic pancreatic cancer      ASSESSMENT/ PLAN :  Cameron Barnard is a 72 year old male with     Metastatic pancreatic cancer  Liver biopsy positive for pancreatic adenocarcinoma  Newly diagnosed  Plan is to start with palliative chemotherapy gemcitabine Abraxane in the next 1 to 2 weeks  -Schedule with me or Dr. Abdalla after discharge-I will send message to Dr. Abdalla's nurse  Radha   -Patient is scheduled for Port-A-Cath placement next week      Malignant ascites  Cytology positive for adenocarcinoma  Continue with weekly paracentesis hopefully with the start of chemo he will need less frequent paracentesis  Schedule for weekly paracentesis once discharged        PE  Portal vein thrombosis  Continue with Eliquis      Cardiac  A. Fib,diltiazem  Pericardial effusion-most likely malignant  Management per cardiology and hospitalist team    INTERIM HISTORY:       MEDICATIONS:  Reviewed         PHYSICAL EXAM:  Vital signs:  Temp: 97.9  F (36.6  C) Temp src: Oral BP: 131/81 Pulse: 89   Resp: 20 SpO2: 95 % O2 Device: None (Room air) Oxygen Delivery: 2 LPM Height: 185.4 cm (6' 1\") Weight: 134.5 kg (296 lb 9.6 oz)  Estimated body mass index is 39.13 kg/m  as calculated from the following:    Height as of this encounter: 1.854 m (6' 1\").    Weight as of this encounter: 134.5 kg (296 lb 9.6 oz).      GENERAL/CONSTITUTIONAL: No acute distress.      LABS:  Recent Labs   Lab Test 09/22/22  0605   *   POTASSIUM 4.8   CHLORIDE 102   CO2 23   ANIONGAP 7   BUN 30   CR 1.07   *   PETER 7.9*     Recent Labs   Lab Test 09/20/22  1636 09/19/22  1425   WBC 15.3* 17.6*   HGB 15.2 15.1    332   MCV 92 92   NEUTROPHIL 86 82     Recent Labs   Lab Test 09/20/22  1636 09/17/22  1107   BILITOTAL 0.9 0.7   ALKPHOS 112 77   ALT 24 19   AST 49* 17   ALBUMIN 2.0* 2.7*             Castro " Patricia Nurse Practitioner   [facility]   [phone]    Chart documentation with Dragon Voice recognition Software. Although reviewed after completion, some words and grammatical errors may remain.

## 2022-09-22 NOTE — PLAN OF CARE
A&Ox4, controlled afib on tele. Dilt gtt off at 2330. PO given and tolerating. Sustaining 70-80s.  -140s. SOB reported and 3L overnight >90%. 2-3+ bilat LL edema. Reported LQ pain r/t malignant ascites. Plan for port insertion next week r/t stage 5 pancreatic cancer and mets to liver and for weekly paracentesis outpatient, hemonc following. Possible discharge today.

## 2022-09-23 NOTE — DISCHARGE SUMMARY
[facility]  Hospitalist Discharge Summary      Date of Admission:  9/20/2022  Date of Discharge:  9/23/2022  Discharging Provider: Amie Swann DO  Discharge Service: Hospitalist Service    Discharge Diagnoses   See below    Follow-ups Needed After Discharge   Follow-up Appointments     Follow-up and recommended labs and tests       Follow up with primary care provider, Gary Bey, within 7 days for   hospital follow- up.  No follow up labs or test are needed.             Unresulted Labs Ordered in the Past 30 Days of this Admission     Date and Time Order Name Status Description    9/21/2022  3:23 PM [facility] In process     9/19/2022  2:27 PM Ascites Fluid Aerobic Bacterial Culture Routine Preliminary       These results will be followed up by PCP    Discharge Disposition   Discharged to home  Condition at discharge: Stable    Hospital Course   Cameron Barnard is a 72 year old male who was most recently admitted to [facility] from 9/9-9/14.  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  He was started on Eliquis with plans for outpatient oncology follow-up.  He underwent paracentesis again on 9/19.  He presented on 9/20 with generalized weakness, shortness of breath, diaphoresis and is found to be in new onset atrial fibrillation with RVR, rate up to 150.  Was mildly hypotensive initially but blood pressures have improved with rate control.      Atrial fibrillation with RVR, new onset  - remains on PTA eliquis and will discharge with new dilt script  - TSH normal       Moderate-sized pericardial effusion  Possible pericarditis  CT scan showed moderate sized pericardial effusion and mild pericardial thickening concerning for pericarditis  - echo without tamponade, showing posterior small to mod effusion  - cardiology consulted and do not recommend any aggressive  treatment due to malignancy  - close follow up with cardiology with echo next week     Lower extremity edema-likely multifactorial-secondary to hypoalbuminemia  - monitor for now     Hypervolemic hyponatremia, sodium 132  - monitor, likely related to GUTIERREZ and low albumin     Mild hyperkalemia, potassium 5.4-resolved  Hypomagnesemia, magnesium 3.2  -Monitor for now.  Recheck in morning     Leukocytosis of 15  -Has been present for 1 week.  No clear evidence of infection.  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  Cytology showed malignant cells.  -There is plan to start palliative chemotherapy as outpatient.  Patient is awaiting port placement     Hypoalbuminemia, albumin 2    Consultations This Hospital Stay   CARDIOLOGY IP CONSULT  ADVANCE DIRECTIVE IP CONSULT  HEMATOLOGY & ONCOLOGY IP CONSULT  CARE MANAGEMENT / SOCIAL WORK IP CONSULT  SMOKING CESSATION PROGRAM IP CONSULT    Code Status   Full Code    Time Spent on this Encounter   IAmie DO, personally saw the patient today and spent greater than 30 minutes discharging this patient.       Amie Swann DO  [facility]  [address], [address]  [facility] [address]  Phone: [phone]  ______________________________________________________________________    Physical Exam   Vital Signs: Temp: 97.7  F (36.5  C) Temp src: Axillary BP: 109/85 Pulse: 91   Resp: 20 SpO2: 94 % O2 Device: None (Room air)    Weight: 295 lbs 9.6 oz         Primary Care Physician   Gary Bey    Discharge Orders      Follow-Up with Cardiology NIC      Reason for your hospital stay     You presented for difficulty breathing and weakness and were found to have an abnormal heart beat and some fluid around your heart. You will have close follow up with cardiology and oncology next week make sure to make your visits     Follow-up and recommended labs and tests     Follow up with primary care provider, Gary Bey, within 7 days for hospital follow- up.  No follow up labs or test are needed.     Activity    Your activity upon discharge: activity as tolerated     Echocardiogram Complete    Administration of IV contrast will be tailored to this examination per the appropriate written protocol listed in the Echocardiography department Protocol Book, or by the supervising Cardiologist. This may result in an order change.    Use of contrast is at the discretion of the supervising Cardiologist.     Diet    Follow this diet upon discharge: Orders Placed This Encounter      Regular Diet Adult       Significant Results and Procedures   Most Recent 3 CBC's:Recent Labs   Lab Test 09/20/22  1636 09/19/22  1425 09/17/22  1352 09/17/22  1107   WBC 15.3* 17.6*  --  17.9*   HGB 15.2 15.1 15.0 15.9   MCV 92 92  --  91    332  --  323     Most Recent 3 BMP's:Recent Labs   Lab Test 09/23/22  0547 09/22/22  0605 09/21/22  1748 09/21/22  0740   * 132*  --  132*   POTASSIUM 4.8 4.8  --  4.8   CHLORIDE 101 102  --  102   CO2 24 23  --  22   BUN 33* 30  --  28   CR 1.07 1.07  --  1.08   ANIONGAP 7 7  --  8   PETER 8.2* 7.9*  --  8.0*   * 128* 144* 142*     Most Recent 2 LFT's:Recent Labs   Lab Test 09/20/22  1636 09/17/22  1107   AST 49* 17   ALT 24 19   ALKPHOS 112 77   BILITOTAL 0.9 0.7   ,   Results for orders placed or performed during the hospital encounter of 09/20/22   CT Chest (PE) Abdomen Pelvis w Contrast    Narrative    EXAM: CT CHEST PE ABDOMEN PELVIS W CONTRAST  LOCATION: [facility]  DATE/TIME: 9/20/2022 6:29 PM    INDICATION: New afib with RVR, recently dx  pancreatic cancer  COMPARISON: CT abdomen pelvis 9/17/2022, CT chest 9/9/2022  TECHNIQUE: CT chest pulmonary angiogram and routine CT abdomen pelvis with IV contrast. Arterial phase through the chest and venous phase through the abdomen and pelvis. Multiplanar reformats and MIP reconstructions were performed. Dose reduction   techniques were used.   CONTRAST: 135  mL isovue 370    FINDINGS:  ANGIOGRAM CHEST: Pulmonary arteries are normal caliber and negative for pulmonary emboli. Thoracic aorta is not well opacified and is  indeterminate for dissection. No CT evidence of right heart strain.     LUNGS AND PLEURA: Trace bilateral pleural effusions. Bibasilar atelectasis.    MEDIASTINUM/AXILLAE: New moderate size pericardial effusion. Mild pericardial thickening. No lymphadenopathy.    CORONARY ARTERY CALCIFICATION: Cannot evaluate.    HEPATOBILIARY: Unchanged scattered hypodense liver lesions. Cholelithiasis.    PANCREAS: Unchanged large infiltrative locally advanced mass in the neck and proximal body of the pancreas.    SPLEEN: Normal.    ADRENAL GLANDS: Normal.    KIDNEYS/BLADDER: Normal.    BOWEL: No bowel obstruction or inflammatory change.    LYMPH NODES: There is again extensive peritoneal nodularity and small volume ascites. Unchanged multiple borderline enlarged upper abdominal/peripancreatic lymph nodes.    VASCULATURE: Unchanged partially occlusive thrombus within the main portal vein and completely occluded left portal vein. There is again occlusion of the left splenic vein.    PELVIC ORGANS: Normal.    MUSCULOSKELETAL: No suspicious osseous lesions.      Impression    IMPRESSION:  1.  No pulmonary embolism.    2.  New moderate-sized pericardial effusion. Mild pericardial thickening suspicious for pericarditis.    3.  Trace bilateral pleural effusions and bibasilar atelectasis.    4.  No acute findings in the abdomen or pelvis. Redemonstrated pancreatic mass and metastatic disease.    5.  Unchanged  nonocclusive thrombus in the main portal vein and complete occlusion of the left portal vein.   XR Chest 2 Views    Narrative    CHEST TWO VIEWS 2022 10:08 AM     HISTORY: New hypoxia since admission.    COMPARISON: None.       Impression    IMPRESSION: Mild left base infiltrate. Question trace pleural fluid  bilaterally. The cardiac silhouette is not enlarged. Pulmonary  vasculature is unremarkable.    JAY BREAUX MD         SYSTEM ID:  [id number]   Echocardiogram Complete     Value    LVEF  50-55%    Narrative    [id number]  [id number]  [id number]  [id number]^MELANIE^MAX^SOLO     [facility]  Echocardiography Laboratory  [address] [place] [id number]     Name: CATHERINE NORIEGA  MRN: [id number]  : 1950  Study Date: 2022 08:01 PM  Age: 72 yrs  Gender: Male  Patient Location: [facility]  Reason For Study: Pericardial Effusion  Ordering Physician: MAX NAVARRO  Referring Physician: Gary Bey  Performed By: Myrna German     BSA: 2.6 m2  Height: 73 in  Weight: 304 lb  HR: 91  BP: 113/83 mmHg  ______________________________________________________________________________  Procedure  Complete Portable Echo Adult. Limited Portable Echo Adult.  ______________________________________________________________________________  Interpretation Summary     Small-moderate, pericardial effusion which is located posteriorly. No  echocardiographic evidence of tamponade. Effusion measures 1.5 cms around LV.  Minimal fluid anteriorly around RV in subcostal views.  Left ventricular systolic function is low normal. The visual ejection fraction  is 50-55%.  Grossly, normal RV size and systolic function.  Mild tricuspid regurgitation.  No prior study for comparison. Findings disscussed with ordering provider at  9:25 pm.  ______________________________________________________________________________  Left Ventricle  The left ventricle is normal in size. There is normal left ventricular  wall  thickness. Left ventricular systolic function is low normal. The visual  ejection fraction is 50-55%. Diastolic function not assessed due to atrial  fibrillation. Regional wall motion abnormalities cannot be excluded due to  limited visualization.     Right Ventricle  The right ventricle is normal in size and function. The right ventricle is not  well visualized.     Atria  The left atrium is not well visualized. Right atrium not well visualized.     Mitral Valve  The mitral valve leaflets appear normal. There is no evidence of stenosis,  fluttering, or prolapse. There is trace mitral regurgitation.     Tricuspid Valve  Normal tricuspid valve. There is mild (1+) tricuspid regurgitation. The right  ventricular systolic pressure is approximated at 23.8 mmHg plus the right  atrial pressure.     Aortic Valve  There is mild trileaflet aortic sclerosis. No aortic stenosis is present.     Pulmonic Valve  The pulmonic valve is not well visualized.     Vessels  Normal size aorta. IVC diameter <2.1 cm collapsing >50% with sniff suggests a  normal RA pressure of 3 mmHg.     Pericardium  Small posterior pericardial effusion.     Rhythm  The rhythm was atrial fibrillation.  ______________________________________________________________________________  MMode/2D Measurements & Calculations  IVSd: 1.5 cm     LVIDd: 5.0 cm  LVIDs: 4.1 cm  LVPWd: 1.4 cm  FS: 17.9 %  LV mass(C)d: 291.8 grams  LV mass(C)dI: 113.5 grams/m2  Ao root diam: 3.9 cm  LA dimension: 3.9 cm  asc Aorta Diam: 4.3 cm  LA/Ao: 1.0  LVOT diam: 2.3 cm  LVOT area: 4.2 cm2  RWT: 0.55     Doppler Measurements & Calculations  MV E max rick: 63.9 cm/sec  MV A max rick: 0.47 cm/sec  MV E/A: 137.4  MV dec slope: 340.5 cm/sec2  MV dec time: 0.19 sec  PA acc time: 0.08 sec  TR max rick: 244.0 cm/sec  TR max P.8 mmHg  E/E' av.7  Lateral E/e': 4.4  Medial E/e': 5.0     ______________________________________________________________________________  Report approved  by: Kevin Ramon 09/20/2022 09:26 PM               Discharge Medications   Current Discharge Medication List      START taking these medications    Details   apixaban ANTICOAGULANT (ELIQUIS) 5 MG tablet Take 1 tablet (5 mg) by mouth 2 times daily for 30 days  Qty: 60 tablet, Refills: 0    Associated Diagnoses: Atrial fibrillation with rapid ventricular response (H)      diltiazem ER COATED BEADS (CARDIZEM CD/CARTIA XT) 180 MG 24 hr capsule Take 1 capsule (180 mg) by mouth daily for 30 days  Qty: 30 capsule, Refills: 0    Associated Diagnoses: Atrial fibrillation with rapid ventricular response (H)         CONTINUE these medications which have NOT CHANGED    Details   lactobacillus rhamnosus (GG) (CULTURELL) capsule Take 1 capsule by mouth daily      morphine (MSIR) 15 MG IR tablet Take 1 tablet (15 mg) by mouth every 4 hours as needed for severe pain  Qty: 60 tablet, Refills: 0    Associated Diagnoses: Malignant neoplasm of head of pancreas (H); Cancer associated pain      pantoprazole (PROTONIX) 40 MG EC tablet Take 1 tablet (40 mg) by mouth every morning (before breakfast)  Qty: 30 tablet, Refills: 0    Associated Diagnoses: Other acute pulmonary embolism, unspecified whether acute cor pulmonale present (H)      sennosides (SENOKOT) 8.6 MG tablet Take 1 tablet by mouth daily         STOP taking these medications       Apixaban Starter Pack (ELIQUIS DVT/PE STARTER PACK) 5 MG TBPK Comments:   Reason for Stopping:         ondansetron (ZOFRAN ODT) 4 MG ODT tab Comments:   Reason for Stopping:             Allergies   Allergies   Allergen Reactions     Seasonal Allergies Other (See Comments)     Runny nose, watery eyes

## 2022-09-23 NOTE — PLAN OF CARE
Pt here with new onset afib RVR. A&Ox4. Neuros intact. VSS. Tele Afib CVR. Regular diet. Up independently/ stand by. Denies pain. Pt scoring green on the Aggression Stop Light Tool. Plan to change to PO dilt and to start palliative chemo as outpatient. Discharge anticipated for 9/23.

## 2022-09-23 NOTE — CONSULTS
Care Management Initial Consult    General Information  Assessment completed with: Patient,    Type of CM/SW Visit: Offer D/C Planning    Primary Care Provider verified and updated as needed: Yes   Readmission within the last 30 days: no previous admission in last 30 days      Reason for Consult: discharge planning  Advance Care Planning: Advance Care Planning Reviewed: no concerns identified          Communication Assessment  Patient's communication style: spoken language (English or Bilingual)    Hearing Difficulty or Deaf: no   Wear Glasses or Blind: yes    Cognitive  Cognitive/Neuro/Behavioral: WDL                      Living Environment:   People in home: spouse  Tiffanie  Current living Arrangements: house      Able to return to prior arrangements:         Family/Social Support:  Care provided by: self  Provides care for: no one  Marital Status:   Wife  Tiffanie       Description of Support System:           Current Resources:   Patient receiving home care services: No     Community Resources: None  Equipment currently used at home: none  Supplies currently used at home:      Employment/Financial:  Employment Status: retired        Financial Concerns: No concerns identified           Lifestyle & Psychosocial Needs:  Social Determinants of Health     Tobacco Use: Low Risk      Smoking Tobacco Use: Never Smoker     Smokeless Tobacco Use: Never Used   Alcohol Use: Not on file   Financial Resource Strain: Not on file   Food Insecurity: Not on file   Transportation Needs: Not on file   Physical Activity: Not on file   Stress: Not on file   Social Connections: Not on file   Intimate Partner Violence: Not on file   Depression: Not on file   Housing Stability: Not on file       Functional Status:  Prior to admission patient needed assistance:   Dependent ADLs:: Independent  Dependent IADLs:: Independent       Mental Health Status:  Mental Health Status: No Current Concerns       Chemical Dependency  Status:  Chemical Dependency Status: No Current Concerns             Values/Beliefs:  Spiritual, Cultural Beliefs, Jehovah's witness Practices, Values that affect care: no               Additional Information:  Met with patient for discharge planning.  Pt discharging to home with his wife.  He is independent in all ADL's.  [facility] [place] [facility].  Pt stated would make own primary follow up.      Care Management Discharge Note    Discharge Date: 09/23/2022       Discharge Disposition: Home    Discharge Services:      Discharge DME:      Discharge Transportation: family or friend will provide    Private pay costs discussed: Not applicable    PAS Confirmation Code:    Patient/family educated on Medicare website which has current facility and service quality ratings:      Education Provided on the Discharge Plan:    Persons Notified of Discharge Plans: staff RN, pt  Patient/Family in Agreement with the Plan: yes    Handoff Referral Completed: No    Additional Information:  No needs identified at discharge.      Carmella Calloway RN, BS  Care Coordinator  [email or web address]

## 2022-09-24 NOTE — PROGRESS NOTES
Clinic Care Coordination Contact  [facility]: Post-Discharge Note  SITUATION                                                      Admission:    Admission Date: 09/20/22   Reason for Admission: Atrial fibrillation with RVR  Discharge:   Discharge Date: 09/23/22  Discharge Diagnosis: Atrial fibrillation with RVR    BACKGROUND                                                      Per hospital discharge summary and inpatient provider notes:    Cameron Barnard is a 72 year old male who was most recently admitted to [facility] from 9/9-9/14.  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  He was started on Eliquis with plans for outpatient oncology follow-up.  He underwent paracentesis again on 9/19.  He presented on 9/20 with generalized weakness, shortness of breath, diaphoresis and is found to be in new onset atrial fibrillation with RVR, rate up to 150.  Was mildly hypotensive initially but blood pressures have improved with rate control.        ASSESSMENT           Discharge Assessment  How are you doing now that you are home?: Patient reports he is doing well, no questions  How are your symptoms? (Red Flag symptoms escalate to triage hotline per guidelines): Improved  Do you feel your condition is stable enough to be safe at home until your provider visit?: Yes  Does the patient have their discharge instructions? : Yes  Does the patient have questions regarding their discharge instructions? : No  Were you started on any new medications or were there changes to any of your previous medications? : Yes  Does the patient have all of their medications?: Yes  Do you have questions regarding any of your medications? : No  Discharge follow-up appointment scheduled within 14 calendar days? : No  Is patient agreeable to assistance with scheduling? : No                  PLAN                                                       Outpatient Plan:     Follow up with primary care provider, Gary Bey, within 7 days for hospital follow- up.  No follow up labs or test are needed.          Future Appointments   Date Time Provider Department Center   9/27/2022 11:30 AM SHIR1 SHINV [facility]   9/27/2022  2:00 PM SHUS4 SHULT [facility]   9/29/2022  8:00 AM SH FAST TRACK LAB [facility]CI [facility]   9/30/2022  8:00 AM [facility]   9/30/2022  8:30 AM SH CANCER INFUSION NURSE [facility]   10/3/2022  8:30 AM SHUS4 SHULT [facility]   10/3/2022 11:00 AM [id number] [facility]   10/4/2022  2:30 PM Lisa Peña APRN CNP [facility]   10/6/2022  7:15 AM SH FAST TRACK LAB [facility]   10/6/2022  8:20 AM [facility]   10/7/2022  8:00 AM SH CANCER INFUSION NURSE [facility]   10/11/2022  8:30 AM SHUS4 SHULT [facility]   10/18/2022  2:00 PM SHUS4 SHULT [facility]   10/19/2022 10:30 AM SH FAST TRACK LAB [facility]   10/20/2022 10:00 AM Belg[facility] Gutierrez [facility]   10/20/2022 11:00 AM SH CANCER INFUSION NURSE [facility]   10/25/2022  2:00 PM SHUS4 SHULT [facility]   10/27/2022  7:45 AM SH FAST TRACK LAB [facility]   10/27/2022  8:40 AM Lesley Abdalla MD [facility]   10/27/2022  9:00 AM SH CANCER INFUSION NURSE [facility]   10/27/2022  2:45 PM Verenice Hart MD [facility]   11/1/2022  2:00 PM SHUS4 SHULT [facility]         For any urgent concerns, please contact our 24 hour nurse triage line: [phone] ([phone])         Clarisse Sharma  Connected Care Resource Center  [facility]    *Connected Care Resource Team does NOT follow patient ongoing. Referrals are identified based on internal discharge reports and the outreach is to ensure patient has an understanding of their discharge instructions.

## 2022-09-26 NOTE — TELEPHONE ENCOUNTER
Patient was admitted to [facility] on 9/20/20 with generalized weakness, SOB, and diaphoresis. Cardiology was consulted for new onset of atrial fibrillation with RVR and pericardial effusion. Started on IV Diltiazem and transiontied to po with controlled rates during hospitalization.    PMH: PE, portal vein thrombosis, new diagnosis of metastatic pancreatic cancer (recent admission at [facility] on 9/9-9/14).     Echo showed EF of 50-55% with small to moderate pericardial effusion without echo evidence of tamponade. Measures 1.5 cm around LV.    9/19/22: s/p paracentesis with 4.8 L of fluid removed. Follows with oncology as outpatient and plan to start chemo.    Pt was started on Eliquis and Diltiazem at time of discharge.    Called patient to discuss any post hospital d/c questions he may have, review medication changes, and confirm f/u appts. Patient denied any questions regarding new medications or changes to PTA medications.     Patient denied any SOB, chest pain, palpitations or light headedness.    RN confirmed with patient that he has an echo scheduled on 10/3/22 at 1100, followed with an OV on 10/4/22 at 1425 with INDERJIT Lisa Peña at our [facility].    Patient advised to call clinic with any cardiac related questions or concerns prior to this inderjit't. Patient verbalized understanding and agreed with plan. ETIENNE Watson RN.

## 2022-09-26 NOTE — TELEPHONE ENCOUNTER
Writer called and spoke with both the patient and spouse, Tiffanie, regarding post hospital 9/20-9/23 follow up . Patient states doing ok, has discomfort from ascites, scheduled for weekly paracentesis due tomorrow , and also constipation .  Patient for constipation started Senna and has had 1 large bowel yesterday. We discussed also increasing water intake from 6  8oz glasses a day to 8 or 10 but with ascites patient experiences constant fullness and pressure. Patient declines wanting to increase frequency of the paracentesis.     Writer reviewed port placement and instructions and confirmed that Eliquis to be held today and tomorrow for port placement.     Appointments reviewed and writer released Compazine patient aware and verbalizes understanding.    Imani Harmon RN

## 2022-09-27 NOTE — DISCHARGE INSTRUCTIONS
Port Insertion Discharge Instructions     After you go home:    Have an adult stay with you for the first 6 hours  You may resume your normal diet       For 24 hours - due to the sedation you received:  Relax and take it easy  Do NOT make any important or legal decisions  Do NOT drive or operate machines at home or at work  Do NOT drink alcohol    Care of Incision sites:    You have a liquid adhesive covering on your incisions. Do not remove the adhesive residue. It will come off on it's own.  You may shower tomorrow.  You may cover the wound with a bandaid if needed for comfort.      Activity     Avoid heavy lifting (greater than 10 pounds) or the overuse of your shoulder for 3 days    Bleeding:    If you start bleeding from the incision sites in your chest or neck - or have swelling in your neck, sit down and press on the site for 5-10 minutes.   If bleeding has not stopped after 10 minutes, call your provider.        Call 911 right away if you have heavy bleeding or bleeding that does not stop.      Medicines:    You may resume all medications  Resume your Warfarin/Coumadin at your regular dose today. Follow up with your provider to have your INR rechecked  Resume your Platelet Inhibitors and Aspirin tomorrow at your regular dose  For minor pain, you may take Acetaminophen (Tylenol) or Ibuprofen (Advil)    Call the provider who ordered this procedure if:    You have swelling in your neck or over your port site  The incision area is red, swollen, hot or tender  You have chills or a fever greater than 101 F (38 C)  Any questions or concerns    Call  911 or go to the Emergency Room if you have:    Severe chest pain or trouble breathing  Bleeding that you cannot control    Additional Information:    Your port may be accessed right away.   You will need to have your port flushed every 30 days or after each use.      If you have questions call:          [facility] Radiology Dept @ [phone]              Paracentesis Discharge Instructions     After you go home:    You may resume your normal diet.    Care of Puncture Sites:    For the first 48 hrs, check your puncture sites every couple hours while you are awake   If there is a bandaid - you may remove it tomorrow morning  You may shower tomorrow  No tub baths, whirlpools or swimming until your puncture sites have fully healed  Fluid may leak from the abdominal site. Change the bandaid as needed - keep the site dry  If the fluid leaks for more than 48 hours, call your ordering provider     Activity:    You may go back to normal activity in 24 hours   Wait 48 hours before lifting, straining, exercise or other strenuous activity    Medicines:    You may resume all your medications  For minor pain, you may take Acetaminophen (Tylenol) or Ibuprofen (Advil)            Call the provider who ordered this procedure if:    The site is red, swollen, hot or tender  Blood or fluid is draining from the site  Chills or a fever greater than 101 F (38 C)  Shortness of breath  Pain that is getting worse  Leaking from the site that does not stop  Any questions or concerns          Call  911 or go to the Emergency Room if:    Severe chest pain or trouble breathing  Increased blood in your sputum (phlegm)  Bleeding that you cannot control      If you have questions call:        [facility] Radiology Dept @ [phone]      The provider who performed your procedure was _________________.

## 2022-09-27 NOTE — PROGRESS NOTES
Care Suites Admission Nursing Note    Patient Information  Name: Cameron Barnard  Age: 72 year old  Reason for admission: Port Placement and Paracentesis  Care Suites arrival time: 0930    Visitor Information  Name: Delroy  Informed of visitor restrictions: Yes  1 visitor allowed per patient   Visitor must screen negative for COVID symptoms   Visitor must wear a mask  Waiting rooms closed to visitors    Patient Admission/Assessment   Pre-procedure assessment complete: Yes  If abnormal assessment/labs, provider notified: N/A  NPO: Yes  Medications held per instructions/orders: Yes  Consent: obtained  If applicable, pregnancy test status: deferred  Patient oriented to room: Yes  Education/questions answered: Yes  Plan/other:     Discharge Planning  Discharge name/phone number: Delroy  Overnight post sedation caregiver: Delroy  Discharge location: home    Emir Hung RN

## 2022-09-27 NOTE — PRE-PROCEDURE
GENERAL PRE-PROCEDURE:   Procedure:  Port a catheter placement with IV moderate sedation, therapeutic paracentesis  Date/Time:  9/27/2022 10:35 AM    Written consent obtained?: Yes    Risks and benefits: Risks, benefits and alternatives were discussed    Consent given by:  Patient  Patient states understanding of procedure being performed: Yes    Patient's understanding of procedure matches consent: Yes    Procedure consent matches procedure scheduled: Yes    Expected level of sedation:  Moderate  Appropriately NPO:  Yes  ASA Class:  3  Mallampati  :  Grade 3- soft palate visible, posterior pharyngeal wall not visible  Lungs:  Lungs clear with good breath sounds bilaterally and other (comment)  Lung exam comment:  Distant in bases   Heart:  A-fib and other (comment)  Heart exam comment:  Irregular heart rate, controlled  History & Physical reviewed:  History and physical reviewed and no updates needed  Statement of review:  I have reviewed the lab findings, diagnostic data, medications, and the plan for sedation

## 2022-09-27 NOTE — PROGRESS NOTES
Care Suites Discharge Nursing Note    Patient Information  Name: Cameron Barnard  Age: 72 year old    Discharge Education:  Discharge instructions reviewed: Yes  Additional education/resources provided: Per Radiology  Patient/patient representative verbalizes understanding: Yes  Patient discharging on new medications: No  Medication education completed: Yes    Discharge Plans:   Discharge location: home  Discharge ride contacted: Yes  Approximate discharge time: 1440    Discharge Criteria:  Discharge criteria met and vital signs stable: Yes.  VSS.  Sites CDI.  AxO    Patient Belongs:  Patient belongings returned to patient: Yes    Emir Hung RN

## 2022-09-27 NOTE — PROGRESS NOTES
Care Suites Post Procedure Note    Patient Information  Name: Cameron Barnard  Age: 72 year old    Post Procedure  Time patient returned to Care Suites:1240  Concerns/abnormal assessment: None at this time  If abnormal assessment, provider notified: N/A  Plan/Other: Monitor site, vitals and sedation    Emir Hung RN

## 2022-09-27 NOTE — IR NOTE
Interventional Radiology Intra-procedural Nursing Note    Patient Name: Cameron Barnard  Medical Record Number: [id number]  Today's Date: September 27, 2022    Procedure: Port a catheter placement with IV moderate sedation, therapeutic paracentesis  Start time: 1201  End time: 1215  Report provided to: Emir BANKS  Patient depart time and location: 1235 to -9    Note: Patient entered Interventional Radiology Suite number 1 via cart. Patient awake, alert and orientated. Assisted onto procedural table in SUPINE position. Prepped and draped.  Dr. STRONG in room. Time out and procedure started. Vital signs stable. Telemetry reading ATRIAL FIBRILLATION.    Procedure well tolerated by patient without complications. Procedure end with debrief by Dr. PFEIFFER.  Manual pressure applied until hemostasis achieved. tegaderm and gauze (port) & gauze/tegaderm (paracentesis site) dressing applied to interventional procedure access site.    Administered medication totals:  Lidocaine 1%  13 mL Intradermal  Lidocaine with Epinephrine 10 mL Intradermal  Heparin 500 Units heparin lock port  Versed 1 mg IVP  Fentanyl 100 mcg IVP    Last dose of sedation administered at 1200.     Paracentesis Fl. Removal Total: 4.6L

## 2022-09-29 NOTE — PROGRESS NOTES
Nursing Note:  Cameron Barnard presents today for port labs.    Patient seen by provider today: No   present during visit today: Not Applicable.    Note: N/A.    Intravenous Access:  Labs drawn without difficulty.  Implanted Port.    Discharge Plan:   Patient was sent to [facility] for tomorrow's  appointment.    Jody Tijerina RN

## 2022-09-30 NOTE — PROGRESS NOTES
Oncology/Hematology Visit Note  Sep 30, 2022    Reason for Visit: follow up of pancreatic adenocarcinoma  09/09-CT scan reveals large pancreatic mass with liver metastasis and peritoneal carcinomatosis  -Paracentesis positive for pancreatic adenocarcinoma  Patient met with Dr. Abdalla who recommended palliative chemotherapy with Gemzar Abraxane    Interval History:  Patient reports he has not been drinking enough fluids.  His appetite is low.  Feeling weak  He continues to have bilateral lower extremity edema he is wearing compression socks.  He is able to ambulate with walker  He denies fever chills sweats cough shortness of breath chest pain nausea vomiting diarrhea abdominal pain      Review of Systems:  14 point ROS of systems including Constitutional, Eyes, Respiratory, Cardiovascular, Gastroenterology, Genitourinary, Integumentary, Muscularskeletal, Psychiatric were all negative except for pertinent positives noted in my HPI.    Physical Examination:  General: The patient is a pleasant male in no acute distress.  /85   Pulse 85   Resp 16   Wt 131.9 kg (290 lb 12.8 oz)   SpO2 97%   BMI 38.37 kg/m    HEENT: EOMI, PERRL. Sclerae are anicteric. Oral mucosa is pink and moist with no lesions or thrush.   Lymph: Neck is supple with no lymphadenopathy in the cervical or supraclavicular areas.   Heart: Regular rate and rhythm.   Lungs: Clear to auscultation bilaterally.   GI: Bowel sounds present, soft, nontender with no palpable hepatosplenomegaly or masses.   Extremities: No lower extremity edema noted bilaterally.   Skin: No rashes, petechiae, or bruising noted on exposed skin.    Laboratory Data:  CBC CMP results reviewed    Assessment and Plan:  This is a 72-year-old male with    pancreatic adenocarcinoma-stage IV  09/09-CT scan reveals large pancreatic mass with liver metastasis and peritoneal carcinomatosis  -Paracentesis positive for pancreatic adenocarcinoma  Patient met with Dr. Abdalla who recommended  palliative chemotherapy with Gemzar Abraxane  Regimen and side effects of the treatment discussed in detail with patient patient verbalized understanding and agrees to proceed with treatment  Labs reviewed abnormalities discussed  Give 1 L NS bolus hydration recheck CMP  -Reviewed with pharmacy aretha to proceed with cycle 1 day 1 Gemzar Abraxane  Patient has follow-up appointment with Dr. Abdalla next week with chemo      Malignant ascites  -Continue with weekly paracentesis      A. Fib  Continue with Eliquis  Continue per cardiology recommendations    Bilateral small pulmonary embolism  Portal vein thrombosis  -Continue with Eliquis    Moderate-sized pericardial effusion with possible pericarditis  Cardiology was consulted in the hospital-no recommendation for aggressive treatment due to malignancy  Patient will see cardiologist next week      GUTIERREZ  As above we will give hydration today recheck creatinine after fluids  -we Have to be careful with his IV fluids due to his bilateral lower extremity edema    Hyperkalemia  As above we will give fluids and recheck potassium      Hyponatremia  Hyponatremia noted in the hospital  We will give some fluids today recheck sodium after IVF today   Refer to nephrology      Hypoalbuminemia  High-protein diet discussed    Bilateral lower extremity edema  Increase protein in diet  -Advised patient to use Ace wrap  -Ambulate  -When sitting elevate legs      ADDENDUM   -Patient given 1 L IV fluids-slow infusion  -Repeat of CMP shows improvement in sodium, potassium, creatinine  -Proper hydration discussed.  Advised patient to drink V8 juice, tomato juice  -Patient is scheduled to come to the clinic on Sunday  -We will recheck CMP on Sunday  -Advised nurse to check CMP on Sunday first before proceeding with IV fluids (patient has bilateral lower extremity edema, ascites we have to be careful with IV fluids)  -Proceed with slow IV infusion on Sunday if sodium lower than his baseline.  Or  elevated creatinine or potassium  -With any significant changes in CMP-page physician on-call        RON Palacios CNP [facility]     Chart documentation with Dragon Voice recognition Software. Although reviewed after completion, some words and grammatical errors may remain.

## 2022-09-30 NOTE — PROGRESS NOTES
Infusion Nursing Note:  Cameron Barnard presents today for C1D1 gemzar abraxane.    Patient seen by provider today: Yes: Castro   present during visit today: Not Applicable.    Note: K+ and creat elevated. Gave aditional 1L NS and rechecked labs. Labs improved. Patient scheduled for labs again Sunday. Per Castro, if creat/K+ are abnormal, patient is to receive 1L NS. If they are normal, do not give fluids due to concern for fluid overload.    Intravenous Access:  Labs drawn without difficulty.  Implanted Port.    Treatment Conditions:  Lab Results   Component Value Date    HGB 14.9 09/29/2022    WBC 17.0 (H) 09/29/2022    ANEU 4.2 05/13/2021    ANEUTAUTO 13.7 (H) 09/29/2022     (H) 09/29/2022      Lab Results   Component Value Date     (L) 09/29/2022    POTASSIUM 5.6 (H) 09/29/2022    MAG 3.2 (H) 09/20/2022    CR 1.61 (H) 09/29/2022    PETER 8.3 (L) 09/29/2022    BILITOTAL 0.8 09/29/2022    ALBUMIN 1.9 (L) 09/29/2022    ALT 17 09/29/2022    AST 24 09/29/2022     Results reviewed, labs MET treatment parameters, ok to proceed with treatment.    Post Infusion Assessment:  Patient tolerated infusion without incident.  Blood return noted pre and post infusion.  Site patent and intact, free from redness, edema or discomfort.  No evidence of extravasations.  Access discontinued per protocol.     Discharge Plan:   Discharge instructions reviewed with: Patient and Family.  Patient and/or family verbalized understanding of discharge instructions and all questions answered.  Copy of AVS reviewed with patient and/or family.  Patient will return Sunday for next appointment.  Patient discharged in stable condition accompanied by: self and wife.  Departure Mode: Ambulatory.      Deidre Castañeda RN

## 2022-09-30 NOTE — PROGRESS NOTES
"Oncology Rooming Note    September 30, 2022 8:12 AM   Cameron Barnard is a 72 year old male who presents for:    Chief Complaint   Patient presents with     Oncology Clinic Visit     Initial Vitals: /85   Pulse 85   Resp 16   Wt 131.9 kg (290 lb 12.8 oz)   SpO2 97%   BMI 38.37 kg/m   Estimated body mass index is 38.37 kg/m  as calculated from the following:    Height as of 9/20/22: 1.854 m (6' 1\").    Weight as of this encounter: 131.9 kg (290 lb 12.8 oz). Body surface area is 2.61 meters squared.  Severe Pain (6) Comment: Data Unavailable   No LMP for male patient.  Allergies reviewed: Yes  Medications reviewed: Yes    Medications: Medication refills not needed today.  Pharmacy name entered into Naname[employer]:    [facility] #[id number] - [place], [place] - [address] [facility] - [address]  [facility] #[id number] - [place] - [address]    Clinical concerns:  NP was notified.      Roxana Vazquez CMA            "

## 2022-09-30 NOTE — LETTER
"    9/30/2022         RE: Cameron Barnard  [address]  [place] [address]        Dear Colleague,    Thank you for referring your patient, Cameron Barnard, to the [facility]. Please see a copy of my visit note below.    Oncology Rooming Note    September 30, 2022 8:12 AM   Cameron Barnard is a 72 year old male who presents for:    Chief Complaint   Patient presents with     Oncology Clinic Visit     Initial Vitals: /85   Pulse 85   Resp 16   Wt 131.9 kg (290 lb 12.8 oz)   SpO2 97%   BMI 38.37 kg/m   Estimated body mass index is 38.37 kg/m  as calculated from the following:    Height as of 9/20/22: 1.854 m (6' 1\").    Weight as of this encounter: 131.9 kg (290 lb 12.8 oz). Body surface area is 2.61 meters squared.  Severe Pain (6) Comment: Data Unavailable   No LMP for male patient.  Allergies reviewed: Yes  Medications reviewed: Yes    Medications: Medication refills not needed today.  Pharmacy name entered into [employer]:    [facility] #[id number] - [place], [place] - [address] [facility] [address] [place] [address] DRUG STORE #[id number] - [place] - [address]    Clinical concerns:  NP was notified.      Roxana Vazquez CMA              Oncology/Hematology Visit Note  Sep 30, 2022    Reason for Visit: follow up of pancreatic adenocarcinoma  09/09-CT scan reveals large pancreatic mass with liver metastasis and peritoneal carcinomatosis  -Paracentesis positive for pancreatic adenocarcinoma  Patient met with Dr. Abdalla who recommended palliative chemotherapy with Gemzar Abraxane    Interval History:  Patient reports he has not been drinking enough fluids.  His appetite is low.  Feeling weak  He continues to have bilateral lower extremity edema he is wearing compression socks.  He is able to ambulate with walker  He denies fever chills sweats cough shortness of breath chest pain nausea vomiting diarrhea abdominal " pain      Review of Systems:  14 point ROS of systems including Constitutional, Eyes, Respiratory, Cardiovascular, Gastroenterology, Genitourinary, Integumentary, Muscularskeletal, Psychiatric were all negative except for pertinent positives noted in my HPI.    Physical Examination:  General: The patient is a pleasant male in no acute distress.  /85   Pulse 85   Resp 16   Wt 131.9 kg (290 lb 12.8 oz)   SpO2 97%   BMI 38.37 kg/m    HEENT: EOMI, PERRL. Sclerae are anicteric. Oral mucosa is pink and moist with no lesions or thrush.   Lymph: Neck is supple with no lymphadenopathy in the cervical or supraclavicular areas.   Heart: Regular rate and rhythm.   Lungs: Clear to auscultation bilaterally.   GI: Bowel sounds present, soft, nontender with no palpable hepatosplenomegaly or masses.   Extremities: No lower extremity edema noted bilaterally.   Skin: No rashes, petechiae, or bruising noted on exposed skin.    Laboratory Data:  CBC CMP results reviewed    Assessment and Plan:  This is a 72-year-old male with    pancreatic adenocarcinoma-stage IV  09/09-CT scan reveals large pancreatic mass with liver metastasis and peritoneal carcinomatosis  -Paracentesis positive for pancreatic adenocarcinoma  Patient met with Dr. Abdalla who recommended palliative chemotherapy with Gemzar Abraxane  Regimen and side effects of the treatment discussed in detail with patient patient verbalized understanding and agrees to proceed with treatment  Labs reviewed abnormalities discussed  Give 1 L NS bolus hydration recheck CMP  -Reviewed with pharmacy okay to proceed with cycle 1 day 1 Gemzar Abraxane  Patient has follow-up appointment with Dr. Abdalla next week with chemo      Malignant ascites  -Continue with weekly paracentesis      A. Fib  Continue with Eliquis  Continue per cardiology recommendations    Bilateral small pulmonary embolism  Portal vein thrombosis  -Continue with Eliquis    Moderate-sized pericardial effusion with  possible pericarditis  Cardiology was consulted in the hospital-no recommendation for aggressive treatment due to malignancy  Patient will see cardiologist next week      GUTIERREZ  As above we will give hydration today recheck creatinine after fluids  -we Have to be careful with his IV fluids due to his bilateral lower extremity edema    Hyperkalemia  As above we will give fluids and recheck potassium      Hyponatremia  Chronic issue for the patient  We will give some fluids today recheck sodium after IVF today     Hypoalbuminemia  High-protein diet discussed    Bilateral lower extremity edema  Increase protein in diet  Continue with compression socks      RON Palacios CNP  [facility] [place]     Chart documentation with Dragon Voice recognition Software. Although reviewed after completion, some words and grammatical errors may remain.            Again, thank you for allowing me to participate in the care of your patient.        Sincerely,        RON Palacios CNP

## 2022-10-01 NOTE — ED TRIAGE NOTES
Patient here with his wife.  He has pancreatic cancer. He complains of constant mid abdominal pain and SOB.  He has been have paracentesis's, last one done on Tuesday.  Hes scheduled for another one on Monday but cant make it that long. He also complains of weakness       Triage Assessment     Row Name 10/01/22 1600       Triage Assessment (Adult)    Airway WDL WDL       Respiratory WDL    Respiratory WDL --  complains of SOB        Skin Circulation/Temperature WDL    Skin Circulation/Temperature WDL WDL       Cardiac WDL    Cardiac WDL WDL       Peripheral/Neurovascular WDL    Peripheral Neurovascular WDL WDL       Cognitive/Neuro/Behavioral WDL    Cognitive/Neuro/Behavioral WDL WDL

## 2022-10-01 NOTE — ED PROVIDER NOTES
History   Chief Complaint:  Abdominal Pain and Shortness of Breath       The history is provided by the patient and the spouse.      Cameron Barnard is a 72 year old male, 4 days status post paracentesis, with history of pancreatic cancer, pulmonary embolism, and atrial fibrillation who presents with abdominal pain and shortness of breath. He had his first chemotherapy session yesterday for the pancreatic caner. While there, he was noted to have low electrolytes levels, so he was placed on IV fluids in order to start the chemotherapy. After the session, he ate food and drank water at home. Then this morning, he woke up and his abdomen was painful and distended. Additionally, he has been short of breath and nauseas. He has not had a bowel movement in a while, but is still able to pass gas. He has undergone multiple abdominal surgeries in the past. He is due to have another paracentesis in 2 days. He denies fever, chills, dysuria, vomiting, or frequency.     Review of Systems   Constitutional: Negative for chills and fever.   Respiratory: Positive for shortness of breath.    Gastrointestinal: Positive for abdominal distention, abdominal pain and nausea. Negative for vomiting.   Genitourinary: Negative for dysuria and frequency.   All other systems reviewed and are negative.    Allergies:  Seasonal Allergies    Medications:  Eliquis   Cardizem   Morphine   Protonix   Compazine     Past Medical History:     Ascending aorta dilation (H)  Deep vein thrombosis of left femoral vein (H)  Primary osteoarthritis of both knees  Pulmonary embolism (H)  Diverticulitis  Bowel obstruction (H)  Lymphedema of left lower extremity  Ventral hernia without obstruction or gangrene  Abdominal wall hematoma, initial encounter  Splenic infarct  Malignant neoplasm of pancreas, unspecified location of malignancy (H)  Other acute pulmonary embolism, unspecified whether acute cor pulmonale present (H)  Malignant neoplasm of head of pancreas  (H)  Pericardial effusion  Atrial fibrillation with rapid ventricular response (H)  Obesity   Primary osteoarthritis of bilateral knees   Gout     Past Surgical History:    Bilateral total knee arthroplasty   Hernia repair   Chest port insertion   Appendectomy   Small bowel resection   Exploratory laparotomy and hematoma evacuation      Family History:    Sister - breast cancer   Mother - blood disease     Social History:  The patient presents to the ED with his wife via private vehicle   PCP: Gary Bey     Physical Exam     Patient Vitals for the past 24 hrs:   BP Temp Temp src Pulse Resp SpO2   10/01/22 2142 110/62 -- -- 97 -- 94 %   10/01/22 2052 -- -- -- -- -- 95 %   10/01/22 2039 106/72 -- -- 101 -- --   10/01/22 1842 112/67 -- -- 84 -- 96 %   10/01/22 1601 125/69 -- -- -- -- --   10/01/22 1559 -- 97.8  F (36.6  C) Oral 100 18 95 %       Physical Exam  Constitutional: Well developed, chronic ill, forlorn, nontox appearance  Head: Atraumatic.   Mouth/Throat: Oropharynx is clear and moist.   Neck:  no stridor  Eyes: no scleral icterus  Cardiovascular: Borderline regular tachycardia, 2+ bilat radial pulses  Pulmonary/Chest: nml resp effort, Clear BS bilat  Abdominal: Protuberant, soft, difficult to reproduce right upper quadrant tenderness, no rebound or guarding   : no CVA tenderness bilat  Ext: Warm, well perfused  Neurological: A&O, symmetric facies, moves ext x4  Skin: Skin is warm and dry.   Psychiatric: Behavior is normal. Thought content normal.   Nursing note and vitals reviewed.    Emergency Department Course   Imaging:  CT Chest (PE) Abdomen Pelvis w Contrast  Final Result  IMPRESSION:  1.  No significant change since 09/20/2022 CT is that there has been a significant increase in size of the pericardial effusion which is now advanced, there has been a moderate increase in the amount of abdominal/pelvic ascites, and the bilateral pleural   effusions have decreased in size and are now minimal.  2.   Stable findings of malignancy.  3.  No PE.    Echocardiogram Limited      Results Pending    Report per radiology    Laboratory:  Labs Ordered and Resulted from Time of ED Arrival to Time of ED Departure   COMPREHENSIVE METABOLIC PANEL - Abnormal       Result Value    Sodium 127 (*)     Potassium 5.4 (*)     Chloride 97      Carbon Dioxide (CO2) 14 (*)     Anion Gap 16 (*)     Urea Nitrogen 59 (*)     Creatinine 1.30 (*)     Calcium 8.1 (*)     Glucose 131 (*)     Alkaline Phosphatase 139      AST 63 (*)     ALT 34      Protein Total 6.1 (*)     Albumin 1.8 (*)     Bilirubin Total 1.5 (*)     GFR Estimate 58 (*)    INR - Abnormal    INR 1.70 (*)    CBC WITH PLATELETS AND DIFFERENTIAL - Abnormal    WBC Count 22.2 (*)     RBC Count 4.78      Hemoglobin 13.9      Hematocrit 42.9      MCV 90      MCH 29.1      MCHC 32.4      RDW 13.7      Platelet Count 407      % Neutrophils 94      % Lymphocytes 3      % Monocytes 2      % Eosinophils 0      % Basophils 0      % Immature Granulocytes 1      NRBCs per 100 WBC 0      Absolute Neutrophils 21.0 (*)     Absolute Lymphocytes 0.6 (*)     Absolute Monocytes 0.4      Absolute Eosinophils 0.0      Absolute Basophils 0.0      Absolute Immature Granulocytes 0.2      Absolute NRBCs 0.0     LIPASE - Abnormal    Lipase 58 (*)    ROUTINE UA WITH MICROSCOPIC REFLEX TO CULTURE - Abnormal    Color Urine Yellow      Appearance Urine Clear      Glucose Urine Negative      Bilirubin Urine Negative      Ketones Urine Negative      Specific Gravity Urine 1.010      Blood Urine Negative      pH Urine 5.5      Protein Albumin Urine 20  (*)     Urobilinogen Urine Normal      Nitrite Urine Negative      Leukocyte Esterase Urine Negative      Mucus Urine Present (*)     RBC Urine 2      WBC Urine <1      Squamous Epithelials Urine <1      Hyaline Casts Urine 3 (*)    NT PROBNP INPATIENT - Abnormal    N terminal Pro BNP Inpatient 2,023 (*)    TROPONIN I - Normal    Troponin I High Sensitivity 6      COVID-19 VIRUS (CORONAVIRUS) BY PCR - Normal    SARS CoV2 PCR Negative        Emergency Department Course:    Reviewed:  I reviewed nursing notes, vitals and past medical history    Assessments:   I obtained history and examined the patient as noted above.    I rechecked the patient and updated.   I rechecked the patient and explained findings. I discussed plan for admission to the hospital.    Consults:   I spoke with Dr. Montemayor from Hospitalist Services, who accepts the patient for admission.    Interventions:    ml IV     Disposition:  The patient was admitted to the hospital under the care of Dr. Montemayor.     Impression & Plan   Medical Decision Makin year old male presenting w/ pleuritic right upper quadrant abdominal pain     DDx includes hepatitis, pancreatitis, biliary pathology, failure of outpatient anticoagulation and subsequent PE, oncologic pain NOS, abdominal pain NOS, SBP although less likely given symptomatology, reaccumulation of malignant ascites.  Less likely vascular catastrophe given pleuritic nature of symptoms, physical exam.  Doubt ACS.  Labs significant for leukocytosis,  stably elevated BNP, metabolic acidosis although question possible lab analyzer error.  Imaging sig for evidence of increased size of pericardial effusion.  Follow-up echo with results as noted above without evidence of tamponade but demonstrated constriction.  Discussed patient with cardiology who recommended follow-up echo tomorrow.  Patient is subsequently admitted to the hospitalist service for further evaluation and management.  I do not feel emergent paracentesis is clinically indicated at this time. Pt and wife counseled on all results, disposition and diagnosis.  They are understanding and agreeable to plan. Patient discharged in stable condition.            Diagnosis:    ICD-10-CM    1. Pericardial effusion  I31.39    2. Malignant neoplasm of pancreas, unspecified location of  malignancy (H)  C25.9    3. Abdominal pain, generalized  R10.84        Scribe Disclosure:  I, Tu Gary, am serving as a scribe at 4:08 PM on 10/1/2022 to document services personally performed by Albaro Culver MD based on my observations and the provider's statements to me.        Albaro Culver MD  10/01/22 5536

## 2022-10-01 NOTE — ED NOTES
Bed: ED02  Expected date:   Expected time:   Means of arrival:   Comments:  Cameron singer, cancer pain, sob

## 2022-10-02 NOTE — PROGRESS NOTES
ONCOLOGIC HISTORY:  Mr. Barnard is a gentleman with metastatic pancreatic cancer.  1. CT abdomen and pelvis on 09/09/2022, revealed pancreatic head and neck mass, liver metastasis and peritoneal carcinomatosis.  There is also main portal vein nonocclusive thrombus and tumor thrombus in left portal vein.  -CT chest angiogram on 09/09/2022 revealed a tiny pulmonary embolism in subsegmental right lower lobe pulmonary artery.  2. Paracentesis was done on 09/09/2022. Cytology is positive for malignancy.  -An ultrasound-guided liver biopsy on 09/13/2022 is positive for adenocarcinoma.  -CA 19-9 on 09/11/2022 is elevated at 1724.     SUBJECTIVE:  Mr. Barnard is a 72-year-old gentleman with a newly diagnosed pancreatic adenocarcinoma.  The patient has malignant ascites.  Ascites has increased.  He is symptomatic from it.  He feels weak.  He has abdominal discomfort and shortness of breath. He is not able to lay flat because of ascites, which is causing shortness of breath.     He feels weak.  No headache.  Some dizziness.  No chest pain.  Some nausea.  Appetite is decreased.  No fever.  No bleeding.     PHYSICAL EXAMINATION:    GENERAL:  He is alert, oriented x 3.  He looked weak.  VITAL SIGNS:  Reviewed.  ABDOMEN:  Nontender.  Tense ascites.   Rest of systems not examined.     ASSESSMENT:    1.  A 72-year-old gentleman with newly diagnosed metastatic pancreatic adenocarcinoma.  2.  Malignant ascites.  3.  Pulmonary embolism secondary to hypercoagulability of malignancy.  4.  Portal vein thrombosis.  5.  Cancer-associated pain.     PLAN:   1.  The patient has symptomatic ascites.  He needs paracentesis.  We called the Radiology Department.  They will not be able to do paracentesis until tomorrow.     Advised the patient he can go to the Emergency Room.  The patient at this time does not want to go to Emergency Room.  He may decide to go if his symptoms get worse; otherwise, he will get paracentesis done tomorrow.     2.   Discussed regarding metastatic pancreatic cancer with the patient and his wife.  I explained to him that unfortunately this is an incurable disease.  Main treatment is going to be palliative chemotherapy.  This is not a surgical case.     I discussed regarding palliative chemotherapy, which will help to control disease, prolong life and palliate symptoms.  We discussed regarding different regimens including FOLFIRINOX and gemcitabine with Abraxane.     The patient is weak.  He is symptomatic with ascites.  I will favor starting him on gemcitabine and Abraxane.  Regimen was discussed.  Side effects of chemotherapy discussed.  He is agreeable for it.  We will plan on starting it in the next few days.     3.  We will send his biopsy specimen for [facility].    4.  Discussed regarding Port-A-Cath placement.  He is agreeable for it.  That will be scheduled.    5.  He has abdominal pain.  This is secondary to malignancy.  We will start him on morphine as needed.    6.  He has thrombosis from malignancy.  He will continue on Eliquis.    7.  We will set him up to see Palliative Care.    8.  He and his wife had multiple questions, which were all answered.  He will be seen when he comes to start chemotherapy.     Total visit time of 45 minutes.  Time spent in today's visit, review of chart/investigations today, discussion regarding chemotherapy and documentation today.

## 2022-10-02 NOTE — PROGRESS NOTES
Non billing PN  Patient admitted early this AM.    H and P and consult reviewed.    Cameron Barnard is a 72 year old male who presents with abdominal pain, sob     Pericardial effusion   *Recently dx adenocarcinoma of pancreas 9/2022  *Just admitted 9/20-23 for afib. Pericardial effusion noted during admit, echo w/o tamponade, small-mod posterior effusion.   *1st chemo day prior to presentation. Woke this am with LUQ abdominal pain. Also with SOB which he thinks get better with hydration. Today nausea. In ED tachy ~100s, -110 systolic (note BP low at discharge 109 systolic), O2 sats normal. Labs BNP 2023. Troponin normal. WBC 22.2.    *echo ED EF 60-65%, large pericardial effusion noted (larger than recent echo), no tamponade, findings suggestive of effusive constrictive physiology.   *CT abdomen unchanged except larger pericardial effusion, increased abdominal ascites.   - NPO >> start diet  - telemetry   - IV fluids  - cardiology consult noted, no indication for acute pericardiocentesis, observation and recheck in a few days.     Recent dx stage IV metastatic adenocarcinoma of pancreas  [Morphine IR 15 mg q4 hours prn, pantoprazole 40 mg daily]  Follows with [facility] oncology. Presented to ED with abdominal pain 9/9. Found to have pancreatic head mass and masses throughout liver. Also with malignant ascites. US biopsy by IR with adenocarcinoma of the pancreas. Underwent paracentesis x 2 9/2022. Started Gemzar/Abraxane 9/30/22.   - [facility] oncology consult - consult note reviewed  - prn Morphine IR 15 mg q4 hours prn available  - paracentesis (therapeutic) in the am- had paracentesis 4.2L  Oncology consulted     Hyponatremia  Hyperkalemia, mild  Anion gap acidosis  Na 127 (recent 128-132), K 5.4 (recent 4.8-5.6). AG is 16, doesn't look toxic.   - check lactic is 1.8 and normal  - IV fluids >>  SL IVF  - kelma for elevated K     GUTIERREZ  CKD III  Hyponatremia  Previous baseline creatinine 0.8-1.1. 9/29 1.6, on presentation at  1.3. possible volume depletion vs other.   - IV fluids   - avoid nephrotoxins   - monitor  - Cr improved with IVF 1.38 >> 1.24,      Abdominal pain  Elevated bilirubin, AST  Bili on presentation at 1.5 (9/30 was 0.9). AST sl up at 63. New pain for admission is in RUQ.   - check RUQ US     Leukocytosis  Has had recent leukocytosis WBC 15-17. On presentation WBC 22.2. afebrile in ED. UA marcelina.  - monitor off abx for now     Pulmonary embolism 9/2022  Portal vein thrombosis  [apixaban 5 mg BID]  Had hx PE 5 years prior, had been on Eliquis and this and was stopped. 9/9 was found to have small RLL pulmonary embolism as well as portal vein thrombus.   - hold apixaban  - start low dose heparin per oncology with recent PE and risk of thrombosis given pancreatic cancer     Atrial fibrillation   [apixaban, diltiazem 180 mg daily]  New as of 9/20/22 admission. Discharged on diltiazem   - resume diltiazem     Constipation  -- no BM for a week  -- start scheduled miralax and dulcolax supp    COVID-19 negative     DVT Prophylaxis: iv heparin (low intensity without boluses)    Code Status: Full Code

## 2022-10-02 NOTE — CONSULTS
[facility]    Cardiology Consultation     Date of Admission:  10/1/2022    Assessment & Plan   Cameron Barnard is a 72 year old male who was admitted on 10/1/2022.  He was seen by Dr. Mala Orellana who is his primary cardiologist.    Assessment and plan  1.  Moderate pericardial effusion, patient continues to have moderate pericardial effusion with some echodense deposit which could be fibrinous deposit.  Cannot exclude mets.  At this time, the pedicle effusion slightly increased in size compared to previous echocardiogram but no clear evidence of tamponade.  There is no evidence of RV diastolic collapse.  There is some increased mitral inflow variation.  At this time, given hemodynamic stability, I would suggest continued observation and repeat echocardiogram in a few days.  I have reviewed the echo images myself.  I also explained to him that use of blood thinner and Eliquis can worsen effusion.  If there is worsening effusion and increasing shortness of breath or hemodynamic instability, he will need a pericardial tap.    2.  Metastatic pancreatic cancer, on palliative chemotherapy  This is causing his ascites and causing increasing shortness of breath related to abdominal fluid accumulation and decreased diaphragmatic excursion.  He likely will benefit from abdominal paracentesis.  Defer to hospitalist and the oncology team.  His abdominal pain could be related to ascites as well as the recent diagnosis of portal vein thrombosis.    3.  Recent diagnosis of atrial fibrillation, continue rate control approach.  On diltiazem ER.    4.  Portal vein thrombosis, likely related to his cancer.    5.  Recent PE, management per hospitalist    6.  Acute on chronic renal failure, creatinine 1.38 on admission and now improved    7.  Hyponatremia and hyperkalemia    At this time, we will continue to follow him and repeat an echocardiogram in couple days to reassess his medical effusion.  His prognosis is  poor given his metastatic cancer.  The medical decision making today was of high complexity.  I discussed the plan with the patient.    This is likely  Montana Winn MD, MD    Primary Care Physician   Gary Bey    Reason for Consult   Reason for consult: I was asked by hospitalist for evaluation of pericardial effusion    History of Present Illness   Cameron Barnard is a 72 year old male with metastatic pancreatic cancer with mets to the liver as well as peritoneal ascites who presents with increasing shortness of breath.      He was also admitted in September with atrial fibrillation and was noted to have moderate pericardial effusion.  This was managed conservatively as there was no evidence of tamponade.  Patient tells me that he has been having paracentesis.  After paracentesis he feels better but once the fluid reaccumulate's, he has increasing shortness of breath.  He also had right upper abdominal discomfort on this admission associated some nausea.      We were consulted because of pericardial effusion.  Repeat echocardiogram done and when compared to previous study shows slight increase in the pericardial effusion size with fibrinous deposit around the pericardium but again no evidence of RV diastolic collapse and does not suggestive of tamponade.  Ejection fraction is normal.      He also has recent diagnosis of portal vein thrombosis as well as pulmonary embolism and because of atrial fibrillation is on Eliquis.  He also is on palliative chemotherapy with Gemzar and Abraxane.      He denies any chest pain.  No orthopnea.  He has some leg edema which is stable.    Patient Active Problem List   Diagnosis     Anticoagulation monitoring, INR range 2-3     Ascending aorta dilation (H)     Deep vein thrombosis of left femoral vein (H)     Primary osteoarthritis of both knees     Pulmonary embolism (H)     Diverticulitis     Bowel obstruction (H)     History of bowel resection     Lymphedema of left  lower extremity     S/P total knee arthroplasty     Ventral hernia without obstruction or gangrene     Abdominal mass     Abdominal wall hematoma, initial encounter     Anticoagulated     Postprocedural hematoma of abdominal wall     S/P revision of total knee, right     Abdominal pain, generalized     Splenic infarct     Malignant neoplasm of pancreas, unspecified location of malignancy (H)     Other acute pulmonary embolism, unspecified whether acute cor pulmonale present (H)     Malignant neoplasm of head of pancreas (H)     Pericardial effusion     Atrial fibrillation with rapid ventricular response (H)       Past Medical History   I have reviewed this patient's medical history and updated it with pertinent information if needed.   Past Medical History:   Diagnosis Date     Allergic state      DVT (deep venous thrombosis) (H)     chronic left femoral DVT     HLD (hyperlipidemia)      Hypertension     white coat syndrome     Multiple subsegmental pulmonary emboli without acute cor pulmonale (H) 2017     Perforated bowel (H) 09/2020     Personal history of deep vein thrombosis     DISTAL VEIN  LLE     Primary osteoarthritis of both knees      Pulmonary embolism (H)      Pulmonary embolus (H)        Past Surgical History   I have reviewed this patient's surgical history and updated it with pertinent information if needed.  Past Surgical History:   Procedure Laterality Date     ARTHROPLASTY KNEE Left 2/10/2021    Procedure: Left total knee arthroplasty;  Surgeon: Gigi De Oliveira MD;  Location: [facility] OR     ARTHROPLASTY KNEE Right 2/4/2022    Procedure: RIGHT TOTAL KNEE ARTHROPLASTY;  Surgeon: Gigi De Oliveira MD;  Location:  OR     COLONOSCOPY N/A 9/19/2020    Procedure: COLONOSCOPY;  Surgeon: Lakshmi Santana MD;  Location:  OR     ESOPHAGOSCOPY, GASTROSCOPY, DUODENOSCOPY (EGD), COMBINED N/A 9/19/2020    Procedure: ESOPHAGOGASTRODUODENOSCOPY (EGD);  Surgeon: Lakshmi Santana MD;   Location: [facility] OR     HERNIORRHAPHY INCISIONAL (LOCATION) N/A 5/5/2021    Procedure: OPEN INCISIONAL HERNIA REPAIR;  Surgeon: Christ Parekh MD;  Location: [facility] OR     INJECT STEROID (LOCATION) Right 2/10/2021    Procedure: Right knee intra-articular injection of corticosteroid, 80 mg of Depo-Medrol;  Surgeon: Gigi De Oliveira MD;  Location: [facility] OR     IR CHEST PORT PLACEMENT > 5 YRS OF AGE  9/27/2022     IR PARACENTESIS  9/27/2022     LAPAROSCOPIC APPENDECTOMY N/A 9/27/2020    Procedure: Laparoscopic appendectomy;  Surgeon: Christ Parekh MD;  Location: [facility] OR     LAPAROSCOPY DIAGNOSTIC (GENERAL) N/A 9/27/2020    Procedure: LAPAROSCOPIC  SMALL BOWEL RESCECTION;  Surgeon: Christ Parekh MD;  Location: [facility] OR     LAPAROTOMY EXPLORATORY N/A 5/10/2021    Procedure: EXPLORATORY LAPAROTOMY AND EVACUATION OF HEMATOMA;  Surgeon: Hari Ceballos MD;  Location: [facility] OR       Prior to Admission Medications   Prior to Admission Medications   Prescriptions Last Dose Informant Patient Reported? Taking?   apixaban ANTICOAGULANT (ELIQUIS) 5 MG tablet   No No   Sig: Take 1 tablet (5 mg) by mouth 2 times daily for 30 days   diltiazem ER COATED BEADS (CARDIZEM CD/CARTIA XT) 180 MG 24 hr capsule   No No   Sig: Take 1 capsule (180 mg) by mouth daily for 30 days   lactobacillus rhamnosus (GG) (CULTURELL) capsule  Self Yes No   Sig: Take 1 capsule by mouth daily   morphine (MSIR) 15 MG IR tablet   No No   Sig: Take 1 tablet (15 mg) by mouth every 4 hours as needed for severe pain   pantoprazole (PROTONIX) 40 MG EC tablet   No No   Sig: Take 1 tablet (40 mg) by mouth every morning (before breakfast)   prochlorperazine (COMPAZINE) 10 MG tablet   No No   Sig: Take 1 tablet (10 mg) by mouth every 6 hours as needed for nausea or vomiting   sennosides (SENOKOT) 8.6 MG tablet   Yes No   Sig: Take 1 tablet by mouth daily      Facility-Administered Medications: None     Current Facility-Administered Medications  "  Medication Dose Route Frequency     diltiazem ER COATED BEADS  180 mg Oral Daily     miconazole   Topical BID     pantoprazole  40 mg Oral QAM AC     sodium chloride (PF)  3 mL Intracatheter Q8H     Current Facility-Administered Medications   Medication Last Rate     - MEDICATION INSTRUCTIONS -       sodium chloride 100 mL/hr at 10/02/22 0429     Allergies   Allergies   Allergen Reactions     Seasonal Allergies Other (See Comments)     Runny nose, watery eyes       Social History    reports that he has never smoked. He has never used smokeless tobacco. He reports current alcohol use. He reports that he does not use drugs.    Family History   Family History   Problem Relation Age of Onset     Breast Cancer Sister        Review of Systems   The comprehensive 10 point Review of Systems is negative other than noted in the HPI or here.     Physical Exam   Vital Signs with Ranges  Temp:  [97.5  F (36.4  C)-97.9  F (36.6  C)] 97.5  F (36.4  C)  Pulse:  [] 90  Resp:  [18-20] 20  BP: (100-127)/(62-85) 127/67  SpO2:  [92 %-96 %] 95 %  Wt Readings from Last 4 Encounters:   10/02/22 133.4 kg (294 lb 1.6 oz)   09/30/22 131.9 kg (290 lb 12.8 oz)   09/23/22 134.1 kg (295 lb 9.6 oz)   09/19/22 138.8 kg (306 lb)     I/O last 3 completed shifts:  In: 425 [I.V.:425]  Out: -       Vitals: /67 (BP Location: Right arm)   Pulse 90   Temp 97.5  F (36.4  C) (Oral)   Resp 20   Ht 1.854 m (6' 1\")   Wt 133.4 kg (294 lb 1.6 oz)   SpO2 95%   BMI 38.80 kg/m      Constitutional:   moderately obese   Eyes:   extra-ocular muscles intact   ENT:   atramatic   Neck:   no jugular venous distension   Hematologic / Lymphatic:   pallor   Back:   symmetric   Lungs:   decerased AE at bases   Cardiovascular:   normal S1 and S2 and no murmur noted, 1+ leg edema   Abdomen:   ascites present large     Neurologic:   No focal deficits   Neuropsychiatric:   Orientation: oriented to self, place, time and situation   Skin:   no lesions       No " lab results found in last 7 days.    Invalid input(s): TROPONINIES    Recent Labs   Lab 10/02/22  0653 10/01/22  1631 09/30/22  1117 09/29/22  0754 09/29/22  0754 09/27/22  1008   WBC 17.8* 22.2*  --   --  17.0*  --    HGB 13.5 13.9  --   --  14.9  --    MCV 90 90  --   --  91  --     407  --   --  534*  --    INR  --  1.70*  --   --   --  1.71*   * 127* 129*   < > 128*  --    POTASSIUM 5.7* 5.4* 5.4*   < > 5.6*  --    CHLORIDE 99 97 98   < > 94  --    CO2 23 14* 26   < > 25  --    BUN 59* 59* 60*   < > 61*  --    CR 1.24 1.30* 1.38*   < > 1.61*  --    GFRESTIMATED 61 58* 54*   < > 45*  --    ANIONGAP 6 16* 5   < > 9  --    PETER 7.9* 8.1* 7.8*   < > 8.3*  --    * 131* 122*   < > 118*  --    ALBUMIN 1.8* 1.8* 1.7*   < > 1.9*  --    PROTTOTAL 5.9* 6.1* 5.7*   < > 6.4*  --    BILITOTAL 1.3 1.5* 0.9   < > 0.8  --    ALKPHOS 123 139 112   < > 103  --    ALT 58 34 18   < > 17  --    * 63* 31   < > 24  --    LIPASE  --  58*  --   --   --   --     < > = values in this interval not displayed.     Recent Labs   Lab Test 05/13/21  0743   CHOL 125   HDL 40   LDL 63   TRIG 109     Recent Labs   Lab 10/02/22  0653 10/01/22  1631 09/29/22  0754   WBC 17.8* 22.2* 17.0*   HGB 13.5 13.9 14.9   HCT 41.5 42.9 45.8   MCV 90 90 91    407 534*     No results for input(s): PH, PHV, PO2, PO2V, SAT, PCO2, PCO2V, HCO3, HCO3V in the last 168 hours.  Recent Labs   Lab 10/01/22  1631   NTBNPI 2,023*     No results for input(s): DD in the last 168 hours.  No results for input(s): SED, CRP in the last 168 hours.  Recent Labs   Lab 10/02/22  0653 10/01/22  1631 09/29/22  0754    407 534*     No results for input(s): TSH in the last 168 hours.  Recent Labs   Lab 10/01/22  1631   COLOR Yellow   APPEARANCE Clear   URINEGLC Negative   URINEBILI Negative   URINEKETONE Negative   SG 1.010   UBLD Negative   URINEPH 5.5   PROTEIN 20 *   NITRITE Negative   LEUKEST Negative   RBCU 2   WBCU <1       Imaging:  Recent  Results (from the past 48 hour(s))   CT Chest (PE) Abdomen Pelvis w Contrast    Narrative    EXAM: CT CHEST PE ABDOMEN PELVIS W CONTRAST  LOCATION: [facility] [place]  DATE/TIME: 10/1/2022 6:15 PM    INDICATION: Dyspnea, LUQ abd pain, pleuritic.  COMPARISON: 09/20/2022.  TECHNIQUE: CT chest pulmonary angiogram and routine CT abdomen pelvis with IV contrast. Arterial phase through the chest and venous phase through the abdomen and pelvis. Multiplanar reformats and MIP reconstructions were performed. Dose reduction   techniques were used.   CONTRAST: 135 mL Isovue 370.        FINDINGS:  ANGIOGRAM CHEST: Pulmonary arteries are normal caliber and negative for pulmonary emboli. Thoracic aorta is negative for dissection. There is minimal reflux of contrast in the IVC which can be associated with right heart strain.     LUNGS AND PLEURA: There is slight dependent atelectasis seen in both lung bases.    MEDIASTINUM/AXILLAE: Since 09/20/2022 there has been a significant increase in size of the pericardial effusion, for instance the right pericardium anteriorly shows pleural fluid measuring approximately 3.3 cm in greatest radial dimension and previously   at this same site the right pericardium measured 0.3 cm. Otherwise the mediastinum is stable.    CORONARY ARTERY CALCIFICATION: Mild.    HEPATOBILIARY: Since 09/20/2022 there are stable abnormal scattered low density lesions seen in both lobes of the liver worrisome for changes of metastasis. The gallbladder shows significant findings of cholelithiasis with no raphael evidence for   cholecystitis. The bile ducts are normal in caliber.    PANCREAS: Mass consistent with neoplasm involving the pancreatic head which abuts the hepatic artery and encases portions of the splenic artery. 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    SPLEEN: Normal.    ADRENAL GLANDS: Normal.    KIDNEYS/BLADDER:  Normal.    BOWEL: Prior postoperative changes with no complications. Diverticulosis with no findings of diverticulitis.    LYMPH NODES: Normal.    VASCULATURE: Mild to moderate calcification no aneurysmal dilatation.    PELVIC ORGANS: Normal.    MUSCULOSKELETAL: There is a moderate amount of abdominal and pelvic ascites with significant nodularity seen in the peritoneum which may be associated with findings of neoplasm. Degenerative disc disease at L4 and L5.      Impression    IMPRESSION:  1.  No significant change since 2022 CT is that there has been a significant increase in size of the pericardial effusion which is now advanced, there has been a moderate increase in the amount of abdominal/pelvic ascites, and the bilateral pleural   effusions have decreased in size and are now minimal.    2.  Stable findings of malignancy.    3.  No PE.   Echocardiogram Limited   Result Value    LVEF  60-65%    Narrative    [id number]  [id number]  [id number]  [id number]^ROSA ISELA^DIEGO^JACK     [facility]  Echocardiography Laboratory  [address] [place] [address]     Name: CATHERINE NORIEGA  MRN: [id number]  : 1950  Study Date: 10/01/2022 08:06 PM  Age: 72 yrs  Gender: Male  Patient Location: [facility] [address]  Reason For Study: Pericardial Effusion  Ordering Physician: DIEGO NATARAJAN  Performed By: Cassi Morris     BSA: 2.5 m2  Height: 72 in  Weight: 290 lb  HR: 94  BP: 112/67 mmHg  ______________________________________________________________________________  Procedure  Limited Portable Echo Adult.  ______________________________________________________________________________  Interpretation Summary     Left ventricular systolic function is normal.  The visual ejection fraction is 60-65%.  The right ventricle is normal in structure, function and size.  Large pericardial effusion measuring max diameter inferolaterally (>3.0 cm)  with moderate effusion anteriorly adjacent to the right ventricle (<2.0  cm).  There is mild variation (<35%) of the tricuspid and mitral valve inflow  velocity profiles suggestive of effusive constrictive physiology, however no  diastolic collapse of the RV to suggest tamponade. Clinical correlation  required.  IVC appears normal.     Compared to recent study 9/20/2022 effusion is slightly larger. Careful  echocardiogram surveillance is recommended.  ______________________________________________________________________________  Left Ventricle  The left ventricle is normal in size. There is normal left ventricular wall  thickness. Left ventricular systolic function is normal. The visual ejection  fraction is 60-65%. Normal left ventricular wall motion.     Right Ventricle  The right ventricle is normal in structure, function and size.     Atria  Normal left atrial size. Right atrial size is normal.     Mitral Valve  The mitral valve is normal in structure and function.     Tricuspid Valve  The tricuspid valve is normal in structure and function. Right ventricular  systolic pressure could not be approximated due to inadequate tricuspid  regurgitation.     Aortic Valve  There is mild trileaflet aortic sclerosis.     Pulmonic Valve  The pulmonic valve is normal in structure and function.     Vessels  The aortic root is normal size. The ascending aorta is Mildly dilated. The  inferior vena cava was normal in size with preserved respiratory variability.     Pericardium  Large pericardial effusion. The mitral valve inflow Doppler reveals no  significant respiratory variation. The tricuspid valve inflow Doppler reveals  no significant respiratory variation.     ______________________________________________________________________________  Report approved by: Kevin Treviño 10/01/2022 10:08 PM     ______________________________________________________________________________          Echo:  No results found for this or any previous visit (from the past 4320 hour(s)).    Clinically  "Significant Risk Factors Present on Admission       # Hyperkalemia: K = 5.7 mmol/L (Ref range: 3.4 - 5.3 mmol/L) on admission, will monitor as appropriate  # Hyponatremia: Na = 128 mmol/L (Ref range: 133 - 144 mmol/L) on admission, will monitor as appropriate     # Hypoalbuminemia: Albumin = 1.8 g/dL (Ref range: 3.4 - 5.0 g/dL) on admission, will monitor as appropriate   # Coagulation Defect: home medication list includes an anticoagulant medication    # Obesity: Estimated body mass index is 38.8 kg/m  as calculated from the following:    Height as of this encounter: 1.854 m (6' 1\").    Weight as of this encounter: 133.4 kg (294 lb 1.6 oz).      "

## 2022-10-02 NOTE — ED NOTES
Pt reports that pain has resolved. C/o feeling warm. Cold washcloth placed on forehead and ice pack placed behind his back.

## 2022-10-02 NOTE — H&P
[facility]    History and Physical  Hospitalist       Date of Admission:  10/1/2022  Date of Service (when I saw the patient): 10/02/22    Assessment & Plan   Cameron Barnard is a 72 year old male who presents with abdominal pain, sob    Pericardial effusion   *Recently dx adenocarcinoma of pancreas 9/2022  *Just admitted 9/20-23 for afib. Pericardial effusion noted during admit, echo w/o tamponade, small-mod posterior effusion.   *1st chemo day prior to presentation. Woke this am with LUQ abdominal pain. Also with SOB which he thinks get better with hydration. Today nausea. In ED tachy ~100s, -110 systolic (note BP low at discharge 109 systolic), O2 sats normal. Labs BNP 2023. Troponin normal. WBC 22.2.    *echo ED EF 60-65%, large pericardial effusion noted (larger than recent echo), no tamponade, findings suggestive of effusive constrictive physiology.   *CT abdomen unchanged except larger pericardial effusion, increased abdominal ascites.   - NPO  - telemetry   - IV fluids  - cardiology consult    Recent dx stage IV metastatic adenocarcinoma of pancreas  [Morphine IR 15 mg q4 hours prn, pantoprazole 40 mg daily]  Follows with [facility] oncology. Presented to ED with abdominal pain 9/9. Found to have pancreatic head mass and masses throughout liver. Also with malignant ascites. US biopsy by IR with adenocarcinoma of the pancreas. Underwent paracentesis x 2 9/2022. Started Gemzar/Abraxane 9/30/22.   - [facility] oncology consult  - prn Morphine IR 15 mg q4 hours prn available  - paracentesis (therapeutic) in the am    Hyponatremia  Hyperkalemia, mild  Anion gap acidosis  Na 127 (recent 128-132), K 5.4 (recent 4.8-5.6). AG is 16, doesn't look toxic.   - check lactic  - IV fluids  - repeat labs in the am    GUTIERREZ  CKD III  Previous baseline creatinine 0.8-1.1. 9/29 1.6, on presentation at 1.3. possible volume depletion vs other.   - IV fluids   - avoid nephrotoxins   - monitor    Abdominal pain  Elevated  bilirubin, AST  Bili on presentation at 1.5 (9/30 was 0.9). AST sl up at 63. New pain for admission is in RUQ.   - check RUQ US    Leukocytosis  Has had recent leukocytosis WBC 15-17. On presentation WBC 22.2. afebrile in ED. UA bland.  - monitor off abx for now    Pulmonary embolism 9/2022  Portal vein thrombosis  [apixaban 5 mg BID]  Had hx PE 5 years prior, had been on Eliquis and this and was stopped. 9/9 was found to have small RLL pulmonary embolism as well as portal vein thrombus.   - hold apixaban in am for possible procedure, resume ASAP    Atrial fibrillation   [apixaban, diltiazem 180 mg daily]  New as of 9/20/22 admission. Discharged on diltiazem   - resume diltiazem    COVID-19 negative    DVT Prophylaxis: DOAC  Code Status: Full Code    Disposition: Expected discharge in 3-5 days     Maverick Montemayor MD, MD  [phone] (P)  Text Page     Primary Care Physician   Gary Bey    Chief Complaint   Abdominal pain, sob    History is obtained from the patient and medical records    History of Present Illness   Cameron Barnard is a 72 year old male who presents with abdominal pain this delightfully pleasant but unfortunate gentleman was recently diagnosed with metastatic pancreatic cancer last month.  He was having abdominal pain and in the emergency department at that time was found to have pancreatic mass.  Biopsy was done and showed adenocarcinoma.  He also was found to have pulmonary embolism as well as portal vein thrombosis.  He returned to the hospital about a week and a half ago with rapid A. fib and was placed on diltiazem.  He also has undergone paracenteses for his ascites which is malignant.  He notes that he she had shortness of breath for about 3 weeks.  He states it fluctuates and thinks that if he hydrates himself shortness of breath is better.  However, when his ascites gets worse he has poor p.o. intake and becomes dehydrated.  He states he notes a cycle where he will have paracenteses,  then does well for couple days and then started to have poor p.o. intake again until his next paracentesis.  His shortness of breath is worse with exertion.  He denies any chest pain.  He also notes on this admission that he has worsening right upper quadrant pain which is new.  He denies any fevers or chills but does note he had a diaphoretic episode emergency department.  He has other pain for his cancer as a dull ache for which she presented to the hospital a month ago.  He denies any cough or urinary symptoms.  He does feel his edema is getting worse.  He had some nausea today.    Past Medical History    I have reviewed this patient's medical history and updated it with pertinent information if needed.   Past Medical History:   Diagnosis Date     Allergic state      DVT (deep venous thrombosis) (H)     chronic left femoral DVT     HLD (hyperlipidemia)      Hypertension     white coat syndrome     Multiple subsegmental pulmonary emboli without acute cor pulmonale (H) 2017     Perforated bowel (H) 09/2020     Personal history of deep vein thrombosis     DISTAL VEIN  LLE     Primary osteoarthritis of both knees      Pulmonary embolism (H)      Pulmonary embolus (H)        Past Surgical History   I have reviewed this patient's surgical history and updated it with pertinent information if needed.  Past Surgical History:   Procedure Laterality Date     ARTHROPLASTY KNEE Left 2/10/2021    Procedure: Left total knee arthroplasty;  Surgeon: Gigi De Oliveira MD;  Location:  OR     ARTHROPLASTY KNEE Right 2/4/2022    Procedure: RIGHT TOTAL KNEE ARTHROPLASTY;  Surgeon: Gigi De Oliveira MD;  Location:  OR     COLONOSCOPY N/A 9/19/2020    Procedure: COLONOSCOPY;  Surgeon: Lakshmi Santana MD;  Location:  OR     ESOPHAGOSCOPY, GASTROSCOPY, DUODENOSCOPY (EGD), COMBINED N/A 9/19/2020    Procedure: ESOPHAGOGASTRODUODENOSCOPY (EGD);  Surgeon: Lakshmi Santana MD;  Location:  OR      HERNIORRHAPHY INCISIONAL (LOCATION) N/A 5/5/2021    Procedure: OPEN INCISIONAL HERNIA REPAIR;  Surgeon: Christ Parekh MD;  Location: [facility] OR     INJECT STEROID (LOCATION) Right 2/10/2021    Procedure: Right knee intra-articular injection of corticosteroid, 80 mg of Depo-Medrol;  Surgeon: Gigi De Oliveira MD;  Location: [facility] OR     IR CHEST PORT PLACEMENT > 5 YRS OF AGE  9/27/2022     IR PARACENTESIS  9/27/2022     LAPAROSCOPIC APPENDECTOMY N/A 9/27/2020    Procedure: Laparoscopic appendectomy;  Surgeon: Christ Parekh MD;  Location: [facility] OR     LAPAROSCOPY DIAGNOSTIC (GENERAL) N/A 9/27/2020    Procedure: LAPAROSCOPIC  SMALL BOWEL RESCECTION;  Surgeon: Christ Parekh MD;  Location: [facility] OR     LAPAROTOMY EXPLORATORY N/A 5/10/2021    Procedure: EXPLORATORY LAPAROTOMY AND EVACUATION OF HEMATOMA;  Surgeon: Hari Ceballos MD;  Location: [facility] OR       Prior to Admission Medications   Prior to Admission Medications   Prescriptions Last Dose Informant Patient Reported? Taking?   apixaban ANTICOAGULANT (ELIQUIS) 5 MG tablet   No No   Sig: Take 1 tablet (5 mg) by mouth 2 times daily for 30 days   diltiazem ER COATED BEADS (CARDIZEM CD/CARTIA XT) 180 MG 24 hr capsule   No No   Sig: Take 1 capsule (180 mg) by mouth daily for 30 days   lactobacillus rhamnosus (GG) (CULTURELL) capsule  Self Yes No   Sig: Take 1 capsule by mouth daily   morphine (MSIR) 15 MG IR tablet   No No   Sig: Take 1 tablet (15 mg) by mouth every 4 hours as needed for severe pain   pantoprazole (PROTONIX) 40 MG EC tablet   No No   Sig: Take 1 tablet (40 mg) by mouth every morning (before breakfast)   prochlorperazine (COMPAZINE) 10 MG tablet   No No   Sig: Take 1 tablet (10 mg) by mouth every 6 hours as needed for nausea or vomiting   sennosides (SENOKOT) 8.6 MG tablet   Yes No   Sig: Take 1 tablet by mouth daily      Facility-Administered Medications: None     Allergies   Allergies   Allergen Reactions     Seasonal Allergies Other  (See Comments)     Runny nose, watery eyes       Social History   I have reviewed this patient's social history and updated it with pertinent information if needed. Cameron Barnard  reports that he has never smoked. He has never used smokeless tobacco. He reports current alcohol use. He reports that he does not use drugs.    Family History   I have reviewed this patient's family history and updated it with pertinent information if needed.   Family History   Problem Relation Age of Onset     Breast Cancer Sister        Review of Systems   The 10 point Review of Systems is negative other than noted in the HPI or here.     Physical Exam   Temp: 97.8  F (36.6  C) Temp src: Oral BP: 114/73 Pulse: 101   Resp: 18 SpO2: 92 % O2 Device: None (Room air)    Vital Signs with Ranges  0 lbs 0 oz    Constitutional: alert, oriented and in no acute distress  Eyes: EOMI, PERRL  HEENT: OP clear  Respiratory: CTA B without w/c  Cardiovascular: RRR, distant heart sounds. 3+ edema.  GI: soft, morbidly obese, minimally tender  Lymph/Hematologic: no cervical LAD  Genitourinary: deferred  Skin: no rashes or lesions grossly  Musculoskeletal: no deformities or arthritis  Neurologic: CN II-XII, GREY  Psychiatric: mood and affect wnl    Data   Data reviewed today:  I personally reviewed the abdominal CT image(s) showing no new pathology, ascites and the echo image(s) showing pericardial effusion .  Recent Labs   Lab 10/01/22  1631 09/30/22  1117 09/29/22  0754 09/29/22  0754 09/27/22  1008   WBC 22.2*  --   --  17.0*  --    HGB 13.9  --   --  14.9  --    MCV 90  --   --  91  --      --   --  534*  --    INR 1.70*  --   --   --  1.71*   * 129*  --  128*  --    POTASSIUM 5.4* 5.4*  --  5.6*  --    CHLORIDE 97 98  --  94  --    CO2 14* 26  --  25  --    BUN 59* 60*  --  61*  --    CR 1.30* 1.38*  --  1.61*  --    ANIONGAP 16* 5  --  9  --    PETER 8.1* 7.8*  --  8.3*  --    * 122*  --  118*  --    ALBUMIN 1.8* 1.7*   < > 1.9*  --     PROTTOTAL 6.1* 5.7*   < > 6.4*  --    BILITOTAL 1.5* 0.9   < > 0.8  --    ALKPHOS 139 112   < > 103  --    ALT 34 18   < > 17  --    AST 63* 31   < > 24  --    LIPASE 58*  --   --   --   --     < > = values in this interval not displayed.       Recent Results (from the past 24 hour(s))   CT Chest (PE) Abdomen Pelvis w Contrast    Narrative    EXAM: CT CHEST PE ABDOMEN PELVIS W CONTRAST  LOCATION: [facility]  DATE/TIME: 10/1/2022 6:15 PM    INDICATION: Dyspnea, LUQ abd pain, pleuritic.  COMPARISON: 09/20/2022.  TECHNIQUE: CT chest pulmonary angiogram and routine CT abdomen pelvis with IV contrast. Arterial phase through the chest and venous phase through the abdomen and pelvis. Multiplanar reformats and MIP reconstructions were performed. Dose reduction   techniques were used.   CONTRAST: 135 mL Isovue 370.        FINDINGS:  ANGIOGRAM CHEST: Pulmonary arteries are normal caliber and negative for pulmonary emboli. Thoracic aorta is negative for dissection. There is minimal reflux of contrast in the IVC which can be associated with right heart strain.     LUNGS AND PLEURA: There is slight dependent atelectasis seen in both lung bases.    MEDIASTINUM/AXILLAE: Since 09/20/2022 there has been a significant increase in size of the pericardial effusion, for instance the right pericardium anteriorly shows pleural fluid measuring approximately 3.3 cm in greatest radial dimension and previously   at this same site the right pericardium measured 0.3 cm. Otherwise the mediastinum is stable.    CORONARY ARTERY CALCIFICATION: Mild.    HEPATOBILIARY: Since 09/20/2022 there are stable abnormal scattered low density lesions seen in both lobes of the liver worrisome for changes of metastasis. The gallbladder shows significant findings of cholelithiasis with no raphael evidence for   cholecystitis. The bile ducts are normal in caliber.    PANCREAS: Mass consistent with neoplasm involving the pancreatic head  which abuts the hepatic artery and encases portions of the splenic artery. 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    SPLEEN: Normal.    ADRENAL GLANDS: Normal.    KIDNEYS/BLADDER: Normal.    BOWEL: Prior postoperative changes with no complications. Diverticulosis with no findings of diverticulitis.    LYMPH NODES: Normal.    VASCULATURE: Mild to moderate calcification no aneurysmal dilatation.    PELVIC ORGANS: Normal.    MUSCULOSKELETAL: There is a moderate amount of abdominal and pelvic ascites with significant nodularity seen in the peritoneum which may be associated with findings of neoplasm. Degenerative disc disease at L4 and L5.      Impression    IMPRESSION:  1.  No significant change since 2022 CT is that there has been a significant increase in size of the pericardial effusion which is now advanced, there has been a moderate increase in the amount of abdominal/pelvic ascites, and the bilateral pleural   effusions have decreased in size and are now minimal.    2.  Stable findings of malignancy.    3.  No PE.   Echocardiogram Limited   Result Value    LVEF  60-65%    Narrative    [id number]  [id number]  [id number]  [id number]^ROSA ISELA^DIEGO^JACK     [facility]  Echocardiography Laboratory  [address] [place] [address]     Name: CATHERINE NORIEGA  MRN: [id number]  : 1950  Study Date: 10/01/2022 08:06 PM  Age: 72 yrs  Gender: Male  Patient Location: [facility]  Reason For Study: Pericardial Effusion  Ordering Physician: DIEGO NATARAJAN  Performed By: Cassi Morris     BSA: 2.5 m2  Height: 72 in  Weight: 290 lb  HR: 94  BP: 112/67 mmHg  ______________________________________________________________________________  Procedure  Limited Portable Echo Adult.  ______________________________________________________________________________  Interpretation Summary     Left ventricular systolic function is normal.  The  visual ejection fraction is 60-65%.  The right ventricle is normal in structure, function and size.  Large pericardial effusion measuring max diameter inferolaterally (>3.0 cm)  with moderate effusion anteriorly adjacent to the right ventricle (<2.0 cm).  There is mild variation (<35%) of the tricuspid and mitral valve inflow  velocity profiles suggestive of effusive constrictive physiology, however no  diastolic collapse of the RV to suggest tamponade. Clinical correlation  required.  IVC appears normal.     Compared to recent study 9/20/2022 effusion is slightly larger. Careful  echocardiogram surveillance is recommended.  ______________________________________________________________________________  Left Ventricle  The left ventricle is normal in size. There is normal left ventricular wall  thickness. Left ventricular systolic function is normal. The visual ejection  fraction is 60-65%. Normal left ventricular wall motion.     Right Ventricle  The right ventricle is normal in structure, function and size.     Atria  Normal left atrial size. Right atrial size is normal.     Mitral Valve  The mitral valve is normal in structure and function.     Tricuspid Valve  The tricuspid valve is normal in structure and function. Right ventricular  systolic pressure could not be approximated due to inadequate tricuspid  regurgitation.     Aortic Valve  There is mild trileaflet aortic sclerosis.     Pulmonic Valve  The pulmonic valve is normal in structure and function.     Vessels  The aortic root is normal size. The ascending aorta is Mildly dilated. The  inferior vena cava was normal in size with preserved respiratory variability.     Pericardium  Large pericardial effusion. The mitral valve inflow Doppler reveals no  significant respiratory variation. The tricuspid valve inflow Doppler reveals  no significant respiratory variation.     ______________________________________________________________________________  Report  approved by: Vero Casillas, MDon 10/01/2022 10:08 PM     ______________________________________________________________________________

## 2022-10-02 NOTE — PROGRESS NOTES
RECEIVING UNIT ED HANDOFF REVIEW    ED Nurse Handoff Report was reviewed by: Adama Blake RN on October 2, 2022 at 12:24 AM

## 2022-10-02 NOTE — PLAN OF CARE
Goal Outcome Evaluation:  Overnight ED admission to [facility]. VSS ex tachy HR in the low 100. Tele Afib w/RVR & CVR at times. APONTE , denies discomfort or any pain. Abdomen fold redness mostly on the left. Scattered skin ecchymotic-bruises and erinn BLE edema-elevated with pillows, lymphedema stockings on. Up with 1A/GB/W. NPO. Plans for hema/onc, cardiology, PT & US abdomen limited & US paracentesis without albumin

## 2022-10-02 NOTE — ED NOTES
[facility]  ED Nurse Handoff Report    ED Chief complaint: Abdominal Pain and Shortness of Breath      ED Diagnosis:   Final diagnoses:   None       Code Status: Full Code    Allergies:   Allergies   Allergen Reactions     Seasonal Allergies Other (See Comments)     Runny nose, watery eyes       Patient Story: Pt presents from home with abd pain and SOB. Pt has hx of pancreatic cancer with recent port placement and started chemo this week.  Focused Assessment:  Pt has been feeling weak since starting chemo. Was at the clinic yesterday and given fluids for dehydration. Pt is alert and orientated x4. Generally weak but able to move around with a walker but it wears him out. Given 500cc NS bolus. CT shows increased pleural effusions. ECHO pending.    Treatments and/or interventions provided: see above  Patient's response to treatments and/or interventions: see above    To be done/followed up on inpatient unit:  none pending    Does this patient have any cognitive concerns?: none    Activity level - Baseline/Home:  Walker  Activity Level - Current:   Walker    Patient's Preferred language: English   Needed?: No    Isolation: None  Infection: Not Applicable  Patient tested for COVID 19 prior to admission: YES  Bariatric?: No    Vital Signs:   Vitals:    10/01/22 1559 10/01/22 1601 10/01/22 1842   BP:  125/69 112/67   Pulse: 100  84   Resp: 18     Temp: 97.8  F (36.6  C)     TempSrc: Oral     SpO2: 95%  96%       Cardiac Rhythm:     Was the PSS-3 completed:   Yes  What interventions are required if any?               Family Comments: Wife was present and very supportive. Has left tonight d/t poor night driving ability.  OBS brochure/video discussed/provided to patient/family: N/A              Name of person given brochure if not patient: N/A              Relationship to patient:  N/A    For the majority of the shift this patient's behavior was Green.   Behavioral interventions performed were  information.    ED NURSE PHONE NUMBER: [phone]

## 2022-10-02 NOTE — PROGRESS NOTES
10/02/22 1000   Quick Adds   Type of Visit Initial PT Evaluation   Living Environment   People in Home spouse   Current Living Arrangements house   Home Accessibility stairs within home   Number of Stairs, Within Home, Primary seven   Stair Railings, Within Home, Primary railings safe and in good condition;railing on right side (ascending)   Transportation Anticipated family or friend will provide   Self-Care   Usual Activity Tolerance moderate   Current Activity Tolerance moderate   Regular Exercise No   Equipment Currently Used at Home walker, rolling   Fall history within last six months no   Activity/Exercise/Self-Care Comment Started using a FWW 1 week ago   General Information   Onset of Illness/Injury or Date of Surgery 10/01/22   Referring Physician Dr. Montemayor   Patient/Family Therapy Goals Statement (PT) To go home   Pertinent History of Current Problem (include personal factors and/or comorbidities that impact the POC) Pt is a 72 year old male with stage IV pancreatic cancer admitted with abdominal pain following first round of chemotherapy.   Existing Precautions/Restrictions fall   Cognition   Affect/Mental Status (Cognition) WNL   Orientation Status (Cognition) oriented x 3   Pain Assessment   Patient Currently in Pain No   Range of Motion (ROM)   Range of Motion ROM is WFL   Strength (Manual Muscle Testing)   Strength Comments Generalized weakness noted throughout bilateral lower extremities   Bed Mobility   Comment, (Bed Mobility) SBA   Transfers   Comment, (Transfers) CGA   Gait/Stairs (Locomotion)   Comment, (Gait/Stairs) 5' FWW CGA, decreased step length with forward bent posture   Balance   Balance Comments Good in sitting, fair in standing   Clinical Impression   Criteria for Skilled Therapeutic Intervention Yes, treatment indicated   PT Diagnosis (PT) Impaired ambulation   Influenced by the following impairments Decreased strength, decreased endurance, decreased balance   Functional  limitations due to impairments Difficulty with bed mobility, transfers, ambulation, stair management   Clinical Presentation (PT Evaluation Complexity) Stable/Uncomplicated   Clinical Presentation Rationale VSS, pain controlled   Clinical Decision Making (Complexity) low complexity   Planned Therapy Interventions (PT) balance training;bed mobility training;gait training;patient/family education;strengthening;stair training;transfer training   Risk & Benefits of therapy have been explained evaluation/treatment results reviewed;care plan/treatment goals reviewed;risks/benefits reviewed;current/potential barriers reviewed;participants voiced agreement with care plan;participants included;patient   PT Discharge Planning   PT Discharge Recommendation (DC Rec) home with assist;home with home care physical therapy;Transitional Care Facility   PT Rationale for DC Rec At baseline, pt resides in a split level home with his wife, all needs met on top floor. Pt reports he started using a FWW last week 2/2 weakness and fatigue related to new cancer diagnosis. This date, pt requires CGA for mobility and is able to ambulate short distances, approximately 40'. Anticipate with further therapy and medical management, pt will progress mobility and be safe to discharge home with wife providing needed assistance. Pt will benefit from HHPT to further improve mobility. If patient does not progress as expected, or assistance is not available at discharge, patient may benefit from TCU to further improve mobility, independence and activity tolerance.   Plan of Care Review   Plan of Care Reviewed With patient   Total Evaluation Time   Total Evaluation Time (Minutes) 10   Physical Therapy Goals   PT Frequency Daily   PT Goals Bed Mobility;Transfers;Gait;Stairs   PT: Bed Mobility Independent;Supine to/from sit   PT: Transfers Modified independent;Sit to/from stand;Bed to/from chair;Assistive device   PT: Gait Modified independent;100  feet;Rolling walker   PT: Stairs Minimal assist;7 stairs;Rail on right

## 2022-10-02 NOTE — CONSULTS
[facility]    Hematology / Oncology Consultation     Date of Admission:  10/1/2022  Date of Consult (When I saw the patient): 10/02/22    Assessment & Plan   Cameron Barnard is a 72 year old male who was admitted on 10/1/2022. I was asked to see the patient for metastatic pancreatic cancer.      Abdominal pain, generalized    Malignant neoplasm of pancreas with malignant ascites and moderate to large pericardial effusion       Cameron has been recently diagnosed with metastatic pancreatic cancer and is being followed by Dr. Lesley Abdalla in Medical Oncology. He has been started on chemotherapy with gemcitabine and nab-paclitaxel with the first dose on 9/30/22. He has been admitted with worsening janina-umbilical abdominal pain and shortness of breath.     His malignant ascites, pericardial effusion are likely contributing to his current symptoms. I have reviewed and appreciate consultation from our colleagues in cardiology. I agree with recommendation for paracentesis. Hopefully this should help his abdominal pain and shortness of breath.     Cameron is at high risk of thrombosis due to his pancreatic cancer and recent pulmonary embolism. I would recommend low intensity anticoagulation with heparin without a bolus given his pericardial effusion and likely that this malignant effusion could bleeding on restarting anticoagulation.     He has just started on aggressive first line chemotherapy with gemcitabine and nab-paclitaxel for his newly diagnosed metasetatic pancreatic cancer. We will have to give it some time for chemotherapy to see a response. Hopefully his effusions will improve on therapy. Also he has marked hypoalbuminemia with albumin of only 1.8 g/dl.       Recommendations:  - Recommend heparin low intensity without a bolus; he has a high risk of thrombosis with pancreatic cancer and recent pulmonary embolism   - Recommend paracentesis for symptomatic relief  - Recommend nephrology consult  for hyponatremia (will order)  - He has significant hypoalbuminemia which is contributing to his effusions - ascites and pericardial effusion. We will have to improve his diet   - Patient complains of constipation; would need a good bowel regimen.   - Patient complains of insomnia; agree with lorazepam 0.5 mg at bedtime prn.     Over 90 min spent on day of visit including review of tests, obtaining/reviewing separately obtained history/physical exam, counseling patient, ordering medications/tests/procedures, communicating with PCP/consultants, and documenting in electronic medical record.    Kyree Johnson  Hematologist and Medical Oncologist  [facility]       Kyree Johnson MD, MD    Code Status    Full Code    Reason for Consult   Reason for consult: I was asked by Dr. Montemayor to evaluate this patient for metastatic pancreatic cancer.    Primary Care Physician   Gary Bey    Chief Complaint   Abdominal pain     History is obtained from the patient    History of Present Illness   Cameron Barnard is a 72 year old male who presents with abdominal pain.    Cameron was in the room alone. He is not doing well. He expressed that he is very tired. He appears uncomfortable. He notes that he has mid abdominal pain - periumbilical. He has no energy. He has not been sleeping well. Today was the first day when he could sleep 4 hours during day after he got lorazepam. He has constipation and feels he has not had stools in a week. He received his first dose of gemcitabine and nab-paclitaxel on Friday - 9/29    He has a poor appetite. This improve right after his paracentesis but soon his belly fills up and after that anything that he eats feels coming up all the way to throat.     Past Medical History   I have reviewed this patient's medical history and updated it with pertinent information if needed.   Past Medical History:   Diagnosis Date     Allergic state      DVT (deep venous thrombosis) (H)     chronic left  femoral DVT     HLD (hyperlipidemia)      Hypertension     white coat syndrome     Multiple subsegmental pulmonary emboli without acute cor pulmonale (H) 2017     Perforated bowel (H) 09/2020     Personal history of deep vein thrombosis     DISTAL VEIN  LLE     Primary osteoarthritis of both knees      Pulmonary embolism (H)      Pulmonary embolus (H)        Past Surgical History   I have reviewed this patient's surgical history and updated it with pertinent information if needed.  Past Surgical History:   Procedure Laterality Date     ARTHROPLASTY KNEE Left 2/10/2021    Procedure: Left total knee arthroplasty;  Surgeon: Gigi De Oliveira MD;  Location: [facility] OR     ARTHROPLASTY KNEE Right 2/4/2022    Procedure: RIGHT TOTAL KNEE ARTHROPLASTY;  Surgeon: Gigi De Oliveira MD;  Location:  OR     COLONOSCOPY N/A 9/19/2020    Procedure: COLONOSCOPY;  Surgeon: Lakshmi Santana MD;  Location:  OR     ESOPHAGOSCOPY, GASTROSCOPY, DUODENOSCOPY (EGD), COMBINED N/A 9/19/2020    Procedure: ESOPHAGOGASTRODUODENOSCOPY (EGD);  Surgeon: Lakshmi Santana MD;  Location:  OR     HERNIORRHAPHY INCISIONAL (LOCATION) N/A 5/5/2021    Procedure: OPEN INCISIONAL HERNIA REPAIR;  Surgeon: Christ Parekh MD;  Location:  OR     INJECT STEROID (LOCATION) Right 2/10/2021    Procedure: Right knee intra-articular injection of corticosteroid, 80 mg of Depo-Medrol;  Surgeon: Gigi De Oliveira MD;  Location: [facility] OR     IR CHEST PORT PLACEMENT > 5 YRS OF AGE  9/27/2022     IR PARACENTESIS  9/27/2022     LAPAROSCOPIC APPENDECTOMY N/A 9/27/2020    Procedure: Laparoscopic appendectomy;  Surgeon: Christ Parekh MD;  Location:  OR     LAPAROSCOPY DIAGNOSTIC (GENERAL) N/A 9/27/2020    Procedure: LAPAROSCOPIC  SMALL BOWEL RESCECTION;  Surgeon: Christ Parekh MD;  Location:  OR     LAPAROTOMY EXPLORATORY N/A 5/10/2021    Procedure: EXPLORATORY LAPAROTOMY AND EVACUATION OF HEMATOMA;  Surgeon:  Hari Ceballos MD;  Location:  OR       Prior to Admission Medications   Prior to Admission Medications   Prescriptions Last Dose Informant Patient Reported? Taking?   apixaban ANTICOAGULANT (ELIQUIS) 5 MG tablet   No No   Sig: Take 1 tablet (5 mg) by mouth 2 times daily for 30 days   diltiazem ER COATED BEADS (CARDIZEM CD/CARTIA XT) 180 MG 24 hr capsule   No No   Sig: Take 1 capsule (180 mg) by mouth daily for 30 days   lactobacillus rhamnosus (GG) (CULTURELL) capsule  Self Yes No   Sig: Take 1 capsule by mouth daily   morphine (MSIR) 15 MG IR tablet   No No   Sig: Take 1 tablet (15 mg) by mouth every 4 hours as needed for severe pain   pantoprazole (PROTONIX) 40 MG EC tablet   No No   Sig: Take 1 tablet (40 mg) by mouth every morning (before breakfast)   prochlorperazine (COMPAZINE) 10 MG tablet   No No   Sig: Take 1 tablet (10 mg) by mouth every 6 hours as needed for nausea or vomiting   sennosides (SENOKOT) 8.6 MG tablet   Yes No   Sig: Take 1 tablet by mouth daily      Facility-Administered Medications: None     Allergies   Allergies   Allergen Reactions     Seasonal Allergies Other (See Comments)     Runny nose, watery eyes       Social History   I have reviewed this patient's social history and updated it with pertinent information if needed. Cameron Barnard  reports that he has never smoked. He has never used smokeless tobacco. He reports current alcohol use. He reports that he does not use drugs.    Family History   I have reviewed this patient's family history and updated it with pertinent information if needed.   Family History   Problem Relation Age of Onset     Breast Cancer Sister        Review of Systems   The 10 point Review of Systems is negative other than noted in the HPI or here.      Physical Exam   Temp: 97.5  F (36.4  C) Temp src: Oral BP: 127/67 Pulse: 90   Resp: 20 SpO2: 95 % O2 Device: Nasal cannula Oxygen Delivery: 2 LPM  Vital Signs with Ranges  Temp:  [97.5  F (36.4  C)-97.9  F (36.6  C)]  97.5  F (36.4  C)  Pulse:  [] 90  Resp:  [18-20] 20  BP: (100-127)/(62-85) 127/67  SpO2:  [92 %-96 %] 95 %  294 lbs 1.6 oz      Data   Recent Labs   Lab Test 10/02/22  0653 10/01/22  1631 09/30/22  1117 09/29/22  0754 09/23/22  0547   * 127* 129* 128* 132*   POTASSIUM 5.7* 5.4* 5.4* 5.6* 4.8   CHLORIDE 99 97 98 94 101   CO2 23 14* 26 25 24   ANIONGAP 6 16* 5 9 7   BUN 59* 59* 60* 61* 33*   CR 1.24 1.30* 1.38* 1.61* 1.07   * 131* 122* 118* 143*   PETER 7.9* 8.1* 7.8* 8.3* 8.2*     Recent Labs   Lab Test 09/20/22  1636 09/18/20  0729   MAG 3.2* 2.4*     Recent Labs   Lab Test 10/02/22  0653 10/01/22  1631 09/29/22  0754 09/20/22  1636 09/19/22  1425 09/17/22  1352 09/17/22  1107   WBC 17.8* 22.2* 17.0* 15.3* 17.6*  --  17.9*   HGB 13.5 13.9 14.9 15.2 15.1   < > 15.9    407 534* 376 332  --  323   MCV 90 90 91 92 92  --  91   NEUTROPHIL  --  94 80 86 82  --  79    < > = values in this interval not displayed.     Recent Labs   Lab Test 10/02/22  0653 10/01/22  1631 09/30/22  1117   BILITOTAL 1.3 1.5* 0.9   ALKPHOS 123 139 112   ALT 58 34 18   * 63* 31   ALBUMIN 1.8* 1.8* 1.7*     TSH   Date Value Ref Range Status   09/20/2022 1.96 0.40 - 4.00 mU/L Final   05/12/2021 0.38 (L) 0.40 - 4.00 mU/L Final     No results for input(s): CEA in the last 87165 hours.  Results for orders placed or performed during the hospital encounter of 10/01/22   CT Chest (PE) Abdomen Pelvis w Contrast    Narrative    EXAM: CT CHEST PE ABDOMEN PELVIS W CONTRAST  LOCATION: [facility]  DATE/TIME: 10/1/2022 6:15 PM    INDICATION: Dyspnea, LUQ abd pain, pleuritic.  COMPARISON: 09/20/2022.  TECHNIQUE: CT chest pulmonary angiogram and routine CT abdomen pelvis with IV contrast. Arterial phase through the chest and venous phase through the abdomen and pelvis. Multiplanar reformats and MIP reconstructions were performed. Dose reduction   techniques were used.   CONTRAST: 135 mL Isovue 370.         FINDINGS:  ANGIOGRAM CHEST: Pulmonary arteries are normal caliber and negative for pulmonary emboli. Thoracic aorta is negative for dissection. There is minimal reflux of contrast in the IVC which can be associated with right heart strain.     LUNGS AND PLEURA: There is slight dependent atelectasis seen in both lung bases.    MEDIASTINUM/AXILLAE: Since 09/20/2022 there has been a significant increase in size of the pericardial effusion, for instance the right pericardium anteriorly shows pleural fluid measuring approximately 3.3 cm in greatest radial dimension and previously   at this same site the right pericardium measured 0.3 cm. Otherwise the mediastinum is stable.    CORONARY ARTERY CALCIFICATION: Mild.    HEPATOBILIARY: Since 09/20/2022 there are stable abnormal scattered low density lesions seen in both lobes of the liver worrisome for changes of metastasis. The gallbladder shows significant findings of cholelithiasis with no raphael evidence for   cholecystitis. The bile ducts are normal in caliber.    PANCREAS: Mass consistent with neoplasm involving the pancreatic head which abuts the hepatic artery and encases portions of the splenic artery. 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    SPLEEN: Normal.    ADRENAL GLANDS: Normal.    KIDNEYS/BLADDER: Normal.    BOWEL: Prior postoperative changes with no complications. Diverticulosis with no findings of diverticulitis.    LYMPH NODES: Normal.    VASCULATURE: Mild to moderate calcification no aneurysmal dilatation.    PELVIC ORGANS: Normal.    MUSCULOSKELETAL: There is a moderate amount of abdominal and pelvic ascites with significant nodularity seen in the peritoneum which may be associated with findings of neoplasm. Degenerative disc disease at L4 and L5.      Impression    IMPRESSION:  1.  No significant change since 09/20/2022 CT is that there has been a significant increase in size of the  pericardial effusion which is now advanced, there has been a moderate increase in the amount of abdominal/pelvic ascites, and the bilateral pleural effusions have decreased in size and are now minimal.    2.  Stable findings of malignancy.    3.  No PE.   Echocardiogram Limited     Value    LVEF  60-65%    [facility]    [id number]  [id number]  [id number]  [id number]^ROSA ISELA^DIEGO^JACK     [facility]  Echocardiography Laboratory  [address] [place] [address]     Name: CATHERINE NORIEGA  MRN: [id number]  : 1950  Study Date: 10/01/2022 08:06 PM  Age: 72 yrs  Gender: Male  Patient [facility]  Reason For Study: Pericardial Effusion  Ordering Physician: DIEGO NATARAJAN  Performed By: Cassi Morris     BSA: 2.5 m2  Height: 72 in  Weight: 290 lb  HR: 94  BP: 112/67 mmHg  ______________________________________________________________________________  Procedure  Limited Portable Echo Adult.  ______________________________________________________________________________  Interpretation Summary     Left ventricular systolic function is normal.  The visual ejection fraction is 60-65%.  The right ventricle is normal in structure, function and size.  Large pericardial effusion measuring max diameter inferolaterally (>3.0 cm)  with moderate effusion anteriorly adjacent to the right ventricle (<2.0 cm).  There is mild variation (<35%) of the tricuspid and mitral valve inflow  velocity profiles suggestive of effusive constrictive physiology, however no  diastolic collapse of the RV to suggest tamponade. Clinical correlation  required.  IVC appears normal.     Compared to recent study 2022 effusion is slightly larger. Careful  echocardiogram surveillance is recommended.  ______________________________________________________________________________  Left Ventricle  The left ventricle is normal in size. There is normal left ventricular wall  thickness. Left ventricular systolic function is  normal. The visual ejection  fraction is 60-65%. Normal left ventricular wall motion.     Right Ventricle  The right ventricle is normal in structure, function and size.     Atria  Normal left atrial size. Right atrial size is normal.     Mitral Valve  The mitral valve is normal in structure and function.     Tricuspid Valve  The tricuspid valve is normal in structure and function. Right ventricular  systolic pressure could not be approximated due to inadequate tricuspid  regurgitation.     Aortic Valve  There is mild trileaflet aortic sclerosis.     Pulmonic Valve  The pulmonic valve is normal in structure and function.     Vessels  The aortic root is normal size. The ascending aorta is Mildly dilated. The  inferior vena cava was normal in size with preserved respiratory variability.     Pericardium  Large pericardial effusion. The mitral valve inflow Doppler reveals no  significant respiratory variation. The tricuspid valve inflow Doppler reveals  no significant respiratory variation.     ______________________________________________________________________________  Report approved by: Kevin Treviño 10/01/2022 10:08 PM     ______________________________________________________________________________

## 2022-10-02 NOTE — PHARMACY-ADMISSION MEDICATION HISTORY
Pharmacy Medication History  Admission medication history interview status for the 10/1/2022  admission is complete. See EPIC admission navigator for prior to admission medications     Location of Interview: Patient room  Medication history sources: Patient and Surescripts    Significant changes made to the medication list:  Added: Tylenol PM     Additional medication history information:   Surescripts was reviewed prior to interviewing the patient. He seemed well aware of his medication list, and was able to confirm medications and last doses. All changes to his medication list are noted above.     Please note, the patient was recently diagnosed with pancreatic adenocarcinoma-stage IV, and was started on C1D1 gemzar abraxane on Friday (09/30/22) per infusion visit note.     Medication reconciliation completed by provider prior to medication history? No    Time spent in this activity: 20 minutes     Prior to Admission medications    Medication Sig Last Dose Taking? Auth Provider Long Term End Date   apixaban ANTICOAGULANT (ELIQUIS) 5 MG tablet Take 1 tablet (5 mg) by mouth 2 times daily for 30 days 10/1/2022 at am Yes Amie Swann, DO No 10/23/22   diltiazem ER COATED BEADS (CARDIZEM CD/CARTIA XT) 180 MG 24 hr capsule Take 1 capsule (180 mg) by mouth daily for 30 days 10/1/2022 at Unknown time Yes Amie Swann, DO Yes 10/24/22   diphenhydrAMINE-acetaminophen (TYLENOL PM)  MG tablet Take 2 tablets by mouth every 4 hours as needed for other (pain) 9/30/2022 Yes Unknown, Entered By History     lactobacillus rhamnosus (GG) (CULTURELL) capsule Take 1 capsule by mouth daily 9/30/2022 Yes Unknown, Entered By History     morphine (MSIR) 15 MG IR tablet Take 1 tablet (15 mg) by mouth every 4 hours as needed for severe pain 10/1/2022 at Unknown time Yes Lesley Abdalla MD     pantoprazole (PROTONIX) 40 MG EC tablet Take 1 tablet (40 mg) by mouth every morning (before breakfast) 10/1/2022 at Unknown time Yes  Hima Salcido DO     prochlorperazine (COMPAZINE) 10 MG tablet Take 1 tablet (10 mg) by mouth every 6 hours as needed for nausea or vomiting Past Week at Unknown time Yes Lesley Abdalla MD     sennosides (SENOKOT) 8.6 MG tablet Take 1 tablet by mouth daily 10/1/2022 at Unknown time Yes Unknown, Entered By History         The information provided in this note is only as accurate as the sources available at the time of update(s)

## 2022-10-03 NOTE — PROVIDER NOTIFICATION
Notification     Notified Person: Randy CAMARGO    Notification Time: 0608     Notification Interaction: AmCom      rm 254 [address].R.     k 5.9 w/ AM labs. Received lokelma yesterday    please advise   dami BANKS *[phone]      Orders Received: Lokelma x1

## 2022-10-03 NOTE — PROGRESS NOTES
MD Notification    Notified Person: MD    Notified Person Name: On call hospitalist    Notification Date/Time: 2130, 10/2/22    Notification Interaction: Web based paging    Purpose of Notification: Pt. Requesting something for insomnia.  Received 0.5mg ativan last night.      Orders Received: 0.25mg ativan ordered.

## 2022-10-03 NOTE — PLAN OF CARE
"Goal Outcome Evaluation:   [id number]   Neuro- x4, pt reports continues to have intermittent hallucinations   Most Recent Vitals- /68   Pulse 112   Temp 98  F (36.7  C) (Oral)   Resp 18   Ht 1.854 m (6' 1\")   Wt 129.8 kg (286 lb 3.2 oz)   SpO2 95%   BMI 37.76 kg/m    Tele/Cardiac- Afib CVR at rest, rvr w/ activity   Resp- RA, APONTE  Activity- 1A gb walker bs   Pain- Abdominal pain, received morphine x1   Drips- heparin infusing at 1800 units/hr  Skin- Escoriation to abdominal folds, erinn BLE and edema.  GI/- WDL except constipation and nausea, received zofran x1 and compazine x1 w/ relief  Labs k 5.9, received order for lokelma x1, pt requesting to take later in morning d/t nausea  Diet: Regular Diet Adult    Plan- continue poc          "

## 2022-10-03 NOTE — PROGRESS NOTES
Service Date: 10/03/2022    SUBJECTIVE:  Mr. Barnard is a 72-year-old gentleman with a newly diagnosed metastatic pancreatic cancer.  The patient has malignant ascites.  He likely has malignant pericardial effusion.  The patient started palliative chemotherapy with gemcitabine and Abraxane on 09/30/2022.    The patient was brought to the Emergency Room on 10/01/2022 because of shortness of breath and abdominal discomfort.  Multiple investigations were done.  -CBC revealed leukocytosis.  -CMP revealed multiple abnormalities.  -CT chest angiogram and abdomen and pelvis did not reveal any pulmonary embolism.  No progression of malignancy.  There was significant increase in pericardial effusion.  There was also increasing ascites and pleural effusion.    The patient admitted to the hospital for further management.  The patient has paracentesis done with has helped.    He has been seen by cardiologist.  They plan to repeat another echocardiogram and do pericardiocentesis if needed.    The patient feels very weak.  His shortness of breath is mildly better.  His abdominal discomfort is also better.    No chest pain.  Some nausea.  No recurrent vomiting.  Appetite is decreased.    PHYSICAL EXAMINATION:    GENERAL:  He is alert.  He looked very weak.  VITAL SIGNS:  Reviewed.  Rest of systems not examined.    LABS:  Reviewed.    ASSESSMENT:    1.  A 72-year-old gentleman with metastatic pancreatic cancer on palliative chemotherapy  2.  Pericardial effusion, likely malignant.  3.  Malignant ascites.  4.  Weakness secondary to his malignancy, chemotherapy and ongoing medical problems.  5.  Elevated uric acid.  6.  Elevated creatinine.  7.  Pulmonary embolism and portal vein thrombus.    PLAN:    1.  The patient looks very weak.  His weakness is secondary to his metastatic disease, chemotherapy and other ongoing problems.  2.  The patient is symptomatic from ascites and pericardia.  He had paracentesis done.  He feels better. He  has a pericardial effusion.  Likely malignant.  Management as per Cardiology.  3.  Labs with multiple abnormalities.  Potassium and uric acid is high.  The patient may be having tumor lysis. For tumor lysis, I will give him 1 dose of rasburicase. Also start allopurinol. We will monitor his uric acid.  4.  For metastatic pancreatic cancer, plan is to continue chemotherapy as outpatient. Treatment may be delayed because of his hospitalization.   5.  Oncology will continue to follow.    Lesley Abdalla MD        D: 10/03/2022   T: 10/03/2022   MT: BRIT    Name:     CATHERINE NORIEGA  MRN:      [id number]        Account:      [id number]   :      1950           Service Date: 10/03/2022       Document: [id number]

## 2022-10-03 NOTE — PROGRESS NOTES
"[facility]    Medicine Progress Note - Hospitalist Service    Date of Admission:  10/1/2022    Cameron Barnard is a 72 year old male who presents with abdominal pain, sob    Assessment & Plan        Pericardial effusion   *Recently dx adenocarcinoma of pancreas 9/2022  *Just admitted 9/20-23 for afib. Pericardial effusion noted during admit, echo w/o tamponade, small-mod posterior effusion.   *1st chemo day prior to presentation. Woke this am with LUQ abdominal pain. Also with SOB which he thinks get better with hydration. Today nausea. In ED tachy ~100s, -110 systolic (note BP low at discharge 109 systolic), O2 sats normal. Labs BNP 2023. Troponin normal. WBC 22.2.    *echo ED EF 60-65%, large pericardial effusion noted (larger than recent echo), no tamponade, findings suggestive of effusive constrictive physiology.   *CT abdomen unchanged except larger pericardial effusion, increased abdominal ascites.     telemetry     IV fluids    Cardiology consult requested.  I appreciate Dr. Winn's evaluation and recommendations    Per him, \"no indication for acute pericardiocentesis, observation and recheck in a few days.\"    Also per Dr. Winn,  \"If there is worsening effusion and increasing shortness of breath or hemodynamic instability, he will need a pericardial tap.\"    Continues on low intensity heparin, see below    Repeat Echo tomorrow     Recent dx stage IV metastatic adenocarcinoma of pancreas  [Morphine IR 15 mg q4 hours prn, pantoprazole 40 mg daily]  Follows with [facility] oncology. Presented to ED with abdominal pain 9/9. Found to have pancreatic head mass and masses throughout liver. Also with malignant ascites. US biopsy by IR with adenocarcinoma of the pancreas. Underwent paracentesis x 2 9/2022. Started Gemzar/Abraxane 9/30/22.     [facility] oncology consult requested, I appreciate Dr. Abdalla's evaluation and recommendations    prn Morphine IR 15 mg q4 hours prn available    paracentesis " (therapeutic) as below    Uric acid and potassium levels are elevated, raising question of tumor lysis.  Dr. Abdalla ordered 1 dose of rasburicase.  I appreciate his follow-up     Hyponatremia  Hyperkalemia, mild  Anion gap acidosis, resolved  Na 127 (recent 128-132), K 5.4 (recent 4.8-5.6). AG is 16, doesn't look toxic.     Checked uric acid, urine sodium, urine osm, serum osm    Lactic acid is within normal limits    IV fluids    Follow sodium levels, they are stable     GUTIERREZ  CKD III  Previous baseline creatinine 0.8-1.1. 9/29 1.6, on presentation at 1.3. possible volume depletion vs other.     IV fluids     avoid nephrotoxins     monitor     Abdominal pain  Elevated bilirubin, AST  Bili on presentation at 1.5 (9/30 was 0.9). AST sl up at 63. New pain for admission is in RUQ.     RUQ US shows cholelithiasis and gallbladder wall thickening without other evidence of cholecystitis. Enlarged fatty liver with hepatic metastasis.     S/P US guided paracentesis yielding 4425 mL of yellow, serous fluid     Leukocytosis  Has had recent leukocytosis WBC 15-17. On presentation WBC 22.2. afebrile in ED. UA bland.    monitor off abx for now     Pulmonary embolism 9/2022  Portal vein thrombosis  [apixaban 5 mg BID]  Had hx PE 5 years prior, had been on Eliquis and this and was stopped. 9/9 was found to have small RLL pulmonary embolism as well as portal vein thrombus.     hold apixaban, timing for resuming per Oncology/Cardiology      Atrial fibrillation   [apixaban, diltiazem 180 mg daily]  New as of 9/20/22 admission. Discharged on diltiazem     resume diltiazem for ventricular rate control    See discussion re: anticoagulants, above        Diet: Regular Diet Adult    DVT Prophylaxis: Pneumatic Compression Devices  Altman Catheter: Not present  Central Lines: PRESENT     Cardiac Monitoring: ACTIVE order. Indication: Tachyarrhythmias, acute (48 hours)  Code Status: Full Code      Disposition Plan      Expected Discharge Date:  "10/05/2022                Total time spent:  > 35 minutes  Time spent counseling, coordination of care: 25 minutes including discussion with care team and Dr. Abdalla regarding elevated uric acid, hyperkalemia, anticoagulants, pericardial effusion, repeat echo, personal review and interpretation of labs and studies as noted above     Danika Machuca MD  Hospitalist Service  [facility]  Securely message with the Vocera Web Console (learn more here)  Text page via [employer] Paging/Directory         Clinically Significant Risk Factors Present on Admission               # Obesity: Estimated body mass index is 37.76 kg/m  as calculated from the following:    Height as of this encounter: 1.854 m (6' 1\").    Weight as of this encounter: 129.8 kg (286 lb 3.2 oz).        ______________________________________________________________________    Interval History   Patient was sleeping, did not wake to voice.  Cannot obtain review of systems from him, discussed with RN.  She says patient has been exhausted, prefers to sleep.  No new respiratory or GI complaints are noted.    Data reviewed today: I reviewed all medications, new labs and imaging results over the last 24 hours. I personally reviewed the abdominal ultrasound image(s) showing Cholelithiasis and gallbladder wall thickening without other evidence of cholecystitis..    Physical Exam   Vital Signs: Temp: 98  F (36.7  C) Temp src: Oral BP: 112/68 Pulse: 112   Resp: 18 SpO2: 95 % O2 Device: None (Room air) Oxygen Delivery: 2 LPM  Weight: 286 lbs 3.2 oz  Constitutional: Sleeping, snoring  Respiratory: Clear to auscultation bilaterally, no crackles or wheezing  Cardiovascular: Irregularly irregular rhythm  GI: Obese, normal bowel sounds, soft, non-distended, non-tender  Skin/Integumen: Profound bilateral lower extremity edema.  Scattered ecchymoses  Other: Cannot assess mood due to drowsiness      Data   Recent Labs   Lab 10/03/22  0526 10/02/22  0653 " 10/01/22  1631 09/29/22  0754 09/27/22  1008   WBC 8.0 17.8* 22.2*   < >  --    HGB 13.3 13.5 13.9   < >  --    MCV 92 90 90   < >  --     311 407   < >  --    INR  --   --  1.70*  --  1.71*   * 128* 127*   < >  --    POTASSIUM 5.9* 5.7* 5.4*   < >  --    CHLORIDE 99 99 97   < >  --    CO2 22 23 14*   < >  --    BUN 64* 59* 59*   < >  --    CR 1.28* 1.24 1.30*   < >  --    ANIONGAP 9 6 16*   < >  --    PETER 7.6* 7.9* 8.1*   < >  --    * 124* 131*   < >  --    ALBUMIN 1.6* 1.8* 1.8*   < >  --    PROTTOTAL 5.5* 5.9* 6.1*   < >  --    BILITOTAL 1.0 1.3 1.5*   < >  --    ALKPHOS 107 123 139   < >  --    ALT 58 58 34   < >  --    AST 67* 100* 63*   < >  --    LIPASE  --   --  58*  --   --     < > = values in this interval not displayed.     Recent Results (from the past 24 hour(s))   US Paracentesis without Albumin    Narrative    ULTRASOUND GUIDED PARACENTESIS   10/2/2022 2:36 PM     HISTORY:  Ascites    PROCEDURE:   Informed consent was obtained from the patient prior to  the procedure with discussion including the possible risks of  bleeding, infection and organ injury . Using 5 mL of 1% lidocaine for  local anesthesia, sterile technique, and sonographic guidance with  permanent image documentation, I placed an 8F paracentesis catheter  into the peritoneal fluid collection. This was used to aspirate 4425  mL of yellow, serous fluid in vacuum bottles, and some of this was  sent for any laboratory studies that had been ordered. There were no  immediate complications.       Impression    IMPRESSION:  Ultrasound guided paracentesis.    MG TAY MD         SYSTEM ID:  [id number]   US Abdomen Limited    Narrative    EXAM: US ABDOMEN LIMITED  LOCATION: [facility]  DATE/TIME: 10/2/2022 3:04 PM    INDICATION: pt with metastatic pancreatic cancer, new elevation in bili and new RUQ abdominal pain  COMPARISON: CT abdomen and pelvis performed 9/9/2022  TECHNIQUE: Limited abdominal  ultrasound.    FINDINGS:    GALLBLADDER: Gallstones. Gallbladder wall thickening measuring up 6 mm. Negative sonographic Lobo sign.    BILE DUCTS: No biliary dilatation. Common bile duct obscured by gas.     LIVER: Mildly enlarged and diffusely echogenic. A 2.5 x 2.7 cm centrally hyperechoic, complex thick-walled lesion is seen in the right lobe of the liver corresponding to hypodensity seen on CT dated 9/9/2022 with no such lesion noted on CT dated   5/10/2021, consistent with hepatic metastasis.    RIGHT KIDNEY: No hydronephrosis.    PANCREAS: The visualized portions are normal.    Moderate volume ascites.      Impression    IMPRESSION:  1.  Cholelithiasis and gallbladder wall thickening without other evidence of cholecystitis.  2.  Enlarged fatty liver with hepatic metastasis.         Medications     heparin 1,800 Units/hr (10/03/22 0807)     - MEDICATION INSTRUCTIONS -       sodium chloride Stopped (10/02/22 1626)       diltiazem ER COATED BEADS  180 mg Oral Daily     LORazepam  0.25 mg Oral At Bedtime     miconazole   Topical BID     pantoprazole  40 mg Oral QAM AC     polyethylene glycol  17 g Oral BID     sodium chloride (PF)  3 mL Intracatheter Q8H

## 2022-10-03 NOTE — PLAN OF CARE
Goal Outcome Evaluation:    VSS. Monitor remains Sinus rhythm. Heparin drip at 2100 unit(s)/hr. Pt. Is weak and fatigued. Poor appetite. Continue to monitor. Plan for Echo tomorrow.

## 2022-10-03 NOTE — PROGRESS NOTES
Summary:    Patient Name: Cameron Barnard  Admit Date: 10/1/2022  Primary Diagnosis: Pericardial effusion  Malignant neoplasm of pancreas, Abdominal pain, generalized weakness  Inpatient Status: Inpatient   Needed? No  Procedures This Admit: Ultrasound with paracentesis  Drips: IV fluids d/c'd. Heparin drip started.  Drains & Devices: N/A  Rhythm: A fib with RVR  Activity Level: Up with 1 GBW. Weak  Oxygen needs: RA - was on 2L O2 overnight.  Important Labs Since Admit: Na+ 128, K+ 5.7  Significant Echo results: EF 60-65%  Anticipated D/C Date: pending    Nauseated this morning with upper abdominal pain- zofran and morphine given.  U/s with paracentesis today. 4425ml drained. Stopped fluids. Pt. Feeling very weak today. Up with 1 GBW. Start heparin drip per oncology with recent PE and risk of thrombosis d/t pancreatic cancer. Starting lokelma d/t hyperkalemia (5.7) and constipation.  Scheduled miralax.  No results yet. C/o intermittent visual hallucinations since yesterday.    Nurse: Shyla Hoskins RN

## 2022-10-03 NOTE — PHARMACY
"The following home medications were NOT continued on inpatient admission per \"Discontinuation of nonessential home medications during hospitalization\" policy: lactobacillus    If a therapeutic holiday is deemed inappropriate per the prescriber, please notify the pharmacist regarding the medication order.    The pharmacist is available to answer any questions and/or concerns the patient may have regarding discontinuation of non-essential medications.    Please ensure that these medications are restarted as needed upon discharge via the medication reconciliation discharge process and included on the discharge medication reconciliation report.    Thank you,  Gracie Chen RPH    "

## 2022-10-03 NOTE — PROGRESS NOTES
[facility]    Cardiology Progress Note    Primary Cardiologist: Dr. Orellana    Date of Admission: 10/01/2022  Service Date: 10/03/2022    Summary:  Mr. Cameron Barnard is a very pleasant 72 year old male with a past medical history of metastatic pancreatic cancer on palliative chemotherapy, pulmonary embolism, and atrial fibrillation who presented on 10/01/2022 with abdominal pain and shortness of breath. Cardiology was consulted as he was found to have a moderate pericardial effusion. He was also found to have significant ascites contributing to his symptoms and underwent paracentesis.    Interval History   Patient is resting comfortably. No acute events overnight. He denies symptoms of chest pain or shortness of breath. He has been bothered by low back pain from being in bed. He got an extender on his bed and has been able to sleep better since this was added. He has been mildly delirious at times per the patient's wife as he has slept very poorly over the past couple of weeks. We reviewed the plan of care as outlined below. Patient and his wife stated understanding.     Telemetry: A.Fib with CVR    Assessment & Plan   1. Moderate pericardial effusion  - Continues to have moderate pericardial effusion with some echodense deposit which could be fibrinous deposit. Cannot exclude mets. Effusion is slightly increased in size compared to previous echocardiogram but no clear evidence of tamponade and no evidence of RV diastolic collapse.      2.  Metastatic pancreatic cancer, on palliative chemotherapy  - Presents with ascites and increasing shortness of breath related to abdominal fluid accumulation and decreased diaphragmatic excursion as well as abdominal pain possibly related to ascites as well as the recent diagnosis of portal vein thrombosis.  - Status post US paracentesis 10/2/22 with this was used to aspirate 4425 mL of yellow, serous fluid drained.     3.  Recent diagnosis of atrial  fibrillation  - On rate control approach with diltiazem ER.     4.  Portal vein thrombosis, likely related to his cancer     5.  Recent PE, management per hospitalist     6.  Acute on chronic renal failure, creatinine 1.38 on admission and now improved     7.  Hyponatremia and hyperkalemia     Plan:   1. Continued observation recommended for pericardial effusion given hemodynamic stability. Will plan for repeat limited TTE tomorrow for reassessment. If there is worsening effusion and increasing shortness of breath or hemodynamic instability, he will need a pericardiocentesis.  2. Continue with rate control for A.Fib with diltiazem  mg daily and with anticoagulation with the heparin gtt for now. Could switch back to PTA apixaban 5 mg BID prior to discharge if effusion stable on repeat TTE.  3. Cardiology will continue to follow along.     Thank you for the opportunity to participate in this pleasant patient's care.     RON Pena, CNP   Nurse Practitioner  [facility]  Pager: [phone]  Text Page  (8am - 5pm, M-F)    Patient Active Problem List   Diagnosis     Anticoagulation monitoring, INR range 2-3     Ascending aorta dilation (H)     Deep vein thrombosis of left femoral vein (H)     Primary osteoarthritis of both knees     Pulmonary embolism (H)     Diverticulitis     Bowel obstruction (H)     History of bowel resection     Lymphedema of left lower extremity     S/P total knee arthroplasty     Ventral hernia without obstruction or gangrene     Abdominal mass     Abdominal wall hematoma, initial encounter     Anticoagulated     Postprocedural hematoma of abdominal wall     S/P revision of total knee, right     Abdominal pain, generalized     Splenic infarct     Malignant neoplasm of pancreas, unspecified location of malignancy (H)     Other acute pulmonary embolism, unspecified whether acute cor pulmonale present (H)     Malignant neoplasm of head of pancreas (H)     Pericardial  effusion     Atrial fibrillation with rapid ventricular response (H)       Physical Exam   Temp: 97.6  F (36.4  C) Temp src: Oral BP: 92/62 Pulse: 93   Resp: 18 SpO2: 95 % O2 Device: Nasal cannula Oxygen Delivery: 1 LPM  Vitals:    10/02/22 0419 10/02/22 1700 10/03/22 0532   Weight: 133.4 kg (294 lb 1.6 oz) 131.4 kg (289 lb 11.2 oz) 129.8 kg (286 lb 3.2 oz)     Vital Signs with Ranges  Temp:  [97.6  F (36.4  C)-98  F (36.7  C)] 97.6  F (36.4  C)  Pulse:  [] 93  Resp:  [18-20] 18  BP: ()/(62-76) 92/62  SpO2:  [91 %-97 %] 95 %  No intake/output data recorded.    Constitutional: Appears his stated age, well nourished, and in no acute distress.  Eyes: Pupils equal, round. Sclerae anicteric.   HEENT: Normocephalic, atraumatic.   Neck: Supple. Thick neck. Unable to visualize JVP.  Respiratory: Breathing non-labored. Lungs clear to auscultation bilaterally. No crackles or wheezes appreciated.  Cardiovascular: Distant heart sounds. Irregularly irregular rhythm, controlled rate, normal S1 and S2. No murmur, rub, or gallop..  Skin: Warm, dry. No apparent rashes.  Musculoskeletal/Extremities: Moves all extremities well and symmetrically. Trace to 1+ LE edema in the ankles bilaterally.  Neurologic: No gross focal deficits. Alert, currently sleepy, cooperative and responding to questions appropriately.  Psychiatric: Affect appropriate. Mentation normal.    Medications     heparin 1,800 Units/hr (10/03/22 0825)     - MEDICATION INSTRUCTIONS -       sodium chloride Stopped (10/02/22 1626)       [Held by provider] apixaban ANTICOAGULANT  5 mg Oral BID     diltiazem ER COATED BEADS  180 mg Oral Daily     LORazepam  0.25 mg Oral At Bedtime     miconazole   Topical BID     pantoprazole  40 mg Oral QAM AC     polyethylene glycol  17 g Oral BID     sennosides  1 tablet Oral Daily     sodium chloride (PF)  3 mL Intracatheter Q8H     Data   Recent Labs   Lab 10/03/22  0526 10/02/22  0653 10/01/22  1631 09/29/22  0754  09/27/22  1008   WBC 8.0 17.8* 22.2*   < >  --    HGB 13.3 13.5 13.9   < >  --    MCV 92 90 90   < >  --     311 407   < >  --    INR  --   --  1.70*  --  1.71*   * 128* 127*   < >  --    POTASSIUM 5.9* 5.7* 5.4*   < >  --    CHLORIDE 99 99 97   < >  --    CO2 22 23 14*   < >  --    BUN 64* 59* 59*   < >  --    CR 1.28* 1.24 1.30*   < >  --    ANIONGAP 9 6 16*   < >  --    PETER 7.6* 7.9* 8.1*   < >  --    * 124* 131*   < >  --    ALBUMIN 1.6* 1.8* 1.8*   < >  --    PROTTOTAL 5.5* 5.9* 6.1*   < >  --    BILITOTAL 1.0 1.3 1.5*   < >  --    ALKPHOS 107 123 139   < >  --    ALT 58 58 34   < >  --    AST 67* 100* 63*   < >  --    LIPASE  --   --  58*  --   --     < > = values in this interval not displayed.     This note was completed in part using Dragon voice recognition software. Although reviewed after completion, some word and grammatical errors may occur.

## 2022-10-03 NOTE — PROVIDER NOTIFICATION
Call placed to  to discuss rasburicase order.     Hospital criteria for use not met currently as pt has not had 48 hrs of allopurinol and hydration therapy. Dr. Abdalla considered this, but would like to dose rasburicase today anyway due to very high uric acid level as well as high potassium and elevated SCr. He is concerned that pt may not respond to allopurinol alone and looks very sick today. He would like to give rasburicase today without delay. He agreed to start allopurinol in addition, however.    Liza PradhanD

## 2022-10-04 NOTE — PROVIDER NOTIFICATION
MD Notification    Notified Person: MD    Notified Person Name: Dr. Machuca     Notification Date/Time: 10/4 1722    Notification Interaction: text page    Purpose of Notification: Cameron Barnard 254: Can you place a WOC consult for pt? He has a significant rash (i'm assuming its moisture related) to L groin and a skin tear to L elbow    Orders Received: no call back. Will pass along to nightshift RN to ask 10/5 rounding MD to place order.    Comments:

## 2022-10-04 NOTE — PLAN OF CARE
Neuro: A&Ox4  Tele/Cardiac: A-fib CVR  Resp: APONTE, 1L NC  Activity: SBA to commode  Pain: pt reports 4/10 abdominal pain/discomfort. Tylenol and senna given  Drips/IV: heparin gtt 2100 units/hr. Port R chest  GI/: due to have BM. Constipation and abdominal discomfort over night.   Skin: rash/redness in left groin. Scattered bruising on legs.   Diet: Regular Diet Adult     Test/Procedures: repeat echo 10/4 to monitor effusion size  Plan: continue plan of care.

## 2022-10-04 NOTE — PROGRESS NOTES
Service Date: 10/04/2022    SUBJECTIVE:  Mr. Barnard is a 72-year-old gentleman with metastatic pancreatic cancer.  The patient started gemcitabine and Abraxane last week.    The patient has malignant ascites.  He requires paracentesis.  The patient says that his abdomen is getting more distended.  He would like to have another paracentesis done tomorrow.    The patient has pericardial effusion.  Cardiology is following.  He will be having an echocardiogram done again today.  After that, a decision will be made by pericardiocentesis.    He feels weak.  No headache.  Some dizziness.  No chest pain.  He has some shortness of breath because of pericardial effusion and ascites.  Some nausea. No recurrent vomiting.  Appetite is decreased.    The patient is on anticoagulation.  Currently, he is on heparin drip.  He has some easy bruising.  No bleeding from ear, nose or throat.  No blood in the urine or stool.    Wife was at the bedside.  As per her, patient is slightly better today.  He is more awake.    PHYSICAL EXAMINATION:    GENERAL:  He is alert and oriented x 3.  Not in any respiratory distress.  VITAL SIGNS:  Reviewed.  SKIN: A few ecchymotic spots, mainly on the lower extremities.  Rest of systems not examined.    LABS:  Reviewed.    ASSESSMENT:  1.  A 72-year-old gentleman with metastatic pancreatic cancer.  2.  Malignant ascites.  3.  Pericardial effusion, likely malignant.  4.  Leukopenia from chemotherapy  5.  Hyperuricemia, improved with rasburicase.  6.  Hyponatremia from malignancy.  7.  Elevated creatinine.  8.  Pulmonary embolism and portal vein thrombus.    PLAN:   1.  Discussed regarding pancreatic cancer.  He has metastatic disease.  He started gemcitabine and Abraxane last week.  He is scheduled to get chemo this week.  The patient overall is very weak.  He is leukopenic/neutropenic.  Plan is to delay his chemotherapy by 1 week.  The patient is agreeable for it.  2.  The patient is  leukopenic/neutropenic.  It can get worse.  We will start him on Neupogen 480 mcg subcutaneously once a day.  3.  The patient has pericardial effusion.  He may require pericardiocentesis.  He is going to have an echo done today.  4.  He has malignant ascites.  It is getting worse.  Paracentesis will be continued for symptomatic relief.  5.  The patient has pulmonary embolism and also portal vein thrombus.  He is on heparin drip.  He can be switched to Eliquis after pericardiocentesis.  6.  The patient's uric acid has improved with rasburicase.  He is on allopurinol, which will be continued.  7.  He and his wife had a few questions, which were all answered.  Oncology will continue to follow.  Case discussed with Dr. Danika Machuca.    TOTAL TIME SPENT:  25 minutes.  Time was spent in today's visit, review of chart/investigations today and documentation today.    Lesley Abdalla MD        D: 10/04/2022   T: 10/04/2022   MT: MILTON    Name:     CATHERINE NORIEGASonido  MRN:      [id number]        Account:      [id number]   :      1950           Service Date: 10/04/2022       Document: [id number]

## 2022-10-04 NOTE — PROGRESS NOTES
"Cardiology Progress Note          Assessment and Plan:         72 year old male with a past medical history of metastatic pancreatic cancer on palliative chemotherapy, pulmonary embolism, and atrial fibrillation who presented on 10/01/2022 with abdominal pain and shortness of breath. Cardiology was consulted as he was found to have a moderate pericardial effusion. He was also found to have significant ascites contributing to his symptoms and underwent paracentesis.    IMPRESSION:      1) Moderate to large pericardial effusion, increased in size c/w last week. Probably malignant.    2) Metastatic pancreatic cancer   3) Recent diagnosis of AF     PLAN  - Repeat echo today  - Will assess for pericardiocentesis after review of echocardiogram.         Interval History:     Complains of generalized fatigue and dyspnea with exertion.              Review of Systems:   As per subjective, otherwise 5 systems reviewed and negative.           Physical Exam:   Blood pressure 112/57, pulse 88, temperature 97.7  F (36.5  C), temperature source Oral, resp. rate 18, height 1.854 m (6' 1\"), weight 129.8 kg (286 lb 3.2 oz), SpO2 94 %.      Vital Sign Ranges  Temperature Temp  Av.7  F (36.5  C)  Min: 97.5  F (36.4  C)  Max: 97.7  F (36.5  C)   Blood pressure Systolic (24hrs), Av , Min:100 , Max:112        Diastolic (24hrs), Av, Min:57, Max:81      Pulse Pulse  Av  Min: 74  Max: 94   Respirations Resp  Av  Min: 18  Max: 22   Pulse oximetry SpO2  Av.5 %  Min: 94 %  Max: 95 %         Intake/Output Summary (Last 24 hours) at 10/4/2022 1151  Last data filed at 10/4/2022 0917  Gross per 24 hour   Intake 0 ml   Output 125 ml   Net -125 ml       Constitutional: NAD  Skin: Warm and dry  Lungs: CTA  Cardiovascular: irrgeular  Abdomen: Soft, non tender.  Extremities and Back: No LE edema  Neurological: Nonfocal           Medications:     I have reviewed this patient's current medications.              Data:     Labs " reviewed.             Raji Szymanski MD, M.D.

## 2022-10-04 NOTE — PROGRESS NOTES
"[facility]    Medicine Progress Note - Hospitalist Service    Date of Admission:  10/1/2022    Cameron Barnard is a 72 year old male who presents with abdominal pain, sob    Assessment & Plan    Pericardial effusion   *Recently dx adenocarcinoma of pancreas 9/2022  *Just admitted 9/20-23 for afib. Pericardial effusion noted during admit, echo w/o tamponade, small-mod posterior effusion.   *1st chemo day prior to presentation. Woke this am with LUQ abdominal pain. Also with SOB which he thinks get better with hydration. Today nausea. In ED tachy ~100s, -110 systolic (note BP low at discharge 109 systolic), O2 sats normal. Labs BNP 2023. Troponin normal. WBC 22.2.    *echo ED EF 60-65%, large pericardial effusion noted (larger than recent echo), no tamponade, findings suggestive of effusive constrictive physiology.   *CT abdomen unchanged except larger pericardial effusion, increased abdominal ascites.     telemetry     IV fluids    Cardiology consult requested.  I appreciate Dr. Winn's evaluation and recommendations    Per him, \"no indication for acute pericardiocentesis, observation and recheck in a few days.\"    Also per Dr. Winn,  \"If there is worsening effusion and increasing shortness of breath or hemodynamic instability, he will need a pericardial tap.\"    Continues on low intensity heparin, see below    Repeat Echo done 10/4 is pending     Recent dx stage IV metastatic adenocarcinoma of pancreas  Malignant ascites  Abdominal pain  Elevated bilirubin, AST  [Morphine IR 15 mg q4 hours prn, pantoprazole 40 mg daily]  Follows with [facility] oncology. Presented to ED with abdominal pain 9/9. Found to have pancreatic head mass and masses throughout liver. Also with malignant ascites. US biopsy by IR with adenocarcinoma of the pancreas. Underwent paracentesis x 2 9/2022. Started Gemzar/Abraxane 9/30/22.   Bili on presentation at 1.5 (9/30 was 0.9). AST sl up at 63. New pain for admission is in " RUQ.     [facility] oncology consult requested, I appreciate Dr. Abdalla's evaluation and recommendations    prn Morphine IR 15 mg q4 hours prn available    RUQ US shows cholelithiasis and gallbladder wall thickening without other evidence of cholecystitis. Enlarged fatty liver with hepatic metastasis.     S/P US guided paracentesis yielding 4425 mL of yellow, serous fluid    Patient says his abdomen again feels uncomfortable, requests repeat paracentesis, ordered for 10/5.  Rapid reaccummulation of fluid is concerning.     Hyponatremia  Hyperkalemia, mild  GUTIERREZ  CKD III  Anion gap acidosis, resolved  Na 127 (recent 128-132), K 5.4 (recent 4.8-5.6). AG is 16, doesn't look toxic.   Previous baseline creatinine 0.8-1.1. 9/29 1.6, on presentation at 1.3. possible volume depletion vs other    Checked uric acid, urine sodium, urine osm, serum osm    Low urine sodium, elevated urine osm suggest hypovolemia, pre-renal ATN, likely related to low plasma oncotic pressure.  FeNA is low, as expected, in setting of decreased intravascular volume    Could give albumin + fluid bolus    Hyperkalemia and elevated uric acid are likely in part related to tumor lysis, treated with rasburicase.  Uric acid improved.    Nephrology consult requested due to rising creatinine, ongoing hyponatremia, hyperkalemia    Lactic acid is within normal limits    avoid nephrotoxins      Leukocytosis  Has had recent leukocytosis WBC 15-17. On presentation WBC 22.2. afebrile in ED. UA bland.    monitor off abx for now     Pulmonary embolism 9/2022  Portal vein thrombosis  [apixaban 5 mg BID]  Had hx PE 5 years prior, had been on Eliquis and this and was stopped. 9/9 was found to have small RLL pulmonary embolism as well as portal vein thrombus.     hold apixaban, timing for resuming per Oncology/Cardiology      Atrial fibrillation   [apixaban, diltiazem 180 mg daily]  New as of 9/20/22 admission. Discharged on diltiazem     resume diltiazem for ventricular rate  "control    See discussion re: anticoagulants, above        Diet: Regular Diet Adult    DVT Prophylaxis: Pneumatic Compression Devices  Altman Catheter: Not present  Central Lines: PRESENT       Cardiac Monitoring: ACTIVE order. Indication: Tachyarrhythmias, acute (48 hours)  Code Status: Full Code      Disposition Plan      Expected Discharge Date: 10/05/2022                Total time spent:  > 35 minutes  Time spent counseling, coordination of care: 25 minutes including discussion with care team and Dr. Abdalla, also patient's wife, regarding Echo, paracentesis, GUTIERREZ, also personal review and interpretation of labs and studies as noted above     Danika Machuca MD  Hospitalist Service  [facility]  Securely message with the Vocera Web Console (learn more here)  Text page via [employer] Paging/Directory         Clinically Significant Risk Factors Present on Admission               # Obesity: Estimated body mass index is 37.76 kg/m  as calculated from the following:    Height as of this encounter: 1.854 m (6' 1\").    Weight as of this encounter: 129.8 kg (286 lb 3.2 oz).        ______________________________________________________________________    Interval History   \"They just got done moving me around.\"  Cameron complains of shortness of breath, worse with any activity.  He thinks his abdomen is more distended and requested repeat paracentesis.    Data reviewed today: I reviewed all medications, new labs and imaging results over the last 24 hours. I personally reviewed the abdominal ultrasound image(s) showing Cholelithiasis and gallbladder wall thickening without other evidence of cholecystitis..    Physical Exam   Vital Signs: Temp: 97.7  F (36.5  C) Temp src: Oral BP: 112/57 Pulse: 88   Resp: 18 SpO2: 94 % O2 Device: Nasal cannula Oxygen Delivery: 1 LPM  Weight: 286 lbs 3.2 oz  Constitutional: Awake, alert.  Looks uncomfortable due to dyspnea  Respiratory: Clear to auscultation   Cardiovascular: " Irregularly irregular rhythm  GI: Obese, normal bowel sounds, soft, distended, non-tender  Skin/Integumen: Profound bilateral lower extremity edema.  Scattered ecchymoses  Other: Mood seems worried        Data   Recent Labs   Lab 10/04/22  0601 10/03/22  0526 10/02/22  0653 10/01/22  1631 09/29/22  0754 09/27/22  1008   WBC 1.6* 8.0 17.8* 22.2*   < >  --    HGB 12.8* 13.3 13.5 13.9   < >  --    MCV 91 92 90 90   < >  --     267 311 407   < >  --    INR  --   --   --  1.70*  --  1.71*   * 130* 128* 127*   < >  --    POTASSIUM 5.7* 5.9* 5.7* 5.4*   < >  --    CHLORIDE 96 99 99 97   < >  --    CO2 23 22 23 14*   < >  --    BUN 80* 64* 59* 59*   < >  --    CR 1.74* 1.28* 1.24 1.30*   < >  --    ANIONGAP 7 9 6 16*   < >  --    PETER 7.5* 7.6* 7.9* 8.1*   < >  --    * 120* 124* 131*   < >  --    ALBUMIN  --  1.6* 1.8* 1.8*   < >  --    PROTTOTAL  --  5.5* 5.9* 6.1*   < >  --    BILITOTAL  --  1.0 1.3 1.5*   < >  --    ALKPHOS  --  107 123 139   < >  --    ALT  --  58 58 34   < >  --    AST  --  67* 100* 63*   < >  --    LIPASE  --   --   --  58*  --   --     < > = values in this interval not displayed.     No results found for this or any previous visit (from the past 24 hour(s)).  Medications     heparin 2,100 Units/hr (10/04/22 0056)     - MEDICATION INSTRUCTIONS -       sodium chloride Stopped (10/02/22 1606)       allopurinol  300 mg Oral Daily     [Held by provider] apixaban ANTICOAGULANT  5 mg Oral BID     diltiazem ER COATED BEADS  180 mg Oral Daily     LORazepam  0.25 mg Oral At Bedtime     miconazole   Topical BID     pantoprazole  40 mg Oral QAM AC     polyethylene glycol  17 g Oral BID     sennosides  1 tablet Oral Daily     sodium chloride (PF)  3 mL Intracatheter Q8H

## 2022-10-04 NOTE — PROGRESS NOTES
"Cardiothoracic Surgery Staff Note    Reviewed and discussed Mr. Barnard's case with Dr. Szymanski with regards to pericardial effusion. In brief, Mr. Barnard is a 72 year old man with stage 4 pancreatic cancer recently started on palliative chemo. He has had paracentesis for malignant ascites with recurrence. Pericardial effusion is likely malignant. With his advanced cancer, hypoalbuminemia I think he is a poor candidate for surgical drainage with subxiphoid window. My procedure would offer acute decompression but unlikely to keep pericardial effusion from draining since my \"window\" is into the abdominal cavity. This cavity already has positive fluid pressure and my window will likely close prematurely leading to recurrence of effusion.     I think the best and least invasive treatment plan would be percutaneous pericardiocentesis with drainage catheter. This way, we avoid the surgical morbidity of a wound that will have issues healing with his poor nutritional state and ongoing chemotherapy. Risk of general anesthesia is high as well. Patient not in room, will visit again tomorrow to explain rationale for avoiding surgery at this time.    Kennedy Diamond MD   Cardiothoracic Surgery  P: [phone]  C: [phone]    "

## 2022-10-04 NOTE — CONSULTS
[facility]    Nephrology Consultation     [facility]    Date of Admission:  10/1/2022    Assessment & Plan     <GUTIERREZ    ~Cameron is experiencing GUTIERREZ in the setting of recnetly diagnosed cancer, general malaise and poor intake and appetite. Additionally, contrast was necessary to image the abdomen/pancreas Ca on the 2nd. Urine sodium less than 5 and elevated urine osms.     ~At this point suspect pre-renal azotemia, however cannot exclude concurrent acute tubular injury related to iodinated contrast and tumor lysis. Today's heart echo did not show evidence of tamponade.      -Will give fluid bolus and maintenance fluid overnight as has had poor urine output through the day.     <HYPONATREMIA    ~Elevated usom consistent with high ADH state. Differential diagnosis is volume depletion versus SIADH. Not severely decreased at this point.     -Will give IV saline and trend Na with volume repletion.     <HYPERKALEMIA    ~Related to decreased secretion. Possible contribution of heparin.     -Lokelma ordered    -Low K diet ordered.       Jamar Khanna MD  [facility], Nephrology  Cell:[phone]  Pager:[phone]    Securely message with the Vocera Web Console (learn more here)  Text page via [employer] Paging/Directory         /\/\/\/\/\/\/\/\/\/\/\/\/\/\/\/\/\/\/\/\/\/\/\/\/\/\    Reason for Consult     I was asked to see the patient for GUTIERREZ and hyponatremia..    Primary Care Physician     Gary Bey    Chief Complaint     Abdominal pain, shortness of air.         History of Present Illness     Cameron Barnard is a 72 year old male who presented on 10/1 with abdominal pain, shortness of air and profound weakness.    Cameron has been battling adenocarcinoma of the pancreas since 9/2/22 when he was diagnosed. He has begun chemotherapy with gemcitabine and paclitaxel recently after which he has experienced increased weakness. He has had rapid AF this month for which he is  anticoagulated.     For the last few weeks, Cameron has been having worsening shortness of air. This has been associated with rapidly accumulating ascites which is from the cancer. This has required repeat paracentesis. As far as the shortness of air goes, he has been noted in the chart to have the sense that if he keeps his volume status low the shortness of air improves.     Associated with this syndrome, Cameron has noted worsening edema in the legs.     Past medical history is remarkable for a history of HTN, dyslipidemia, DVT (left femoral DVT), PE back in 2017. He aslo has osteoarthritis.     Reviewing the renal function the creatinine has historically been overall normal with occasoinal high values since at least 2019. eGFR has by and large been greater than 60. Urinalysis was normal back in 2019 per Epic.     Along with the elevated creatinne the patient has been noted to have hyponatremia. The baseline sodium just after diagnosis on the 9th was 140. Most recently it has been hanging int he mid 120 range. Urine osm was 412 today.     Images reviewed and in looking at the abdominal CTs available, the contour of the kidneys is irregular with cortices appearing somewhat thin.  Overall length of the kidneys appears about normal.     History is obtained from the patient and chart review.      Past Medical History   I have reviewed this patient's medical history and updated it with pertinent information if needed.   Past Medical History:   Diagnosis Date     Allergic state      DVT (deep venous thrombosis) (H)     chronic left femoral DVT     HLD (hyperlipidemia)      Hypertension     white coat syndrome     Multiple subsegmental pulmonary emboli without acute cor pulmonale (H) 2017     Perforated bowel (H) 09/2020     Personal history of deep vein thrombosis     DISTAL VEIN  LLE     Primary osteoarthritis of both knees      Pulmonary embolism (H)      Pulmonary embolus (H)        Past Surgical History   I have reviewed  this patient's surgical history and updated it with pertinent information if needed.  Past Surgical History:   Procedure Laterality Date     ARTHROPLASTY KNEE Left 2/10/2021    Procedure: Left total knee arthroplasty;  Surgeon: Gigi De Oliveira MD;  Location: [facility] OR     ARTHROPLASTY KNEE Right 2/4/2022    Procedure: RIGHT TOTAL KNEE ARTHROPLASTY;  Surgeon: Gigi De Oliveira MD;  Location:  OR     COLONOSCOPY N/A 9/19/2020    Procedure: COLONOSCOPY;  Surgeon: Lakshmi Santana MD;  Location:  OR     ESOPHAGOSCOPY, GASTROSCOPY, DUODENOSCOPY (EGD), COMBINED N/A 9/19/2020    Procedure: ESOPHAGOGASTRODUODENOSCOPY (EGD);  Surgeon: Lakshmi Santana MD;  Location:  OR     HERNIORRHAPHY INCISIONAL (LOCATION) N/A 5/5/2021    Procedure: OPEN INCISIONAL HERNIA REPAIR;  Surgeon: Christ Parekh MD;  Location:  OR     INJECT STEROID (LOCATION) Right 2/10/2021    Procedure: Right knee intra-articular injection of corticosteroid, 80 mg of Depo-Medrol;  Surgeon: Gigi De Oliveira MD;  Location: [facility] OR     IR CHEST PORT PLACEMENT > 5 YRS OF AGE  9/27/2022     IR PARACENTESIS  9/27/2022     LAPAROSCOPIC APPENDECTOMY N/A 9/27/2020    Procedure: Laparoscopic appendectomy;  Surgeon: Christ Parekh MD;  Location:  OR     LAPAROSCOPY DIAGNOSTIC (GENERAL) N/A 9/27/2020    Procedure: LAPAROSCOPIC  SMALL BOWEL RESCECTION;  Surgeon: Christ Parekh MD;  Location:  OR     LAPAROTOMY EXPLORATORY N/A 5/10/2021    Procedure: EXPLORATORY LAPAROTOMY AND EVACUATION OF HEMATOMA;  Surgeon: Hari Ceballos MD;  Location:  OR       Prior to Admission Medications   Prior to Admission Medications   Prescriptions Last Dose Informant Patient Reported? Taking?   apixaban ANTICOAGULANT (ELIQUIS) 5 MG tablet 10/1/2022 at am  No Yes   Sig: Take 1 tablet (5 mg) by mouth 2 times daily for 30 days   diltiazem ER COATED BEADS (CARDIZEM CD/CARTIA XT) 180 MG 24 hr capsule 10/1/2022 at Unknown time   No Yes   Sig: Take 1 capsule (180 mg) by mouth daily for 30 days   diphenhydrAMINE-acetaminophen (TYLENOL PM)  MG tablet 9/30/2022  Yes Yes   Sig: Take 2 tablets by mouth every 4 hours as needed for other (pain)   lactobacillus rhamnosus (GG) (CULTURELL) capsule 9/30/2022 Self Yes Yes   Sig: Take 1 capsule by mouth daily   morphine (MSIR) 15 MG IR tablet 10/1/2022 at Unknown time  No Yes   Sig: Take 1 tablet (15 mg) by mouth every 4 hours as needed for severe pain   pantoprazole (PROTONIX) 40 MG EC tablet 10/1/2022 at Unknown time  No Yes   Sig: Take 1 tablet (40 mg) by mouth every morning (before breakfast)   prochlorperazine (COMPAZINE) 10 MG tablet Past Week at Unknown time  No Yes   Sig: Take 1 tablet (10 mg) by mouth every 6 hours as needed for nausea or vomiting   sennosides (SENOKOT) 8.6 MG tablet 10/1/2022 at Unknown time  Yes Yes   Sig: Take 1 tablet by mouth daily      Facility-Administered Medications: None     [unfilled]  Allergies   Allergies   Allergen Reactions     Seasonal Allergies Other (See Comments)     Runny nose, watery eyes       Social History   I have reviewed this patient's social history and updated it with pertinent information if needed. Cameron Barnard  reports that he has never smoked. He has never used smokeless tobacco. He reports current alcohol use. He reports that he does not use drugs. The patient's usual occupation:    Family History   I have reviewed this patient's family history and updated it with pertinent information if needed. No family history of kidney disease.   Family History   Problem Relation Age of Onset     Breast Cancer Sister        Review of Systems   The 14 point Review of Systems is negative other than noted in the HPI.     Physical Exam   Temp: 97.7  F (36.5  C) Temp src: Oral BP: 112/57 Pulse: 88   Resp: 18 SpO2: 94 % O2 Device: Nasal cannula Oxygen Delivery: 1 LPM  Vital Signs with Ranges  Temp:  [97.5  F (36.4  C)-97.7  F (36.5   C)] 97.7  F (36.5  C)  Pulse:  [74-94] 88  Resp:  [18-22] 18  BP: (100-112)/(57-81) 112/57  Cuff Mean (mmHg):  [90] 90  SpO2:  [94 %-95 %] 94 %  286 lbs 3.2 oz    GENERAL APPEARANCE:  Alert, fatigued appearing. Exam limited by the patient having to go off to a test.   EYES:  EOM, lids, pupils and irises normal, sclera clear and conjunctiva normal  ENT:  Mouth normal, moist mucous membranes, nose normal without drainage or crusting, hearing acuity grossly intact  RESP:  Respiratory effort normal,no respiratory distress,  EXT: + none bilateral lower extremities.  No gross deformities.  SKIN:  skin on extremities warm, dry and intact without rashes  NEURO: cranial nerves grossly intact, no facial asymmetry, no speech deficits and able to follow directions, moves all extremities symmetrically  PSYCH:  insight and judgement and memory appear intact, affect and mood somewhat depressed and anxious.     Data   BMP  Recent Labs   Lab 10/04/22  0601 10/03/22  0526 10/02/22  0653 10/01/22  1631   * 130* 128* 127*   POTASSIUM 5.7* 5.9* 5.7* 5.4*   CHLORIDE 96 99 99 97   PETER 7.5* 7.6* 7.9* 8.1*   CO2 23 22 23 14*   BUN 80* 64* 59* 59*   CR 1.74* 1.28* 1.24 1.30*   * 120* 124* 131*     [email or web address](phos:4)  CBC)  Recent Labs   Lab 10/04/22  0601 10/03/22  0526 10/02/22  0653 10/01/22  1631   WBC 1.6*  1.6* 8.0 17.8* 22.2*   HGB 12.8* 13.3 13.5 13.9   HCT 40.0 41.4 41.5 42.9   MCV 91 92 90 90    267 311 407     Recent Labs   Lab 10/03/22  0526   AST 67*   ALT 58   ALKPHOS 107   BILITOTAL 1.0     Recent Labs   Lab 10/01/22  1631   INR 1.70*     No results found for: D2VIT, D3VIT, DTOT  Recent Labs   Lab 10/04/22  0601   HGB 12.8*   HCT 40.0   MCV 91     No results for input(s): PTHI in the last 168 hours.

## 2022-10-05 NOTE — PLAN OF CARE
Pleasant gentlemen. Pt is ao, SBA 1 with gait and walker, 5L of O2 and full code. Pt tele is NSR. Pt complained of abdominal pain and was treated with tylenol. Pt has hyperkalemia and lokelma was ordered. On heparin gtt and IV fluids.   Plan: Discuss with pt pericardiocentesis with drainage catheter

## 2022-10-05 NOTE — CONSULTS
[facility] Nurse Inpatient Assessment     Consulted for: Left groin, left elbow     Patient History (according to provider note(s):      Cameron Barnard is a 72 year old male who presents with abdominal pain, sob     Pericardial effusion   *Recently dx adenocarcinoma of pancreas 9/2022  *Just admitted 9/20-23 for afib. Pericardial effusion noted during admit, echo w/o tamponade, small-mod posterior effusion.   *1st chemo day prior to presentation. Woke this am with LUQ abdominal pain. Also with SOB which he thinks get better with hydration. Today nausea. In ED tachy ~100s, -110 systolic (note BP low at discharge 109 systolic), O2 sats normal. Labs BNP 2023. Troponin normal. WBC 22.2.    *echo ED EF 60-65%, large pericardial effusion noted (larger than recent echo), no tamponade, findings suggestive of effusive constrictive physiology.   *CT abdomen unchanged except larger pericardial effusion, increased abdominal ascites.    Areas Assessed:      Areas visualized during today's visit: Focused:, Perineal area and Upper extremities     Wound Location:  Left elbow  Date of last photo none taken   Wound History: skin tear   Wound Base: 10 % epidermis     Palpation of the wound bed: normal      Drainage: scant     Description of drainage: serous     Measurements (length x width x depth, in cm) 2  x 1.5  x  0.1 cm   Periwound skin: edematous and erythema- blanchable      Color: pink      Temperature: normal   Odor: none  Pain: no grimacing or signs of discomfort, none       Treatment Plan:   Left groin fold  Q shift if soiled, q 5 days for a new piece if clean  1. Wash skin gently. Pat dry, do not rub.  2. Cut the appropriate size of fabric with clean scissors, allowing a minimum of 2 inches of the fabric exposed outside the skin fold for moisture evaporation.  3. Lay a single layer of fabric in the skin fold, placing one edge of the fabric into the base of the fold. Gently smooth the rest  of the fabric over the skin, keeping it flat. Leave at least 2 inches of the fabric exposed outside the skin fold.  4. Secure the fabric in one of several ways; with the skin fold, with a small amount of tape, or tucked under clothing.  When to Dispose: Each piece of InterDry may be used up to 5 days, depending on fabric soiling, odor, amount of moisture and general skin condition. May label with skin marker to date  Removal: Remove the fabric before bathing and reuse when finished. When removing the fabric from skin folds, gently separate the skin fold and lift away fabric.      Left elbow - Change every 5 days and prn dislodgement  cleanse wound with wound cleanser, pat dry   Cover with mepilex 4x4      Orders: Written    RECOMMEND PRIMARY TEAM ORDER: None, at this time  Education provided: plan of care and Hygiene  Discussed plan of care with: Patient and Nurse  WOC nurse follow-up plan: weekly  Notify WOC if wound(s) deteriorate.  Nursing to notify the Provider(s) and re-consult the WOC Nurse if new skin concern.    DATA:     Current support surface: Bariatric Low air loss mattress  Containment of urine/stool: Incontinence Protocol  BMI: Body mass index is 37.76 kg/m .   Active diet order: Orders Placed This Encounter      2 Gram K Diet     Output: I/O last 3 completed shifts:  In: 415 [P.O.:415]  Out: 675 [Urine:675]     Labs: Recent Labs   Lab 10/05/22  0441 10/04/22  0601 10/03/22  0526 10/02/22  0653 10/01/22  1631   ALBUMIN  --   --  1.6*   < > 1.8*   HGB 11.8*   < > 13.3   < > 13.9   INR  --   --   --   --  1.70*   WBC 2.7*   < > 8.0   < > 22.2*    < > = values in this interval not displayed.     Pressure injury risk assessment:   Sensory Perception: 3-->slightly limited  Moisture: 3-->occasionally moist  Activity: 3-->walks occasionally  Mobility: 3-->slightly limited  Nutrition: 2-->probably inadequate  Friction and Shear: 3-->no apparent problem  Davon Score: 17    Melony Smith RN BS CWOCN

## 2022-10-05 NOTE — PROGRESS NOTES
Service Date: 10/05/2022    SUBJECTIVE:  Mr. Noriega is a 72-year-old gentleman with metastatic pancreatic cancer on palliative chemotherapy with gemcitabine and Abraxane, which was started last week.  The patient has malignant ascites.  He has pericardial effusion.  Cardiology is following.  They plan to do a pericardiocentesis today.    The patient feels weak.  No chest pain.  He has mild shortness of breath. Abdominal distention is better after paracentesis yesterday.  He has some back pain.  Appetite is decreased.  No fever or chills.  No bleeding from any site.    PHYSICAL EXAMINATION:    GENERAL:  He is alert and oriented x 3.  He is weak.  Not in any respiratory distress.  VITAL SIGNS:  Reviewed.  Rest of systems not examined.    LABS:  Reviewed.    ASSESSMENT:    1.  A 72-year-old gentleman with metastatic pancreatic cancer.  2.  Malignant ascites.  3.  Pericardial effusion, likely malignant.  4.  Leukopenia from chemotherapy.  5.  Hyponatremia from malignancy.  6.  Elevated creatinine.  7.  Pulmonary embolism and portal vein thrombus.    PLAN:    1.  The patient continues to be very weak.  This is from his metastatic disease, chemotherapy and other medical problems.  I am not sure how much of his fatigue is going to improve.  2.  For pancreatic cancer, plan is to continue his chemotherapy.  He will not get chemotherapy this week. It will resume next week.  3.  He has malignant ascites.  Paracentesis will done as needed for symptomatic relief.  4.  He has pericardial effusion.  He will be having pericardiocentesis.  5.  Patient is leukopenic/neutropenic.  He got Neupogen yesterday.  He is not neutropenic today.  We will discontinue the Neupogen.  6.  He was hyperuricemic.  He is on allopurinol, which will be continued.  7.  Oncology will continue to follow.    Lesley Abdalla MD        D: 10/05/2022   T: 10/05/2022   MT: BRIT    Name:     CATHERINE NORIEGA  MRN:      [id number]        Account:      [id number]    :      1950           Service Date: 10/05/2022       Document: [id number]

## 2022-10-05 NOTE — PROVIDER NOTIFICATION
MD Notification    Notified Person: MD    Notified Person Name:  Josiechely     Notification Date/Time: 10/5 1527    Notification Interaction: text page     Purpose of Notification: Just FYI: Pt has not been tolerating lokelma, vomited with AM dose and said it made him really nauseous last night.     Orders Received: Lokelma discontinued     Comments:

## 2022-10-05 NOTE — PROGRESS NOTES
"Cardiology Progress Note          Assessment and Plan:         72 year old male with a past medical history of metastatic pancreatic cancer on palliative chemotherapy, pulmonary embolism, and atrial fibrillation who presented on 10/01/2022 with abdominal pain and shortness of breath. Cardiology was consulted as he was found to have a moderate pericardial effusion. He was also found to have significant ascites contributing to his symptoms and underwent paracentesis.    IMPRESSION:      1) Moderate to large pericardial effusion, increased in size c/w last week. Probably malignant.    2) Metastatic pancreatic cancer   3) Recent diagnosis of AF     PLAN  - Repeat echo yesterday confirmed the presence of a moderate to large pericardial effusion with some evidence of fibrinous organization.  -CV surgery consultation much appreciated.  Per their recommendations, we will attempt percutaneous pericardiocentesis today given concerns regarding the potential surgical morbidity that could result from poor wound healing.  -I have explained the indications, risks and benefits of pericardiocentesis to the patient in detail.  He understands the risks and is willing to proceed.        Interval History:     Still complaining of of generalized fatigue and dyspnea with exertion.              Review of Systems:   As per subjective, otherwise 5 systems reviewed and negative.           Physical Exam:   Blood pressure 121/71, pulse 82, temperature 98.6  F (37  C), temperature source Oral, resp. rate 20, height 1.854 m (6' 1\"), weight 129.8 kg (286 lb 3.2 oz), SpO2 94 %.      Vital Sign Ranges  Temperature Temp  Av.7  F (36.5  C)  Min: 97.5  F (36.4  C)  Max: 97.7  F (36.5  C)   Blood pressure Systolic (24hrs), Av , Min:100 , Max:112        Diastolic (24hrs), Av, Min:57, Max:81      Pulse Pulse  Av  Min: 74  Max: 94   Respirations Resp  Av  Min: 18  Max: 22   Pulse oximetry SpO2  Av.5 %  Min: 94 %  Max: 95 %     "     Intake/Output Summary (Last 24 hours) at 10/4/2022 1151  Last data filed at 10/4/2022 0917  Gross per 24 hour   Intake 0 ml   Output 125 ml   Net -125 ml       Constitutional: NAD  Skin: Warm and dry  Lungs: CTA  Cardiovascular: irregular  Abdomen: Soft, non tender.  Extremities and Back: No LE edema  Neurological: Nonfocal           Medications:     I have reviewed this patient's current medications.              Data:                 Raji Szymanski MD, M.D.

## 2022-10-05 NOTE — PROVIDER NOTIFICATION
MD Notification    Notified Person: MD    Notified Person Name: Dr. Machuca     Notification Date/Time: 10/5 0917    Notification Interaction: text page     Purpose of Notification: Cameron Barnard 254: Pt still c/o 6/10 abdominal pain despite Tylenol and morphine. He also states its a little harder to breathe this morning. I'm wondering if he's not tolerating IVF that nephrology ordered?     Orders Received: morphine dose increased    Comments:

## 2022-10-05 NOTE — PROGRESS NOTES
Interventional Cardiology    Patient came down to Cath Lab and underwent preliminary echocardiogram.  There is a small pericardial effusion mainly directed posteriorly with organization of material compared to prior studies.  There is no apical window or substernal noted upon examination.  Discussed findings with referring cardiology team along with the patient, and no attempt at pericardiocentesis was made.  Patient taken back to floor for continued care.    Niall Han MD

## 2022-10-05 NOTE — PROGRESS NOTES
"[facility]    Medicine Progress Note - Hospitalist Service    Date of Admission:  10/1/2022    Cmaeron Barnard is a 72 year old male who presents with abdominal pain, sob    Assessment & Plan    Pericardial effusion   *Recently dx adenocarcinoma of pancreas 9/2022  *Just admitted 9/20-23 for afib. Pericardial effusion noted during admit, echo w/o tamponade, small-mod posterior effusion.   *1st chemo day prior to presentation. Woke this am with LUQ abdominal pain. Also with SOB which he thinks get better with hydration. Today nausea. In ED tachy ~100s, -110 systolic (note BP low at discharge 109 systolic), O2 sats normal. Labs BNP 2023. Troponin normal. WBC 22.2.    *echo ED EF 60-65%, large pericardial effusion noted (larger than recent echo), no tamponade, findings suggestive of effusive constrictive physiology.   *CT abdomen unchanged except larger pericardial effusion, increased abdominal ascites.     telemetry     Cardiology consult requested.  I appreciate Dr. Szymanski's followup    Per him, \"no indication for acute pericardiocentesis, observation and recheck in a few days.\"    Also per Dr. Winn,  \"If there is worsening effusion and increasing shortness of breath or hemodynamic instability, he will need a pericardial tap.\"    Continues on low intensity heparin, see below    Repeat Echo done 10/4 shows, \"a large (2.6-2.8 cms) posterior and small to moderate anterior    pericardial effusion as previously reported. On direct comparison of the two echos, the effusion appears a bit small(er) on today's study. Effusion appears to be partially organizing anteriorly. No features of tamponade.\"    CV surgery consult requested to assess for possible pericardial window, please see their conclusions.  I appreciate Dr. Diamond's evaluation and recommendations    Per him, \"My procedure would offer acute decompression but unlikely to keep pericardial effusion from draining since my \"window\" is into the " "abdominal cavity. This cavity already has positive fluid pressure and my window will likely close prematurely leading to recurrence of effusion.\"    Also per him, \"think the best and least invasive treatment plan would be percutaneous pericardiocentesis with drainage catheter.\"    Patient sent for pericardiocentesis.  Dr. Han notes, \"There is a small pericardial effusion mainly directed posteriorly with organization of material compared to prior studies.....no attempt at pericardiocentesis was made.\"    Timing for further Echos, procedures per Cardiology     Recent dx stage IV metastatic adenocarcinoma of pancreas  Malignant ascites  Abdominal pain  Elevated bilirubin, AST  [Morphine IR 15 mg q4 hours prn, pantoprazole 40 mg daily]  Follows with [facility] oncology. Presented to ED with abdominal pain 9/9. Found to have pancreatic head mass and masses throughout liver. Also with malignant ascites. US biopsy by IR with adenocarcinoma of the pancreas. Underwent paracentesis x 2 9/2022. Started Gemzar/Abraxane 9/30/22.   Bili on presentation at 1.5 (9/30 was 0.9). AST sl up at 63. New pain for admission is in RUQ.     [facility] oncology consult requested, I appreciate Dr. Abdalla's evaluation and recommendations    prn Morphine IR 15 mg q4 hours prn available    RUQ US shows cholelithiasis and gallbladder wall thickening without other evidence of cholecystitis. Enlarged fatty liver with hepatic metastasis.     S/P US guided paracentesis yielding 4425 mL of yellow, serous fluid    Patient says his abdomen again feels uncomfortable, requested repeat paracentesis. 2.1 L of  straw colored fluid was drained.     Hyponatremia  Hyperkalemia, mild  GUTIERREZ  CKD III  Anion gap acidosis, resolved  Na 127 (recent 128-132), K 5.4 (recent 4.8-5.6). AG is 16, doesn't look toxic.   Previous baseline creatinine 0.8-1.1. 9/29 1.6, on presentation at 1.3. possible volume depletion vs other    Checked uric acid, urine sodium, urine osm, serum osm    Low " urine sodium, elevated urine osm suggest hypovolemia, pre-renal ATN, likely related to low plasma oncotic pressure.  FeNA is low, as expected, in setting of decreased intravascular volume    Could give albumin + fluid bolus    Hyperkalemia and elevated uric acid are likely in part related to tumor lysis, treated with rasburicase.  Uric acid improved.    Nephrology consult requested due to rising creatinine, ongoing hyponatremia, hyperkalemia.  I appreciate Dr. Khanna's evaluation and recommendations    Lokelma repeated    Lactic acid is within normal limits    avoid nephrotoxins      Leukocytosis  Has had recent leukocytosis WBC 15-17. On presentation WBC 22.2. afebrile in ED. UA bland.    monitor off abx for now     Pulmonary embolism 9/2022  Portal vein thrombosis  [apixaban 5 mg BID]  Had hx PE 5 years prior, had been on Eliquis and this and was stopped. 9/9 was found to have small RLL pulmonary embolism as well as portal vein thrombus.     hold apixaban, timing for resuming per Oncology/Cardiology      Atrial fibrillation   [apixaban, diltiazem 180 mg daily]  New as of 9/20/22 admission. Discharged on diltiazem     resume diltiazem for ventricular rate control    See discussion re: anticoagulants, above        Diet: Snacks/Supplements Adult: Ensure Enlive; Between Meals  2 Gram K Diet    DVT Prophylaxis: Pneumatic Compression Devices  Altman Catheter: Not present  Central Lines: PRESENT       Cardiac Monitoring: ACTIVE order. Indication: Tachyarrhythmias, acute (48 hours)  Code Status: Full Code      Disposition Plan     Expected Discharge Date: 10/05/2022                Total time spent:  > 35 minutes  Time spent counseling, coordination of care: 25 minutes including discussion with care team and Dr. Szymanski, regarding Echo, pericardiocentesis, GUTIERREZ, hiccups, also personal review and interpretation of labs and studies as noted above     Danika Machuca MD  Hospitalist Service  [facility]  "Hospital  Securely message with the Vocera Web Console (learn more here)  Text page via [employer] Paging/Directory         Clinically Significant Risk Factors Present on Admission               # Obesity: Estimated body mass index is 37.76 kg/m  as calculated from the following:    Height as of this encounter: 1.854 m (6' 1\").    Weight as of this encounter: 129.8 kg (286 lb 3.2 oz).      _____________________________________________________________________    Interval History   \"These bloody hiccups are driving me crazy.\" Patient says he has had hiccups since Tuesday.  His mouth is dry.  He feels short of breath, paracentesis yesterday, \"didn't do as much.\"  He is disappointed that he couldn't have pericardiocentesis.    Data reviewed today: I reviewed all medications, new labs and imaging results over the last 24 hours. I personally reviewed the abdominal ultrasound image(s) showing Cholelithiasis and gallbladder wall thickening without other evidence of cholecystitis..    Physical Exam   Vital Signs: Temp: 98.6  F (37  C) Temp src: Oral BP: 121/71 Pulse: 82   Resp: 20 SpO2: 94 % O2 Device: None (Room air) Oxygen Delivery: 3 LPM  Weight: 286 lbs 3.2 oz  Constitutional: Dozing, wakes to voice  Respiratory: Clear to auscultation anteriorly  Cardiovascular: Irregularly irregular rhythm  GI: Obese, normal bowel sounds, soft, distended, non-tender  Skin/Integumen: Profound bilateral lower extremity edema.  Scattered ecchymoses  Other: Mood seems discouraged, resigned        Data   Recent Labs   Lab 10/05/22  0441 10/04/22  2250 10/04/22  0601 10/03/22  0526 10/02/22  0653 10/01/22  1631   WBC 2.7*  --  1.6*  1.6* 8.0 17.8* 22.2*   HGB 11.8*  --  12.8* 13.3 13.5 13.9   MCV 88  --  91 92 90 90     --  263 267 311 407   INR  --   --   --   --   --  1.70*   * 126* 126* 130* 128* 127*   POTASSIUM 5.7*  --  5.7* 5.9* 5.7* 5.4*   CHLORIDE 97  --  96 99 99 97   CO2 21  --  23 22 23 14*   BUN 84*  --  80* 64* 59* 59* "   CR 1.51* 1.59* 1.74* 1.28* 1.24 1.30*   ANIONGAP 11  --  7 9 6 16*   PETER 7.5*  --  7.5* 7.6* 7.9* 8.1*   *  --  127* 120* 124* 131*   ALBUMIN  --   --   --  1.6* 1.8* 1.8*   PROTTOTAL  --   --   --  5.5* 5.9* 6.1*   BILITOTAL  --   --   --  1.0 1.3 1.5*   ALKPHOS  --   --   --  107 123 139   ALT  --   --   --  58 58 34   AST  --   --   --  67* 100* 63*   LIPASE  --   --   --   --   --  58*     Recent Results (from the past 24 hour(s))   Echocardiogram Limited    Narrative    [id number]  [id number]  [id number]  [id number]^MIKE^ETTA     [facility]  Echocardiography Laboratory  [address] [place] [id number]     Name: CATHERINE NORIEGA  MRN: [id number]  : 1950  Study Date: 10/04/2022 12:17 PM  Age: 72 yrs  Gender: Male  Patient Location: [facility]  Reason For Study: Pericardial Effusion  Ordering Physician: ETTA MCKEON  Performed By: Cassi Morris     BSA: 2.5 m2  Height: 73 in  Weight: 286 lb  HR: 87  ______________________________________________________________________________  Procedure  Limited Portable Echo Adult.  ______________________________________________________________________________  Interpretation Summary     Limited echo to assess pericardial effusion.     There is a large (2.6-2.8 cms) posterior and small to moderate anterior  pericardial effusion as previously reported. On direct comparison of the two  echos, the effusion appears a bit small on today's study.  Effusion appears to be partially organizing anteriorly.  IVC is normal in size.  No features of tamponade. Correlate clinically.  ______________________________________________________________________________  Left Ventricle  Left ventricular systolic function is normal.     Right Ventricle  The right ventricle is normal in size and function.     Atria  Right atrium not well visualized.     Mitral Valve  The mitral valve is normal in structure and function.     Aortic Valve  No aortic stenosis is  present.     Vessels  The inferior vena cava was normal in size with preserved respiratory  variability.  ______________________________________________________________________________  Report approved by: Kevin Ellis 10/04/2022 03:12 PM     ______________________________________________________________________________       Paracentesis without Albumin    Narrative    ULTRASOUND PARACENTESIS WITHOUT ALBUMIN  10/4/2022 3:46 PM     HISTORY: Pancreatic cancer with malignant ascites.    FINDINGS: Ultrasound was used to evaluate for the presence and best  approach for paracentesis. Written and oral informed consent was  obtained. A pause for the cause procedure to verify the correct  patient and correct procedure. The skin overlying the right lower  quadrant was prepped and draped in the usual sterile fashion. The  subcutaneous tissues were anesthetized with 10 mL 1% lidocaine. A  catheter was advanced into the peritoneal space and 2.1 L of  straw  colored fluid was drained. There were no immediate complications.  Ultrasound images were permanently stored.  Patient left the  ultrasound suite in satisfactory condition.      Impression    IMPRESSION: Technically successful paracentesis without immediate  complications.    JAY BREAUX MD         SYSTEM ID:  [id number]     Medications     heparin 2,250 Units/hr (10/05/22 0759)     - MEDICATION INSTRUCTIONS -       sodium chloride 125 mL/hr at 10/05/22 0822       albumin human  12.5 g Intravenous Once     allopurinol  300 mg Oral Daily     [Held by provider] apixaban ANTICOAGULANT  5 mg Oral BID     diltiazem ER COATED BEADS  180 mg Oral Daily     filgrastim  480 mcg Subcutaneous Daily at 8 pm     LORazepam  0.25 mg Oral At Bedtime     miconazole   Topical BID     pantoprazole  40 mg Oral QAM AC     polyethylene glycol  17 g Oral BID     sennosides  1 tablet Oral Daily     sodium chloride (PF)  3 mL Intracatheter Q8H     sodium zirconium cyclosilicate  10 g Oral  TID

## 2022-10-05 NOTE — PROGRESS NOTES
A&O x4. VSS on 2L O2. Tele A fib CVR.  C/o abdominal and buttock pain, PRN morphine given. C/o SOB, MD aware, IVF discontinued. Up A2 w/ walker and sero-steady. Pt progressively weaker today, compared to 10/4. Refusing all meals, pt only intake has been ice chips. Will continue to monitor. Wife updated on pt current status.

## 2022-10-05 NOTE — PROGRESS NOTES
[facility]    Nephrology Progress Note    Assessment & Plan        <GUTIERREZ     ~GUTIERREZ related to volume depletion, contrast, perhaps third spacing resulting in intravascular volume depletion.      ~Will discontinue fluids at this point and monitor volume status, UO, renal function.           <HYPONATREMIA     ~Improved with saline administration. As before, high ADH state. SIADH v Volume depletion. Na improved     -Repeat urine osmolality today.      <HYPERKALEMIA     ~Related to decreased secretion. Possible contribution of heparin. Did get some lokelma in and will work in gut. On low K diet.      -Monitor potassium.         Jamar Khanna MD  [facility], Nephrology  Cell:[phone]  Pager:[phone]  On iClinical    Securely message with the Vocera Web Console (learn more here)  Text page via [employer] Paging/Directory     =========================  <><><><><><><><><><><>    Interval History     Did not tolerate lokelma at all. Threw it up.     Feels very poorly still. Overwhelming weakness. Continuous with abdominal distension, some edema. Ongoing abdominal pain.     Creatinine came down over night to 1.5. The potassium is already        BP: 104/67 Pulse: 81   Resp: 18 SpO2: 97 % O2 Device: Nasal cannula Oxygen Delivery: 2 LPM    Vitals:    10/02/22 0419 10/02/22 1700 10/03/22 0532   Weight: 133.4 kg (294 lb 1.6 oz) 131.4 kg (289 lb 11.2 oz) 129.8 kg (286 lb 3.2 oz)       Vital Signs with Ranges    Pulse:  [] 81  Resp:  [14-22] 18  BP: (104-121)/(67-71) 104/67  SpO2:  [94 %-98 %] 97 %    I/O last 3 completed shifts:  In: 1615 [P.O.:415; I.V.:1200]  Out: 950 [Urine:650; Emesis/NG output:300]    Physical Exam    BP Readings from Last 5 Encounters:   10/05/22 104/67   09/30/22 130/76   09/30/22 138/85   09/27/22 102/81   09/23/22 109/85        Wt Readings from Last 10 Encounters:   10/03/22 129.8 kg (286 lb 3.2 oz)   09/30/22 131.9 kg (290 lb 12.8 oz)   09/23/22 134.1 kg (295  lb 9.6 oz)   09/19/22 138.8 kg (306 lb)   09/09/22 129.3 kg (285 lb)   02/04/22 129.3 kg (285 lb)   05/10/21 131.5 kg (290 lb)   05/05/21 131.4 kg (289 lb 9.6 oz)   02/10/21 129.3 kg (285 lb)   02/03/21 131.7 kg (290 lb 6.4 oz)       GENERAL:      Fatigued appearing. Somnolent. Able to converse, but slowly.     [] Intubated, sedated  [] Speech fluent, attentive.  HEENT:    []  Normocephalic. No gross abnormalities.    []The mouth moist.    [] Icteric  NECK: The jugular venous pressure is [] Normal  [x] Elevated (Supine) [] Not visible  CV:  [x] RRR [] IRR S1 S2 No murmur or rub detected.  RESP: [x]  Decreased breath sounds bilaterally with no audible crackle. []  Breathing normal, unlabored.  GI: Abdomen distended but not tender.       Abdomen soft/non-tender/non-distended. []  No masses, organomegaly  MUSCULOSKELETAL: []  Extremities normal - no gross deformities noted.   Edema: [] None []  1+ [x] 2+ []  3+ [] 4+ edema. [] Anasarca  ACCESS:   SKIN:     [x]  No new lesions or rashes, dry to touch.   [x]  Pale   []  Jaundiced    NEURO:      Attention poor. Somnolent.   []  No overt deficit. Speech and attention normal.    PSYCH:   Appears very depressed.   [] mood good, affect appropriate.   []  Unable to assess    Medications       heparin Stopped (10/05/22 1033)     - MEDICATION INSTRUCTIONS -       sodium chloride 125 mL/hr at 10/05/22 0822         albumin human  12.5 g Intravenous Once     allopurinol  300 mg Oral Daily     [Held by provider] apixaban ANTICOAGULANT  5 mg Oral BID     diltiazem ER COATED BEADS  180 mg Oral Daily     LORazepam  0.25 mg Oral At Bedtime     miconazole   Topical BID     pantoprazole  40 mg Oral QAM AC     polyethylene glycol  17 g Oral BID     sennosides  1 tablet Oral Daily     sodium chloride (PF)  3 mL Intracatheter Q8H       Data     UA RESULTS:  Recent Labs   Lab Test 10/01/22  1631   COLOR Yellow   APPEARANCE Clear   URINEGLC Negative   URINEBILI Negative   URINEKETONE Negative    SG 1.010   UBLD Negative   URINEPH 5.5   PROTEIN 20 *   NITRITE Negative   LEUKEST Negative   RBCU 2   WBCU <1      BMP  Recent Labs   Lab 10/05/22  0441 10/04/22  2250 10/04/22  0601 10/03/22  0526 10/02/22  0653   * 126* 126* 130* 128*   POTASSIUM 5.7*  --  5.7* 5.9* 5.7*   CHLORIDE 97  --  96 99 99   PETER 7.5*  --  7.5* 7.6* 7.9*   CO2 21  --  23 22 23   BUN 84*  --  80* 64* 59*   CR 1.51* 1.59* 1.74* 1.28* 1.24   *  --  127* 120* 124*     Phos@[email or web address](phos:4)  CBC)  Recent Labs   Lab 10/05/22  0441 10/04/22  0601 10/03/22  0526 10/02/22  0653   WBC 2.7* 1.6*  1.6* 8.0 17.8*   HGB 11.8* 12.8* 13.3 13.5   HCT 35.3* 40.0 41.4 41.5   MCV 88 91 92 90    263 267 311     Lab Results   Component Value Date    ALBUMIN 1.6 10/03/2022    ALBUMIN 1.8 10/02/2022    ALBUMIN 1.8 10/01/2022    ALBUMIN 2.6 05/13/2021    ALBUMIN 3.3 05/10/2021    ALBUMIN 3.0 09/26/2020        Recent Labs   Lab 10/05/22  0441   HGB 11.8*   HCT 35.3*   MCV 88       Recent Labs   Lab 10/03/22  0526   AST 67*   ALT 58   ALKPHOS 107   BILITOTAL 1.0     Recent Labs   Lab 10/01/22  1631   INR 1.70*     No results found for: D2VIT, D3VIT, DTOT  No results for input(s): PTHI in the last 168 hours.    Attestation:   I have reviewed today's relevant vital signs, notes, medications, labs and imaging.

## 2022-10-06 NOTE — CONSULTS
Care Management Initial Consult    General Information  Assessment completed with: Patient, Spouse or significant other, Tiffanie  Type of CM/SW Visit: Initial Assessment    Primary Care Provider verified and updated as needed: Yes   Readmission within the last 30 days:        Reason for Consult: discharge planning  Advance Care Planning: Advance Care Planning Reviewed: present on chart, other (see comments) (Has ACP docs)          Communication Assessment  Patient's communication style: spoken language (English or Bilingual)    Hearing Difficulty or Deaf: no   Wear Glasses or Blind: yes    Cognitive  Cognitive/Neuro/Behavioral: WDL  Level of Consciousness: alert  Arousal Level: opens eyes spontaneously  Orientation: oriented x 4  Mood/Behavior: calm, cooperative  Best Language: 0 - No aphasia  Speech: clear    Living Environment:   People in home: spouse  Tiffanie  Current living Arrangements: house      Able to return to prior arrangements: yes       Family/Social Support:  Care provided by: self  Provides care for: no one  Marital Status:   Wife  Tiffanie       Description of Support System: Supportive, Involved    Support Assessment: Adequate social supports, Adequate family and caregiver support    Current Resources:   Patient receiving home care services: No     Community Resources: None  Equipment currently used at home: walker, rolling, cane, straight  Supplies currently used at home: None    Employment/Financial:  Employment Status: retired        Financial Concerns:             Lifestyle & Psychosocial Needs:  Social Determinants of Health     Tobacco Use: Low Risk      Smoking Tobacco Use: Never Smoker     Smokeless Tobacco Use: Never Used   Alcohol Use: Not on file   Financial Resource Strain: Not on file   Food Insecurity: Not on file   Transportation Needs: Not on file   Physical Activity: Not on file   Stress: Not on file   Social Connections: Not on file   Intimate Partner Violence: Not on  file   Depression: Not on file   Housing Stability: Not on file       Functional Status:  Prior to admission patient needed assistance:              Mental Health Status:          Chemical Dependency Status:                Values/Beliefs:  Spiritual, Cultural Beliefs, Congregation Practices, Values that affect care: no               Additional Information:  Per care management/social work consult for discharge planning, patient was admitted on 10/1/2022 with abdominal pain. Tentative date of discharge is 10/11/2022. Writer talked to patient and patient's spouse, Tiffanie, in patient's room. Patient lives with spouse in a house. He has five stairs going up to get to the main level of his house. Patient uses a walker and cane as needed. Patient was independent before coming to the hospital. Tiffanie said that patient missed his chemo apt this week and it's rescheduled for next week. Mentioned PT's recommendation for TCU v. Home with assist. She said that his preference would be to go home as he's most comfortable there. Mentioned if he went to a TCU, then his chemo would have to be put on hold. She said that patient wouldn't want to do that. She said that they don't want to make a decision right now as they are still waiting for answers on what's going on. She did say that she would want information on how to apply for [employer] and other medical taxis. Writer said she would print out information for her.    Writer printed out [employer] mobility information and application for patient. Writer gave patient the [employer] phone number to see if they cover any transport under his insurance. Writer also made a list of private pay transport companies including [employer], [employer], [employer], [employer], and [employer].    Will continue to follow.     VALORIE Nixon

## 2022-10-06 NOTE — PROGRESS NOTES
Cardiology Progress Note          Assessment and Plan:         72 year old male with a past medical history of metastatic pancreatic cancer on palliative chemotherapy, pulmonary embolism, and atrial fibrillation who presented on 10/01/2022 with abdominal pain and shortness of breath. Cardiology was consulted as he was found to have a moderate pericardial effusion. He was also found to have significant ascites contributing to his symptoms and underwent paracentesis.    IMPRESSION:      1) Moderate to large pericardial effusion, increased in size c/w last week. Probably malignant.    2) Metastatic pancreatic cancer   3) Recent diagnosis of AF     PLAN  -Unfortunately the echocardiogram yesterday demonstrated a small pericardial effusion anteriorly with evidence of fibrinous consolidation.  Although the effusion is moderate to large in size adjacent to the lateral wall segments, these are not amenable to percutaneous drainage per our discussion with the interventional team yesterday.  I also had follow-up conversations with both cardiac and thoracic surgery, and both teams felt that a pericardial window or VATS carry a significant risk of surgical site infection and nonhealing.  There are also understandable concerns about his suitability for general anesthesia.  -I discussed these concerns with the patient in detail both yesterday and today.  At this point in time he is clinically stable and there is no evidence of cardiac tamponade.  I would recommend obtaining a follow-up echocardiogram tomorrow for reassessment of effusion size and consolidation but at this time would not recommend proceeding with attempts at percutaneous or surgical drainage given the considerations described above.  He expresses understanding.          Interval History:     Feeling better from a breathing standpoint this morning.             Review of Systems:   As per subjective, otherwise 5 systems reviewed and negative.           Physical  "Exam:   Blood pressure 110/70, pulse 95, temperature 97.6  F (36.4  C), temperature source Axillary, resp. rate 18, height 1.854 m (6' 1\"), weight 129.8 kg (286 lb 3.2 oz), SpO2 96 %.      Vital Sign Ranges  Temperature Temp  Av.7  F (36.5  C)  Min: 97.5  F (36.4  C)  Max: 97.7  F (36.5  C)   Blood pressure Systolic (24hrs), Av , Min:100 , Max:112        Diastolic (24hrs), Av, Min:57, Max:81      Pulse Pulse  Av  Min: 74  Max: 94   Respirations Resp  Av  Min: 18  Max: 22   Pulse oximetry SpO2  Av.5 %  Min: 94 %  Max: 95 %         Intake/Output Summary (Last 24 hours) at 10/4/2022 1151  Last data filed at 10/4/2022 0917  Gross per 24 hour   Intake 0 ml   Output 125 ml   Net -125 ml       Constitutional: NAD  Skin: Warm and dry  Lungs: CTA  Cardiovascular: irregular  Abdomen: Soft, non tender.  Extremities and Back: +2 LE edema  Neurological: Nonfocal           Medications:     I have reviewed this patient's current medications.              Data:                 Raji Szymanski MD, M.D.    "

## 2022-10-06 NOTE — PROGRESS NOTES
Patient remains in afib. No urine output this shift. Groin/abdominal pannus fold remains reddened, powder applied. Patient refuses to turn on right side, turns from supine to left. Very little PO intake.

## 2022-10-06 NOTE — PROGRESS NOTES
Pt received prn pain medication. See providers notes for plan of care. No intervention at this time. Palliative care has been consulted.

## 2022-10-06 NOTE — PLAN OF CARE
Cognition/Mentation: A/O x4    VS: stable, 2L NC    Cardiac: afib/CVR    Respiratory: LS diminished, APONTE    GI/: continent, using urinal at bedside, low UO, denies nausea overnight    Activity: in bed overnight    Pain: PRN Tylenol given per patient request for abdominal pain    Drips: heparin gtt infusing @ 2550 units/hr (next PTT draw @ 0645)    Drains/Tubes: Port, PIV SL    Skin: scattered bruising, scabs to shin/knee, mepilex to left elbow, +3 edema BLE    Other: pt c/o sore throat, visible red patches but no swelling, patient denies difficulty swallowing    Disposition: pending

## 2022-10-06 NOTE — PROGRESS NOTES
"[facility]    Medicine Progress Note - Hospitalist Service    Date of Admission:  10/1/2022    Cameron Barnard is a 72 year old male who presents with abdominal pain, sob    Assessment & Plan    Pericardial effusion   *Recently dx adenocarcinoma of pancreas 9/2022  *Just admitted 9/20-23 for afib. Pericardial effusion noted during admit, echo w/o tamponade, small-mod posterior effusion.   *1st chemo day prior to presentation. Woke this am with LUQ abdominal pain. Also with SOB which he thinks get better with hydration. Today nausea. In ED tachy ~100s, -110 systolic (note BP low at discharge 109 systolic), O2 sats normal. Labs BNP 2023. Troponin normal. WBC 22.2.    *echo ED EF 60-65%, large pericardial effusion noted (larger than recent echo), no tamponade, findings suggestive of effusive constrictive physiology.   *CT abdomen unchanged except larger pericardial effusion, increased abdominal ascites.     telemetry     Cardiology consult requested.  I appreciate Dr. Szymanski's followup    Per him, \"no indication for acute pericardiocentesis, observation and recheck in a few days.\"    Also per Dr. Winn,  \"If there is worsening effusion and increasing shortness of breath or hemodynamic instability, he will need a pericardial tap.\"    Continues on low intensity heparin, see below    Repeat Echo done 10/4 shows, \"a large (2.6-2.8 cms) posterior and small to moderate anterior    pericardial effusion as previously reported. On direct comparison of the two echos, the effusion appears a bit small(er) on today's study. Effusion appears to be partially organizing anteriorly. No features of tamponade.\"    CV surgery consult requested to assess for possible pericardial window, please see their conclusions.  I appreciate Dr. Diamond's evaluation and recommendations    Per him, \"My procedure would offer acute decompression but unlikely to keep pericardial effusion from draining since my \"window\" is into the " "abdominal cavity. This cavity already has positive fluid pressure and my window will likely close prematurely leading to recurrence of effusion.\"    Also per him, \"think the best and least invasive treatment plan would be percutaneous pericardiocentesis with drainage catheter.\"    Patient sent for pericardiocentesis.  Dr. Han notes, \"There is a small pericardial effusion mainly directed posteriorly with organization of material compared to prior studies.....no attempt at pericardiocentesis was made.\"    Timing for further Echos, procedures per Cardiology    Note that Oncology recommends Palliative Care consult, see further discussion, below       Recent dx stage IV metastatic adenocarcinoma of pancreas  Malignant ascites  Abdominal pain  Elevated bilirubin, AST  [Morphine IR 15 mg q4 hours prn, pantoprazole 40 mg daily]  Follows with [facility] oncology. Presented to ED with abdominal pain 9/9. Found to have pancreatic head mass and masses throughout liver. Also with malignant ascites. US biopsy by IR with adenocarcinoma of the pancreas. Underwent paracentesis x 2 9/2022. Started Gemzar/Abraxane 9/30/22.   Bili on presentation at 1.5 (9/30 was 0.9). AST sl up at 63. New pain for admission is in RUQ.     [facility] oncology consult requested, I appreciate Dr. Abdalla's evaluation and recommendations    prn Morphine IR 15 mg q4 hours prn available    RUQ US shows cholelithiasis and gallbladder wall thickening without other evidence of cholecystitis. Enlarged fatty liver with hepatic metastasis.     Received Neupogen for neutropenia    S/P US guided paracentesis yielding 4425 mL of yellow, serous fluid    Patient says his abdomen again feels uncomfortable, requested repeat paracentesis. 2.1 L of  straw colored fluid was drained.    I discussed with Dr. Abdalla.  He recommends palliative care consult.  Based on rapid progression of symptoms, change to emphasis on patient's comfort would be appropriate     Hyponatremia  Hyperkalemia, " mild  GUTIERREZ  CKD III  Anion gap acidosis, resolved  Na 127 (recent 128-132), K 5.4 (recent 4.8-5.6). AG is 16, doesn't look toxic.   Previous baseline creatinine 0.8-1.1. 9/29 1.6, on presentation at 1.3. possible volume depletion vs other    Checked uric acid, urine sodium, urine osm, serum osm    Low urine sodium, elevated urine osm suggest hypovolemia, pre-renal ATN, likely related to low plasma oncotic pressure.  FeNA is low, as expected, in setting of decreased intravascular volume    Could give albumin + fluid bolus    Hyperkalemia and elevated uric acid are likely in part related to tumor lysis, treated with rasburicase.  Uric acid improved.    Nephrology consult requested due to rising creatinine, ongoing hyponatremia, hyperkalemia.  I appreciate Dr. Khanna's evaluation and recommendations    Rodneyma repeated, patient has difficulty tolerating due to nausea    Lactic acid is within normal limits    avoid nephrotoxins     Cortisol level is normal.  Renin, aldosterone levels are pending     Leukocytosis  Has had recent leukocytosis WBC 15-17. On presentation WBC 22.2. afebrile in ED. UA marcelina.    monitor off abx for now     Pulmonary embolism 9/2022  Portal vein thrombosis  [apixaban 5 mg BID]  Had hx PE 5 years prior, had been on Eliquis and this and was stopped. 9/9 was found to have small RLL pulmonary embolism as well as portal vein thrombus.     hold apixaban, timing for resuming per Oncology/Cardiology     Continue heparin gtt     Hiccups    Patient says he had no relief from hiccups with Thorazine    Try Reglan.     Atrial fibrillation   [apixaban, diltiazem 180 mg daily]  New as of 9/20/22 admission. Discharged on diltiazem     resume diltiazem for ventricular rate control    See discussion re: anticoagulants, above        Diet: Snacks/Supplements Adult: Ensure Enlive; Between Meals  2 Gram K Diet    DVT Prophylaxis: Pneumatic Compression Devices  Altman Catheter: Not present  Central Lines: PRESENT    "    Cardiac Monitoring: ACTIVE order. Indication: Tachyarrhythmias, acute (48 hours)  Code Status: Full Code      Disposition Plan     Expected Discharge Date: 10/11/2022                Total time spent:  > 35 minutes  Time spent counseling, coordination of care: 25 minutes including discussion with care team and Dr. Abdalla, also Bethany Baker NP, regarding metastatic pancreatic cancer, pericardial effusion, malignant ascites, hiccups, also personal review and interpretation of labs and studies as noted above     Danika Machuca MD  Hospitalist Service  [facility]  Securely message with the Vocera Web Console (learn more here)  Text page via [employer] Paging/Directory       Clinically Significant Risk Factors Present on Admission                   _____________________________________________________________________    Interval History   \"If I sit up, the hiccups come back.\"  Patient says he had no relief from hiccups with Thorazine.  He is exhausted, his mouth is dry.  He denies abdominal pain.    Data reviewed today: I reviewed all medications, new labs and imaging results over the last 24 hours. I personally reviewed the abdominal ultrasound image(s) showing Cholelithiasis and gallbladder wall thickening without other evidence of cholecystitis..    Physical Exam   Vital Signs: Temp: 97.6  F (36.4  C) Temp src: Axillary BP: 110/70 Pulse: 95   Resp: 18 SpO2: 96 % O2 Device: Nasal cannula Oxygen Delivery: 2 LPM  Weight: 286 lbs 3.2 oz  Constitutional: Dozing, wakes to voice.  He looks exhausted  Respiratory: Clear to auscultation anteriorly  Cardiovascular: Irregularly irregular rhythm  GI: Obese, normal bowel sounds, soft, distended, non-tender  Skin/Integumen: Profound bilateral lower extremity edema.  Scattered ecchymoses, small areas of subcutaneous hemorrhage  Other: Mood seems resigned        Data   Recent Labs   Lab 10/06/22  0636 10/05/22  0441 10/04/22  2250 10/04/22  0601 " 10/03/22  0526 10/02/22  0653 10/01/22  1631   WBC 2.5* 2.7*  --  1.6*  1.6* 8.0 17.8* 22.2*   HGB 11.8* 11.8*  --  12.8* 13.3 13.5 13.9   MCV 91 88  --  91 92 90 90   * 175  --  263 267 311 407   INR  --   --   --   --   --   --  1.70*   * 129* 126* 126* 130* 128* 127*   POTASSIUM 5.4* 5.7*  --  5.7* 5.9* 5.7* 5.4*   CHLORIDE 98 97  --  96 99 99 97   CO2 22 21  --  23 22 23 14*   BUN 88* 84*  --  80* 64* 59* 59*   CR 1.32* 1.51* 1.59* 1.74* 1.28* 1.24 1.30*   ANIONGAP 7 11  --  7 9 6 16*   PETER 7.1* 7.5*  --  7.5* 7.6* 7.9* 8.1*   * 117*  --  127* 120* 124* 131*   ALBUMIN  --   --   --   --  1.6* 1.8* 1.8*   PROTTOTAL  --   --   --   --  5.5* 5.9* 6.1*   BILITOTAL  --   --   --   --  1.0 1.3 1.5*   ALKPHOS  --   --   --   --  107 123 139   ALT  --   --   --   --  58 58 34   AST  --   --   --   --  67* 100* 63*   LIPASE  --   --   --   --   --   --  58*     Recent Results (from the past 24 hour(s))   Cardiac Catheterization    Narrative    Patient came down to Cath Lab and underwent preliminary echocardiogram.    There is a small pericardial effusion mainly directed posteriorly with   organization of material compared to prior studies.  There is no apical   window or substernal noted upon examination.  Discussed findings with   referring cardiology team along with the patient, and no attempt at   pericardiocentesis was made.  Patient taken back to floor for continued   care.   Echocardiogram Limited   Result Value    LVEF  60-65%    Narrative    [id number]  [id number]  [id number]  [id number]^KIKA^PEREZ     [facility]  Echocardiography Laboratory  [address] [place] [address]     Name: CATHERINE NORIEGA  MRN: [id number]  : 1950  Study Date: 10/05/2022 02:14 PM  Age: 72 yrs  Gender: Male  [facility]  Reason For Study: Tamponade  Ordering Physician: KIKA SMALL  Referring Physician: Gary Bey  Performed By: Andrei Rodriguez     BSA:  2.5 m2  Height: 73 in  Weight: 286 lb  BP: 121/71 mmHg  ______________________________________________________________________________  Procedure  Limited Portable Echo Adult.  ______________________________________________________________________________  Interpretation Summary     Small pericardial effusion  There are no echocardiographic indications of cardiac tamponade.  Small left pleural effusion  ______________________________________________________________________________  Left Ventricle  The visual ejection fraction is 60-65%. Regional wall motion abnormalities  cannot be excluded due to limited visualization.     Right Ventricle  The right ventricle is normal in size and function.     Atria  Lipomatous hypertrophy of the interatrial septum is noted.     Vessels  Non dilated IVC.     Pericardium  There are no echocardiographic indications of cardiac tamponade. Small  pericardial effusion. The pericardial effusion is located mainly by the  anterolateral wall. Small left pleural effusion.     Rhythm  The rhythm was undetermined.  ______________________________________________________________________________  Report approved by: Kevin Berman 10/05/2022 03:13 PM     ______________________________________________________________________________        Medications     heparin 2,550 Units/hr (10/06/22 0750)     - MEDICATION INSTRUCTIONS -         albumin human  12.5 g Intravenous Once     allopurinol  300 mg Oral Daily     [Held by provider] apixaban ANTICOAGULANT  5 mg Oral BID     diltiazem ER COATED BEADS  180 mg Oral Daily     LORazepam  0.25 mg Oral At Bedtime     miconazole   Topical BID     pantoprazole  40 mg Oral QAM AC     polyethylene glycol  17 g Oral BID     sennosides  1 tablet Oral Daily     sodium chloride (PF)  3 mL Intracatheter Q8H

## 2022-10-07 NOTE — PROGRESS NOTES
Cardiology Progress Note          Assessment and Plan:         72 year old male with a past medical history of metastatic pancreatic cancer on palliative chemotherapy, pulmonary embolism, and atrial fibrillation who presented on 10/01/2022 with abdominal pain and shortness of breath. Cardiology was consulted as he was found to have a moderate pericardial effusion. He was also found to have significant ascites contributing to his symptoms and underwent paracentesis.    IMPRESSION:      1) Moderate to large pericardial effusion, increased in size c/w last week. Probably malignant.    2) Metastatic pancreatic cancer   3) Recent diagnosis of AF     PLAN  -Unfortunately serial echocardiograms have demonstrated a small pericardial effusion anteriorly with evidence of fibrinous consolidation.  Although the effusion is moderate to large in size adjacent to the lateral wall segments, these are not amenable to percutaneous drainage per our discussion with the interventional team. I also had follow-up conversations with both cardiac and thoracic surgery, and both teams felt that a pericardial window or VATS carry a significant risk of surgical site infection and nonhealing.  There are also understandable concerns about his suitability for general anesthesia.    -I discussed these concerns with the patient in detail . At this point in time he is clinically stable and there is no evidence of cardiac tamponade.  I will review his follow-up echocardiogram from today for reassessment of effusion size and consolidation but at this time would not recommend proceeding with attempts at percutaneous or surgical drainage given the considerations described above.  He expresses understanding.    - I agree with the plans for a palliative approach.     - Please call with questions.           Interval History:     Feeling about the same from a breathing standpoint this morning.             Review of Systems:   As per subjective, otherwise 5  "systems reviewed and negative.           Physical Exam:   Blood pressure 105/74, pulse 78, temperature 96.8  F (36  C), temperature source Axillary, resp. rate 22, height 1.854 m (6' 1\"), weight 129.8 kg (286 lb 3.2 oz), SpO2 96 %.      Vital Sign Ranges  Temperature Temp  Av.7  F (36.5  C)  Min: 97.5  F (36.4  C)  Max: 97.7  F (36.5  C)   Blood pressure Systolic (24hrs), Av , Min:100 , Max:112        Diastolic (24hrs), Av, Min:57, Max:81      Pulse Pulse  Av  Min: 74  Max: 94   Respirations Resp  Av  Min: 18  Max: 22   Pulse oximetry SpO2  Av.5 %  Min: 94 %  Max: 95 %         Intake/Output Summary (Last 24 hours) at 10/4/2022 1151  Last data filed at 10/4/2022 0917  Gross per 24 hour   Intake 0 ml   Output 125 ml   Net -125 ml       Constitutional: NAD  Skin: Warm and dry  Lungs: CTA  Cardiovascular: irregular  Abdomen: Soft, non tender.  Extremities and Back: +2 LE edema  Neurological: Nonfocal           Medications:     I have reviewed this patient's current medications.              Data:                 Raji Szymanski MD, MMOON.    "

## 2022-10-07 NOTE — PROGRESS NOTES
[facility]  Palliative Care Spiritual Assessment    Patient: Cameron Barnard  Date of Admission:  10/1/2022  Reason for consult: Impending Death      Summary:  Visit with Cameron's wife, Tiffanie, bedside. Offered emotional support through reflective conversation, normalization, and words of comfort. Tiffanie reflected briefly on their Syn[employer] upbringings. Neither she nor Cameron remains affiliated with Catholicism. Cameron, she shared, has an appreciation for [employer]an practices. Together, we considered mantras and practices focusing on a peaceful state of mind.      PLAN: Spiritual Health continues to remain available per need/request.      Vaishali Hanley  Associate   Palliative Care  Phone: [phone]

## 2022-10-07 NOTE — PLAN OF CARE
A&O x4. Pt reported achy abdominal pain, managed with prn morphine. Heparin gtt running at 2250 unit(s)/hr. VSS, on 2.5L NC. Up w/ assist of 2, lift. Tele: A Fib CVR. Alarms on. Rash under abdominal folds, micatin applied. Plan for palliative consult today.

## 2022-10-07 NOTE — PROGRESS NOTES
10/07/2022 [id number]    Full assessment not complete due to pt being on comfort cares. Pt resting well during shift. Able to move self in bed. Port accessed on R chest.

## 2022-10-07 NOTE — PROGRESS NOTES
"[facility]    Medicine Progress Note - Hospitalist Service    Date of Admission:  10/1/2022    Cameron Barnard is a 72 year old male who presents with abdominal pain, sob    Assessment & Plan    Pericardial effusion   *Recently dx adenocarcinoma of pancreas 9/2022  *Just admitted 9/20-23 for afib. Pericardial effusion noted during admit, echo w/o tamponade, small-mod posterior effusion.   *1st chemo day prior to presentation. Woke this am with LUQ abdominal pain. Also with SOB which he thinks get better with hydration. Today nausea. In ED tachy ~100s, -110 systolic (note BP low at discharge 109 systolic), O2 sats normal. Labs BNP 2023. Troponin normal. WBC 22.2.    *echo ED EF 60-65%, large pericardial effusion noted (larger than recent echo), no tamponade, findings suggestive of effusive constrictive physiology.   *CT abdomen unchanged except larger pericardial effusion, increased abdominal ascites.     telemetry     Cardiology consult requested.  I appreciate Dr. Szymanski's followup    Per him, \"no indication for acute pericardiocentesis, observation and recheck in a few days.\"    Also per Dr. Winn,  \"If there is worsening effusion and increasing shortness of breath or hemodynamic instability, he will need a pericardial tap.\"    Continues on low intensity heparin, see below    Repeat Echo done 10/4 shows, \"a large (2.6-2.8 cms) posterior and small to moderate anterior    pericardial effusion as previously reported. On direct comparison of the two echos, the effusion appears a bit small(er) on today's study. Effusion appears to be partially organizing anteriorly. No features of tamponade.\"    CV surgery consult requested to assess for possible pericardial window, please see their conclusions.  I appreciate Dr. Diamond's evaluation and recommendations    Per him, \"My procedure would offer acute decompression but unlikely to keep pericardial effusion from draining since my \"window\" is into the " "abdominal cavity. This cavity already has positive fluid pressure and my window will likely close prematurely leading to recurrence of effusion.\"    Also per him, \"think the best and least invasive treatment plan would be percutaneous pericardiocentesis with drainage catheter.\"    Patient sent for pericardiocentesis.  Dr. Han notes, \"There is a small pericardial effusion mainly directed posteriorly with organization of material compared to prior studies.....no attempt at pericardiocentesis was made.\"    Oncology recommended Palliative Care consult, see further discussion, below       Recent dx stage IV metastatic adenocarcinoma of pancreas  Malignant ascites  Abdominal pain  Elevated bilirubin, AST  [Morphine IR 15 mg q4 hours prn, pantoprazole 40 mg daily]  Follows with [facility] oncology. Presented to ED with abdominal pain 9/9. Found to have pancreatic head mass and masses throughout liver. Also with malignant ascites. US biopsy by IR with adenocarcinoma of the pancreas. Underwent paracentesis x 2 9/2022. Started Gemzar/Abraxane 9/30/22.   Bili on presentation at 1.5 (9/30 was 0.9). AST sl up at 63. New pain for admission is in RUQ.     [facility] oncology consult requested, I appreciate Dr. Abdalla's evaluation and recommendations    prn Morphine IR 30 mg q4 hours prn available    RUQ US shows cholelithiasis and gallbladder wall thickening without other evidence of cholecystitis. Enlarged fatty liver with hepatic metastasis.     Received Neupogen for neutropenia    S/P US guided paracentesis yielding 4425 mL of yellow, serous fluid    Patient requested repeat paracentesis. 2.1 L of  straw colored fluid was drained.    I discussed with Dr. Abdalla.  He recommends palliative care consult.  Based on rapid progression of symptoms, change to emphasis on patient's comfort would be appropriate    Palliative Care consult complaint, patient elects comfort care    [facility] consult requested     Hyponatremia  Hyperkalemia, " mild  GUTIERREZ  CKD III  Anion gap acidosis, resolved  Na 127 (recent 128-132), K 5.4 (recent 4.8-5.6). AG is 16, doesn't look toxic.   Previous baseline creatinine 0.8-1.1. 9/29 1.6, on presentation at 1.3. possible volume depletion vs other    Checked uric acid, urine sodium, urine osm, serum osm    Low urine sodium, elevated urine osm suggest hypovolemia, pre-renal ATN, likely related to low plasma oncotic pressure.  FeNA is low, as expected, in setting of decreased intravascular volume    Could give albumin + fluid bolus    Hyperkalemia and elevated uric acid are likely in part related to tumor lysis, treated with rasburicase.  Uric acid improved.    Nephrology consult requested due to rising creatinine, ongoing hyponatremia, hyperkalemia.  I appreciate Dr. Khanna's evaluation and recommendations    Rodneyma repeated, patient has difficulty tolerating due to nausea    Lactic acid is within normal limits    avoid nephrotoxins     Cortisol level is normal.  Renin, aldosterone levels are pending    Hyponatremia, (mild) hyperkalemia persist     Leukocytosis  Has had recent leukocytosis WBC 15-17. On presentation WBC 22.2. afebrile in ED. UA marcelina.    monitor off abx for now     Pulmonary embolism 9/2022  Portal vein thrombosis  [apixaban 5 mg BID]  Had hx PE 5 years prior, had been on Eliquis and this and was stopped. 9/9 was found to have small RLL pulmonary embolism as well as portal vein thrombus.     hold apixaban, timing for resuming per Oncology/Cardiology     discontinue heparin gtt     Change back to DOAC, continue as desired     Hiccups    Patient says he had no relief from hiccups with Thorazine    Try Reglan.     Atrial fibrillation   [apixaban, diltiazem 180 mg daily]  New as of 9/20/22 admission. Discharged on diltiazem     resume diltiazem for ventricular rate control    See discussion re: anticoagulants, above        Diet: Snacks/Supplements Adult: Ensure Enlive; Between Meals  2 Gram K Diet    DVT  "Prophylaxis: Pneumatic Compression Devices  Altman Catheter: Not present  Central Lines: PRESENT       Cardiac Monitoring: ACTIVE order. Indication: Tachyarrhythmias, acute (48 hours)  Code Status: Full Code      Disposition Plan     Expected Discharge Date: 10/09/2022                  Total time spent:  > 35 minutes  Time spent counseling, coordination of care: 25 minutes including discussion with care team and wife, Delroy, regarding pain control, comfort measures, anticoagulants, also personal review and interpretation of labs and studies as noted above   Danika Machuca MD  Hospitalist Service  [facility]  Securely message with the Vocera Web Console (learn more here)  Text page via V[employer] Paging/Directory       Clinically Significant Risk Factors Present on Admission                   _____________________________________________________________________    Interval History   \"Can you give me an epidural injection?\"  Patient complains of pain near the right hip.  Wife says he has had multiple recent hospital stays and his hip pain is related to the hospital bed.  We agree to try a lidocaine patch to relieve pain.  He is exhausted.  Patient and wife discussed with palliative care and elected emphasis on his comfort.    Data reviewed today: I reviewed all medications, new labs and imaging results over the last 24 hours. I personally reviewed the abdominal ultrasound image(s) showing Cholelithiasis and gallbladder wall thickening without other evidence of cholecystitis..    Physical Exam   Vital Signs: Temp: 97.4  F (36.3  C) Temp src: Axillary BP: 124/70 Pulse: 80   Resp: 22 SpO2: 96 % O2 Device: Nasal cannula Oxygen Delivery: 2.5 LPM  Weight: 286 lbs 3.2 oz  Constitutional: Dozing, wakes to voice.  He again looks exhausted  Respiratory: Clear to auscultation anteriorly  Cardiovascular: Irregularly irregular rhythm  GI: Obese, normal bowel sounds, soft, distended, non-tender  Skin/Integumen: "  Scattered ecchymoses, small areas of subcutaneous hemorrhage on lower legs.  Very faint red area in the region of the right greater trochanter  Extremities: Profound bilateral lower extremity edema.  No obvious discomfort with internal, external rotation of the right hip  Other: Mood seems resigned        Data   Recent Labs   Lab 10/07/22  0536 10/06/22  0636 10/05/22  0441 10/04/22  2250 10/04/22  0601 10/03/22  0526 10/02/22  0653 10/01/22  1631   WBC  --  2.5* 2.7*  --  1.6*  1.6* 8.0 17.8* 22.2*   HGB  --  11.8* 11.8*  --  12.8* 13.3 13.5 13.9   MCV  --  91 88  --  91 92 90 90   PLT  --  124* 175  --  263 267 311 407   INR  --   --   --   --   --   --   --  1.70*   * 127* 129*   < > 126* 130* 128* 127*   POTASSIUM 5.8* 5.4* 5.7*  --  5.7* 5.9* 5.7* 5.4*   CHLORIDE 99 98 97  --  96 99 99 97   CO2 21 22 21  --  23 22 23 14*   BUN 94* 88* 84*  --  80* 64* 59* 59*   CR 1.30* 1.32* 1.51*   < > 1.74* 1.28* 1.24 1.30*   ANIONGAP 9 7 11  --  7 9 6 16*   PETER 7.4* 7.1* 7.5*  --  7.5* 7.6* 7.9* 8.1*   * 103* 117*  --  127* 120* 124* 131*   ALBUMIN  --   --   --   --   --  1.6* 1.8* 1.8*   PROTTOTAL  --   --   --   --   --  5.5* 5.9* 6.1*   BILITOTAL  --   --   --   --   --  1.0 1.3 1.5*   ALKPHOS  --   --   --   --   --  107 123 139   ALT  --   --   --   --   --  58 58 34   AST  --   --   --   --   --  67* 100* 63*   LIPASE  --   --   --   --   --   --   --  58*    < > = values in this interval not displayed.     No results found for this or any previous visit (from the past 24 hour(s)).  Medications     heparin 2,250 Units/hr (10/07/22 0719)     - MEDICATION INSTRUCTIONS -         albumin human  12.5 g Intravenous Once     allopurinol  300 mg Oral Daily     [Held by provider] apixaban ANTICOAGULANT  5 mg Oral BID     diltiazem ER COATED BEADS  180 mg Oral Daily     LORazepam  0.25 mg Oral At Bedtime     miconazole   Topical BID     pantoprazole  40 mg Oral QAM AC     polyethylene glycol  17 g Oral BID      sennosides  1 tablet Oral Daily     sodium chloride (PF)  3 mL Intracatheter Q8H

## 2022-10-07 NOTE — PROGRESS NOTES
Writer and MSW Vaishali German attempted a co-visit with Mr. Barnard. His family had recently departed for the day he was able to report. Cameron affirmed twice that he was feeling very tired, and eventually stopped answering questions. He agreed that coming back to see him at a later time would be preferable.  Cameron appeared comfortable and reported no pain, shaking his head slightly when asked.  No signs of distress beyond fatigue observed.  Reviewed indications for [facility]. Cameron's family may be needed present to execute election.  Spoke with spouse Tiffanie by phone. She is agreeable to Hospice admission. Plan is for Hospice Nurse to meet with her - she arrives around 930a depending on traffic, and will sign election for services to begin.    Ajit Merritt  [facility] RN Clinical Liaison - Hospice    Office:   [phone]    Cell: [phone]

## 2022-10-07 NOTE — PROGRESS NOTES
Service Date: 10/06/2022    SUBJECTIVE:  Mr. Barnard is a 72-year-old gentleman with metastatic pancreatic cancer.  He received gemcitabine and Abraxane last week.  The patient has not been doing well.  He is getting weaker.  He has malignant ascites requiring paracentesis.  He has pericardial effusion.  Cardiology is following.  No pericardiocentesis done as there was not enough fluid.    The patient's overall condition is deteriorating.  He is getting weaker.  He has abdominal discomfort.  He is mildly short of breath.  His appetite is decreased.  Most of the time he is just lying on bed.    Labs reveal multiple abnormalities including hyponatremia, hyperkalemia and elevated creatinine.    PHYSICAL EXAMINATION:   The patient is very weak.  He would open eyes, but quickly goes back to sleep.  Not in any respiratory distress or pain.  Rest of systems not examined.    LABS:  Reviewed.    ASSESSMENT:    1.  A 72-year-old gentleman with metastatic pancreatic cancer.  He has started gemcitabine and Abraxane last week.  2.  Malignant ascites.  3.  Pericardial effusion, likely malignant.  4.  Anemia  5.  Thrombocytopenia  6.  Hyponatremia.  7.  Elevated creatinine.  8.  Worsening weakness.  9.  Pulmonary embolism and portal vein thrombus.    PLAN:     1.  The patient has metastatic pancreatic cancer.  He has started chemotherapy last week. His overall condition is deteriorating.  He is getting weaker.     I explained to the patient that because of his worsening weakness and overall condition, we may not be able to give him any further treatment.    I explained to the patient that we might have to change our goals of care. We might have to start thinking of comfort care.    I told the patient that we will get a Palliative Care consult.  He is agreeable for it.    I called and spoke to his wife, Tiffanie Barnard.  I told her that the patient is deteriorating.  Tiffanie told me that she has noticed that the patient's overall  condition is deteriorating.  She understands that the patient may not be able to get any further treatment.  We may soon after start talking about comfort care with hospice.  She is okay with Palliative Care consult.    2.  The patient has malignant ascites.  Paracentesis will be done as needed.    3.  The patient had pericardial effusion.  Management as per cardiologist.    4.  Labs revealed multiple abnormalities.  We will monitor.  He should be transfused as needed.    5.  Oncology will continue to follow.  Case discussed with Dr. Machuca.    TOTAL TIME SPENT:  25 minutes.  Time spent in today's visit, review of chart/investigations today and documentation today.    Lesley Abdalla MD        D: 10/06/2022   T: 10/06/2022   MT: REINALDO    Name:     CATHERINE NORIEGA  MRN:      [id number]        Account:      [id number]   :      1950           Service Date: 10/06/2022       Document: [id number]

## 2022-10-07 NOTE — PROGRESS NOTES
"Hematology-Oncology Follow-up Note  [facility]     Today's Date: 10/07/22  Date of Admission:  10/1/2022  Reason for Consult: pancreatic cancer      ASSESSMENT/ PLAN :  Cameron Barnard is a 72 year old male progressive, metastatic pancreatic cancer and multiple complications. Plan to proceed with comfort based approach as of today.     1. Metastatic pancreatic cancer status post gemcitabine and Abraxane x 1 cycle last week.   2.  Malignant ascites  3.  Pericardial effusion, likely malignant  4.  Anemia  5.  Thrombocytopenia  6.  Hyponatremia  7.  Elevated creatinine  8.  Worsening weakness  9.  Pulmonary embolism and portal vein thrombus    PLAN:     The patient has newly diagnosed metastatic pancreatic cancer.  He has started chemotherapy last week but his overall condition is deteriorating.  There are no treatment options at this time in his current state and the disease is progressing.  Patient and wife understand the poor prognosis and lack of feasible treatment options in his current state. They have met with palliative care today and have elected hospice care. This is a very reasonable and supported situation considering prognosis. We appreciate management of symptoms and medical conditions per the medical and palliative services. Oncology will sign-off today as Cameron transitions to comfort-based approach.     INTERIM HISTORY:  Stable. Continues to have discomfort at sacrum and throat. No further hiccups. Discussed plan for hospice/comfort care and patient and wife in agreement and coping appropriately.      MEDICATIONS:  Reviewed   Recommend any medications per patient's wishes.     PHYSICAL EXAM:  Vital signs:  Temp: 96.8  F (36  C) Temp src: Axillary BP: 105/74 Pulse: 78   Resp: 22 SpO2: 96 % O2 Device: Nasal cannula Oxygen Delivery: 2.5 LPM Height: 185.4 cm (6' 1\") Weight: 129.8 kg (286 lb 3.2 oz)  Estimated body mass index is 37.76 kg/m  as calculated from the following:    Height as of " "this encounter: 1.854 m (6' 1\").    Weight as of this encounter: 129.8 kg (286 lb 3.2 oz).    GENERAL/CONSTITUTIONAL: Chronically and acutely ill appearing. Alert to conversation, responds minimally verbally.  RESPIRATORY: Breathing comfortably on supplemental O2.   MUSCULOSKELETAL: Edematous. Minimal movement while laying in bed.  SKIN: Diffuse purpura/ecchymosis.     LABS:  Recent Labs   Lab Test 10/07/22  0536   *   POTASSIUM 5.8*   CHLORIDE 99   CO2 21   ANIONGAP 9   BUN 94*   CR 1.30*   *   PETER 7.4*     Recent Labs   Lab Test 10/06/22  0636 10/05/22  0441   WBC 2.5* 2.7*   HGB 11.8* 11.8*   * 175   MCV 91 88   NEUTROPHIL 84 74     Recent Labs   Lab Test 10/03/22  0526 10/02/22  0653   BILITOTAL 1.0 1.3   ALKPHOS 107 123   ALT 58 58   AST 67* 100*   ALBUMIN 1.6* 1.8*       25 minutes spent on the date of the encounter doing chart review, review of test results, interpretation of tests, patient visit, documentation and discussion with other provider(s)        Belgica Gutierrez PA-C  [facility]   [phone]          "

## 2022-10-07 NOTE — CONSULTS
[facility]  Palliative Care Consultation Note    Patient: Cameron Barnrad  Date of Admission:  10/1/2022    Requesting Clinician / Team: Dr. Machuca/Hospitalist   Reason for consult: Goals of care    Recommendations:    Per discussion with pt and wife, plan to transition to comfort measures only    Stop checking VS, labs, no further procedures     Stop heparin gtt     High concentration morphine 30-45mg SL every 2hrs PRN pain, SOB    Ativan 0.5-1mg SL every 3hrs PRN anxiety    Haldol 0.5-1mg SL every 6hrs PRN agitation    Atropine, robinul PRN secretions     Agree with [facility] hospice consult as I believe prognosis is likely measured in days, and suspect that Cameron will not be able to transfer out of the hospital with hospice services    These recommendations have been discussed with bedside RN Noemi, Dr. Machuca, and unit SW Fang.    [facility]  Contact information available via [facility] Paging/Directory      Thank you for the opportunity to participate in the care of this patient and family. Our team: will continue to follow.     During regular M-F work hours ([phone]) -- if you are not sure who specifically to contact -- please contact us on [facility] Smart Web.     After regular work hours and on weekends/holidays, you can call our answering service at [phone].     Attestation:  Total time on the floor involved in the patient's care: 70 minutes  Total time spent in counseling/care coordination: >50% spent in counseling goals of care in setting of newly diagnosed metastatic pancreatic CA, malignant ascites, PE, portal vein thrombosis, abdominal pain/distention, hiccups, SOB, pericardial effusion    Assessments:  Cameron Barnard is a 72 year old male with PMH significant for newly diagnosed metastatic pancreatic CA with liver mets and malignant ascites complicated by PE and portal vein thrombosis s/p first cycle gemcitabine/abraxane 9/30, a.fib, and  "CKD stage III who presents with abdominal pain and SOB. He is found to have a pericardial effusion without tamponade. He was evaluated by Cardiology; effusion has evidence of fibrinous consolidation, not a good candidate for pericardiocentesis, pericardial window or VATS. He has recurrent malignant ascites, s/p paracentesis 10/4 with 2.1L fluid removed. Oncology is recommending comfort measures.     Today, the patient was seen for:  Goals of care in setting of newly diagnosed metastatic pancreatic CA, malignant ascites, PE, portal vein thrombosis, abdominal pain/distention, hiccups, SOB, pericardial effusion    Visited with Cameron, along with his wife Tiffanie. Cameron was sleeping after just speaking with his son, who lives in the [place]. Per Tiffanie's request, we visited in private.    Introduced myself and our services to wife; assistance in pain and symptom management, emotional and spiritual support, and complex medical decision making.    Together we acknowledge Cameron's aggressive cancer diagnosis. She understands there are not cancer directed treatments available to Cameron. She has worked in hospice previously, and understands he is dying. She believes he may only have days to live.     She believes Cameron knows he is dying as well. They have spoken previously about his wishes, and he would not want aggressive interventions. They have made arrangements for him to be cremated.     We discuss a comfort plan of care, prioritizing symptom management, and allowing and preparing for a natural dying process. We agree to DNR/DNI. We talk about hospice in the hospital and the small possibility for stability and transfer out of the hospital with hospice services.     We circled back to Cameron's room. He is lethargic and fatigued. He reports pain in his \"bum\" and he has a sore mouth/thoat. He is very tired. We acknowledge that his cancer is very aggressive and that he is dying. We agree that keeping him comfortable at end of life is " most appropriate.     Prognosis, Goals, & Planning:      Functional Status just prior to hospitalization: 2 (Ambulatory and capable of all selfcare but unable to carry out any work activities; may need help with IADLs up and about > 50% of waking hours)      Prognosis, Goals, and/or Advance Care Planning were addressed today: Yes      Patient's decision making preferences: with input from medical clinicians and loved ones          Patient has decision-making capacity today for complex decisions: Partial (needs assistance with complex decisions)            I have concerns about the patient/family's health literacy today: No           Patient has a completed Health Care Directive: Yes, and on file.      Code status: No CPR / No Intubation    Coping, Meaning, & Spirituality:   Mood, coping, and/or meaning in the context of serious illness were addressed today: Yes  Summary/Comments: Wife believes that Cameron accepts what is happening to his body, and she hopes that this allows for peace. Pt was raised Gnosticism, but doesn't identify with this Mandaen any longer. He chooses to practice as a bhuddist.     Social:     Living situation: Lives in [place] with wife     Wood family / caregivers: Wife Tiffanie of 39 years. Pt has a son from a previous marriage who lives in the      History of Present Illness:  History gathered today from: patient, family/loved ones, medical chart, medical team members, unit team members, health care directive/s    Cameron Barnard is a 72 year old male with PMH significant for newly diagnosed metastatic pancreatic CA with liver mets and malignant ascites complicated by PE and portal vein thrombosis s/p first cycle gemcitabine/abraxane 9/30, a.fib, and CKD stage III who presents with abdominal pain and SOB. He is found to have a pericardial effusion without tamponade. He was evaluated by Cardiology; effusion has evidence of fibrinous consolidation, not a good candidate for pericardiocentesis,  pericardial window or VATS. He has recurrent malignant ascites, s/p paracentesis 10/4 with 2.1L fluid removed. Oncology is recommending comfort measures.     Key Palliative Symptom Data:  # Pain severity the last 12 hours: low  # Dyspnea severity the last 12 hours: low  # Nausea severity the last 12 hours: none  # Anxiety severity the last 12 hours: low    Patient is on opioids: assessed and bowels ok/no needed changes to plan of care today.    ROS:  Comprehensive ROS is reviewed and is negative except as here & per HPI: N/A     Past Medical History:  Past Medical History:   Diagnosis Date     Allergic state      DVT (deep venous thrombosis) (H)     chronic left femoral DVT     HLD (hyperlipidemia)      Hypertension     white coat syndrome     Multiple subsegmental pulmonary emboli without acute cor pulmonale (H) 2017     Perforated bowel (H) 09/2020     Personal history of deep vein thrombosis     DISTAL VEIN  LLE     Primary osteoarthritis of both knees      Pulmonary embolism (H)      Pulmonary embolus (H)         Past Surgical History:  Past Surgical History:   Procedure Laterality Date     ARTHROPLASTY KNEE Left 2/10/2021    Procedure: Left total knee arthroplasty;  Surgeon: Gigi De Oliveira MD;  Location: [facility] OR     ARTHROPLASTY KNEE Right 2/4/2022    Procedure: RIGHT TOTAL KNEE ARTHROPLASTY;  Surgeon: Gigi De Oliveira MD;  Location:  OR     COLONOSCOPY N/A 9/19/2020    Procedure: COLONOSCOPY;  Surgeon: Lakshmi Santana MD;  Location:  OR     CV PERICARDIOCENTESIS N/A 10/5/2022    Procedure: Pericardiocentesis;  Surgeon: Niall Han MD;  Location:  [facility]ATH [facility]     ESOPHAGOSCOPY, GASTROSCOPY, DUODENOSCOPY (EGD), COMBINED N/A 9/19/2020    Procedure: ESOPHAGOGASTRODUODENOSCOPY (EGD);  Surgeon: Lakshmi Santana MD;  Location:  OR     HERNIORRHAPHY INCISIONAL (LOCATION) N/A 5/5/2021    Procedure: OPEN INCISIONAL HERNIA REPAIR;  Surgeon: Christ Parekh  MD Delfino;  Location: [facility] OR     INJECT STEROID (LOCATION) Right 2/10/2021    Procedure: Right knee intra-articular injection of corticosteroid, 80 mg of Depo-Medrol;  Surgeon: Gigi De Oliveira MD;  Location: [facility] OR     IR CHEST PORT PLACEMENT > 5 YRS OF AGE  9/27/2022     IR PARACENTESIS  9/27/2022     LAPAROSCOPIC APPENDECTOMY N/A 9/27/2020    Procedure: Laparoscopic appendectomy;  Surgeon: Christ Parekh MD;  Location: [facility] OR     LAPAROSCOPY DIAGNOSTIC (GENERAL) N/A 9/27/2020    Procedure: LAPAROSCOPIC  SMALL BOWEL RESCECTION;  Surgeon: Christ Parekh MD;  Location: [facility] OR     LAPAROTOMY EXPLORATORY N/A 5/10/2021    Procedure: EXPLORATORY LAPAROTOMY AND EVACUATION OF HEMATOMA;  Surgeon: Hari Ceballos MD;  Location: [facility] OR         Family History:  Family History   Problem Relation Age of Onset     Breast Cancer Sister          Allergies:  Allergies   Allergen Reactions     Seasonal Allergies Other (See Comments)     Runny nose, watery eyes        Medications:  I have reviewed this patient's medication profile and medications from this hospitalization.   Noted scheduled meds are:  Lidoderm patch   Ativan 0.25mg PO at bedtime   Senna 1 tab PO daily     Noted PRN meds are:  High concentration morphine 30-45mg SL every 2hrs PRN pain, SOB  Ativan 0.5-1mg SL every 3hrs PRN anxiety  Haldol 0.5-1mg SL every 6hrs PRN agitation  Atropine, robinul PRN secretions     Physical Exam:  Vital Signs: Temp: 96.8  F (36  C) Temp src: Axillary BP: 105/74 Pulse: 78   Resp: 22 SpO2: 96 % O2 Device: Nasal cannula Oxygen Delivery: 2.5 LPM  Weight: 286 lbs 3.2 oz  CONSTITUTIONAL: Chronically ill fatigued and lethargic man seen resting in bed in NAD, calm and cooperative. Wife present   HEENT: NCAT  RESPIRATORY: NL respiratory effort on 2.5L NC    Data reviewed:  Recent imaging reviewed, my comments on pertinents:   Results for orders placed or performed during the hospital encounter of 10/01/22   CT Chest (PE)  Abdomen Pelvis w Contrast    Impression    IMPRESSION:  1.  No significant change since 09/20/2022 CT is that there has been a significant increase in size of the pericardial effusion which is now advanced, there has been a moderate increase in the amount of abdominal/pelvic ascites, and the bilateral pleural   effusions have decreased in size and are now minimal.    2.  Stable findings of malignancy.    3.  No PE.   US Paracentesis without Albumin    Impression    IMPRESSION:  Ultrasound guided paracentesis.    MG TAY MD         SYSTEM ID:  [id number]   US Abdomen Limited    Impression    IMPRESSION:  1.  Cholelithiasis and gallbladder wall thickening without other evidence of cholecystitis.  2.  Enlarged fatty liver with hepatic metastasis.       US Paracentesis without Albumin    Impression    IMPRESSION: Technically successful paracentesis without immediate  complications.    JAY BREAUX MD         SYSTEM ID:  [id number]   Echocardiogram Limited   Result Value Ref Range    LVEF  60-65%    Echocardiogram Limited   Result Value Ref Range    LVEF  60-65%        Recent lab data reviewed, my comments on pertinents:   Na 129  K 5.8  Creat 1.3  WBC 2.5  Hgb 11.8  Plt 124  Albumin 1.6  INR 1.7

## 2022-10-07 NOTE — PLAN OF CARE
Care plan note:      Recent Vitals:  Temp: 96.8  F (36  C) Temp src: Axillary BP: 105/74 Pulse: 78   Resp: 22 SpO2: 96 % O2 Device: Nasal cannula      Orientation/Neuro: Alert and Oriented x 4  Pain: Pain is controlled without morphine.   Tele: NA.   IV medications: None   Mobility: Assist of 2 and Total w/ lift   Skin: Rashes: groin and Bruises: scattered.    GI: WDL  : WDL     Diet: Tolerating diet:  Poor, eating less than 25%.  Orders Placed This Encounter      2 Gram K Diet      Safety/Concerns:  Fall Risk and Davon Risk  Aggression Color: Green    Plan: Pt declining. Transitioned to comfort cares.    Continue to monitor.      Noemi Reilly RN

## 2022-10-08 NOTE — DISCHARGE SUMMARY
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    Unresulted Labs Ordered in the Past 30 Days of this Admission     Date and Time Order Name Status Description    10/4/2022 11:59 AM Aldosterone Renin Ratio In process     10/4/2022 11:59 AM Renin activity In process       These results will be followed up as needed    Discharge Disposition   Discharged to general inpatient hospice  Condition at discharge: Terminal      Hospital Course   Pericardial effusion   *Recently dx adenocarcinoma of pancreas 9/2022  *Just admitted 9/20-23 for afib. Pericardial effusion noted during admit, echo w/o tamponade, small-mod posterior effusion.   *1st chemo day prior to presentation. Woke this am with LUQ abdominal pain. Also with SOB which he thinks get better with hydration. Today nausea. In ED tachy ~100s, -110 systolic (note BP low at discharge 109 systolic), O2 sats normal. Labs BNP 2023. Troponin normal. WBC 22.2.    *echo ED EF 60-65%, large pericardial effusion noted (larger than recent echo), no tamponade, findings suggestive of effusive constrictive physiology.   *CT abdomen unchanged except larger pericardial effusion, increased abdominal ascites.     telemetry     Cardiology consult requested.  I appreciate Dr. Szymanski's  "followup    Per him, \"no indication for acute pericardiocentesis, observation and recheck in a few days.\"    Also per Dr. Winn,  \"If there is worsening effusion and increasing shortness of breath or hemodynamic instability, he will need a pericardial tap.\"    Continues on low intensity heparin, see below    Repeat Echo done 10/4 shows, \"a large (2.6-2.8 cms) posterior and small to moderate anterior    pericardial effusion as previously reported. On direct comparison of the two echos, the effusion appears a bit small(er) on today's study. Effusion appears to be partially organizing anteriorly. No features of tamponade.\"    CV surgery consult requested to assess for possible pericardial window, please see their conclusions.  I appreciate Dr. Diamond's evaluation and recommendations    Per him, \"My procedure would offer acute decompression but unlikely to keep pericardial effusion from draining since my \"window\" is into the abdominal cavity. This cavity already has positive fluid pressure and my window will likely close prematurely leading to recurrence of effusion.\"    Also per him, \"think the best and least invasive treatment plan would be percutaneous pericardiocentesis with drainage catheter.\"    Patient sent for pericardiocentesis.  Dr. Han notes, \"There is a small pericardial effusion mainly directed posteriorly with organization of material compared to prior studies.....no attempt at pericardiocentesis was made.\"    Oncology recommended Palliative Care consult, see further discussion, below       Recent dx stage IV metastatic adenocarcinoma of pancreas  Malignant ascites  Abdominal pain  Elevated bilirubin, AST  [Morphine IR 15 mg q4 hours prn, pantoprazole 40 mg daily]  Follows with [facility] oncology. Presented to ED with abdominal pain 9/9. Found to have pancreatic head mass and masses throughout liver. Also with malignant ascites. US biopsy by IR with adenocarcinoma of the pancreas. Underwent paracentesis x 2 " 9/2022. Started Gemzar/Abraxane 9/30/22.   Bili on presentation at 1.5 (9/30 was 0.9). AST sl up at 63. New pain for admission is in RUQ.     [facility] oncology consult requested, I appreciate Dr. Abdalla's evaluation and recommendations    prn Morphine IR 30 mg q4 hours prn available    RUQ US shows cholelithiasis and gallbladder wall thickening without other evidence of cholecystitis. Enlarged fatty liver with hepatic metastasis.     Received Neupogen for neutropenia    S/P US guided paracentesis yielding 4425 mL of yellow, serous fluid    Patient requested repeat paracentesis. 2.1 L of  straw colored fluid was drained.    I discussed with Dr. Abdalla.  He recommends palliative care consult.  Based on rapid progression of symptoms, change to emphasis on patient's comfort would be appropriate    Palliative Care consult complaint, patient elects comfort care    [facility] hospice consult requested     Hyponatremia  Hyperkalemia, mild  GUTIERREZ  CKD III  Anion gap acidosis, resolved  Na 127 (recent 128-132), K 5.4 (recent 4.8-5.6). AG is 16, doesn't look toxic.   Previous baseline creatinine 0.8-1.1. 9/29 1.6, on presentation at 1.3. possible volume depletion vs other    Checked uric acid, urine sodium, urine osm, serum osm    Low urine sodium, elevated urine osm suggest hypovolemia, pre-renal ATN, likely related to low plasma oncotic pressure.  FeNA is low, as expected, in setting of decreased intravascular volume    Could give albumin + fluid bolus    Hyperkalemia and elevated uric acid are likely in part related to tumor lysis, treated with rasburicase.  Uric acid improved.    Nephrology consult requested due to rising creatinine, ongoing hyponatremia, hyperkalemia.  I appreciate Dr. Khanna's evaluation and recommendations    Lokelma repeated, patient has difficulty tolerating due to nausea    Lactic acid is within normal limits    avoid nephrotoxins     Cortisol level is normal.  Renin, aldosterone levels are pending    Hyponatremia,  (mild) hyperkalemia persist     Leukocytosis  Has had recent leukocytosis WBC 15-17. On presentation WBC 22.2. afebrile in ED. UA marcelina.    monitor off abx for now     Pulmonary embolism 9/2022  Portal vein thrombosis  [apixaban 5 mg BID]  Had hx PE 5 years prior, had been on Eliquis and this and was stopped. 9/9 was found to have small RLL pulmonary embolism as well as portal vein thrombus.     hold apixaban, timing for resuming per Oncology/Cardiology     discontinue heparin gtt     Change back to DOAC, continue as desired     Hiccups    Patient says he had no relief from hiccups with Thorazine    Try Reglan.     Atrial fibrillation   [apixaban, diltiazem 180 mg daily]  New as of 9/20/22 admission. Discharged on diltiazem     resume diltiazem for ventricular rate control    See discussion re: anticoagulants, above       Consultations This Hospital Stay   CARDIOLOGY IP CONSULT  PHYSICAL THERAPY ADULT IP CONSULT  HEMATOLOGY & ONCOLOGY IP CONSULT  PHARMACY IP CONSULT  PHARMACY IP CONSULT  CARE MANAGEMENT / SOCIAL WORK IP CONSULT  VASCULAR ACCESS ADULT IP CONSULT  VASCULAR ACCESS ADULT IP CONSULT  OCCUPATIONAL THERAPY ADULT IP CONSULT  NEPHROLOGY IP CONSULT  WOUND OSTOMY CONTINENCE NURSE  IP CONSULT  PALLIATIVE CARE ADULT IP CONSULT  [facility] INPATIENT HOSPICE ADULT CONSULT  SPIRITUAL HEALTH SERVICES IP CONSULT  SOCIAL WORK IP CONSULT  [facility] INPATIENT HOSPICE ADULT CONSULT  SOCIAL WORK IP CONSULT    Code Status   No CPR- Do NOT Intubate    Time Spent on this Encounter   I, Danika Machuca MD, personally saw the patient today and spent greater than 30 minutes discharging this patient.       Danika Machuca MD  [facility]  [address] [place] [address] AVE, [address]  [facility] [address]  Phone: [phone]  ______________________________________________________________________    Physical Exam   Vital Signs:           Resp: 16     Oxygen Delivery: 2 LPM  Weight: 286 lbs 3.2 oz  I saw and examined the  patient on the date of discharge.         Primary Care Physician   Gary Bey    Discharge Orders      Echocardiogram Limited    Administration of IV contrast will be tailored to this examination per the appropriate written protocol listed in the Echocardiography department Protocol Book, or by the supervising Cardiologist. This may result in an order change.    Use of contrast is at the discretion of the supervising Cardiologist.       Significant Results and Procedures   Most Recent 3 CBC's:Recent Labs   Lab Test 10/06/22  0636 10/05/22  0441 10/04/22  0601   WBC 2.5* 2.7* 1.6*  1.6*   HGB 11.8* 11.8* 12.8*   MCV 91 88 91   * 175 263     Most Recent 3 BMP's:Recent Labs   Lab Test 10/07/22  0536 10/06/22  0636 10/05/22  0441   * 127* 129*   POTASSIUM 5.8* 5.4* 5.7*   CHLORIDE 99 98 97   CO2 21 22 21   BUN 94* 88* 84*   CR 1.30* 1.32* 1.51*   ANIONGAP 9 7 11   PETER 7.4* 7.1* 7.5*   * 103* 117*     Most Recent 2 LFT's:Recent Labs   Lab Test 10/03/22  0526 10/02/22  0653   AST 67* 100*   ALT 58 58   ALKPHOS 107 123   BILITOTAL 1.0 1.3   ,   Results for orders placed or performed during the hospital encounter of 10/01/22   CT Chest (PE) Abdomen Pelvis w Contrast    Narrative    EXAM: CT CHEST PE ABDOMEN PELVIS W CONTRAST  LOCATION: [facility]  DATE/TIME: 10/1/2022 6:15 PM    INDICATION: Dyspnea, LUQ abd pain, pleuritic.  COMPARISON: 09/20/2022.  TECHNIQUE: CT chest pulmonary angiogram and routine CT abdomen pelvis with IV contrast. Arterial phase through the chest and venous phase through the abdomen and pelvis. Multiplanar reformats and MIP reconstructions were performed. Dose reduction   techniques were used.   CONTRAST: 135 mL Isovue 370.        FINDINGS:  ANGIOGRAM CHEST: Pulmonary arteries are normal caliber and negative for pulmonary emboli. Thoracic aorta is negative for dissection. There is minimal reflux of contrast in the IVC which can be associated with  right heart strain.     LUNGS AND PLEURA: There is slight dependent atelectasis seen in both lung bases.    MEDIASTINUM/AXILLAE: Since 09/20/2022 there has been a significant increase in size of the pericardial effusion, for instance the right pericardium anteriorly shows pleural fluid measuring approximately 3.3 cm in greatest radial dimension and previously   at this same site the right pericardium measured 0.3 cm. Otherwise the mediastinum is stable.    CORONARY ARTERY CALCIFICATION: Mild.    HEPATOBILIARY: Since 09/20/2022 there are stable abnormal scattered low density lesions seen in both lobes of the liver worrisome for changes of metastasis. The gallbladder shows significant findings of cholelithiasis with no raphael evidence for   cholecystitis. The bile ducts are normal in caliber.    PANCREAS: Mass consistent with neoplasm involving the pancreatic head which abuts the hepatic artery and encases portions of the splenic artery. 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    SPLEEN: Normal.    ADRENAL GLANDS: Normal.    KIDNEYS/BLADDER: Normal.    BOWEL: Prior postoperative changes with no complications. Diverticulosis with no findings of diverticulitis.    LYMPH NODES: Normal.    VASCULATURE: Mild to moderate calcification no aneurysmal dilatation.    PELVIC ORGANS: Normal.    MUSCULOSKELETAL: There is a moderate amount of abdominal and pelvic ascites with significant nodularity seen in the peritoneum which may be associated with findings of neoplasm. Degenerative disc disease at L4 and L5.      Impression    IMPRESSION:  1.  No significant change since 09/20/2022 CT is that there has been a significant increase in size of the pericardial effusion which is now advanced, there has been a moderate increase in the amount of abdominal/pelvic ascites, and the bilateral pleural   effusions have decreased in size and are now minimal.    2.  Stable findings  of malignancy.    3.  No PE.   US Paracentesis without Albumin    Narrative    ULTRASOUND GUIDED PARACENTESIS   10/2/2022 2:36 PM     HISTORY:  Ascites    PROCEDURE:   Informed consent was obtained from the patient prior to  the procedure with discussion including the possible risks of  bleeding, infection and organ injury . Using 5 mL of 1% lidocaine for  local anesthesia, sterile technique, and sonographic guidance with  permanent image documentation, I placed an 8F paracentesis catheter  into the peritoneal fluid collection. This was used to aspirate 4425  mL of yellow, serous fluid in vacuum bottles, and some of this was  sent for any laboratory studies that had been ordered. There were no  immediate complications.       Impression    IMPRESSION:  Ultrasound guided paracentesis.    MG TAY MD         SYSTEM ID:  [id number]   US Abdomen Limited    Narrative    EXAM: US ABDOMEN LIMITED  LOCATION: [facility]  DATE/TIME: 10/2/2022 3:04 PM    INDICATION: pt with metastatic pancreatic cancer, new elevation in bili and new RUQ abdominal pain  COMPARISON: CT abdomen and pelvis performed 9/9/2022  TECHNIQUE: Limited abdominal ultrasound.    FINDINGS:    GALLBLADDER: Gallstones. Gallbladder wall thickening measuring up 6 mm. Negative sonographic Lobo sign.    BILE DUCTS: No biliary dilatation. Common bile duct obscured by gas.     LIVER: Mildly enlarged and diffusely echogenic. A 2.5 x 2.7 cm centrally hyperechoic, complex thick-walled lesion is seen in the right lobe of the liver corresponding to hypodensity seen on CT dated 9/9/2022 with no such lesion noted on CT dated   5/10/2021, consistent with hepatic metastasis.    RIGHT KIDNEY: No hydronephrosis.    PANCREAS: The visualized portions are normal.    Moderate volume ascites.      Impression    IMPRESSION:  1.  Cholelithiasis and gallbladder wall thickening without other evidence of cholecystitis.  2.  Enlarged fatty liver with  hepatic metastasis.       US Paracentesis without Albumin    Narrative    ULTRASOUND PARACENTESIS WITHOUT ALBUMIN  10/4/2022 3:46 PM     HISTORY: Pancreatic cancer with malignant ascites.    FINDINGS: Ultrasound was used to evaluate for the presence and best  approach for paracentesis. Written and oral informed consent was  obtained. A pause for the cause procedure to verify the correct  patient and correct procedure. The skin overlying the right lower  quadrant was prepped and draped in the usual sterile fashion. The  subcutaneous tissues were anesthetized with 10 mL 1% lidocaine. A  catheter was advanced into the peritoneal space and 2.1 L of  straw  colored fluid was drained. There were no immediate complications.  Ultrasound images were permanently stored.  Patient left the  ultrasound suite in satisfactory condition.      Impression    IMPRESSION: Technically successful paracentesis without immediate  complications.    JAY BREAUX MD         SYSTEM ID:  [id number]   Echocardiogram Limited     Value    LVEF  60-65%    Narrative    [id number]  [id number]  [id number]  [id number]^ROSA ISELA^DIEGO^JACK     [facility]  Echocardiography Laboratory  [address] [place] [address]     Name: CATHERINE NORIEGA  MRN: [id number]  : 1950  Study Date: 10/01/2022 08:06 PM  Age: 72 yrs  Gender: Male  Patient Location: [facility]  Reason For Study: Pericardial Effusion  Ordering Physician: DIEGO NATARAJAN  Performed By: Cassi Morris     BSA: 2.5 m2  Height: 72 in  Weight: 290 lb  HR: 94  BP: 112/67 mmHg  ______________________________________________________________________________  Procedure  Limited Portable Echo Adult.  ______________________________________________________________________________  Interpretation Summary     Left ventricular systolic function is normal.  The visual ejection fraction is 60-65%.  The right ventricle is normal in structure, function and size.  Large pericardial effusion  measuring max diameter inferolaterally (>3.0 cm)  with moderate effusion anteriorly adjacent to the right ventricle (<2.0 cm).  There is mild variation (<35%) of the tricuspid and mitral valve inflow  velocity profiles suggestive of effusive constrictive physiology, however no  diastolic collapse of the RV to suggest tamponade. Clinical correlation  required.  IVC appears normal.     Compared to recent study 9/20/2022 effusion is slightly larger. Careful  echocardiogram surveillance is recommended.  ______________________________________________________________________________  Left Ventricle  The left ventricle is normal in size. There is normal left ventricular wall  thickness. Left ventricular systolic function is normal. The visual ejection  fraction is 60-65%. Normal left ventricular wall motion.     Right Ventricle  The right ventricle is normal in structure, function and size.     Atria  Normal left atrial size. Right atrial size is normal.     Mitral Valve  The mitral valve is normal in structure and function.     Tricuspid Valve  The tricuspid valve is normal in structure and function. Right ventricular  systolic pressure could not be approximated due to inadequate tricuspid  regurgitation.     Aortic Valve  There is mild trileaflet aortic sclerosis.     Pulmonic Valve  The pulmonic valve is normal in structure and function.     Vessels  The aortic root is normal size. The ascending aorta is Mildly dilated. The  inferior vena cava was normal in size with preserved respiratory variability.     Pericardium  Large pericardial effusion. The mitral valve inflow Doppler reveals no  significant respiratory variation. The tricuspid valve inflow Doppler reveals  no significant respiratory variation.     ______________________________________________________________________________  Report approved by: Kevin Treviño 10/01/2022 10:08 PM      ______________________________________________________________________________      Echocardiogram Limited    Narrative    [id number]  [id number]  [id number]  [id number]^MIKE^ETTA     [facility]  Echocardiography Laboratory  [address] [place] [address]     Name: CATHERINE NORIEGA  MRN: [id number]  : 1950  Study Date: 10/04/2022 12:17 PM  Age: 72 yrs  Gender: Male  Patient Location: [facility]  Reason For Study: Pericardial Effusion  Ordering Physician: ETTA MCKEON  Performed By: Cassi Morris     BSA: 2.5 m2  Height: 73 in  Weight: 286 lb  HR: 87  ______________________________________________________________________________  Procedure  Limited Portable Echo Adult.  ______________________________________________________________________________  Interpretation Summary     Limited echo to assess pericardial effusion.     There is a large (2.6-2.8 cms) posterior and small to moderate anterior  pericardial effusion as previously reported. On direct comparison of the two  echos, the effusion appears a bit small on today's study.  Effusion appears to be partially organizing anteriorly.  IVC is normal in size.  No features of tamponade. Correlate clinically.  ______________________________________________________________________________  Left Ventricle  Left ventricular systolic function is normal.     Right Ventricle  The right ventricle is normal in size and function.     Atria  Right atrium not well visualized.     Mitral Valve  The mitral valve is normal in structure and function.     Aortic Valve  No aortic stenosis is present.     Vessels  The inferior vena cava was normal in size with preserved respiratory  variability.  ______________________________________________________________________________  Report approved by: Kevin Ellis 10/04/2022 03:12 PM     ______________________________________________________________________________      Echocardiogram Limited     Value     LVEF  60-65%    [facility]    [id number]  [id number]  [id number]  [id number]^STACI^KIKA^ANA     [facility]  Echocardiography Laboratory  [address] [place] [address]     Name: CATHERINE NORIEGA  MRN: [id number]  : 1950  Study Date: 10/05/2022 02:14 PM  Age: 72 yrs  Gender: Male  Patient Location: [facility]  Reason For Study: Tamponade  Ordering Physician: KIKA SMALL  Referring Physician: Gary Bey  Performed By: Andrei Rodriguez     BSA: 2.5 m2  Height: 73 in  Weight: 286 lb  BP: 121/71 mmHg  ______________________________________________________________________________  Procedure  Limited Portable Echo Adult.  ______________________________________________________________________________  Interpretation Summary     Small pericardial effusion  There are no echocardiographic indications of cardiac tamponade.  Small left pleural effusion  ______________________________________________________________________________  Left Ventricle  The visual ejection fraction is 60-65%. Regional wall motion abnormalities  cannot be excluded due to limited visualization.     Right Ventricle  The right ventricle is normal in size and function.     Atria  Lipomatous hypertrophy of the interatrial septum is noted.     Vessels  Non dilated IVC.     Pericardium  There are no echocardiographic indications of cardiac tamponade. Small  pericardial effusion. The pericardial effusion is located mainly by the  anterolateral wall. Small left pleural effusion.     Rhythm  The rhythm was undetermined.  ______________________________________________________________________________  Report approved by: Kevin Berman 10/05/2022 03:13 PM     ______________________________________________________________________________      Echocardiogram Limited     Value    LVEF  60-65%    Narrative    [id number]  [id number]  [id number]  [id number]^ALI^BILAL^DARRELL     [facility]  Echocardiography  Laboratory  [address] [place] [address]     Name: CATHERINE NORIEGA  MRN: [id number]  : 1950  Study Date: 10/07/2022 09:27 AM  Age: 72 yrs  Gender: Male  Patient Location: [facility]  Reason For Study: Pericardial Effusion  Ordering Physician: ROBERT DEVINE  Referring Physician: Gary Bey  Performed By: Adnrei Rodriguez     BSA: 2.5 m2  Height: 73 in  Weight: 286 lb  HR: 96  BP: 105/74 mmHg  ______________________________________________________________________________  Procedure  Limited Portable Echo Adult.  ______________________________________________________________________________  Interpretation Summary     There is a moderate pericardial effusion localized around the posterior  aspect, measuring up to 3cm. There is trace effusion anteriorly, with  thickening of the pericardium suggesting organizing effusion. Right atrium is  not well visualized, on some views it appears slightly compressed, however  this appears similar on views from the 10/5/2022 study, and on that study  there were additional views where the right atrium appears normal. No RV  chamber collapse, IVC is normal in size with normal response to respiration.  Pericardial effusion size is stable from the prior study 10/5/2022.  ______________________________________________________________________________  Left Ventricle  The left ventricle is normal in size. Left ventricular systolic function is  normal. The visual ejection fraction is 60-65%.     Vessels  The inferior vena cava was normal in size with preserved respiratory  variability.     Pericardium  There is a moderate pericardial effusion localized around the posterior  aspect, measuring up to 3cm. There is tracel effusion anteriorly, with  thickening of the pericardium suggesting organizing effusion. Right atrium is  not well visualized, on some views it appears slightly compressed, however  this appears similar on views from the 10/5/2022 study, and on that  study  there were additional views where the right atrium appears normal. No RV  chamber collapse, IVC is normal in size with normal response to respiration.  Pericardial effusion size is stable from the prior study 10/5/2022.     ______________________________________________________________________________  MMode/2D Measurements & Calculations  IVSd: 1.0 cm  LVIDd: 4.5 cm  LVIDs: 2.7 cm  LVPWd: 1.6 cm  FS: 39.1 %  LV mass(C)d: 228.9 grams  LV mass(C)dI: 91.4 grams/m2  LA dimension: 4.3 cm  RWT: 0.72     ______________________________________________________________________________  Report approved by: Kevin Priest 10/07/2022 12:00 PM         Cardiac Catheterization    Narrative    Patient came down to Cath Lab and underwent preliminary echocardiogram.    There is a small pericardial effusion mainly directed posteriorly with   organization of material compared to prior studies.  There is no apical   window or substernal noted upon examination.  Discussed findings with   referring cardiology team along with the patient, and no attempt at   pericardiocentesis was made.  Patient taken back to floor for continued   care.       Discharge Medications   Current Discharge Medication List      CONTINUE these medications which have NOT CHANGED    Details   apixaban ANTICOAGULANT (ELIQUIS) 5 MG tablet Take 1 tablet (5 mg) by mouth 2 times daily for 30 days  Qty: 60 tablet, Refills: 0    Associated Diagnoses: Atrial fibrillation with rapid ventricular response (H)      diltiazem ER COATED BEADS (CARDIZEM CD/CARTIA XT) 180 MG 24 hr capsule Take 1 capsule (180 mg) by mouth daily for 30 days  Qty: 30 capsule, Refills: 0    Associated Diagnoses: Atrial fibrillation with rapid ventricular response (H)      diphenhydrAMINE-acetaminophen (TYLENOL PM)  MG tablet Take 2 tablets by mouth every 4 hours as needed for other (pain)      lactobacillus rhamnosus (GG) (CULTURELL) capsule Take 1 capsule by mouth daily      morphine  (MSIR) 15 MG IR tablet Take 1 tablet (15 mg) by mouth every 4 hours as needed for severe pain  Qty: 60 tablet, Refills: 0    Associated Diagnoses: Malignant neoplasm of head of pancreas (H); Cancer associated pain      pantoprazole (PROTONIX) 40 MG EC tablet Take 1 tablet (40 mg) by mouth every morning (before breakfast)  Qty: 30 tablet, Refills: 0    Associated Diagnoses: Other acute pulmonary embolism, unspecified whether acute cor pulmonale present (H)      prochlorperazine (COMPAZINE) 10 MG tablet Take 1 tablet (10 mg) by mouth every 6 hours as needed for nausea or vomiting  Qty: 30 tablet, Refills: 2    Associated Diagnoses: Malignant neoplasm of head of pancreas (H)      sennosides (SENOKOT) 8.6 MG tablet Take 1 tablet by mouth daily           Allergies   Allergies   Allergen Reactions     Seasonal Allergies Other (See Comments)     Runny nose, watery eyes

## 2022-10-08 NOTE — H&P
[facility]    History and Physical - Hospitalist Service       Date of Admission:  10/8/2022    Assessment & Plan   Cameron Barnard is a 72 year old male who is being transitioned to inpatient hospice on 10/8/2022. Please see the discharge summary from the same date for more details of his hospital course.       Diet:   as tolerated  Altman Catheter: Not present  Central Lines: PRESENT     Code Status:   DNR/DNI  Expected Discharge: working on discharge with hospice    Danika Machuca MD  Hospitalist Service  [facility]  Securely message with the Vocera Web Console (learn more here)  Text page via [employer] Paging/Directory         ______________________________________________________________________    Chief Complaint   Transitioning to inpatient hospice    History of Present Illness   Cameron Barnard is a 72 year old male who is being transitioned to inpatient hospice.  Please see the discharge summary from the same date for more details; a brief summary of his current symptoms is included here.    Today, Cameron is bothered by nausea and vomited x1.  His right hip pain has improved.  His breathing is about the same.  He has not had hiccups today.      Review of Systems    Review of systems was not needed on this patient    Past Medical History    Please see the medical & surgical history outlined in the H&P from the previous hospital encounter    Social History   Please see the social history outlined in the H&P from the previous hospital encounter    Family History   Please see the family history outlined in the H&P from the previous hospital encounter    Prior to Admission Medications   Prior to Admission Medications   Prescriptions Last Dose Informant Patient Reported? Taking?   apixaban ANTICOAGULANT (ELIQUIS) 5 MG tablet   No No   Sig: Take 1 tablet (5 mg) by mouth 2 times daily for 30 days   diltiazem ER COATED BEADS (CARDIZEM CD/CARTIA XT) 180 MG 24 hr capsule   No  No   Sig: Take 1 capsule (180 mg) by mouth daily for 30 days   diphenhydrAMINE-acetaminophen (TYLENOL PM)  MG tablet   Yes No   Sig: Take 2 tablets by mouth every 4 hours as needed for other (pain)   lactobacillus rhamnosus (GG) (CULTURELL) capsule  Self Yes No   Sig: Take 1 capsule by mouth daily   morphine (MSIR) 15 MG IR tablet   No No   Sig: Take 1 tablet (15 mg) by mouth every 4 hours as needed for severe pain   pantoprazole (PROTONIX) 40 MG EC tablet   No No   Sig: Take 1 tablet (40 mg) by mouth every morning (before breakfast)   prochlorperazine (COMPAZINE) 10 MG tablet   No No   Sig: Take 1 tablet (10 mg) by mouth every 6 hours as needed for nausea or vomiting   sennosides (SENOKOT) 8.6 MG tablet   Yes No   Sig: Take 1 tablet by mouth daily      Facility-Administered Medications: None     Allergies   Allergies   Allergen Reactions     Seasonal Allergies Other (See Comments)     Runny nose, watery eyes       Physical Exam   Vital Signs:                    Weight: 0 lbs 0 oz    Please see physical exam from my DISCHARGE SUMMARY on the same date

## 2022-10-08 NOTE — PLAN OF CARE
[id number]: Comfort care. A&O x4. Drowsy, opens eyes spontaneously. C/o sore throat, ice chips and mouth swabs done frequently. C/o sore buttock, repositioned and PO morphine given with improvement. C/o shortness of breath with any activity. Voiding per urinal. Repositioned with assist of 2 + lift.

## 2022-10-08 NOTE — PLAN OF CARE
Physical Therapy Discharge Summary    Reason for therapy discharge:    Change in medical status.    Progress towards therapy goal(s). See goals on Care Plan in [facility] electronic health record for goal details.  Goals not met.  Barriers to achieving goals:   Pt has transitioned to comfort cares.    Therapy recommendation(s):    No further therapy is recommended.

## 2022-10-09 PROBLEM — Z51.5 HOSPICE CARE PATIENT: Status: ACTIVE | Noted: 2022-01-01

## 2022-10-09 NOTE — CONSULTS
[facility]    Consult Note - [facility]    ______________________________________________________________________    [facility] 24/7 Contact Number: [phone]    - Providers: Please contact [facility] with changes in orders or clinical plan of care   - Nursing: Please contact [facility] with significant changes in patient condition    Hospice will notify the care team (including the hospitalist) to confirm date of inpatient hospice (GIP) admission.    New Epic encounter will not be created until hospice completes admission.   ______________________________________________________________________        Hospice Diagnosis: Metastatic pancreatic cancer    Indication for Inpatient Hospice: Patient is eligible for [facility] hospice for management of pain and nausea AEB patient not able to get comfortable, stating pain is 5/10.    Goals for Hospital Discharge: If tolerated, patient will transition from IV to oral / sL pain medication to facilitate transfer to home / hospice facility.        Plan of Care Discussed with the Following:   - Nurse: JOCELYN Poe  - Hospitalist/Rounding Provider: Dr. Sven Barnes's Family/Preferred Contact: Spouse, Tiffanie  - Hospice Provider:Dr. Dusty Rocha    Summary of Visit (includes assessment, medications and any new orders):   Writer met with Spouse Tiffanie to discuss [facility] hospice and EOL. Spouse is in agreement with hospice care and admission to [facility] for symptom management. Spouse signed hospice consent forms. Patient is eligible for [facility] hospice for pain management and nausea. Patient currently utilizing PRN Roxanol pain and PRN Zofran for nausea. Hospice would recommend discontinuing IV and transitioning to PO/SL comfort medications. Hospice will continue to visit patient daily, and consult with hospital staff regarding medication recommendations for optimal comfort.    Radha Mark RN

## 2022-10-09 NOTE — PROGRESS NOTES
[facility]    Progress Note - [facility]    ______________________________________________________________________    [facility] Contact Number: [phone]    - Providers: Please contact [facility] with changes in orders or clinical plan of care   - Nursing: Please contact [facility] with significant changes in patient condition  ______________________________________________________________________        Plan of Care Discussed with the Following:   - Nurse: JOCELYN Farris  - Hospitalist/Rounding Provider: Dr. Machuca    - Cameron's Family/Preferred Contact: Spouse Tiffanie  - Hospice Provider: Dr. Dusty Rocha    Summary of Visit (includes assessment, medications and any new orders):   [facility] daily visit assessment. Patient was sleeping peacefully, appears very comfortable. Spouse Tiffanie at bedside. Patient awoke briefly with touch and voice, then fall asleep, denies pain/SOB. Yesterday PM, patient had bouts of nausea and vomiting x 3, was given Zofran with effective results. Patient is mouth breathing, O2 sats 92 percent on RA, HR 81, RR 20, Abdomen distended and hard, decreased urine output, trena in color, 2 plus edema noted in LE, cold feet, mottling noted on feet/toes. Patient has been more somnolent today. Spouse reports this morning patient was fidgeting with the covers and was given 1 PRN dose of Morphine and Lorazepam with effective results. No oral intake, using mouth swabs for comfort. Patient is repositioned every 2 hours as tolerated, lidocaine patch in place on right hip. Patient continues to be eligible of [facility] Hospice for symptom management. Hospice will continue to assess patient daily and support family.    Radha Mark RN

## 2022-10-09 NOTE — PROGRESS NOTES
"[facility]    Medicine Progress Note - Hospitalist Service    Date of Admission:  10/8/2022    Assessment & Plan        Cameron Barnard is a 72 year old male who is being transitioned to inpatient hospice on 10/8/2022. Please see the discharge summary from the same date for more details of his hospital course.    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  Malignant ascites  Hyponatremia  Mild hyperkalemia  Acute kidney injury  Stage III chronic kidney disease  Anion gap acidosis, resolved  Leukocytosis  Pulmonary embolism 9/2022 not  Portal vein thrombosis  Hiccups  Persistent atrial fibrillation        Diet: Snacks/Supplements Adult: Ensure Enlive; Between Meals  2 Gram K Diet    DVT Prophylaxis: DOAC (if he wishes to take it)  Altman Catheter: Not present  Central Lines: PRESENT     Cardiac Monitoring: None  Code Status: No CPR- Do NOT Intubate      Disposition Plan      Expected Discharge Date: 10/11/2022                The patient's care was discussed with the Bedside Nurse and Patient and patient's wife, Delroy    Danika Machuca MD  Hospitalist Service  [facility]  Securely message with the Vocera Web Console (learn more here)  Text page via [employer] Paging/Directory         Clinically Significant Risk Factors Present on Admission               # Coagulation Defect: home medication list includes an anticoagulant medication      # Obesity: Estimated body mass index is 37.76 kg/m  as calculated from the following:    Height as of 10/2/22: 1.854 m (6' 1\").    Weight as of 10/3/22: 129.8 kg (286 lb 3.2 oz).        ______________________________________________________________________    Interval History   Patient is nonverbal, cannot obtain review of systems from him.  His wife, Delroy, is at the bedside, discussed with her and also bedside RN, Kaleigh.  Patient's " urinary catheter leaked, and he woke up briefly.  RN administered morphine for turns and cares, he now seems comfortable.  Wife asked if she will need to come back to the hospital after he dies, she says she completed the paperwork at [employer] and does not wish to view the body after death.     Data reviewed today: I reviewed all medications, new labs and imaging results over the last 24 hours. I personally reviewed no images or EKG's today.    Physical Exam   Vital Signs:                    Weight: 0 lbs 0 oz  Constitutional: Somnolent, does not wake to voice  Respiratory: open mouth breathing  Cardiovascular: Regular rate and rhythm,  GI: Very distended, soft  Skin/Integumen: Profound bilateral lower extremity edema.  Areas of subcutaneous hemorrhage on both legs  Other: Cannot assess mood due to drowsiness      Data   Recent Labs   Lab 10/07/22  0536 10/06/22  0636 10/05/22  0441 10/04/22  2250 10/04/22  0601 10/03/22  0526   WBC  --  2.5* 2.7*  --  1.6*  1.6* 8.0   HGB  --  11.8* 11.8*  --  12.8* 13.3   MCV  --  91 88  --  91 92   PLT  --  124* 175  --  263 267   * 127* 129*   < > 126* 130*   POTASSIUM 5.8* 5.4* 5.7*  --  5.7* 5.9*   CHLORIDE 99 98 97  --  96 99   CO2 21 22 21  --  23 22   BUN 94* 88* 84*  --  80* 64*   CR 1.30* 1.32* 1.51*   < > 1.74* 1.28*   ANIONGAP 9 7 11  --  7 9   PETER 7.4* 7.1* 7.5*  --  7.5* 7.6*   * 103* 117*  --  127* 120*   ALBUMIN  --   --   --   --   --  1.6*   PROTTOTAL  --   --   --   --   --  5.5*   BILITOTAL  --   --   --   --   --  1.0   ALKPHOS  --   --   --   --   --  107   ALT  --   --   --   --   --  58   AST  --   --   --   --   --  67*    < > = values in this interval not displayed.     No results found for this or any previous visit (from the past 24 hour(s)).  Medications     - MEDICATION INSTRUCTIONS -         lidocaine  1 patch Transdermal Q24H     lidocaine   Transdermal Q8H PATRICIO     LORazepam  0.25 mg Oral At Bedtime     miconazole    Topical BID     sennosides  1 tablet Oral Daily     sodium chloride (PF)  3 mL Intracatheter Q8H

## 2022-10-09 NOTE — PLAN OF CARE
Alert and oriented but talks very little. He has complained of significant nausea and abdominal pain all day. Zofran, Morphine (IV and SL), and reglan all given with good relief. He did have three instances of vomiting today. No urinary output today. He was turned every two hours as he tolerated. Continue with hospice cares and transfer to 8th floor when bed is available.

## 2022-10-09 NOTE — PLAN OF CARE
Goal Outcome Evaluation:    Pt A x O x4, slow to respond, arouses to voice/gentle touch. Denies nausea/vomiting overnight. Ativan given x2. Intermittent hip discomfort, denies medication interventions, repositioned as pt tolerated. New agility bed in room, pt requested to transfer to other bed at a later time. Continue hospice cares and transfer to 8th floor once bed becomes available.

## 2022-10-09 NOTE — PLAN OF CARE
Pt somnolent most of shift but comfortable--morphine given x1, arouses to repeated voice and gentle touching, weight shifting, pillow movements and turns throughout day at request of patient and wife-Delroy, external cath in place- small amount of urine output noted, skin cool to touch, wife at bedside most of day, plan to transfer to  when bed available, continue to monitor closely.

## 2022-10-10 NOTE — PROGRESS NOTES
[facility]    Progress Note - [facility]    ______________________________________________________________________    [facility] Contact Number: [phone]    - Providers: Please contact [facility] with changes in orders or clinical plan of care   - Nursing: Please contact [facility] with significant changes in patient condition  ______________________________________________________________________        Plan of Care Discussed with the Following:   - Nurse:JOCELYN Vallejo  - Hospitalist/Rounding Provider: Dr. Mccarthy   - Cameron's Family/Preferred Contact: Spouse Tiffanie  - Hospice Provider: Dr. Dusty Rocha    Summary of Visit (includes assessment, medications and any new orders):   GIP daily visit assessment.  Patient was non responsive, mouth breathing, RR 28. Mottling noted in feet and knees. Decrease urine output, dark trena in color. Patient appears to be actively dying. Oral secretions noted. Writer spoke with JOCELYN Vallejo to administer PRN atropine for oral secretions and PRN Morphine for shallow breathing. Patient continue to meet GIP eligibility AEB patient is not stable for transport.  Hospice will visit and assess patient daily and support family.     Radha Mark RN

## 2022-10-10 NOTE — PLAN OF CARE
Goal Outcome Evaluation:    Somnolent overnight. Arouses to gentle touch/voice.Turn/weight shifting for pt comfort. Denies nausea/vomiting. Ativan and morphine given x2. External catheter in place, Dark trena urine output. Continue hospice cares and transfer to 8th floor once bed becomes available.

## 2022-10-10 NOTE — PROGRESS NOTES
Date of Admission:  10/8/2022        Assessment & Plan          Cameron Barnard is a 72 year old male who is being transitioned to inpatient hospice on 10/8/2022. Please see the discharge summary from the same date for more details of his hospital course.     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 Pt has been comfortable.   -good control of sxs with prn ativan and morphine. Pt unresponsive.appears comfortable. No RN concerns    Plan;   Cont with current comfort care orders  IP Hospice team following.           Diet: Snacks/Supplements Adult: Ensure Enlive; Between Meals  2 Gram K Diet    DVT Prophylaxis: DOAC (if he wishes to take it)  Altman Catheter: Not present  Central Lines: PRESENT     Cardiac Monitoring: None  Code Status: No CPR- Do NOT Intubate          Disposition Plan        Expected Discharge Date: 10/11/2022

## 2022-10-10 NOTE — DEATH PRONOUNCEMENT
NP DEATH PRONOUNCEMENT    Called to pronounce Cameron Barnard dead.    Physical Exam: Spontaneous respirations absent, Breath sounds absent, Carotid pulse absent and Heart sounds absent    Patient was pronounced dead at 5:55 PM, October 10, 2022.    Preliminary Cause of Death: Cardiopulmonary arrest in the setting of Stage IV metastatic adenocarcinoma of the pancreas; patient was on hospice care.    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 (examples: HIV, chicken pox, TB, Ebola, CJD) :  NO    Multi-drug resistant organism present? (example: MRSA): NO    Please consider an autopsy if any of the following exist:  NO Unexpected or unexplained death during or following any dental, medical, or surgical diagnostic treatment procedures.   NO Death of mother at or up to seven days after delivery.     NO All  and pediatric deaths.     NO Death where the cause is sufficiently obscure to delay completion of the death certificate.   NO Deaths in which autopsy would confirm a suspected illness/condition that would affect surviving family members or recipients of transplanted organs.     The following deaths must be reported to the 's Office:  NO A death that may be due entirely or in part to any factors other than natural disease (recent surgery, recent trauma, suspected abuse/neglect).   NO A death that may be an accident, suicide, or homicide.     NO Any sudden, unexpected death in which there is no prior history of significant heart disease or any  other condition associated with sudden death.   NO A death under suspicious, unusual, or unexpected circumstances.    NO Any death which is apparently due to natural causes but in which the  does not have a personal physician familiar with the patient s medical history, social, or environmental situation or the circumstances of the terminal event.   NO Any death apparently due to Sudden Infant Death Syndrome.     NO Deaths that occur during, in association with, or as consequences of a diagnostic, therapeutic, or anesthetic procedure.   NO Any death in which a fracture of a major bone has occurred within the past (6) six months.   NO A death of persons note seen by their physician within 120 days of demise.     NO Any death in which the  was an inmate of a public institution or was in the custody of Law Enforcement personnel.   NO  All unexpected deaths of children   NO Solid organ donors   NO Unidentified bodies   YES Deaths of persons whose bodies are to be cremated or otherwise disposed of so that the bodies will later be unavailable for examination;   NO Deaths unattended by a physician outside of a licensed healthcare facility or licensed residential hospice program   NO Deaths occurring within 24 hours of arrival to a health care facility if death is unexpected.    NO Deaths associated with the decedent s employment.   NO Deaths attributed to acts of terrorism.   NO Any death in which there is uncertainty as to whether it is a medical examiner s care should be discussed with the medical investigator.        Body disposition: Autopsy was discussed with family member:  Spouse Tiffanie by phone.  Permission for autopsy was declined.    Emir Dueñas CNP  Hospitalist and House NIC  Pager: [phone]

## 2022-10-10 NOTE — PLAN OF CARE
Goal Outcome Evaluation:      Plan of Care Reviewed With: patient    Overall Patient Progress: decliningOverall Patient Progress: declining       Pt appears comfortable, he does respond to pain but otherwise mostly unresponsive.  Morphine and ativan for comfort.  Report called to station 88 RN and wife with patient, he transferred in size wise bed with pulsate mattress and other belongings sent with.  Cont to monitor and keep comfortable.

## 2022-10-11 NOTE — DISCHARGE SUMMARY
[facility]    Death Summary  Hospitalist    Date of Admission:  10/1/2022  Date of Death:         10/11/2022  Provider Completing Death Summary: Lucio Mccarthy MD, MD    Discharge Diagnoses   stage IV metastatic adenocarcinoma of pancreas  Pericardial effusion      History of Present Illness   Cameron Barnard is a 72 year old male who presents with abdominal pain this delightfully pleasant but unfortunate gentleman was recently diagnosed with metastatic pancreatic cancer last month.  He was having abdominal pain and in the emergency department at that time was found to have pancreatic mass.  Biopsy was done and showed adenocarcinoma.  He also was found to have pulmonary embolism as well as portal vein thrombosis.  He returned to the hospital about a week and a half ago with rapid A. fib and was placed on diltiazem.  He also has undergone paracenteses for his ascites which is malignant.  He notes that he she had shortness of breath for about 3 weeks.  He states it fluctuates and thinks that if he hydrates himself shortness of breath is better.  However, when his ascites gets worse he has poor p.o. intake and becomes dehydrated.  He states he notes a cycle where he will have paracenteses, then does well for couple days and then started to have poor p.o. intake again until his next paracentesis.  His shortness of breath is worse with exertion.  He denies any chest pain.  He also notes on this admission that he has worsening right upper quadrant pain which is new.  He denies any fevers or chills but does note he had a diaphoretic episode emergency department.  He has other pain for his cancer as a dull ache for which she presented to the hospital a month ago.  He denies any cough or urinary symptoms.  He does feel his edema is getting worse.  He had some nausea today    Hospital Course   Pericardial effusion   *Recently dx adenocarcinoma of pancreas 9/2022  *Just admitted 9/20-23 for afib.  "Pericardial effusion noted during admit, echo w/o tamponade, small-mod posterior effusion.   *1st chemo day prior to presentation. Woke this am with LUQ abdominal pain. Also with SOB which he thinks get better with hydration. Today nausea. In ED tachy ~100s, -110 systolic (note BP low at discharge 109 systolic), O2 sats normal. Labs BNP 2023. Troponin normal. WBC 22.2.    *echo ED EF 60-65%, large pericardial effusion noted (larger than recent echo), no tamponade, findings suggestive of effusive constrictive physiology.   *CT abdomen unchanged except larger pericardial effusion, increased abdominal ascites.   -pt followed by cardiology and cv surgery, per cv surgery, pericardial window would offer acute decompression but unlikely to keep pericardial effusion from draining since my \"window\" is into the abdominal cavity. This cavity already has positive fluid pressure and my window will likely close prematurely leading to recurrence of effusion.  -palliative care consulted and pt elected to proceed with hospice care.      Recent dx stage IV metastatic adenocarcinoma of pancreas  Malignant ascites  Abdominal pain  Elevated bilirubin, AST  [Morphine IR 15 mg q4 hours prn, pantoprazole 40 mg daily]  Follows with  oncology. Presented to ED with abdominal pain 9/9. Found to have pancreatic head mass and masses throughout liver. Also with malignant ascites. US biopsy by IR with adenocarcinoma of the pancreas. Underwent paracentesis x 2 9/2022. Started Gemzar/Abraxane 9/30/22.   Bili on presentation at 1.5 (9/30 was 0.9). AST sl up at 63. New pain for admission is in RUQ.  [facility] oncology consult requested, I appreciate Dr. Abdalla's evaluation and recommendation, provided prn Morphine IR 30 mg q4 hours prn available.  RUQ US shows cholelithiasis and gallbladder wall thickening without other evidence of cholecystitis. Enlarged fatty liver with hepatic metastasis. Received Neupogen for neutropenia.  S/P US guided " paracentesis yielding 4425 mL of yellow, serous fluid. Patient requested repeat paracentesis. 2.1 L of  straw colored fluid was drained.  -oncology recommended palliative care consult based on rapid progression of symptoms, change to emphasis on patient's comfort would be appropriate   Pt seen by palliative care and pt elected to transition to comfort care.  [facility] hospice consulted and patient admitted to in patient hospice.  -pt was followed by hospitalist and inpatient hospice team. Comfort care medications provided. Pt had excellent control of his symptoms with ativan and morphine.   Pt passed at 5:55 pm on 10/10/22.        Hyponatremia  Hyperkalemia, mild  GUTIERREZ  CKD III  Anion gap acidosis, resolved      Pulmonary embolism 9/2022  Portal vein thrombosis.        Atrial fibrillation                  Cause of death: stage IV metastatic adenocarcinoma of pancreas  Pericardial effusion    Lucio Mccarthy MD, MD       Pending Results   Unresulted Labs Ordered in the Past 30 Days of this Admission     Date and Time Order Name Status Description    10/4/2022 11:59 AM Aldosterone Renin Ratio In process     10/4/2022 11:59 AM Renin activity In process           Primary Care Physician   Gary Bey    Consultations This Hospital Stay   CARDIOLOGY IP CONSULT  PHYSICAL THERAPY ADULT IP CONSULT  HEMATOLOGY & ONCOLOGY IP CONSULT  PHARMACY IP CONSULT  PHARMACY IP CONSULT  CARE MANAGEMENT / SOCIAL WORK IP CONSULT  VASCULAR ACCESS ADULT IP CONSULT  VASCULAR ACCESS ADULT IP CONSULT  OCCUPATIONAL THERAPY ADULT IP CONSULT  NEPHROLOGY IP CONSULT  WOUND OSTOMY CONTINENCE NURSE  IP CONSULT  PALLIATIVE CARE ADULT IP CONSULT  [facility] INPATIENT HOSPICE ADULT CONSULT  SPIRITUAL HEALTH SERVICES IP CONSULT  SOCIAL WORK IP CONSULT  [facility] INPATIENT HOSPICE ADULT CONSULT  SOCIAL WORK IP CONSULT    Time Spent on this Encounter   I, Lucio Mccarthy MD, personally saw the patient today and spent less than or equal to 30 minutes discharging this  patient.    Data   Most Recent 3 CBC's:Recent Labs   Lab Test 10/06/22  0636 10/05/22  0441 10/04/22  0601   WBC 2.5* 2.7* 1.6*  1.6*   HGB 11.8* 11.8* 12.8*   MCV 91 88 91   * 175 263      Most Recent 3 BMP's:  Recent Labs   Lab Test 10/07/22  0536 10/06/22  0636 10/05/22  0441   * 127* 129*   POTASSIUM 5.8* 5.4* 5.7*   CHLORIDE 99 98 97   CO2 21 22 21   BUN 94* 88* 84*   CR 1.30* 1.32* 1.51*   ANIONGAP 9 7 11   PETER 7.4* 7.1* 7.5*   * 103* 117*     Most Recent 2 LFT's:  Recent Labs   Lab Test 10/03/22  0526 10/02/22  0653   AST 67* 100*   ALT 58 58   ALKPHOS 107 123   BILITOTAL 1.0 1.3     Most Recent INR's and Anticoagulation Dosing History:  Anticoagulation Dose History     Recent Dosing and Labs Latest Ref Rng & Units 5/7/2021 5/10/2021 9/13/2022 9/13/2022 9/19/2022 9/27/2022 10/1/2022    INR 0.85 - 1.15 1.05 1.39(H) 1.05 1.02 2.49(H) 1.71(H) 1.70(H)        Most Recent 3 Troponin's:No lab results found.  Most Recent Cholesterol Panel:  Recent Labs   Lab Test 05/13/21  0743   CHOL 125   LDL 63   HDL 40   TRIG 109     Most Recent 6 Bacteria Isolates From Any Culture (See EPIC Reports for Culture Details):  Recent Labs   Lab Test 06/22/21  1030 09/13/20  1624 09/13/20  1420   CULT No growth No growth No growth     Most Recent TSH, T4 and A1c Labs:  Recent Labs   Lab Test 09/20/22  1636 05/12/21  0753   TSH 1.96 0.38*   T4  --  1.26     Results for orders placed or performed during the hospital encounter of 10/01/22   CT Chest (PE) Abdomen Pelvis w Contrast    Narrative    EXAM: CT CHEST PE ABDOMEN PELVIS W CONTRAST  LOCATION: [facility]  DATE/TIME: 10/1/2022 6:15 PM    INDICATION: Dyspnea, LUQ abd pain, pleuritic.  COMPARISON: 09/20/2022.  TECHNIQUE: CT chest pulmonary angiogram and routine CT abdomen pelvis with IV contrast. Arterial phase through the chest and venous phase through the abdomen and pelvis. Multiplanar reformats and MIP reconstructions were performed.  Dose reduction   techniques were used.   CONTRAST: 135 mL Isovue 370.        FINDINGS:  ANGIOGRAM CHEST: Pulmonary arteries are normal caliber and negative for pulmonary emboli. Thoracic aorta is negative for dissection. There is minimal reflux of contrast in the IVC which can be associated with right heart strain.     LUNGS AND PLEURA: There is slight dependent atelectasis seen in both lung bases.    MEDIASTINUM/AXILLAE: Since 09/20/2022 there has been a significant increase in size of the pericardial effusion, for instance the right pericardium anteriorly shows pleural fluid measuring approximately 3.3 cm in greatest radial dimension and previously   at this same site the right pericardium measured 0.3 cm. Otherwise the mediastinum is stable.    CORONARY ARTERY CALCIFICATION: Mild.    HEPATOBILIARY: Since 09/20/2022 there are stable abnormal scattered low density lesions seen in both lobes of the liver worrisome for changes of metastasis. The gallbladder shows significant findings of cholelithiasis with no raphael evidence for   cholecystitis. The bile ducts are normal in caliber.    PANCREAS: Mass consistent with neoplasm involving the pancreatic head which abuts the hepatic artery and encases portions of the splenic artery. 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    SPLEEN: Normal.    ADRENAL GLANDS: Normal.    KIDNEYS/BLADDER: Normal.    BOWEL: Prior postoperative changes with no complications. Diverticulosis with no findings of diverticulitis.    LYMPH NODES: Normal.    VASCULATURE: Mild to moderate calcification no aneurysmal dilatation.    PELVIC ORGANS: Normal.    MUSCULOSKELETAL: There is a moderate amount of abdominal and pelvic ascites with significant nodularity seen in the peritoneum which may be associated with findings of neoplasm. Degenerative disc disease at L4 and L5.      Impression    IMPRESSION:  1.  No significant change since  09/20/2022 CT is that there has been a significant increase in size of the pericardial effusion which is now advanced, there has been a moderate increase in the amount of abdominal/pelvic ascites, and the bilateral pleural   effusions have decreased in size and are now minimal.    2.  Stable findings of malignancy.    3.  No PE.   US Paracentesis without Albumin    Narrative    ULTRASOUND GUIDED PARACENTESIS   10/2/2022 2:36 PM     HISTORY:  Ascites    PROCEDURE:   Informed consent was obtained from the patient prior to  the procedure with discussion including the possible risks of  bleeding, infection and organ injury . Using 5 mL of 1% lidocaine for  local anesthesia, sterile technique, and sonographic guidance with  permanent image documentation, I placed an 8F paracentesis catheter  into the peritoneal fluid collection. This was used to aspirate 4425  mL of yellow, serous fluid in vacuum bottles, and some of this was  sent for any laboratory studies that had been ordered. There were no  immediate complications.       Impression    IMPRESSION:  Ultrasound guided paracentesis.    MG TAY MD         SYSTEM ID:  [id number]   US Abdomen Limited    Narrative    EXAM: US ABDOMEN LIMITED  LOCATION: [facility]  DATE/TIME: 10/2/2022 3:04 PM    INDICATION: pt with metastatic pancreatic cancer, new elevation in bili and new RUQ abdominal pain  COMPARISON: CT abdomen and pelvis performed 9/9/2022  TECHNIQUE: Limited abdominal ultrasound.    FINDINGS:    GALLBLADDER: Gallstones. Gallbladder wall thickening measuring up 6 mm. Negative sonographic Lobo sign.    BILE DUCTS: No biliary dilatation. Common bile duct obscured by gas.     LIVER: Mildly enlarged and diffusely echogenic. A 2.5 x 2.7 cm centrally hyperechoic, complex thick-walled lesion is seen in the right lobe of the liver corresponding to hypodensity seen on CT dated 9/9/2022 with no such lesion noted on CT dated   5/10/2021,  consistent with hepatic metastasis.    RIGHT KIDNEY: No hydronephrosis.    PANCREAS: The visualized portions are normal.    Moderate volume ascites.      Impression    IMPRESSION:  1.  Cholelithiasis and gallbladder wall thickening without other evidence of cholecystitis.  2.  Enlarged fatty liver with hepatic metastasis.       US Paracentesis without Albumin    Narrative    ULTRASOUND PARACENTESIS WITHOUT ALBUMIN  10/4/2022 3:46 PM     HISTORY: Pancreatic cancer with malignant ascites.    FINDINGS: Ultrasound was used to evaluate for the presence and best  approach for paracentesis. Written and oral informed consent was  obtained. A pause for the cause procedure to verify the correct  patient and correct procedure. The skin overlying the right lower  quadrant was prepped and draped in the usual sterile fashion. The  subcutaneous tissues were anesthetized with 10 mL 1% lidocaine. A  catheter was advanced into the peritoneal space and 2.1 L of  straw  colored fluid was drained. There were no immediate complications.  Ultrasound images were permanently stored.  Patient left the  ultrasound suite in satisfactory condition.      Impression    IMPRESSION: Technically successful paracentesis without immediate  complications.    JAY BREAUX MD         SYSTEM ID:  [id number]   Echocardiogram Limited     Value    LVEF  60-65%    Narrative    [id number]  [id number]  [id number]  [id number]^ROSA ISELA^DIEGO^JACK     [facility]  Echocardiography Laboratory  [address] [place] [address]     Name: CATHERINE NORIEGA  MRN: [id number]  : 1950  Study Date: 10/01/2022 08:06 PM  Age: 72 yrs  Gender: Male  Patient Location: [facility]  Reason For Study: Pericardial Effusion  Ordering Physician: DIEGO NATARAJAN  Performed By: Cassi Morris     BSA: 2.5 m2  Height: 72 in  Weight: 290 lb  HR: 94  BP: 112/67 mmHg  ______________________________________________________________________________  Procedure  Limited  Portable Echo Adult.  ______________________________________________________________________________  Interpretation Summary     Left ventricular systolic function is normal.  The visual ejection fraction is 60-65%.  The right ventricle is normal in structure, function and size.  Large pericardial effusion measuring max diameter inferolaterally (>3.0 cm)  with moderate effusion anteriorly adjacent to the right ventricle (<2.0 cm).  There is mild variation (<35%) of the tricuspid and mitral valve inflow  velocity profiles suggestive of effusive constrictive physiology, however no  diastolic collapse of the RV to suggest tamponade. Clinical correlation  required.  IVC appears normal.     Compared to recent study 9/20/2022 effusion is slightly larger. Careful  echocardiogram surveillance is recommended.  ______________________________________________________________________________  Left Ventricle  The left ventricle is normal in size. There is normal left ventricular wall  thickness. Left ventricular systolic function is normal. The visual ejection  fraction is 60-65%. Normal left ventricular wall motion.     Right Ventricle  The right ventricle is normal in structure, function and size.     Atria  Normal left atrial size. Right atrial size is normal.     Mitral Valve  The mitral valve is normal in structure and function.     Tricuspid Valve  The tricuspid valve is normal in structure and function. Right ventricular  systolic pressure could not be approximated due to inadequate tricuspid  regurgitation.     Aortic Valve  There is mild trileaflet aortic sclerosis.     Pulmonic Valve  The pulmonic valve is normal in structure and function.     Vessels  The aortic root is normal size. The ascending aorta is Mildly dilated. The  inferior vena cava was normal in size with preserved respiratory variability.     Pericardium  Large pericardial effusion. The mitral valve inflow Doppler reveals no  significant respiratory  variation. The tricuspid valve inflow Doppler reveals  no significant respiratory variation.     ______________________________________________________________________________  Report approved by: Kevin Treviño 10/01/2022 10:08 PM     ______________________________________________________________________________      Echocardiogram Limited    Narrative    [id number]  [id number]  [id number]  [id number]^MIKE     [facility]  Echocardiography Laboratory  [address] [place] [id number]     Name: RAGHAVENDRA NORIEGAN J  MRN: [id number]  : 1950  Study Date: 10/04/2022 12:17 PM  Age: 72 yrs  Gender: Male  Patient Location: [facility]  Reason For Study: Pericardial Effusion  Ordering Physician: ETTA MCKEON  Performed By: Cassi Morris     BSA: 2.5 m2  Height: 73 in  Weight: 286 lb  HR: 87  ______________________________________________________________________________  Procedure  Limited Portable Echo Adult.  ______________________________________________________________________________  Interpretation Summary     Limited echo to assess pericardial effusion.     There is a large (2.6-2.8 cms) posterior and small to moderate anterior  pericardial effusion as previously reported. On direct comparison of the two  echos, the effusion appears a bit small on today's study.  Effusion appears to be partially organizing anteriorly.  IVC is normal in size.  No features of tamponade. Correlate clinically.  ______________________________________________________________________________  Left Ventricle  Left ventricular systolic function is normal.     Right Ventricle  The right ventricle is normal in size and function.     Atria  Right atrium not well visualized.     Mitral Valve  The mitral valve is normal in structure and function.     Aortic Valve  No aortic stenosis is present.     Vessels  The inferior vena cava was normal in size with preserved  respiratory  variability.  ______________________________________________________________________________  Report approved by: Kevin Ellis 10/04/2022 03:12 PM     ______________________________________________________________________________      Echocardiogram Limited     Value    LVEF  60-65%    Narrative    [id number]  [id number]  [id number]  [id number]^STACI^KIKA^ANA     [facility]  Echocardiography Laboratory  [address] [place] [address], [place] [address]     Name: RAGHAVENDRA NORIEGAN J  MRN: [id number]  : 1950  Study Date: 10/05/2022 02:14 PM  Age: 72 yrs  Gender: Male  [place] [facility]  [place] For Study: Tamponade  Ordering Physician: KIKA SMALL  Referring Physician: Gary Bey  Performed By: Andrei Rodriguez     BSA: 2.5 m2  Height: 73 in  Weight: 286 lb  BP: 121/71 mmHg  ______________________________________________________________________________  Procedure  Limited Portable Echo Adult.  ______________________________________________________________________________  Interpretation Summary     Small pericardial effusion  There are no echocardiographic indications of cardiac tamponade.  Small left pleural effusion  ______________________________________________________________________________  Left Ventricle  The visual ejection fraction is 60-65%. Regional wall motion abnormalities  cannot be excluded due to limited visualization.     Right Ventricle  The right ventricle is normal in size and function.     Atria  Lipomatous hypertrophy of the interatrial septum is noted.     Vessels  Non dilated IVC.     Pericardium  There are no echocardiographic indications of cardiac tamponade. Small  pericardial effusion. The pericardial effusion is located mainly by the  anterolateral wall. Small left pleural effusion.     Rhythm  The rhythm was undetermined.  ______________________________________________________________________________  Report approved by: Tong Chavez,  Kevin 10/05/2022 03:13 PM     ______________________________________________________________________________      Echocardiogram Limited     Value    LVEF  60-65%    Narrative    [id number]  [id number]  [id number]  [id number]^SYBIL^ROBERT^DARRELL     [facility]  Echocardiography Laboratory  [address] [place] [address], [place] [address]     Name: CATHERINE NORIEGA  MRN: [id number]  : 1950  Study Date: 10/07/2022 09:27 AM  Age: 72 yrs  Gender: Male  Patient Location: [facility]  Reason For Study: Pericardial Effusion  Ordering Physician: ROBERT DEVINE  Referring Physician: Gary Bey  Performed By: Andrei Rodriguez     BSA: 2.5 m2  Height: 73 in  Weight: 286 lb  HR: 96  BP: 105/74 mmHg  ______________________________________________________________________________  Procedure  Limited Portable Echo Adult.  ______________________________________________________________________________  Interpretation Summary     There is a moderate pericardial effusion localized around the posterior  aspect, measuring up to 3cm. There is trace effusion anteriorly, with  thickening of the pericardium suggesting organizing effusion. Right atrium is  not well visualized, on some views it appears slightly compressed, however  this appears similar on views from the 10/5/2022 study, and on that study  there were additional views where the right atrium appears normal. No RV  chamber collapse, IVC is normal in size with normal response to respiration.  Pericardial effusion size is stable from the prior study 10/5/2022.  ______________________________________________________________________________  Left Ventricle  The left ventricle is normal in size. Left ventricular systolic function is  normal. The visual ejection fraction is 60-65%.     Vessels  The inferior vena cava was normal in size with preserved respiratory  variability.     Pericardium  There is a moderate pericardial effusion localized around the posterior  aspect, measuring  up to 3cm. There is tracel effusion anteriorly, with  thickening of the pericardium suggesting organizing effusion. Right atrium is  not well visualized, on some views it appears slightly compressed, however  this appears similar on views from the 10/5/2022 study, and on that study  there were additional views where the right atrium appears normal. No RV  chamber collapse, IVC is normal in size with normal response to respiration.  Pericardial effusion size is stable from the prior study 10/5/2022.     ______________________________________________________________________________  MMode/2D Measurements & Calculations  IVSd: 1.0 cm  LVIDd: 4.5 cm  LVIDs: 2.7 cm  LVPWd: 1.6 cm  FS: 39.1 %  LV mass(C)d: 228.9 grams  LV mass(C)dI: 91.4 grams/m2  LA dimension: 4.3 cm  RWT: 0.72     ______________________________________________________________________________  Report approved by: Kevin Priest 10/07/2022 12:00 PM         Cardiac Catheterization    Narrative    Patient came down to Cath Lab and underwent preliminary echocardiogram.    There is a small pericardial effusion mainly directed posteriorly with   organization of material compared to prior studies.  There is no apical   window or substernal noted upon examination.  Discussed findings with   referring cardiology team along with the patient, and no attempt at   pericardiocentesis was made.  Patient taken back to floor for continued   care.

## 2022-10-13 LAB
ALDOST/RENIN PLAS-RTO: 0.5 {RATIO} (ref 0–25)
RENIN PLAS-CCNC: 135 NG/ML/HR

## 2022-10-18 ENCOUNTER — MEDICAL CORRESPONDENCE (OUTPATIENT)
Dept: HEALTH INFORMATION MANAGEMENT | Facility: CLINIC | Age: 72
End: 2022-10-18

## 2022-10-29 NOTE — DISCHARGE SUMMARY
[facility]    Death Summary  Hospitalist    Date of Admission:  10/8/2022  Date of Death:         10/10/2022  Provider Completing Death Summary: Lucio Mccarthy MD, MD    Discharge Diagnoses      stage IV metastatic adenocarcinoma of pancreas  Pericardial effusion       History of Present Illness      Cameron Barnard is a 72 year old male who presents with abdominal pain this delightfully pleasant but unfortunate gentleman was recently diagnosed with metastatic pancreatic cancer last month.  He was having abdominal pain and in the emergency department at that time was found to have pancreatic mass.  Biopsy was done and showed adenocarcinoma.  He also was found to have pulmonary embolism as well as portal vein thrombosis.  He returned to the hospital about a week and a half ago with rapid A. fib and was placed on diltiazem.  He also has undergone paracenteses for his ascites which is malignant.  He notes that he she had shortness of breath for about 3 weeks.  He states it fluctuates and thinks that if he hydrates himself shortness of breath is better.  However, when his ascites gets worse he has poor p.o. intake and becomes dehydrated.  He states he notes a cycle where he will have paracenteses, then does well for couple days and then started to have poor p.o. intake again until his next paracentesis.  His shortness of breath is worse with exertion.  He denies any chest pain.  He also notes on this admission that he has worsening right upper quadrant pain which is new.  He denies any fevers or chills but does note he had a diaphoretic episode emergency department.  He has other pain for his cancer as a dull ache for which she presented to the hospital a month ago.  He denies any cough or urinary symptoms.  He does feel his edema is getting worse.  He had some nausea today    Hospital Course      Pericardial effusion   *Recently dx adenocarcinoma of pancreas 9/2022  *Just admitted 9/20-23 for  "afib. Pericardial effusion noted during admit, echo w/o tamponade, small-mod posterior effusion.   *1st chemo day prior to presentation. Woke this am with LUQ abdominal pain. Also with SOB which he thinks get better with hydration. Today nausea. In ED tachy ~100s, -110 systolic (note BP low at discharge 109 systolic), O2 sats normal. Labs BNP 2023. Troponin normal. WBC 22.2.    *echo ED EF 60-65%, large pericardial effusion noted (larger than recent echo), no tamponade, findings suggestive of effusive constrictive physiology.   *CT abdomen unchanged except larger pericardial effusion, increased abdominal ascites.   -pt followed by cardiology and cv surgery, per cv surgery, pericardial window would offer acute decompression but unlikely to keep pericardial effusion from draining since my \"window\" is into the abdominal cavity. This cavity already has positive fluid pressure and my window will likely close prematurely leading to recurrence of effusion.  -palliative care consulted and pt elected to proceed with hospice care.      Recent dx stage IV metastatic adenocarcinoma of pancreas  Malignant ascites  Abdominal pain  Elevated bilirubin, AST  [Morphine IR 15 mg q4 hours prn, pantoprazole 40 mg daily]  Follows with  oncology. Presented to ED with abdominal pain 9/9. Found to have pancreatic head mass and masses throughout liver. Also with malignant ascites. US biopsy by IR with adenocarcinoma of the pancreas. Underwent paracentesis x 2 9/2022. Started Gemzar/Abraxane 9/30/22.   Bili on presentation at 1.5 (9/30 was 0.9). AST sl up at 63. New pain for admission is in RUQ.  [facility] oncology consult requested, I appreciate Dr. Abdalla's evaluation and recommendation, provided prn Morphine IR 30 mg q4 hours prn available.  RUQ US shows cholelithiasis and gallbladder wall thickening without other evidence of cholecystitis. Enlarged fatty liver with hepatic metastasis. Received Neupogen for neutropenia.  S/P US guided " paracentesis yielding 4425 mL of yellow, serous fluid. Patient requested repeat paracentesis. 2.1 L of  straw colored fluid was drained.  -oncology recommended palliative care consult based on rapid progression of symptoms, change to emphasis on patient's comfort would be appropriate   Pt seen by palliative care and pt elected to transition to comfort care.  [facility] hospice consulted and patient admitted to in patient hospice.  -pt was followed by hospitalist and inpatient hospice team. Comfort care medications provided. Pt had excellent control of his symptoms with ativan and morphine.   Pt passed at 5:55 pm on 10/10/22.         Hyponatremia  Hyperkalemia, mild  GUTIERREZ  CKD III  Anion gap acidosis, resolved      Pulmonary embolism 9/2022  Portal vein thrombosis.         Atrial fibrillation              Cause of death: stage IV metastatic adenocarcinoma of pancreas  Pericardial effusion    Lucio Mccarthy MD, MD         Pending Results   Unresulted Labs Ordered in the Past 30 Days of this Admission     No orders found from 9/8/2022 to 10/9/2022.          Primary Care Physician   Gary Bey    Consultations This Hospital Stay   [facility] INPATIENT HOSPICE ADULT CONSULT  SOCIAL WORK IP CONSULT  PHARMACY IP CONSULT    Time Spent on this Encounter   I, Lucio Mccarthy MD, personally saw the patient today and spent less than or equal to 30 minutes discharging this patient.    Data   Most Recent 3 CBC's:Recent Labs   Lab Test 10/06/22  0636 10/05/22  0441 10/04/22  0601   WBC 2.5* 2.7* 1.6*  1.6*   HGB 11.8* 11.8* 12.8*   MCV 91 88 91   * 175 263      Most Recent 3 BMP's:  Recent Labs   Lab Test 10/07/22  0536 10/06/22  0636 10/05/22  0441   * 127* 129*   POTASSIUM 5.8* 5.4* 5.7*   CHLORIDE 99 98 97   CO2 21 22 21   BUN 94* 88* 84*   CR 1.30* 1.32* 1.51*   ANIONGAP 9 7 11   PETER 7.4* 7.1* 7.5*   * 103* 117*     Most Recent 2 LFT's:  Recent Labs   Lab Test 10/03/22  0526 10/02/22  0653   AST 67* 100*    ALT 58 58   ALKPHOS 107 123   BILITOTAL 1.0 1.3     Most Recent INR's and Anticoagulation Dosing History:  Anticoagulation Dose History     Recent Dosing and Labs Latest Ref Rng & Units 5/7/2021 5/10/2021 9/13/2022 9/13/2022 9/19/2022 9/27/2022 10/1/2022    INR 0.85 - 1.15 1.05 1.39(H) 1.05 1.02 2.49(H) 1.71(H) 1.70(H)        Most Recent 3 Troponin's:No lab results found.  Most Recent Cholesterol Panel:  Recent Labs   Lab Test 05/13/21  0743   CHOL 125   LDL 63   HDL 40   TRIG 109     Most Recent 6 Bacteria Isolates From Any Culture (See EPIC Reports for Culture Details):  Recent Labs   Lab Test 06/22/21  1030 09/13/20  1624 09/13/20  1420   CULT No growth No growth No growth     Most Recent TSH, T4 and A1c Labs:  Recent Labs   Lab Test 09/20/22  1636 05/12/21  0753   TSH 1.96 0.38*   T4  --  1.26     Results for orders placed or performed during the hospital encounter of 10/01/22   CT Chest (PE) Abdomen Pelvis w Contrast    Narrative    EXAM: CT CHEST PE ABDOMEN PELVIS W CONTRAST  LOCATION: [facility]  DATE/TIME: 10/1/2022 6:15 PM    INDICATION: Dyspnea, LUQ abd pain, pleuritic.  COMPARISON: 09/20/2022.  TECHNIQUE: CT chest pulmonary angiogram and routine CT abdomen pelvis with IV contrast. Arterial phase through the chest and venous phase through the abdomen and pelvis. Multiplanar reformats and MIP reconstructions were performed. Dose reduction   techniques were used.   CONTRAST: 135 mL Isovue 370.        FINDINGS:  ANGIOGRAM CHEST: Pulmonary arteries are normal caliber and negative for pulmonary emboli. Thoracic aorta is negative for dissection. There is minimal reflux of contrast in the IVC which can be associated with right heart strain.     LUNGS AND PLEURA: There is slight dependent atelectasis seen in both lung bases.    MEDIASTINUM/AXILLAE: Since 09/20/2022 there has been a significant increase in size of the pericardial effusion, for instance the right pericardium anteriorly  shows pleural fluid measuring approximately 3.3 cm in greatest radial dimension and previously   at this same site the right pericardium measured 0.3 cm. Otherwise the mediastinum is stable.    CORONARY ARTERY CALCIFICATION: Mild.    HEPATOBILIARY: Since 09/20/2022 there are stable abnormal scattered low density lesions seen in both lobes of the liver worrisome for changes of metastasis. The gallbladder shows significant findings of cholelithiasis with no raphael evidence for   cholecystitis. The bile ducts are normal in caliber.    PANCREAS: Mass consistent with neoplasm involving the pancreatic head which abuts the hepatic artery and encases portions of the splenic artery. 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    SPLEEN: Normal.    ADRENAL GLANDS: Normal.    KIDNEYS/BLADDER: Normal.    BOWEL: Prior postoperative changes with no complications. Diverticulosis with no findings of diverticulitis.    LYMPH NODES: Normal.    VASCULATURE: Mild to moderate calcification no aneurysmal dilatation.    PELVIC ORGANS: Normal.    MUSCULOSKELETAL: There is a moderate amount of abdominal and pelvic ascites with significant nodularity seen in the peritoneum which may be associated with findings of neoplasm. Degenerative disc disease at L4 and L5.      Impression    IMPRESSION:  1.  No significant change since 09/20/2022 CT is that there has been a significant increase in size of the pericardial effusion which is now advanced, there has been a moderate increase in the amount of abdominal/pelvic ascites, and the bilateral pleural   effusions have decreased in size and are now minimal.    2.  Stable findings of malignancy.    3.  No PE.   US Paracentesis without Albumin    Narrative    ULTRASOUND GUIDED PARACENTESIS   10/2/2022 2:36 PM     HISTORY:  Ascites    PROCEDURE:   Informed consent was obtained from the patient prior to  the procedure with discussion including  the possible risks of  bleeding, infection and organ injury . Using 5 mL of 1% lidocaine for  local anesthesia, sterile technique, and sonographic guidance with  permanent image documentation, I placed an 8F paracentesis catheter  into the peritoneal fluid collection. This was used to aspirate 4425  mL of yellow, serous fluid in vacuum bottles, and some of this was  sent for any laboratory studies that had been ordered. There were no  immediate complications.       Impression    IMPRESSION:  Ultrasound guided paracentesis.    MG TAY MD         SYSTEM ID:  [id number]   US Abdomen Limited    Narrative    EXAM: US ABDOMEN LIMITED  LOCATION: [facility]  DATE/TIME: 10/2/2022 3:04 PM    INDICATION: pt with metastatic pancreatic cancer, new elevation in bili and new RUQ abdominal pain  COMPARISON: CT abdomen and pelvis performed 9/9/2022  TECHNIQUE: Limited abdominal ultrasound.    FINDINGS:    GALLBLADDER: Gallstones. Gallbladder wall thickening measuring up 6 mm. Negative sonographic Lobo sign.    BILE DUCTS: No biliary dilatation. Common bile duct obscured by gas.     LIVER: Mildly enlarged and diffusely echogenic. A 2.5 x 2.7 cm centrally hyperechoic, complex thick-walled lesion is seen in the right lobe of the liver corresponding to hypodensity seen on CT dated 9/9/2022 with no such lesion noted on CT dated   5/10/2021, consistent with hepatic metastasis.    RIGHT KIDNEY: No hydronephrosis.    PANCREAS: The visualized portions are normal.    Moderate volume ascites.      Impression    IMPRESSION:  1.  Cholelithiasis and gallbladder wall thickening without other evidence of cholecystitis.  2.  Enlarged fatty liver with hepatic metastasis.       US Paracentesis without Albumin    Narrative    ULTRASOUND PARACENTESIS WITHOUT ALBUMIN  10/4/2022 3:46 PM     HISTORY: Pancreatic cancer with malignant ascites.    FINDINGS: Ultrasound was used to evaluate for the presence and best  approach  for paracentesis. Written and oral informed consent was  obtained. A pause for the cause procedure to verify the correct  patient and correct procedure. The skin overlying the right lower  quadrant was prepped and draped in the usual sterile fashion. The  subcutaneous tissues were anesthetized with 10 mL 1% lidocaine. A  catheter was advanced into the peritoneal space and 2.1 L of  straw  colored fluid was drained. There were no immediate complications.  Ultrasound images were permanently stored.  Patient left the  ultrasound suite in satisfactory condition.      Impression    IMPRESSION: Technically successful paracentesis without immediate  complications.    JAY BREAUX MD         SYSTEM ID:  [id number]   Echocardiogram Limited     Value    LVEF  60-65%    Narrative    [id number]  [id number]  [id number]  [id number]^ROSA ISELA^DIEGO^JACK     [facility]  Echocardiography Laboratory  [address] [place] [address]     Name: CATHERINE NORIEGA  MRN: [id number]  : 1950  Study Date: 10/01/2022 08:06 PM  Age: 72 yrs  Gender: Male  Patient Location: [facility]  Reason For Study: Pericardial Effusion  Ordering Physician: DIEGO NATARAJAN  Performed By: Cassi Morris     BSA: 2.5 m2  Height: 72 in  Weight: 290 lb  HR: 94  BP: 112/67 mmHg  ______________________________________________________________________________  Procedure  Limited Portable Echo Adult.  ______________________________________________________________________________  Interpretation Summary     Left ventricular systolic function is normal.  The visual ejection fraction is 60-65%.  The right ventricle is normal in structure, function and size.  Large pericardial effusion measuring max diameter inferolaterally (>3.0 cm)  with moderate effusion anteriorly adjacent to the right ventricle (<2.0 cm).  There is mild variation (<35%) of the tricuspid and mitral valve inflow  velocity profiles suggestive of effusive constrictive physiology,  however no  diastolic collapse of the RV to suggest tamponade. Clinical correlation  required.  IVC appears normal.     Compared to recent study 2022 effusion is slightly larger. Careful  echocardiogram surveillance is recommended.  ______________________________________________________________________________  Left Ventricle  The left ventricle is normal in size. There is normal left ventricular wall  thickness. Left ventricular systolic function is normal. The visual ejection  fraction is 60-65%. Normal left ventricular wall motion.     Right Ventricle  The right ventricle is normal in structure, function and size.     Atria  Normal left atrial size. Right atrial size is normal.     Mitral Valve  The mitral valve is normal in structure and function.     Tricuspid Valve  The tricuspid valve is normal in structure and function. Right ventricular  systolic pressure could not be approximated due to inadequate tricuspid  regurgitation.     Aortic Valve  There is mild trileaflet aortic sclerosis.     Pulmonic Valve  The pulmonic valve is normal in structure and function.     Vessels  The aortic root is normal size. The ascending aorta is Mildly dilated. The  inferior vena cava was normal in size with preserved respiratory variability.     Pericardium  Large pericardial effusion. The mitral valve inflow Doppler reveals no  significant respiratory variation. The tricuspid valve inflow Doppler reveals  no significant respiratory variation.     ______________________________________________________________________________  Report approved by: Kevin Treviño 10/01/2022 10:08 PM     ______________________________________________________________________________      Echocardiogram Limited    Narrative    [id number]  [id number]  [id number]  [id number]^PATNOE^[facility]  Echocardiography Laboratory  [address] [place] [address]     Name: CATHERINE NORIEGA  MRN: [id number]  :  1950  Study Date: 10/04/2022 12:17 PM  Age: 72 yrs  Gender: Male  Patient Location: [facility]  Reason For Study: Pericardial Effusion  Ordering Physician: ETTA MCKEON  Performed By: Cassi Morris     BSA: 2.5 m2  Height: 73 in  Weight: 286 lb  HR: 87  ______________________________________________________________________________  Procedure  Limited Portable Echo Adult.  ______________________________________________________________________________  Interpretation Summary     Limited echo to assess pericardial effusion.     There is a large (2.6-2.8 cms) posterior and small to moderate anterior  pericardial effusion as previously reported. On direct comparison of the two  echos, the effusion appears a bit small on today's study.  Effusion appears to be partially organizing anteriorly.  IVC is normal in size.  No features of tamponade. Correlate clinically.  ______________________________________________________________________________  Left Ventricle  Left ventricular systolic function is normal.     Right Ventricle  The right ventricle is normal in size and function.     Atria  Right atrium not well visualized.     Mitral Valve  The mitral valve is normal in structure and function.     Aortic Valve  No aortic stenosis is present.     Vessels  The inferior vena cava was normal in size with preserved respiratory  variability.  ______________________________________________________________________________  Report approved by: Kevin Ellis 10/04/2022 03:12 PM     ______________________________________________________________________________      Echocardiogram Limited     Value    LVEF  60-65%    Narrative    [id number]  [id number]  [id number]  [id number]^STACI^KIKA^ANA     [facility]  Echocardiography Laboratory  [address] [place] [id number]     Name: CATHERINE NORIEGA  MRN: [id number]  : 1950  Study Date: 10/05/2022 02:14 PM  Age: 72 yrs  Gender: Male  Patient Location:  [facility]  Reason For Study: Tamponade  Ordering Physician: KIKA SMALL  Referring Physician: Gary Bey  Performed By: Andrei Rodriguez     BSA: 2.5 m2  Height: 73 in  Weight: 286 lb  BP: 121/71 mmHg  ______________________________________________________________________________  Procedure  Limited Portable Echo Adult.  ______________________________________________________________________________  Interpretation Summary     Small pericardial effusion  There are no echocardiographic indications of cardiac tamponade.  Small left pleural effusion  ______________________________________________________________________________  Left Ventricle  The visual ejection fraction is 60-65%. Regional wall motion abnormalities  cannot be excluded due to limited visualization.     Right Ventricle  The right ventricle is normal in size and function.     Atria  Lipomatous hypertrophy of the interatrial septum is noted.     Vessels  Non dilated IVC.     Pericardium  There are no echocardiographic indications of cardiac tamponade. Small  pericardial effusion. The pericardial effusion is located mainly by the  anterolateral wall. Small left pleural effusion.     Rhythm  The rhythm was undetermined.  ______________________________________________________________________________  Report approved by: Kevin Berman 10/05/2022 03:13 PM     ______________________________________________________________________________      Echocardiogram Limited     Value    LVEF  60-65%    Narrative    [id number]  [id number]  [id number]  [id number]^SYBIL^ROBERT^DARRELL     [facility]  Echocardiography Laboratory  [address] [place] [id number]     Name: CATHERINE NORIEGA  MRN: [id number]  : 1950  Study Date: 10/07/2022 09:27 AM  Age: 72 yrs  Gender: Male  Patient Location: [facility]  Reason For Study: Pericardial Effusion  Ordering Physician: ROBERT DEVINE  Referring Physician: Gary Bey  Performed By: Andrei  Michael     BSA: 2.5 m2  Height: 73 in  Weight: 286 lb  HR: 96  BP: 105/74 mmHg  ______________________________________________________________________________  Procedure  Limited Portable Echo Adult.  ______________________________________________________________________________  Interpretation Summary     There is a moderate pericardial effusion localized around the posterior  aspect, measuring up to 3cm. There is trace effusion anteriorly, with  thickening of the pericardium suggesting organizing effusion. Right atrium is  not well visualized, on some views it appears slightly compressed, however  this appears similar on views from the 10/5/2022 study, and on that study  there were additional views where the right atrium appears normal. No RV  chamber collapse, IVC is normal in size with normal response to respiration.  Pericardial effusion size is stable from the prior study 10/5/2022.  ______________________________________________________________________________  Left Ventricle  The left ventricle is normal in size. Left ventricular systolic function is  normal. The visual ejection fraction is 60-65%.     Vessels  The inferior vena cava was normal in size with preserved respiratory  variability.     Pericardium  There is a moderate pericardial effusion localized around the posterior  aspect, measuring up to 3cm. There is tracel effusion anteriorly, with  thickening of the pericardium suggesting organizing effusion. Right atrium is  not well visualized, on some views it appears slightly compressed, however  this appears similar on views from the 10/5/2022 study, and on that study  there were additional views where the right atrium appears normal. No RV  chamber collapse, IVC is normal in size with normal response to respiration.  Pericardial effusion size is stable from the prior study 10/5/2022.     ______________________________________________________________________________  MMode/2D Measurements &  Calculations  IVSd: 1.0 cm  LVIDd: 4.5 cm  LVIDs: 2.7 cm  LVPWd: 1.6 cm  FS: 39.1 %  LV mass(C)d: 228.9 grams  LV mass(C)dI: 91.4 grams/m2  LA dimension: 4.3 cm  RWT: 0.72     ______________________________________________________________________________  Report approved by: Kevin Priest 10/07/2022 12:00 PM         Cardiac Catheterization    Narrative    Patient came down to Cath Lab and underwent preliminary echocardiogram.    There is a small pericardial effusion mainly directed posteriorly with   organization of material compared to prior studies.  There is no apical   window or substernal noted upon examination.  Discussed findings with   referring cardiology team along with the patient, and no attempt at   pericardiocentesis was made.  Patient taken back to floor for continued   care.

## (undated) DEVICE — DRAPE LAP W/ARMBOARD 29410

## (undated) DEVICE — ENDO TROCAR SLEEVE KII Z-THREADED 05X100MM CTS02

## (undated) DEVICE — SUCTION IRR SYSTEM W/O TIP INTERPULSE HANDPIECE 0210-100-000

## (undated) DEVICE — GLOVE PROTEXIS BLUE W/NEU-THERA 8.0  2D73EB80

## (undated) DEVICE — LINEN TOWEL PACK X5 5464

## (undated) DEVICE — SOL WATER IRRIG 1000ML BOTTLE 2F7114

## (undated) DEVICE — DRSG STERI STRIP 1/2X4" R1547

## (undated) DEVICE — DRAPE STERI U 1015

## (undated) DEVICE — SU VICRYL 3-0 SH 27" J316H

## (undated) DEVICE — SU VICRYL 2-0 CT-1 27" UND J259H

## (undated) DEVICE — GOWN XLG DISP 9545

## (undated) DEVICE — PREP CHLORAPREP 26ML TINTED ORANGE  260815

## (undated) DEVICE — DRSG TELFA ISLAND 4X10"

## (undated) DEVICE — DRAPE IOBAN INCISE 23X17" 6650EZ

## (undated) DEVICE — ESU HOLDER LAP INST DISP PURPLE LONG 330MM H-PRO-330

## (undated) DEVICE — SET HANDPIECE INTERPULSE W/COAXIAL FAN SPRAY TIP 0210118000

## (undated) DEVICE — SUCTION TIP YANKAUER STR K87

## (undated) DEVICE — ESU PENCIL W/HOLSTER E2350H

## (undated) DEVICE — SU VICRYL 3-0 SH CR 8X18" J774

## (undated) DEVICE — SOL NACL 0.9% IRRIG 3000ML BAG 2B7477

## (undated) DEVICE — PREP CHLORAPREP 26ML TINTED HI-LITE ORANGE 930815

## (undated) DEVICE — ESU GROUND PAD UNIVERSAL W/O CORD

## (undated) DEVICE — GLOVE PROTEXIS POWDER FREE SMT 8.0  2D72PT80X

## (undated) DEVICE — DRSG AQUACEL AG 3.5X9.75" HYDROFIBER 412011

## (undated) DEVICE — DRSG BANDAID 1X3" FABRIC

## (undated) DEVICE — BONE CLEANING TIP INTERPULSE  0210-010-000

## (undated) DEVICE — SUTURE MONOCRYL+ 2-0 CT-1 36" UNDYED MCP945H

## (undated) DEVICE — ENDO SCOPE WARMER LF TM500

## (undated) DEVICE — BLADE SAW SAGITTAL STRK 25X75X0.89MM SYS 6 6125-089-075

## (undated) DEVICE — Device

## (undated) DEVICE — LINEN DRAPE 54X72" 5467

## (undated) DEVICE — BNDG ABDOMINAL BINDER 9X62-84" 79-89210

## (undated) DEVICE — SOL NACL 0.9% INJ 1000ML BAG 2B1324X

## (undated) DEVICE — SOL NACL 0.9% IRRIG 1000ML BOTTLE 2F7124

## (undated) DEVICE — GLOVE PROTEXIS W/NEU-THERA 7.5  2D73TE75

## (undated) DEVICE — SUCTION CANISTER MEDIVAC LINER 3000ML W/LID 65651-530

## (undated) DEVICE — MANIFOLD KIT ANGIO AUTOMATED 014613

## (undated) DEVICE — GLOVE PROTEXIS W/NEU-THERA 7.0  2D73TE70

## (undated) DEVICE — SUCTION IRR STRYKERFLOW II W/TIP 250-070-520

## (undated) DEVICE — SU VICRYL 4-0 PS-2 18" UND J496H

## (undated) DEVICE — GLOVE PROTEXIS BLUE W/NEU-THERA 7.5  2D73EB75

## (undated) DEVICE — STPL ENDO RELOAD ECHELON 45X2.0MM GREY ECR45M

## (undated) DEVICE — PACK MINOR SBA15MIFSE

## (undated) DEVICE — STPL RELOAD LINEAR CUT 75MM TCR75

## (undated) DEVICE — TOURNIQUET SGL BLADDER 34"X4" PURPLE 5921-034-135

## (undated) DEVICE — STPL LINEAR CUT 75MM TLC75

## (undated) DEVICE — DRAIN ROUND W/RESERV KIT JACKSON PRATT 10FR 400ML SU130-402D

## (undated) DEVICE — PACK TOTAL KNEE BOXED LATEX FREE PO15TKFCT

## (undated) DEVICE — BLADE SAW SAGITTAL STRK 18X90X1.27MM HD SYS 6 6118-127-090

## (undated) DEVICE — DRSG TELFA 3X8" 1238

## (undated) DEVICE — MANIFOLD NEPTUNE 4 PORT 700-20

## (undated) DEVICE — GLOVE PROTEXIS W/NEU-THERA 8.0  2D73TE80

## (undated) DEVICE — ENDO TROCAR FIRST ENTRY KII FIOS Z-THRD 12X100MM CTF73

## (undated) DEVICE — SU MONOCRYL 2-0 CT-1 36" UND Y945H

## (undated) DEVICE — SU VICRYL 1 MO-4 18" J702D

## (undated) DEVICE — RX VISTASEAL FIBRIN SEALANT W/THROMBIN 10ML VST10

## (undated) DEVICE — BLADE CLIPPER SGL USE 9680

## (undated) DEVICE — SU VICRYL 0 CT-1 CR 8X18" J740D

## (undated) DEVICE — SPONGE LAP 18X18" X8435

## (undated) DEVICE — SYR BULB IRRIG 50ML LATEX FREE 0035280

## (undated) DEVICE — GLOVE PROTEXIS POWDER FREE 7.5 ORTHOPEDIC 2D73ET75

## (undated) DEVICE — SU VICRYL 0 UR-6 27" J603H

## (undated) DEVICE — BLADE CLIPPER 4412A

## (undated) DEVICE — SU STRATAFIX PDS PLUS 1 CT-1 12" SXPP1A443

## (undated) DEVICE — KIT HAND CONTROL ANGIOTOUCH ACIST 65CM AT-P65

## (undated) DEVICE — SU VICRYL 3-0 SH 27" UND J416H

## (undated) DEVICE — ESU ELEC BLADE 2.75" COATED/INSULATED E1455

## (undated) DEVICE — TOURNIQUET CUFF 34" REPRO BROWN 60-7070-106

## (undated) DEVICE — GLOVE PROTEXIS POWDER FREE 7.0 ORTHOPEDIC 2D73ET70

## (undated) DEVICE — CAST PADDING 6" STERILE 9046S

## (undated) DEVICE — GLOVE PROTEXIS BLUE W/NEU-THERA 7.0  2D73EB70

## (undated) DEVICE — LINEN FULL SHEET 5511

## (undated) DEVICE — PACK MAJOR SBA15MAFSI

## (undated) DEVICE — SUCTION MANIFOLD NEPTUNE 2 SYS 4 PORT 0702-020-000

## (undated) DEVICE — SU VICRYL 0 CT-1 CR 8X27" UND JJ41G

## (undated) DEVICE — KIT DRAIN 6FR CATHETER PIGTAIL PERICARDIOCENTESIS PC101/A

## (undated) DEVICE — WRAP EZY KNEE

## (undated) DEVICE — GLOVE PROTEXIS MICRO 7.5  2D73PM75

## (undated) DEVICE — TOTE ANGIO CORP PC15AT SAN32CC83O

## (undated) DEVICE — INTRODUCER CATH VASC 5FRX10CM  MPIS-501-NT-U-SST

## (undated) DEVICE — INTRO SHEATH 6FRX10CM PINNACLE RSS602

## (undated) DEVICE — BONE CEMENT MIXEVAC III HI VAC KIT  0206-015-000

## (undated) DEVICE — ESU GROUND PAD ADULT W/CORD E7507

## (undated) DEVICE — CLOSURE SYS SKIN PREMIERPRO EXOFIN FUSION 4X22CM STRL 3472

## (undated) DEVICE — HOOD FLYTE W/PEELAWAY 408-800-100

## (undated) DEVICE — LINEN ORTHO ACL PACK 5447

## (undated) DEVICE — PACK TOTAL KNEE SOP15TKFSD

## (undated) DEVICE — BLADE SAW SAGITTAL STRK 25X79.5X1.24MM 4/2000 2108-318-000

## (undated) DEVICE — SU STRATAFIX PDS PLUS 1 CT-1 18" SXPP1A404

## (undated) DEVICE — ENDO TROCAR FIRST ENTRY KII FIOS Z-THRD 05X100MM CTF03

## (undated) DEVICE — ESU PENCIL W/SMOKE EVAC CVPLP2000

## (undated) DEVICE — PACK LAP CHOLE SLC15LCFSD

## (undated) DEVICE — SU VICRYL 2-0 TIE 12X18" J905T

## (undated) DEVICE — SU VICRYL+ 1 MO-4 18" DYED VCP702D

## (undated) DEVICE — SU PDS II 2-0 CT-2 27"  Z333H

## (undated) DEVICE — ESU LIGASURE IMPACT OPEN SEALER/DVDR CVD LG JAW LF4418

## (undated) DEVICE — DECANTER VIAL 2006S

## (undated) DEVICE — DEFIB PRO-PADZ LVP LQD GEL ADULT 8900-2105-01

## (undated) DEVICE — BAG CLEAR TRASH 1.3M 39X33" P4040C

## (undated) DEVICE — STPL RELOAD REG TISSUE ECHELON 45 X 3.6MM BLUE GST45B

## (undated) DEVICE — STPL POWERED ECHELON 45MM PSEE45A

## (undated) DEVICE — LIGHT HANDLE X2

## (undated) RX ORDER — FENTANYL CITRATE 50 UG/ML
INJECTION, SOLUTION INTRAMUSCULAR; INTRAVENOUS
Status: DISPENSED
Start: 2022-01-01

## (undated) RX ORDER — GLYCOPYRROLATE 0.2 MG/ML
INJECTION, SOLUTION INTRAMUSCULAR; INTRAVENOUS
Status: DISPENSED
Start: 2020-09-19

## (undated) RX ORDER — PROPOFOL 10 MG/ML
INJECTION, EMULSION INTRAVENOUS
Status: DISPENSED
Start: 2021-02-10

## (undated) RX ORDER — HYDROMORPHONE HYDROCHLORIDE 1 MG/ML
INJECTION, SOLUTION INTRAMUSCULAR; INTRAVENOUS; SUBCUTANEOUS
Status: DISPENSED
Start: 2020-09-27

## (undated) RX ORDER — PROPOFOL 10 MG/ML
INJECTION, EMULSION INTRAVENOUS
Status: DISPENSED
Start: 2021-05-05

## (undated) RX ORDER — FENTANYL CITRATE 0.05 MG/ML
INJECTION, SOLUTION INTRAMUSCULAR; INTRAVENOUS
Status: DISPENSED
Start: 2021-05-05

## (undated) RX ORDER — PIPERACILLIN SODIUM, TAZOBACTAM SODIUM 3; .375 G/15ML; G/15ML
INJECTION, POWDER, LYOPHILIZED, FOR SOLUTION INTRAVENOUS
Status: DISPENSED
Start: 2020-09-19

## (undated) RX ORDER — FENTANYL CITRATE 50 UG/ML
INJECTION, SOLUTION INTRAMUSCULAR; INTRAVENOUS
Status: DISPENSED
Start: 2021-05-10

## (undated) RX ORDER — FENTANYL CITRATE 50 UG/ML
INJECTION, SOLUTION INTRAMUSCULAR; INTRAVENOUS
Status: DISPENSED
Start: 2020-09-27

## (undated) RX ORDER — PROPOFOL 10 MG/ML
INJECTION, EMULSION INTRAVENOUS
Status: DISPENSED
Start: 2021-05-10

## (undated) RX ORDER — VANCOMYCIN HYDROCHLORIDE 1 G/20ML
INJECTION, POWDER, LYOPHILIZED, FOR SOLUTION INTRAVENOUS
Status: DISPENSED
Start: 2021-02-10

## (undated) RX ORDER — HYDROMORPHONE HCL IN WATER/PF 6 MG/30 ML
PATIENT CONTROLLED ANALGESIA SYRINGE INTRAVENOUS
Status: DISPENSED
Start: 2022-01-01

## (undated) RX ORDER — FENTANYL CITRATE 50 UG/ML
INJECTION, SOLUTION INTRAMUSCULAR; INTRAVENOUS
Status: DISPENSED
Start: 2021-02-10

## (undated) RX ORDER — PROPOFOL 10 MG/ML
INJECTION, EMULSION INTRAVENOUS
Status: DISPENSED
Start: 2022-01-01

## (undated) RX ORDER — CEFAZOLIN SODIUM 1 G/3ML
INJECTION, POWDER, FOR SOLUTION INTRAMUSCULAR; INTRAVENOUS
Status: DISPENSED
Start: 2022-01-01

## (undated) RX ORDER — BUPIVACAINE HYDROCHLORIDE 5 MG/ML
INJECTION, SOLUTION EPIDURAL; INTRACAUDAL
Status: DISPENSED
Start: 2021-02-10

## (undated) RX ORDER — HYDRALAZINE HYDROCHLORIDE 20 MG/ML
INJECTION INTRAMUSCULAR; INTRAVENOUS
Status: DISPENSED
Start: 2021-05-05

## (undated) RX ORDER — LIDOCAINE HYDROCHLORIDE 10 MG/ML
INJECTION, SOLUTION INFILTRATION; PERINEURAL
Status: DISPENSED
Start: 2021-05-10

## (undated) RX ORDER — ONDANSETRON 2 MG/ML
INJECTION INTRAMUSCULAR; INTRAVENOUS
Status: DISPENSED
Start: 2021-05-05

## (undated) RX ORDER — FENTANYL CITRATE-0.9 % NACL/PF 10 MCG/ML
PLASTIC BAG, INJECTION (ML) INTRAVENOUS
Status: DISPENSED
Start: 2021-02-10

## (undated) RX ORDER — ROPIVACAINE HYDROCHLORIDE 5 MG/ML
INJECTION, SOLUTION EPIDURAL; INFILTRATION; PERINEURAL
Status: DISPENSED
Start: 2022-01-01

## (undated) RX ORDER — HEPARIN SODIUM 1000 [USP'U]/ML
INJECTION, SOLUTION INTRAVENOUS; SUBCUTANEOUS
Status: DISPENSED
Start: 2022-01-01

## (undated) RX ORDER — NEOSTIGMINE METHYLSULFATE 1 MG/ML
VIAL (ML) INJECTION
Status: DISPENSED
Start: 2020-09-27

## (undated) RX ORDER — DEXAMETHASONE SODIUM PHOSPHATE 4 MG/ML
INJECTION, SOLUTION INTRA-ARTICULAR; INTRALESIONAL; INTRAMUSCULAR; INTRAVENOUS; SOFT TISSUE
Status: DISPENSED
Start: 2021-02-10

## (undated) RX ORDER — DEXAMETHASONE SODIUM PHOSPHATE 4 MG/ML
INJECTION, SOLUTION INTRA-ARTICULAR; INTRALESIONAL; INTRAMUSCULAR; INTRAVENOUS; SOFT TISSUE
Status: DISPENSED
Start: 2021-05-05

## (undated) RX ORDER — BUPIVACAINE HYDROCHLORIDE AND EPINEPHRINE 2.5; 5 MG/ML; UG/ML
INJECTION, SOLUTION EPIDURAL; INFILTRATION; INTRACAUDAL; PERINEURAL
Status: DISPENSED
Start: 2021-05-10

## (undated) RX ORDER — ONDANSETRON 2 MG/ML
INJECTION INTRAMUSCULAR; INTRAVENOUS
Status: DISPENSED
Start: 2020-09-27

## (undated) RX ORDER — FENTANYL CITRATE 50 UG/ML
INJECTION, SOLUTION INTRAMUSCULAR; INTRAVENOUS
Status: DISPENSED
Start: 2021-05-05

## (undated) RX ORDER — TRANEXAMIC ACID 650 MG/1
TABLET ORAL
Status: DISPENSED
Start: 2022-01-01

## (undated) RX ORDER — VANCOMYCIN HYDROCHLORIDE 1 G/20ML
INJECTION, POWDER, LYOPHILIZED, FOR SOLUTION INTRAVENOUS
Status: DISPENSED
Start: 2022-01-01

## (undated) RX ORDER — CEFAZOLIN SODIUM 2 G/100ML
INJECTION, SOLUTION INTRAVENOUS
Status: DISPENSED
Start: 2021-02-10

## (undated) RX ORDER — TRANEXAMIC ACID 650 MG/1
TABLET ORAL
Status: DISPENSED
Start: 2021-02-10

## (undated) RX ORDER — ONDANSETRON 2 MG/ML
INJECTION INTRAMUSCULAR; INTRAVENOUS
Status: DISPENSED
Start: 2022-01-01

## (undated) RX ORDER — LIDOCAINE HYDROCHLORIDE 10 MG/ML
INJECTION, SOLUTION EPIDURAL; INFILTRATION; INTRACAUDAL; PERINEURAL
Status: DISPENSED
Start: 2021-02-10

## (undated) RX ORDER — FENTANYL CITRATE 50 UG/ML
INJECTION, SOLUTION INTRAMUSCULAR; INTRAVENOUS
Status: DISPENSED
Start: 2020-09-19

## (undated) RX ORDER — FENTANYL CITRATE 50 UG/ML
INJECTION, SOLUTION INTRAMUSCULAR; INTRAVENOUS
Status: DISPENSED
Start: 2021-06-22

## (undated) RX ORDER — HYDROMORPHONE HYDROCHLORIDE 1 MG/ML
INJECTION, SOLUTION INTRAMUSCULAR; INTRAVENOUS; SUBCUTANEOUS
Status: DISPENSED
Start: 2021-05-11

## (undated) RX ORDER — NEOSTIGMINE METHYLSULFATE 1 MG/ML
VIAL (ML) INJECTION
Status: DISPENSED
Start: 2020-09-19

## (undated) RX ORDER — FLUMAZENIL 0.1 MG/ML
INJECTION, SOLUTION INTRAVENOUS
Status: DISPENSED
Start: 2021-06-22

## (undated) RX ORDER — LIDOCAINE HYDROCHLORIDE 10 MG/ML
INJECTION, SOLUTION EPIDURAL; INFILTRATION; INTRACAUDAL; PERINEURAL
Status: DISPENSED
Start: 2022-01-01

## (undated) RX ORDER — CELECOXIB 200 MG/1
CAPSULE ORAL
Status: DISPENSED
Start: 2022-01-01

## (undated) RX ORDER — NEOSTIGMINE METHYLSULFATE 1 MG/ML
VIAL (ML) INJECTION
Status: DISPENSED
Start: 2021-05-11

## (undated) RX ORDER — LIDOCAINE HYDROCHLORIDE 20 MG/ML
INJECTION, SOLUTION EPIDURAL; INFILTRATION; INTRACAUDAL; PERINEURAL
Status: DISPENSED
Start: 2020-09-19

## (undated) RX ORDER — HYDROMORPHONE HYDROCHLORIDE 1 MG/ML
INJECTION, SOLUTION INTRAMUSCULAR; INTRAVENOUS; SUBCUTANEOUS
Status: DISPENSED
Start: 2021-05-05

## (undated) RX ORDER — ACETAMINOPHEN 325 MG/1
TABLET ORAL
Status: DISPENSED
Start: 2022-01-01

## (undated) RX ORDER — ONDANSETRON 2 MG/ML
INJECTION INTRAMUSCULAR; INTRAVENOUS
Status: DISPENSED
Start: 2020-09-19

## (undated) RX ORDER — CEFAZOLIN SODIUM IN 0.9 % NACL 3 G/100 ML
INTRAVENOUS SOLUTION, PIGGYBACK (ML) INTRAVENOUS
Status: DISPENSED
Start: 2021-05-05

## (undated) RX ORDER — LIDOCAINE HYDROCHLORIDE 20 MG/ML
INJECTION, SOLUTION EPIDURAL; INFILTRATION; INTRACAUDAL; PERINEURAL
Status: DISPENSED
Start: 2020-09-27

## (undated) RX ORDER — FENTANYL CITRATE 0.05 MG/ML
INJECTION, SOLUTION INTRAMUSCULAR; INTRAVENOUS
Status: DISPENSED
Start: 2022-01-01

## (undated) RX ORDER — ACETAMINOPHEN 325 MG/1
TABLET ORAL
Status: DISPENSED
Start: 2021-02-10

## (undated) RX ORDER — DEXAMETHASONE SODIUM PHOSPHATE 4 MG/ML
INJECTION, SOLUTION INTRA-ARTICULAR; INTRALESIONAL; INTRAMUSCULAR; INTRAVENOUS; SOFT TISSUE
Status: DISPENSED
Start: 2022-01-01

## (undated) RX ORDER — ONDANSETRON 2 MG/ML
INJECTION INTRAMUSCULAR; INTRAVENOUS
Status: DISPENSED
Start: 2021-02-10

## (undated) RX ORDER — VECURONIUM BROMIDE 1 MG/ML
INJECTION, POWDER, LYOPHILIZED, FOR SOLUTION INTRAVENOUS
Status: DISPENSED
Start: 2021-05-11

## (undated) RX ORDER — METHYLPREDNISOLONE ACETATE 40 MG/ML
INJECTION, SUSPENSION INTRA-ARTICULAR; INTRALESIONAL; INTRAMUSCULAR; SOFT TISSUE
Status: DISPENSED
Start: 2021-02-10

## (undated) RX ORDER — DEXAMETHASONE SODIUM PHOSPHATE 4 MG/ML
INJECTION, SOLUTION INTRA-ARTICULAR; INTRALESIONAL; INTRAMUSCULAR; INTRAVENOUS; SOFT TISSUE
Status: DISPENSED
Start: 2020-09-27

## (undated) RX ORDER — HEPARIN SODIUM (PORCINE) LOCK FLUSH IV SOLN 100 UNIT/ML 100 UNIT/ML
SOLUTION INTRAVENOUS
Status: DISPENSED
Start: 2022-01-01

## (undated) RX ORDER — NALOXONE HYDROCHLORIDE 0.4 MG/ML
INJECTION, SOLUTION INTRAMUSCULAR; INTRAVENOUS; SUBCUTANEOUS
Status: DISPENSED
Start: 2021-06-22

## (undated) RX ORDER — LIDOCAINE HYDROCHLORIDE 20 MG/ML
INJECTION, SOLUTION EPIDURAL; INFILTRATION; INTRACAUDAL; PERINEURAL
Status: DISPENSED
Start: 2021-05-05

## (undated) RX ORDER — GLYCOPYRROLATE 0.2 MG/ML
INJECTION, SOLUTION INTRAMUSCULAR; INTRAVENOUS
Status: DISPENSED
Start: 2020-09-27

## (undated) RX ORDER — GLYCOPYRROLATE 0.2 MG/ML
INJECTION, SOLUTION INTRAMUSCULAR; INTRAVENOUS
Status: DISPENSED
Start: 2021-05-11

## (undated) RX ORDER — PROPOFOL 10 MG/ML
INJECTION, EMULSION INTRAVENOUS
Status: DISPENSED
Start: 2020-09-19